# Patient Record
Sex: FEMALE | Race: WHITE | NOT HISPANIC OR LATINO | Employment: OTHER | URBAN - METROPOLITAN AREA
[De-identification: names, ages, dates, MRNs, and addresses within clinical notes are randomized per-mention and may not be internally consistent; named-entity substitution may affect disease eponyms.]

---

## 2017-02-15 ENCOUNTER — HOSPITAL ENCOUNTER (OUTPATIENT)
Dept: FAMILY MEDICINE CLINIC | Facility: CLINIC | Age: 72
Setting detail: SPECIMEN
Discharge: HOME | End: 2017-02-15
Attending: FAMILY MEDICINE | Admitting: FAMILY MEDICINE

## 2017-02-15 LAB
BILIRUB UR QL STRIP: NEGATIVE MG/DL
CASTS URNS QL MICRO: ABNORMAL /[LPF]
COLOR UR: YELLOW
CONV BACTERIA IN URINE MICRO: NEGATIVE
CONV CLARITY OF URINE: CLEAR
CONV HYALINE CASTS IN URINE MICRO: 0 /[LPF] (ref 0–5)
CONV PROTEIN IN URINE BY AUTOMATED TEST STRIP: NEGATIVE MG/DL
CONV SMALL ROUND CELLS: ABNORMAL /[HPF]
CONV UROBILINOGEN IN URINE BY AUTOMATED TEST STRIP: 0.2 MG/DL
CULTURE INDICATED?: ABNORMAL
GLUCOSE UR QL: NEGATIVE MG/DL
HGB UR QL STRIP: ABNORMAL
KETONES UR QL STRIP: NEGATIVE MG/DL
LEUKOCYTE ESTERASE UR QL STRIP: NEGATIVE
NITRITE UR QL STRIP: NEGATIVE
PH UR STRIP.AUTO: 6.5 [PH] (ref 4.5–8)
RBC #/AREA URNS HPF: 5 /[HPF] (ref 0–3)
SP GR UR: 1.01 (ref 1–1.03)
SPERM URNS QL MICRO: ABNORMAL /[HPF]
SQUAMOUS SPT QL MICRO: 0 /[HPF] (ref 0–5)
UNIDENT CRYS URNS QL MICRO: ABNORMAL /[HPF]
WBC #/AREA URNS HPF: 0 /[HPF] (ref 0–5)
YEAST SPEC QL WET PREP: ABNORMAL /[HPF]

## 2017-09-20 ENCOUNTER — HOSPITAL ENCOUNTER (OUTPATIENT)
Dept: FAMILY MEDICINE CLINIC | Facility: CLINIC | Age: 72
Setting detail: SPECIMEN
Discharge: HOME | End: 2017-09-20
Attending: FAMILY MEDICINE | Admitting: FAMILY MEDICINE

## 2017-12-06 ENCOUNTER — HOSPITAL ENCOUNTER (OUTPATIENT)
Dept: FAMILY MEDICINE CLINIC | Facility: CLINIC | Age: 72
Setting detail: SPECIMEN
Discharge: HOME | End: 2017-12-06
Attending: FAMILY MEDICINE | Admitting: FAMILY MEDICINE

## 2018-01-17 ENCOUNTER — HOSPITAL ENCOUNTER (OUTPATIENT)
Dept: CARDIOLOGY | Facility: HOSPITAL | Age: 73
Discharge: HOME | End: 2018-01-17
Attending: INTERNAL MEDICINE | Admitting: INTERNAL MEDICINE

## 2018-01-24 ENCOUNTER — HOSPITAL ENCOUNTER (OUTPATIENT)
Dept: CARDIOLOGY | Facility: HOSPITAL | Age: 73
Discharge: HOME | End: 2018-01-24
Attending: INTERNAL MEDICINE | Admitting: INTERNAL MEDICINE

## 2019-01-11 ENCOUNTER — HOSPITAL ENCOUNTER (OUTPATIENT)
Dept: CARDIOLOGY | Facility: HOSPITAL | Age: 74
Discharge: HOME | End: 2019-01-11
Attending: INTERNAL MEDICINE | Admitting: INTERNAL MEDICINE

## 2019-02-05 ENCOUNTER — HOSPITAL ENCOUNTER (OUTPATIENT)
Dept: FAMILY MEDICINE CLINIC | Facility: CLINIC | Age: 74
Setting detail: SPECIMEN
Discharge: HOME | End: 2019-02-05
Attending: FAMILY MEDICINE | Admitting: FAMILY MEDICINE

## 2019-02-05 LAB
ANION GAP SERPL CALC-SCNC: 16.5 MMOL/L (ref 10–20)
BUN SERPL-MCNC: 24 MG/DL (ref 8–20)
BUN/CREAT SERPL: 15 (ref 5.4–26.2)
CALCIUM SERPL-MCNC: 9 MG/DL (ref 8.9–10.3)
CHLORIDE SERPL-SCNC: 100 MMOL/L (ref 101–111)
CONV CO2: 23 MMOL/L (ref 22–32)
CREAT UR-MCNC: 1.6 MG/DL (ref 0.4–1)
GLUCOSE SERPL-MCNC: 108 MG/DL (ref 65–99)
POTASSIUM SERPL-SCNC: 4.5 MMOL/L (ref 3.6–5.1)
SODIUM SERPL-SCNC: 135 MMOL/L (ref 136–144)

## 2019-07-01 ENCOUNTER — OFFICE VISIT (OUTPATIENT)
Dept: FAMILY MEDICINE CLINIC | Facility: CLINIC | Age: 74
End: 2019-07-01

## 2019-07-01 VITALS
HEIGHT: 63 IN | TEMPERATURE: 97.8 F | BODY MASS INDEX: 28.53 KG/M2 | HEART RATE: 73 BPM | OXYGEN SATURATION: 97 % | RESPIRATION RATE: 14 BRPM | WEIGHT: 161 LBS | DIASTOLIC BLOOD PRESSURE: 87 MMHG | SYSTOLIC BLOOD PRESSURE: 145 MMHG

## 2019-07-01 DIAGNOSIS — I10 ESSENTIAL HYPERTENSION: ICD-10-CM

## 2019-07-01 DIAGNOSIS — F41.9 ANXIETY: Primary | ICD-10-CM

## 2019-07-01 PROBLEM — R32 URINARY INCONTINENCE: Status: ACTIVE | Noted: 2017-07-27

## 2019-07-01 PROBLEM — R89.9 ABNORMAL LABORATORY TEST: Status: ACTIVE | Noted: 2017-09-21

## 2019-07-01 PROBLEM — R89.9 ABNORMAL LABORATORY TEST RESULT: Status: ACTIVE | Noted: 2019-02-05

## 2019-07-01 PROBLEM — Z12.31 VISIT FOR SCREENING MAMMOGRAM: Status: ACTIVE | Noted: 2017-07-27

## 2019-07-01 PROBLEM — M79.643 HAND PAIN: Status: ACTIVE | Noted: 2017-12-06

## 2019-07-01 PROBLEM — M81.0 OSTEOPOROSIS: Status: ACTIVE | Noted: 2019-07-01

## 2019-07-01 PROBLEM — R63.5 WEIGHT GAIN: Status: ACTIVE | Noted: 2017-02-15

## 2019-07-01 PROBLEM — K76.82 ENCEPHALOPATHY, PORTAL SYSTEMIC: Status: ACTIVE | Noted: 2019-07-01

## 2019-07-01 PROBLEM — R10.13 EPIGASTRIC PAIN: Status: ACTIVE | Noted: 2017-09-20

## 2019-07-01 PROBLEM — R10.9 ABDOMINAL CRAMPS: Status: ACTIVE | Noted: 2017-09-20

## 2019-07-01 PROBLEM — R79.89 SERUM CREATININE RAISED: Status: ACTIVE | Noted: 2017-09-21

## 2019-07-01 PROBLEM — N76.0 VAGINITIS AND VULVOVAGINITIS: Status: ACTIVE | Noted: 2018-08-03

## 2019-07-01 PROBLEM — R41.3 OTHER AMNESIA: Status: ACTIVE | Noted: 2019-02-05

## 2019-07-01 PROBLEM — R00.2 PALPITATIONS: Status: ACTIVE | Noted: 2017-12-20

## 2019-07-01 PROBLEM — K21.9 GASTROESOPHAGEAL REFLUX DISEASE: Status: ACTIVE | Noted: 2017-09-20

## 2019-07-01 PROBLEM — E53.8 DEFICIENCY OF OTHER SPECIFIED B GROUP VITAMINS: Status: ACTIVE | Noted: 2019-07-01

## 2019-07-01 PROBLEM — E87.5 HYPERKALEMIA: Status: ACTIVE | Noted: 2019-02-05

## 2019-07-01 PROBLEM — I35.1 AORTIC VALVE REGURGITATION: Status: ACTIVE | Noted: 2018-01-24

## 2019-07-01 PROBLEM — N39.0 URINARY TRACT INFECTION: Status: ACTIVE | Noted: 2017-09-20

## 2019-07-01 PROBLEM — Z91.81 HX OF FALL: Status: ACTIVE | Noted: 2017-12-06

## 2019-07-01 PROBLEM — R06.09 DYSPNEA ON EXERTION: Status: ACTIVE | Noted: 2017-12-20

## 2019-07-01 PROBLEM — R11.0 NAUSEA: Status: ACTIVE | Noted: 2017-09-20

## 2019-07-01 PROBLEM — L98.9 SKIN LESION OF FACE: Status: ACTIVE | Noted: 2017-07-27

## 2019-07-01 PROBLEM — E78.5 HYPERLIPIDEMIA: Status: ACTIVE | Noted: 2019-07-01

## 2019-07-01 PROBLEM — R82.90 ABNORMAL URINE FINDING: Status: ACTIVE | Noted: 2017-02-15

## 2019-07-01 PROBLEM — R42 DIZZINESS: Status: ACTIVE | Noted: 2017-12-20

## 2019-07-01 PROBLEM — K58.9 IRRITABLE BOWEL SYNDROME: Status: ACTIVE | Noted: 2019-07-01

## 2019-07-01 PROBLEM — R51.9 HEADACHE: Status: ACTIVE | Noted: 2017-12-06

## 2019-07-01 PROBLEM — R53.83 FATIGUE: Status: ACTIVE | Noted: 2017-02-15

## 2019-07-01 PROBLEM — Z23 ENCOUNTER FOR IMMUNIZATION: Status: ACTIVE | Noted: 2018-08-03

## 2019-07-01 PROBLEM — R42 INTERMITTENT VERTIGO: Status: ACTIVE | Noted: 2019-02-05

## 2019-07-01 PROBLEM — M54.89 OTHER DORSALGIA: Status: ACTIVE | Noted: 2018-05-25

## 2019-07-01 PROCEDURE — 99213 OFFICE O/P EST LOW 20 MIN: CPT | Performed by: FAMILY MEDICINE

## 2019-07-01 RX ORDER — CLINDAMYCIN HYDROCHLORIDE 300 MG/1
1 CAPSULE ORAL EVERY 8 HOURS
COMMUNITY
Start: 2019-02-05 | End: 2019-08-27

## 2019-07-01 RX ORDER — METOPROLOL SUCCINATE 50 MG/1
50 TABLET, EXTENDED RELEASE ORAL DAILY
Qty: 90 TABLET | Refills: 0 | Status: SHIPPED | OUTPATIENT
Start: 2019-07-01 | End: 2019-08-16 | Stop reason: SDUPTHER

## 2019-07-01 RX ORDER — FENOFIBRATE 145 MG/1
1 TABLET, COATED ORAL EVERY EVENING
COMMUNITY
Start: 2019-06-03 | End: 2020-10-16

## 2019-07-01 RX ORDER — TACROLIMUS 1 MG/1
1 CAPSULE ORAL 2 TIMES DAILY
COMMUNITY
End: 2019-08-27

## 2019-07-01 RX ORDER — DICYCLOMINE HYDROCHLORIDE 10 MG/1
10 CAPSULE ORAL
Qty: 30 CAPSULE | Refills: 0 | Status: SHIPPED | OUTPATIENT
Start: 2019-07-01 | End: 2020-04-22

## 2019-07-01 RX ORDER — TRAMADOL HYDROCHLORIDE 50 MG/1
1 TABLET ORAL DAILY PRN
COMMUNITY
End: 2020-01-07

## 2019-07-01 RX ORDER — SPIRONOLACTONE 25 MG
1 TABLET ORAL DAILY
COMMUNITY

## 2019-07-01 RX ORDER — METOPROLOL SUCCINATE 50 MG/1
1 TABLET, EXTENDED RELEASE ORAL DAILY
COMMUNITY
Start: 2019-05-27 | End: 2019-07-01 | Stop reason: SDUPTHER

## 2019-07-01 RX ORDER — PANTOPRAZOLE SODIUM 40 MG/1
1 TABLET, DELAYED RELEASE ORAL DAILY
COMMUNITY
Start: 2017-01-31 | End: 2019-07-01 | Stop reason: SDUPTHER

## 2019-07-01 RX ORDER — VITAMIN E 268 MG
400 CAPSULE ORAL DAILY
COMMUNITY
End: 2022-05-06

## 2019-07-01 RX ORDER — ACETAMINOPHEN, ASPIRIN AND CAFFEINE 250; 250; 65 MG/1; MG/1; MG/1
1 TABLET, FILM COATED ORAL EVERY 6 HOURS PRN
COMMUNITY
End: 2022-12-07 | Stop reason: ALTCHOICE

## 2019-07-01 RX ORDER — BUSPIRONE HYDROCHLORIDE 15 MG/1
0.5 TABLET ORAL 2 TIMES DAILY
COMMUNITY
Start: 2016-03-14 | End: 2019-07-01 | Stop reason: SDUPTHER

## 2019-07-01 RX ORDER — ONDANSETRON 4 MG/1
1 TABLET, FILM COATED ORAL EVERY 8 HOURS
COMMUNITY
Start: 2017-09-20 | End: 2019-08-27 | Stop reason: SDUPTHER

## 2019-07-01 RX ORDER — LACTOBACILLUS RHAMNOSUS GG 10B CELL
2 CAPSULE ORAL DAILY
COMMUNITY
Start: 2018-05-25 | End: 2021-05-14

## 2019-07-01 RX ORDER — MECLIZINE HCL 12.5 MG/1
1 TABLET ORAL EVERY 6 HOURS PRN
COMMUNITY
Start: 2017-02-15 | End: 2021-03-16 | Stop reason: SDUPTHER

## 2019-07-01 RX ORDER — DIPHENHYDRAMINE HCL 25 MG
25 TABLET ORAL EVERY 6 HOURS PRN
COMMUNITY

## 2019-07-01 RX ORDER — BACLOFEN 10 MG/1
0.5 TABLET ORAL NIGHTLY PRN
COMMUNITY
Start: 2018-01-03 | End: 2019-07-01 | Stop reason: SDUPTHER

## 2019-07-01 RX ORDER — PHENYLEPHRINE HCL 10 MG/1
10 TABLET, FILM COATED ORAL EVERY 4 HOURS PRN
COMMUNITY
End: 2019-08-27

## 2019-07-01 RX ORDER — BACLOFEN 10 MG/1
5 TABLET ORAL 2 TIMES DAILY PRN
Qty: 30 TABLET | Refills: 1 | Status: SHIPPED | OUTPATIENT
Start: 2019-07-01 | End: 2019-12-16 | Stop reason: SDUPTHER

## 2019-07-01 RX ORDER — BUSPIRONE HYDROCHLORIDE 5 MG/1
2.5 TABLET ORAL 2 TIMES DAILY
Qty: 30 TABLET | Refills: 0 | Status: SHIPPED | OUTPATIENT
Start: 2019-07-01 | End: 2019-08-27 | Stop reason: SINTOL

## 2019-07-01 NOTE — PROGRESS NOTES
Called both of her #'s but no vm on either on  Subjective   Christal Romo is a 74 y.o. female.     Chief Complaint   Patient presents with   • Hypertension   • Anxiety         Current Outpatient Medications:   •  aspirin 81 MG tablet, Take 1 tablet by mouth Daily., Disp: , Rfl:   •  aspirin-acetaminophen-caffeine (EXCEDRIN MIGRAINE) 250-250-65 MG per tablet, Take 1 tablet by mouth Every 6 (Six) Hours As Needed for Headache., Disp: , Rfl:   •  baclofen (LIORESAL) 10 MG tablet, Take 0.5 tablets by mouth 2 (Two) Times a Day As Needed for Muscle Spasms., Disp: 30 tablet, Rfl: 1  •  busPIRone (BUSPAR) 5 MG tablet, Take 0.5 tablets by mouth 2 (Two) Times a Day., Disp: 30 tablet, Rfl: 0  •  clindamycin (CLEOCIN) 300 MG capsule, Take 1 capsule by mouth Every 8 (Eight) Hours. Prn dental procedure, Disp: , Rfl:   •  diphenhydrAMINE (BENADRYL) 25 MG tablet, Take 25 mg by mouth Every 6 (Six) Hours As Needed for Itching., Disp: , Rfl:   •  fenofibrate (TRICOR) 145 MG tablet, Take 1 tablet by mouth Every Evening., Disp: , Rfl:   •  Lutein 20 MG capsule, Take 1 tablet by mouth Daily., Disp: , Rfl:   •  meclizine (ANTIVERT) 12.5 MG tablet, Take 1 tablet by mouth Every 6 (Six) Hours As Needed., Disp: , Rfl:   •  metoprolol succinate XL (TOPROL-XL) 50 MG 24 hr tablet, Take 1 tablet by mouth Daily., Disp: 90 tablet, Rfl: 0  •  ondansetron (ZOFRAN) 4 MG tablet, Take 1 tablet by mouth Every 8 (Eight) Hours. prn, Disp: , Rfl:   •  phenylephrine (SUDAFED PE CONGESTION) 10 MG tablet, Take 10 mg by mouth Every 4 (Four) Hours As Needed for Congestion., Disp: , Rfl:   •  Probiotic Product (CULTURELLE PRO-WELL) capsule, Take 2 capsules by mouth Daily., Disp: , Rfl:   •  tacrolimus (PROGRAF) 1 MG capsule, Take 1 mg by mouth 2 (Two) Times a Day., Disp: , Rfl:   •  traMADol (ULTRAM) 50 MG tablet, Take 1 tablet by mouth Daily As Needed., Disp: , Rfl:   •  vitamin E 400 UNIT capsule, Take 400 Units by mouth 2 (Two) Times a Day., Disp: , Rfl:   •  dicyclomine (BENTYL) 10 MG capsule, Take 1  capsule by mouth 4 (Four) Times a Day Before Meals & at Bedtime As Needed (for abd bloating/ cramping)., Disp: 30 capsule, Rfl: 0    Past Medical History:   Diagnosis Date   • Anxiety    • B12 deficiency    • Depression    • Gastritis    • GERD (gastroesophageal reflux disease)    • H/O diagnostic tests     Dexa    • Hyperlipidemia    • Hypertension    • Insulin resistance    • Liver disease     s/p Transplant   • 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 - Dr Ritter [facility]; CBC/Prograf/CMP/LIPIDS/A1C   • Meniere's disease    • OAB (overactive bladder)    • Osteoporosis    • PUD (peptic ulcer disease)        Past Surgical History:   Procedure Laterality Date   • BREAST LUMPECTOMY Left     Benign   • COLONOSCOPY  2012    Polyps -  rech     • LIVER TRANSPLANTATION      [facility]   • MAMMO BILATERAL      Neg-    • OTHER SURGICAL HISTORY  2014    Liver Stent   • STEROID INJECTION KNEE      Knee Cortosone Injections   • TOTAL ABDOMINAL HYSTERECTOMY         Family History   Problem Relation Age of Onset   • Diabetes Sister         Type II       Social History     Socioeconomic History   • Marital status:      Spouse name: Not on file   • Number of children: Not on file   • Years of education: Not on file   • Highest education level: Not on file   Tobacco Use   • Smoking status: Former Smoker     Last attempt to quit:      Years since quittin.5   • Smokeless tobacco: Never Used   • Tobacco comment: Quit .    Substance and Sexual Activity   • Alcohol use: No     Frequency: Never   • Drug use: No   • Sexual activity: Defer       73 y/o C female here for f/u on mult conditions and med review    Pt stressed w/ the upcoming CABG Sx for her  and the Prograf med shortage for her liver transplant         The following portions of the patient's history were reviewed and updated as appropriate: allergies, current medications, past  family history, past medical history, past social history, past surgical history and problem list.    Review of Systems   Constitutional: Negative for activity change, fatigue and unexpected weight gain.   Respiratory: Negative for cough, chest tightness and shortness of breath.    Cardiovascular: Negative for chest pain, palpitations and leg swelling.   Gastrointestinal: Positive for abdominal distention (abd bloating) and diarrhea. Negative for abdominal pain.   Genitourinary: Negative for urinary incontinence, frequency and urgency.   Musculoskeletal: Negative for arthralgias and myalgias.   Neurological: Negative for dizziness, facial asymmetry, speech difficulty, weakness, light-headedness, headache, memory problem and confusion.   Psychiatric/Behavioral: The patient is nervous/anxious (only using buspar prn).        Vitals:    07/01/19 1357   BP: 145/87   Pulse: 73   Resp: 14   Temp: 97.8 °F (36.6 °C)   SpO2: 97%       Objective   Physical Exam   Constitutional: She is oriented to person, place, and time. She appears well-developed and well-nourished.   HENT:   Head: Normocephalic and atraumatic.   Neck: Normal range of motion. Neck supple.   Cardiovascular: Normal rate, regular rhythm, normal heart sounds and intact distal pulses.   No murmur heard.  Pulmonary/Chest: Effort normal and breath sounds normal.   Musculoskeletal: She exhibits no edema.   Neurological: She is alert and oriented to person, place, and time.   Skin: Skin is warm and dry. No rash noted.   Psychiatric: She has a normal mood and affect. Her behavior is normal. Judgment and thought content normal.   Nursing note and vitals reviewed.        Assessment/Plan   Christal was seen today for hypertension and anxiety.    Diagnoses and all orders for this visit:    Anxiety    Essential hypertension    Other orders  -     busPIRone (BUSPAR) 5 MG tablet; Take 0.5 tablets by mouth 2 (Two) Times a Day.  -     metoprolol succinate XL (TOPROL-XL) 50 MG 24  hr tablet; Take 1 tablet by mouth Daily.  -     dicyclomine (BENTYL) 10 MG capsule; Take 1 capsule by mouth 4 (Four) Times a Day Before Meals & at Bedtime As Needed (for abd bloating/ cramping).  -     baclofen (LIORESAL) 10 MG tablet; Take 0.5 tablets by mouth 2 (Two) Times a Day As Needed for Muscle Spasms.    goto bid buspar ---not prn  Trial of prn bentyl     Get recent labs from [facility] (transplant labs) for review

## 2019-08-16 RX ORDER — METOPROLOL SUCCINATE 50 MG/1
50 TABLET, EXTENDED RELEASE ORAL DAILY
Qty: 90 TABLET | Refills: 0 | Status: SHIPPED | OUTPATIENT
Start: 2019-08-16 | End: 2019-10-09 | Stop reason: SDUPTHER

## 2019-08-27 ENCOUNTER — OFFICE VISIT (OUTPATIENT)
Dept: FAMILY MEDICINE CLINIC | Facility: CLINIC | Age: 74
End: 2019-08-27

## 2019-08-27 VITALS
DIASTOLIC BLOOD PRESSURE: 80 MMHG | BODY MASS INDEX: 28.17 KG/M2 | HEIGHT: 63 IN | OXYGEN SATURATION: 99 % | RESPIRATION RATE: 18 BRPM | HEART RATE: 72 BPM | WEIGHT: 159 LBS | SYSTOLIC BLOOD PRESSURE: 175 MMHG | TEMPERATURE: 98.1 F

## 2019-08-27 DIAGNOSIS — I10 ESSENTIAL HYPERTENSION: ICD-10-CM

## 2019-08-27 DIAGNOSIS — L91.8 SKIN TAGS, MULTIPLE ACQUIRED: Primary | ICD-10-CM

## 2019-08-27 PROCEDURE — 99213 OFFICE O/P EST LOW 20 MIN: CPT | Performed by: FAMILY MEDICINE

## 2019-08-27 RX ORDER — BUSPIRONE HYDROCHLORIDE 5 MG/1
TABLET ORAL
Start: 2019-08-27 | End: 2020-01-07

## 2019-08-27 RX ORDER — ONDANSETRON 4 MG/1
4 TABLET, FILM COATED ORAL EVERY 8 HOURS PRN
Qty: 90 TABLET | Refills: 0 | Status: SHIPPED | OUTPATIENT
Start: 2019-08-27 | End: 2020-04-22

## 2019-08-27 RX ORDER — RANITIDINE 150 MG/1
150 CAPSULE ORAL 2 TIMES DAILY
Qty: 60 CAPSULE | Refills: 3 | Status: SHIPPED | OUTPATIENT
Start: 2019-08-27 | End: 2020-02-10

## 2019-09-26 ENCOUNTER — DOCUMENTATION (OUTPATIENT)
Dept: FAMILY MEDICINE CLINIC | Facility: CLINIC | Age: 74
End: 2019-09-26

## 2019-09-26 RX ORDER — PREDNISONE 10 MG/1
10 TABLET ORAL DAILY
COMMUNITY
End: 2020-03-03

## 2019-09-26 RX ORDER — MYCOPHENOLIC ACID 360 MG/1
360 TABLET, DELAYED RELEASE ORAL 2 TIMES DAILY
COMMUNITY
End: 2020-08-04

## 2019-10-09 ENCOUNTER — TELEPHONE (OUTPATIENT)
Dept: FAMILY MEDICINE CLINIC | Facility: CLINIC | Age: 74
End: 2019-10-09

## 2019-10-09 RX ORDER — METOPROLOL SUCCINATE 50 MG/1
50 TABLET, EXTENDED RELEASE ORAL DAILY
Qty: 90 TABLET | Refills: 0 | Status: SHIPPED | OUTPATIENT
Start: 2019-10-09 | End: 2019-10-19

## 2019-10-09 NOTE — TELEPHONE ENCOUNTER
PT NEEDS RX REFILLED.  IT'S HER BP MED/METOPROLOM SUCC 50 MILLIGRAM.[employer] ON 62 PAT IS NOT OUT YET BUT WILL BE IN A WEEK OR 2 BM

## 2019-10-14 ENCOUNTER — TELEPHONE (OUTPATIENT)
Dept: FAMILY MEDICINE CLINIC | Facility: CLINIC | Age: 74
End: 2019-10-14

## 2019-10-14 NOTE — TELEPHONE ENCOUNTER
Patient called stating that the Ranitidine that she was taking was one of the ones that is part of the recall and can cause cancer and is wanting to know what she should do.

## 2019-10-16 NOTE — TELEPHONE ENCOUNTER
Can try otc magnesium 250mg qday to see if helps but if doesn't, it is poss RLS and may need iron studies done before poss Rx

## 2019-10-16 NOTE — TELEPHONE ENCOUNTER
Pt informed. She has stopped taking it.     She is having a lot of muscle cramps in legs and says the Bacoflen does not seem to be helping. Any recs?     Had Oct labs done @ [facility] - in chart

## 2019-10-18 ENCOUNTER — TELEPHONE (OUTPATIENT)
Dept: FAMILY MEDICINE CLINIC | Facility: CLINIC | Age: 74
End: 2019-10-18

## 2019-10-18 NOTE — TELEPHONE ENCOUNTER
Patient called stating that when she was in last time  told her to take the metoprolol 1 and 1/2 daily because her BP may be elevated because she is dealing with the loss of her . She needs the medication updated in her chart and sent in to the [facility] in [place] as she is almost out of the med.

## 2019-10-19 RX ORDER — METOPROLOL SUCCINATE 50 MG/1
75 TABLET, EXTENDED RELEASE ORAL DAILY
Qty: 135 TABLET | Refills: 0 | Status: SHIPPED | OUTPATIENT
Start: 2019-10-19 | End: 2020-03-03 | Stop reason: SDUPTHER

## 2019-12-10 ENCOUNTER — TELEPHONE (OUTPATIENT)
Dept: FAMILY MEDICINE CLINIC | Facility: CLINIC | Age: 74
End: 2019-12-10

## 2019-12-10 DIAGNOSIS — Z12.31 VISIT FOR SCREENING MAMMOGRAM: Primary | ICD-10-CM

## 2019-12-10 NOTE — TELEPHONE ENCOUNTER
Patient is requesting an order for a screening mammogram be put in for [facility].

## 2019-12-17 RX ORDER — BACLOFEN 10 MG/1
TABLET ORAL
Qty: 30 TABLET | Refills: 0 | Status: SHIPPED | OUTPATIENT
Start: 2019-12-17 | End: 2020-03-03 | Stop reason: SDUPTHER

## 2020-01-07 ENCOUNTER — OFFICE VISIT (OUTPATIENT)
Dept: FAMILY MEDICINE CLINIC | Facility: CLINIC | Age: 75
End: 2020-01-07

## 2020-01-07 VITALS
HEIGHT: 63 IN | TEMPERATURE: 97.5 F | SYSTOLIC BLOOD PRESSURE: 178 MMHG | BODY MASS INDEX: 28.35 KG/M2 | OXYGEN SATURATION: 98 % | WEIGHT: 160 LBS | DIASTOLIC BLOOD PRESSURE: 84 MMHG | HEART RATE: 76 BPM

## 2020-01-07 DIAGNOSIS — M54.2 CERVICALGIA: ICD-10-CM

## 2020-01-07 DIAGNOSIS — I10 ELEVATED BLOOD PRESSURE READING IN OFFICE WITH DIAGNOSIS OF HYPERTENSION: Primary | ICD-10-CM

## 2020-01-07 DIAGNOSIS — R35.0 URINARY FREQUENCY: ICD-10-CM

## 2020-01-07 DIAGNOSIS — D69.6 THROMBOCYTOPENIA (HCC): ICD-10-CM

## 2020-01-07 DIAGNOSIS — Z94.4 STATUS POST LIVER TRANSPLANTATION (HCC): ICD-10-CM

## 2020-01-07 DIAGNOSIS — F41.9 ANXIETY: ICD-10-CM

## 2020-01-07 LAB
BILIRUB BLD-MCNC: NEGATIVE MG/DL
CLARITY, POC: CLEAR
COLOR UR: YELLOW
GLUCOSE UR STRIP-MCNC: NEGATIVE MG/DL
KETONES UR QL: NEGATIVE
LEUKOCYTE EST, POC: NEGATIVE
NITRITE UR-MCNC: NEGATIVE MG/ML
PH UR: 7.5 [PH] (ref 5–8)
PROT UR STRIP-MCNC: NEGATIVE MG/DL
RBC # UR STRIP: NEGATIVE /UL
SP GR UR: 1.01 (ref 1–1.03)
UROBILINOGEN UR QL: NORMAL

## 2020-01-07 PROCEDURE — 81003 URINALYSIS AUTO W/O SCOPE: CPT | Performed by: FAMILY MEDICINE

## 2020-01-07 PROCEDURE — 99214 OFFICE O/P EST MOD 30 MIN: CPT | Performed by: FAMILY MEDICINE

## 2020-01-07 RX ORDER — FAMOTIDINE 10 MG
10 TABLET ORAL DAILY
COMMUNITY
End: 2020-02-10

## 2020-01-07 RX ORDER — TACROLIMUS 1 MG/1
2 CAPSULE, GELATIN COATED ORAL 2 TIMES DAILY
Qty: 120 CAPSULE
Start: 2020-01-07 | End: 2020-03-03

## 2020-01-07 RX ORDER — OXYCODONE HYDROCHLORIDE 5 MG/1
TABLET ORAL
Qty: 14 TABLET | Refills: 0 | Status: SHIPPED | OUTPATIENT
Start: 2020-01-07 | End: 2020-09-17 | Stop reason: SINTOL

## 2020-01-07 RX ORDER — SOLIFENACIN SUCCINATE 5 MG/1
5 TABLET, FILM COATED ORAL NIGHTLY
Qty: 30 TABLET | Refills: 0 | Status: SHIPPED | OUTPATIENT
Start: 2020-01-07 | End: 2020-09-17

## 2020-01-07 RX ORDER — PHENYLEPHRINE HCL 10 MG/1
10 TABLET, FILM COATED ORAL EVERY 4 HOURS PRN
COMMUNITY
End: 2020-02-10

## 2020-01-07 RX ORDER — SERTRALINE HYDROCHLORIDE 25 MG/1
25 TABLET, FILM COATED ORAL NIGHTLY
Qty: 30 TABLET | Refills: 1 | Status: SHIPPED | OUTPATIENT
Start: 2020-01-07 | End: 2020-03-03

## 2020-02-05 ENCOUNTER — TELEPHONE (OUTPATIENT)
Dept: FAMILY MEDICINE CLINIC | Facility: CLINIC | Age: 75
End: 2020-02-05

## 2020-02-05 RX ORDER — ICOSAPENT ETHYL 1000 MG/1
2 CAPSULE ORAL 2 TIMES DAILY WITH MEALS
Qty: 120 CAPSULE | Refills: 1 | Status: SHIPPED | OUTPATIENT
Start: 2020-02-05 | End: 2020-02-14 | Stop reason: SDUPTHER

## 2020-02-09 PROBLEM — K76.82 ENCEPHALOPATHY, PORTAL SYSTEMIC: Status: RESOLVED | Noted: 2019-07-01 | Resolved: 2020-02-09

## 2020-02-09 PROBLEM — K27.9 PEPTIC ULCER DISEASE: Status: ACTIVE | Noted: 2020-02-09

## 2020-02-09 PROBLEM — I35.1 AORTIC VALVE REGURGITATION: Status: RESOLVED | Noted: 2018-01-24 | Resolved: 2020-02-09

## 2020-02-10 ENCOUNTER — OFFICE VISIT (OUTPATIENT)
Dept: FAMILY MEDICINE CLINIC | Facility: CLINIC | Age: 75
End: 2020-02-10

## 2020-02-10 VITALS
SYSTOLIC BLOOD PRESSURE: 161 MMHG | DIASTOLIC BLOOD PRESSURE: 68 MMHG | BODY MASS INDEX: 28.17 KG/M2 | HEIGHT: 63 IN | RESPIRATION RATE: 18 BRPM | HEART RATE: 73 BPM | TEMPERATURE: 98.2 F | OXYGEN SATURATION: 97 % | WEIGHT: 159 LBS

## 2020-02-10 DIAGNOSIS — I85.00 ESOPHAGEAL VARICES DETERMINED BY ENDOSCOPY (HCC): ICD-10-CM

## 2020-02-10 DIAGNOSIS — M81.0 AGE-RELATED OSTEOPOROSIS WITHOUT CURRENT PATHOLOGICAL FRACTURE: ICD-10-CM

## 2020-02-10 DIAGNOSIS — J06.9 ACUTE URI: Primary | ICD-10-CM

## 2020-02-10 DIAGNOSIS — Z94.4 STATUS POST LIVER TRANSPLANTATION (HCC): ICD-10-CM

## 2020-02-10 DIAGNOSIS — Z86.010 HISTORY OF COLON POLYPS: ICD-10-CM

## 2020-02-10 DIAGNOSIS — R42 VERTIGO: ICD-10-CM

## 2020-02-10 PROCEDURE — 99214 OFFICE O/P EST MOD 30 MIN: CPT | Performed by: FAMILY MEDICINE

## 2020-02-10 RX ORDER — IBANDRONATE SODIUM 150 MG/1
150 TABLET, FILM COATED ORAL
Qty: 3 TABLET | Refills: 4 | Status: SHIPPED | OUTPATIENT
Start: 2020-02-10 | End: 2022-04-13 | Stop reason: ALTCHOICE

## 2020-02-10 RX ORDER — FAMOTIDINE 20 MG/1
40 TABLET, FILM COATED ORAL
Start: 2020-02-10 | End: 2020-08-04

## 2020-02-10 NOTE — PROGRESS NOTES
Subjective   Christal Romo is a 75 y.o. female.     Chief Complaint   Patient presents with   • Follow-up     need GI referrals p/ transplant team   • Abnormal Lab     Creatinine levels         Current Outpatient Medications:   •  aspirin 81 MG tablet, Take 1 tablet by mouth Daily., Disp: , Rfl:   •  aspirin-acetaminophen-caffeine (EXCEDRIN MIGRAINE) 250-250-65 MG per tablet, Take 1 tablet by mouth Every 6 (Six) Hours As Needed for Headache., Disp: , Rfl:   •  baclofen (LIORESAL) 10 MG tablet, TAKE ONE-HALF TABLET BY MOUTH TWICE A DAY AS NEEDED FOR MUSCLE SPASM, Disp: 30 tablet, Rfl: 0  •  dicyclomine (BENTYL) 10 MG capsule, Take 1 capsule by mouth 4 (Four) Times a Day Before Meals & at Bedtime As Needed (for abd bloating/ cramping)., Disp: 30 capsule, Rfl: 0  •  diphenhydrAMINE (BENADRYL) 25 MG tablet, Take 25 mg by mouth Every 6 (Six) Hours As Needed for Itching., Disp: , Rfl:   •  fenofibrate (TRICOR) 145 MG tablet, Take 1 tablet by mouth Every Evening., Disp: , Rfl:   •  Lutein 20 MG capsule, Take 1 tablet by mouth Daily., Disp: , Rfl:   •  meclizine (ANTIVERT) 12.5 MG tablet, Take 1 tablet by mouth Every 6 (Six) Hours As Needed., Disp: , Rfl:   •  metoprolol succinate XL (TOPROL-XL) 50 MG 24 hr tablet, Take 1.5 tablets by mouth Daily., Disp: 135 tablet, Rfl: 0  •  mycophenolate (MYFORTIC) 180 MG EC tablet, Take 360 mg by mouth 2 (Two) Times a Day., Disp: , Rfl:   •  ondansetron (ZOFRAN) 4 MG tablet, Take 1 tablet by mouth Every 8 (Eight) Hours As Needed for Nausea or Vomiting. prn, Disp: 90 tablet, Rfl: 0  •  oxyCODONE (ROXICODONE) 5 MG immediate release tablet, 1/2 - 1 po bid prn severe pain, Disp: 14 tablet, Rfl: 0  •  predniSONE (DELTASONE) 10 MG tablet, Take 10 mg by mouth Daily., Disp: , Rfl:   •  Probiotic Product (CULTURELLE PRO-WELL) capsule, Take 2 capsules by mouth Daily., Disp: , Rfl:   •  PROGRAF 1 MG capsule, Take 2 capsules by mouth 2 (Two) Times a Day., Disp: 120 capsule, Rfl:   •  solifenacin  (VESICARE) 5 MG tablet, Take 1 tablet by mouth Every Night., Disp: 30 tablet, Rfl: 0  •  vitamin E 400 UNIT capsule, Take 400 Units by mouth 2 (Two) Times a Day., Disp: , Rfl:   •  famotidine (PEPCID) 20 MG tablet, Take 2 tablets by mouth Daily With Breakfast., Disp: , Rfl:   •  ibandronate (BONIVA) 150 MG tablet, Take 1 tablet by mouth Every 30 (Thirty) Days., Disp: 3 tablet, Rfl: 4  •  icosapent ethyl (VASCEPA) 1 g capsule capsule, Take 2 g by mouth 2 (Two) Times a Day With Meals., Disp: 120 capsule, Rfl: 1  •  sertraline (ZOLOFT) 25 MG tablet, Take 1 tablet by mouth Every Night., Disp: 30 tablet, Rfl: 1    Past Medical History:   Diagnosis Date   • Anxiety    • B12 deficiency    • Depression    • Gastritis    • GERD (gastroesophageal reflux disease)    • H/O diagnostic tests     Dexa 2012/2017   • Hyperlipidemia    • Hypertension    • Insulin resistance    • Liver disease     s/p Transplant   • 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 - Dr Stone Stevens; CBC/Prograf/CMP/LIPIDS/A1C   • Meniere's disease    • OAB (overactive bladder)    • Osteoporosis    • PUD (peptic ulcer disease)        Past Surgical History:   Procedure Laterality Date   • BREAST LUMPECTOMY Left     Benign   • COLONOSCOPY  2012    Polyps = 2012/ 2015 rech 2020    • LIVER TRANSPLANTATION      [facility]   • MAMMO BILATERAL      Neg- 2017/2018   • OTHER SURGICAL HISTORY  2014    Liver Stent   • STEROID INJECTION KNEE      Knee Cortosone Injections   • TOTAL ABDOMINAL HYSTERECTOMY  1979       Family History   Problem Relation Age of Onset   • Diabetes Sister         Type II   • Heart disease Sister    • Hypertension Sister    • No Known Problems Brother        Social History     Socioeconomic History   • Marital status:      Spouse name: Not on file   • Number of children: Not on file   • Years of education: Not on file   • Highest education level: Not on file   Tobacco Use   • Smoking status: Never  Smoker   • Smokeless tobacco: Never Used   • Tobacco comment: Quit 2002.    Substance and Sexual Activity   • Alcohol use: No     Frequency: Never     Comment: very rarely    • Drug use: No   • Sexual activity: Defer       76 y/o C female here to f/u on recent Transplant Clinic Labs.....    pt is worried about her CRI and feels she may have a sinus infection and having some vertigo ----  Pt wanting to go to [facility] for vestibular rehab  Pt also supposed to see GI and needing referral  Pt has had 2 shots in her neck by Dr Hadley @ [facility] but not seeming to help enough       The following portions of the patient's history were reviewed and updated as appropriate: allergies, current medications, past family history, past medical history, past social history, past surgical history and problem list.    Review of Systems   Constitutional: Negative for chills, fatigue and fever.   HENT: Positive for congestion, rhinorrhea and sinus pressure. Negative for ear discharge, ear pain, postnasal drip, sneezing, sore throat and swollen glands.    Eyes: Negative for pain, discharge, redness, itching and visual disturbance.   Respiratory: Negative for cough, shortness of breath, wheezing and stridor.    Skin: Negative for rash.   Allergic/Immunologic: Negative for environmental allergies.   Neurological: Positive for dizziness.   Psychiatric/Behavioral: Positive for dysphoric mood. Negative for sleep disturbance.       Vitals:    02/10/20 1422   BP: 161/68   Pulse: 73   Resp: 18   Temp: 98.2 °F (36.8 °C)   SpO2: 97%       Objective   Physical Exam   Constitutional: She is oriented to person, place, and time. She appears well-developed and well-nourished. No distress.   HENT:   Head: Normocephalic and atraumatic.   Right Ear: External ear normal.   Left Ear: External ear normal.   Nose: Nose normal.   Mouth/Throat: Oropharynx is clear and moist. No oropharyngeal exudate.   Eyes: Pupils are equal, round, and reactive to light. Conjunctivae  and EOM are normal. Right eye exhibits no discharge. Left eye exhibits no discharge. No scleral icterus.   Neck: Normal range of motion. Neck supple. No thyromegaly present.   Cardiovascular: Normal rate, regular rhythm, normal heart sounds and intact distal pulses.   No murmur heard.  Pulmonary/Chest: Effort normal and breath sounds normal. No respiratory distress. She has no wheezes. She has no rales.   Lymphadenopathy:     She has no cervical adenopathy.   Neurological: She is alert and oriented to person, place, and time. No cranial nerve deficit.   Skin: Skin is warm and dry. No rash noted. She is not diaphoretic. No erythema. No pallor.   Psychiatric: She has a normal mood and affect. Her behavior is normal. Judgment and thought content normal.   Nursing note and vitals reviewed.        Assessment/Plan   Christal was seen today for follow-up and abnormal lab.    Diagnoses and all orders for this visit:    Acute URI    Vertigo  -     Ambulatory Referral to Physical Therapy Vestibular    Esophageal varices determined by endoscopy (CMS/[facility])  -     Ambulatory Referral to Gastroenterology    Status post liver transplantation (CMS/[facility])  -     Ambulatory Referral to Gastroenterology    History of colon polyps  -     Ambulatory Referral to Gastroenterology    Age-related osteoporosis without current pathological fracture    Other orders  -     ibandronate (BONIVA) 150 MG tablet; Take 1 tablet by mouth Every 30 (Thirty) Days.  -     famotidine (PEPCID) 20 MG tablet; Take 2 tablets by mouth Daily With Breakfast.    otc sympt uri tx

## 2020-02-14 ENCOUNTER — TELEPHONE (OUTPATIENT)
Dept: FAMILY MEDICINE CLINIC | Facility: CLINIC | Age: 75
End: 2020-02-14

## 2020-02-14 RX ORDER — ICOSAPENT ETHYL 1000 MG/1
2 CAPSULE ORAL 2 TIMES DAILY WITH MEALS
Qty: 120 CAPSULE | Refills: 1 | Status: SHIPPED | OUTPATIENT
Start: 2020-02-14 | End: 2020-07-15

## 2020-02-14 NOTE — TELEPHONE ENCOUNTER
Patient states that the Vasepa cost too much at [facility] and wants to know if you can sent it to [facility] in [place] to see if its will be any cheaper there.

## 2020-02-14 NOTE — TELEPHONE ENCOUNTER
Pt states the Vascepa was $300 for 1 mon. She has fish oil 1 gram otc at home that she can take 2 tabs instead? She just can't afford that every mon. Please advise.

## 2020-02-17 NOTE — TELEPHONE ENCOUNTER
Attempted mult times to reach pt    Cell # - not accepting calls.   Home # - states # has changed

## 2020-02-19 ENCOUNTER — OFFICE VISIT (OUTPATIENT)
Dept: CARDIOLOGY | Facility: CLINIC | Age: 75
End: 2020-02-19

## 2020-02-19 VITALS
HEART RATE: 70 BPM | DIASTOLIC BLOOD PRESSURE: 86 MMHG | OXYGEN SATURATION: 98 % | HEIGHT: 63 IN | SYSTOLIC BLOOD PRESSURE: 164 MMHG | WEIGHT: 160.6 LBS | BODY MASS INDEX: 28.46 KG/M2

## 2020-02-19 DIAGNOSIS — I10 ESSENTIAL HYPERTENSION: ICD-10-CM

## 2020-02-19 DIAGNOSIS — E78.2 MIXED HYPERLIPIDEMIA: Primary | ICD-10-CM

## 2020-02-19 DIAGNOSIS — Z94.4 STATUS POST LIVER TRANSPLANTATION (HCC): ICD-10-CM

## 2020-02-19 DIAGNOSIS — K74.60 LIVER CIRRHOSIS SECONDARY TO NASH (HCC): ICD-10-CM

## 2020-02-19 DIAGNOSIS — K75.81 LIVER CIRRHOSIS SECONDARY TO NASH (HCC): ICD-10-CM

## 2020-02-19 DIAGNOSIS — R00.2 PALPITATIONS: ICD-10-CM

## 2020-02-19 DIAGNOSIS — R09.89 CAROTID BRUIT, UNSPECIFIED LATERALITY: ICD-10-CM

## 2020-02-19 PROCEDURE — 93000 ELECTROCARDIOGRAM COMPLETE: CPT | Performed by: INTERNAL MEDICINE

## 2020-02-19 PROCEDURE — 99214 OFFICE O/P EST MOD 30 MIN: CPT | Performed by: INTERNAL MEDICINE

## 2020-02-19 RX ORDER — BUSPIRONE HYDROCHLORIDE 15 MG/1
15 TABLET ORAL NIGHTLY
COMMUNITY
End: 2020-03-03 | Stop reason: SDUPTHER

## 2020-02-19 NOTE — PROGRESS NOTES
Cardiology Office Visit      Encounter Date:  02/19/2020    Patient ID:   Christal Romo is a 75 y.o. female.    Reason For Followup:  Aortic insufficiency  Hypertension    Brief Clinical History:  Dear Saar Espino, DO    I had the pleasure of seeing Christal Romo today. As you are well aware, this is a 75 y.o. female with no known history of ischemic heart disease.  She does have a history of nonalcoholic steatohepatitis and liver cirrhosis and is status post liver transplant.  She has additional history that includes hypertension, palpitations, and aortic insufficiency. She presents today for follow-up on the above conditions.    Interval History:  She denies any shortness of breath.  She denies any PND orthopnea.    She denies any PND orthopnea.  She denies any syncope or near syncope.  She reports feeling well from a cardiac perspective.    Unfortunately, as I am sure you are aware, her  passed away last summer and she is still struggling with this loss.  She is tearful and emotional in the office today.  She is having a significant amount of neck discomfort which I feel may be affecting her blood pressure as well.  She reports that she was originally going to use Vascepa however her insurance would not cover.  We will work on prior authorization or medication assistance.    She has sold her home and has moved into a senior community and is making adjustments.  She is closer to her children in this facility.    Assessment & Plan    Impressions:  Hypertension  Palpitations.  History of abnormal troponin secondary to mouse antigen interaction with troponin assay reagent.  Cirrhosis status post liver transplant 2016  Poor dentition.  Aortic insufficiency  Hyperlipidemia with a preponderance of hypertriglyceridemia    Recommendations:  Samples of Vascepa 1 mg twice daily were given  Continue to monitor blood pressure notify if persistently elevated  Will work on prior authorization/medication  "assistance  Follow-up in 6 months time sooner should there be difficulties.    Objective:    Vitals:  Vitals:    02/19/20 1315   BP: 164/86   BP Location: Left arm   Pulse: 70   SpO2: 98%   Weight: 72.8 kg (160 lb 9.6 oz)   Height: 160 cm (63\")       Physical Exam:    General: Alert, cooperative, no distress, appears stated age  Head:  Normocephalic, atraumatic, mucous membranes moist  Eyes:  Conjunctiva/corneas clear, EOM's intact     Neck:  Supple, bruit noted  Lungs: Clear to auscultation bilaterally, no wheezes rhonchi rales are noted  Chest wall: No tenderness  Heart::  Regular rate and rhythm, S1 and S2 normal, 1/6 holosystolic murmur.  Rub or gallop  Abdomen: Soft, non-tender, nondistended bowel sounds active  Extremities: No cyanosis, clubbing, or edema  Pulses: 2+ and symmetric all extremities  Skin:  No rashes or lesions  Neuro/psych: A&O x3. CN II through XII are grossly intact with appropriate affect      Allergies:  Allergies   Allergen Reactions   • Amlodipine Itching   • Atacand  [Candesartan Cilexetil] Other (See Comments)   • Atenolol Palpitations   • Azithromycin Nausea Only   • Escitalopram Oxalate Other (See Comments)   • Ibuprofen Unknown (See Comments)   • Levofloxacin Nausea Only   • Penicillin G Unknown (See Comments)   • Spironolactone Nausea And Vomiting   • Sucralfate Swelling   • Sulfamethoxazole-Trimethoprim Rash     BACTRIM CAUSED THROMBOCYTOPENIA PER PATIENT     • Triamterene-Hctz Myalgia   • Venlafaxine Other (See Comments)     HEADACHE   • Citrus Other (See Comments)       Medication Review:     Current Outpatient Medications:   •  aspirin 81 MG tablet, Take 1 tablet by mouth Daily., Disp: , Rfl:   •  aspirin-acetaminophen-caffeine (EXCEDRIN MIGRAINE) 250-250-65 MG per tablet, Take 1 tablet by mouth Every 6 (Six) Hours As Needed for Headache., Disp: , Rfl:   •  baclofen (LIORESAL) 10 MG tablet, TAKE ONE-HALF TABLET BY MOUTH TWICE A DAY AS NEEDED FOR MUSCLE SPASM, Disp: 30 tablet, " Rfl: 0  •  busPIRone (BUSPAR) 15 MG tablet, Take 15 mg by mouth Every Night., Disp: , Rfl:   •  dicyclomine (BENTYL) 10 MG capsule, Take 1 capsule by mouth 4 (Four) Times a Day Before Meals & at Bedtime As Needed (for abd bloating/ cramping)., Disp: 30 capsule, Rfl: 0  •  diphenhydrAMINE (BENADRYL) 25 MG tablet, Take 25 mg by mouth Every 6 (Six) Hours As Needed for Itching., Disp: , Rfl:   •  famotidine (PEPCID) 20 MG tablet, Take 2 tablets by mouth Daily With Breakfast., Disp: , Rfl:   •  fenofibrate (TRICOR) 145 MG tablet, Take 1 tablet by mouth Every Evening., Disp: , Rfl:   •  ibandronate (BONIVA) 150 MG tablet, Take 1 tablet by mouth Every 30 (Thirty) Days., Disp: 3 tablet, Rfl: 4  •  icosapent ethyl (VASCEPA) 1 g capsule capsule, Take 2 g by mouth 2 (Two) Times a Day With Meals., Disp: 120 capsule, Rfl: 1  •  Lutein 20 MG capsule, Take 1 tablet by mouth Daily., Disp: , Rfl:   •  meclizine (ANTIVERT) 12.5 MG tablet, Take 1 tablet by mouth Every 6 (Six) Hours As Needed., Disp: , Rfl:   •  metoprolol succinate XL (TOPROL-XL) 50 MG 24 hr tablet, Take 1.5 tablets by mouth Daily., Disp: 135 tablet, Rfl: 0  •  mycophenolate (MYFORTIC) 180 MG EC tablet, Take 360 mg by mouth 2 (Two) Times a Day., Disp: , Rfl:   •  ondansetron (ZOFRAN) 4 MG tablet, Take 1 tablet by mouth Every 8 (Eight) Hours As Needed for Nausea or Vomiting. prn, Disp: 90 tablet, Rfl: 0  •  oxyCODONE (ROXICODONE) 5 MG immediate release tablet, 1/2 - 1 po bid prn severe pain, Disp: 14 tablet, Rfl: 0  •  predniSONE (DELTASONE) 10 MG tablet, Take 10 mg by mouth Daily., Disp: , Rfl:   •  Probiotic Product (CULTURELLE PRO-WELL) capsule, Take 2 capsules by mouth Daily., Disp: , Rfl:   •  PROGRAF 1 MG capsule, Take 2 capsules by mouth 2 (Two) Times a Day., Disp: 120 capsule, Rfl:   •  sertraline (ZOLOFT) 25 MG tablet, Take 1 tablet by mouth Every Night., Disp: 30 tablet, Rfl: 1  •  solifenacin (VESICARE) 5 MG tablet, Take 1 tablet by mouth Every Night., Disp:  30 tablet, Rfl: 0  •  vitamin E 400 UNIT capsule, Take 400 Units by mouth Daily. 2 tabs daily ( morning ), Disp: , Rfl:     Family History:  Family History   Problem Relation Age of Onset   • Diabetes Sister         Type II   • Heart disease Sister    • Hypertension Sister    • No Known Problems Brother        Past Medical History:  Past Medical History:   Diagnosis Date   • Anxiety    • B12 deficiency    • Depression    • Gastritis    • GERD (gastroesophageal reflux disease)    • H/O diagnostic tests     Dexa 2012/2017   • Hyperlipidemia    • Hypertension    • Insulin resistance    • Liver disease     s/p Transplant   • 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 - Dr Stone Stevens; CBC/Prograf/CMP/LIPIDS/A1C   • Meniere's disease    • OAB (overactive bladder)    • Osteoporosis    • PUD (peptic ulcer disease)        Past surgical History:  Past Surgical History:   Procedure Laterality Date   • BREAST LUMPECTOMY Left     Benign   • COLONOSCOPY  2012    Polyps = 2012/ 2015, rech 2020   [facility]   • LIVER TRANSPLANTATION      [facility]   • MAMMO BILATERAL      Neg- 2017/2018   • OTHER SURGICAL HISTORY  2014    Liver Stent   • STEROID INJECTION KNEE      Knee Cortosone Injections   • TOTAL ABDOMINAL HYSTERECTOMY  1979       Social History:  Social History     Socioeconomic History   • Marital status:      Spouse name: Not on file   • Number of children: Not on file   • Years of education: Not on file   • Highest education level: Not on file   Tobacco Use   • Smoking status: Never Smoker   • Smokeless tobacco: Never Used   • Tobacco comment: Quit 2002.    Substance and Sexual Activity   • Alcohol use: No     Frequency: Never     Comment: very rarely    • Drug use: No   • Sexual activity: Defer       Review of Systems:  The following systems were reviewed as they relate to the cardiovascular system: Constitutional, Eyes, ENT, Cardiovascular, Respiratory, Gastrointestinal,  Integumentary, Neurological, Psychiatric, Hematologic, Endocrine, Musculoskeletal, and Genitourinary. The pertinent cardiovascular findings are reported above with all other cardiovascular points within those systems being negative.    Diagnostic Study Review:     Current Electrocardiogram:    ECG 12 Lead  Date/Time: 2/19/2020 6:30 PM  Performed by: Domneico Clifton DO  Authorized by: Domenico Clifton DO   Comparison: not compared with previous ECG   Previous ECG: no previous ECG available  Comments: Normal sinus rhythm with a ventricular rate of 70 bpm.  Low voltage in the precordial leads.  Consider old anterior septal MI.  Normal QT and QTc intervals.  Normal QRS axis.              NOTE: The following portions of the patient's history were reviewed and updated this visit as appropriate: allergies, current medications, past family history, past medical history, past social history, past surgical history and problem list.

## 2020-02-19 NOTE — TELEPHONE ENCOUNTER
Patient was notified and states that she got some samples from her cardiologist and his office is going to try to get assistance through the medication company.

## 2020-02-21 ENCOUNTER — TELEPHONE (OUTPATIENT)
Dept: CARDIOLOGY | Facility: CLINIC | Age: 75
End: 2020-02-21

## 2020-02-21 NOTE — TELEPHONE ENCOUNTER
Patient called 2- to report Dr. Clifton gave her samples of Vascepa at  [facility] on 2-19-20 .   Stated she took one that night and woke up 2-20-20 not feeling well. No fever , bp elevated alittle.   Patient questioned if from the medication or not. Thought to hold and try again on Saturday.   Informed patient there is virus going around currently and possibly could be that even though hasn't manifested other symptoms as of yet.   Instructed patient to wait 1-2 weeks till no symptoms and restart then. If again with symptoms to call and report.   Patient verbalized understanding.     normal... Well appearing, well nourished, awake, alert, oriented to person, place, time/situation and in no apparent distress.

## 2020-03-03 ENCOUNTER — OFFICE VISIT (OUTPATIENT)
Dept: FAMILY MEDICINE CLINIC | Facility: CLINIC | Age: 75
End: 2020-03-03

## 2020-03-03 ENCOUNTER — TELEPHONE (OUTPATIENT)
Dept: FAMILY MEDICINE CLINIC | Facility: CLINIC | Age: 75
End: 2020-03-03

## 2020-03-03 VITALS
HEIGHT: 63 IN | SYSTOLIC BLOOD PRESSURE: 161 MMHG | RESPIRATION RATE: 14 BRPM | TEMPERATURE: 98.1 F | BODY MASS INDEX: 28.35 KG/M2 | DIASTOLIC BLOOD PRESSURE: 83 MMHG | OXYGEN SATURATION: 98 % | HEART RATE: 75 BPM | WEIGHT: 160 LBS

## 2020-03-03 DIAGNOSIS — I10 ESSENTIAL HYPERTENSION: Primary | ICD-10-CM

## 2020-03-03 DIAGNOSIS — R11.0 NAUSEA: ICD-10-CM

## 2020-03-03 DIAGNOSIS — M81.0 AGE-RELATED OSTEOPOROSIS WITHOUT CURRENT PATHOLOGICAL FRACTURE: ICD-10-CM

## 2020-03-03 DIAGNOSIS — N32.81 OVERACTIVE BLADDER: ICD-10-CM

## 2020-03-03 DIAGNOSIS — Z94.4 STATUS POST LIVER TRANSPLANTATION (HCC): ICD-10-CM

## 2020-03-03 PROCEDURE — 99214 OFFICE O/P EST MOD 30 MIN: CPT | Performed by: FAMILY MEDICINE

## 2020-03-03 RX ORDER — BUSPIRONE HYDROCHLORIDE 15 MG/1
TABLET ORAL
Qty: 60 TABLET | Refills: 3 | Status: SHIPPED | OUTPATIENT
Start: 2020-03-03 | End: 2020-03-04

## 2020-03-03 RX ORDER — BACLOFEN 10 MG/1
10 TABLET ORAL NIGHTLY PRN
Qty: 30 TABLET | Refills: 3 | Status: SHIPPED | OUTPATIENT
Start: 2020-03-03 | End: 2020-10-19 | Stop reason: SDUPTHER

## 2020-03-03 RX ORDER — PREDNISONE 1 MG/1
TABLET ORAL
COMMUNITY
Start: 2020-02-27 | End: 2020-03-03 | Stop reason: SDUPTHER

## 2020-03-03 RX ORDER — PREDNISONE 1 MG/1
7.5 TABLET ORAL DAILY
Status: SHIPPED | COMMUNITY
Start: 2020-03-03 | End: 2020-04-20

## 2020-03-03 RX ORDER — CHLORHEXIDINE GLUCONATE 0.12 MG/ML
RINSE ORAL
COMMUNITY
Start: 2020-02-14 | End: 2020-04-20

## 2020-03-03 RX ORDER — METOPROLOL SUCCINATE 50 MG/1
75 TABLET, EXTENDED RELEASE ORAL DAILY
Qty: 135 TABLET | Refills: 0 | Status: SHIPPED | OUTPATIENT
Start: 2020-03-03 | End: 2020-05-27

## 2020-03-03 RX ORDER — AZELASTINE 1 MG/ML
2 SPRAY, METERED NASAL DAILY
COMMUNITY
Start: 2020-02-26 | End: 2021-10-18

## 2020-03-03 RX ORDER — TACROLIMUS 1 MG/1
CAPSULE, GELATIN COATED ORAL
Qty: 120 CAPSULE
Start: 2020-03-03 | End: 2020-03-03

## 2020-03-03 NOTE — PROGRESS NOTES
Subjective   Christal Romo is a 75 y.o. female.     Chief Complaint   Patient presents with   • Hypertension     Baclofen and Metoprolol to [facility] in [place].   • Hyperlipidemia     The Vasepa made her sick for 2 days and she went to the [facility] for it.    • Vomiting     Patient went to the [facility] after she ate a fish sandwhich at [facility].         Current Outpatient Medications:   •  aspirin 81 MG tablet, Take 1 tablet by mouth Daily., Disp: , Rfl:   •  aspirin-acetaminophen-caffeine (EXCEDRIN MIGRAINE) 250-250-65 MG per tablet, Take 1 tablet by mouth Every 6 (Six) Hours As Needed for Headache., Disp: , Rfl:   •  baclofen (LIORESAL) 10 MG tablet, Take 1 tablet by mouth At Night As Needed for Muscle Spasms., Disp: 30 tablet, Rfl: 3  •  dicyclomine (BENTYL) 10 MG capsule, Take 1 capsule by mouth 4 (Four) Times a Day Before Meals & at Bedtime As Needed (for abd bloating/ cramping)., Disp: 30 capsule, Rfl: 0  •  diphenhydrAMINE (BENADRYL) 25 MG tablet, Take 25 mg by mouth Every 6 (Six) Hours As Needed for Itching., Disp: , Rfl:   •  famotidine (PEPCID) 20 MG tablet, Take 2 tablets by mouth Daily With Breakfast., Disp: , Rfl:   •  fenofibrate (TRICOR) 145 MG tablet, Take 1 tablet by mouth Every Evening., Disp: , Rfl:   •  ibandronate (BONIVA) 150 MG tablet, Take 1 tablet by mouth Every 30 (Thirty) Days., Disp: 3 tablet, Rfl: 4  •  Lutein 20 MG capsule, Take 1 tablet by mouth Daily., Disp: , Rfl:   •  meclizine (ANTIVERT) 12.5 MG tablet, Take 1 tablet by mouth Every 6 (Six) Hours As Needed., Disp: , Rfl:   •  metoprolol succinate XL (TOPROL-XL) 50 MG 24 hr tablet, Take 1.5 tablets by mouth Daily., Disp: 135 tablet, Rfl: 0  •  mycophenolate (MYFORTIC) 180 MG EC tablet, Take 360 mg by mouth 2 (Two) Times a Day., Disp: , Rfl:   •  ondansetron (ZOFRAN) 4 MG tablet, Take 1 tablet by mouth Every 8 (Eight) Hours As Needed for Nausea or Vomiting. prn, Disp: 90 tablet, Rfl: 0  •  oxyCODONE (ROXICODONE) 5 MG  immediate release tablet, 1/2 - 1 po bid prn severe pain, Disp: 14 tablet, Rfl: 0  •  Probiotic Product (CULTURELLE PRO-WELL) capsule, Take 2 capsules by mouth Daily., Disp: , Rfl:   •  solifenacin (VESICARE) 5 MG tablet, Take 1 tablet by mouth Every Night., Disp: 30 tablet, Rfl: 0  •  vitamin E 400 UNIT capsule, Take 400 Units by mouth Daily. 2 tabs daily ( morning ), Disp: , Rfl:   •  azelastine (ASTELIN) 0.1 % nasal spray, 2 sprays into the nostril(s) as directed by provider Daily., Disp: , Rfl:   •  busPIRone (BUSPAR) 5 MG tablet, 5-10mg qam and 15mg qhs, Disp: 150 tablet, Rfl: 0  •  chlorhexidine (PERIDEX) 0.12 % solution, , Disp: , Rfl:   •  icosapent ethyl (VASCEPA) 1 g capsule capsule, Take 2 g by mouth 2 (Two) Times a Day With Meals., Disp: 120 capsule, Rfl: 1  •  predniSONE (DELTASONE) 5 MG tablet, Take 1.5 tablets by mouth Daily. X 2weeks then 5mg qday x 2weeks then 2.5 mg qday x 2wks then stop, Disp: , Rfl:     Past Medical History:   Diagnosis Date   • Allergic    • Anxiety    • Arthritis    • B12 deficiency    • Cataract    • Chronic diarrhea    • Depression    • Gastritis    • GERD (gastroesophageal reflux disease)    • H/O diagnostic tests     Dexa 2012/2017   • Headache    • Hyperlipidemia    • Hypertension    • Injury of back    • Injury of neck    • Insulin resistance    • Liver disease     s/p Transplant   • 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 - Dr Ritter [facility]; CBC/Prograf/CMP/LIPIDS/A1C   • Meniere's disease    • OAB (overactive bladder)    • Osteoporosis    • PUD (peptic ulcer disease)        Past Surgical History:   Procedure Laterality Date   • BREAST LUMPECTOMY Left     Benign   • COLONOSCOPY  2012    Polyps = 2012/ 2015, rech 2020   [facility]   • [place] TR[place]ATION      [facility]   • MAMMO BILATERAL      Neg- 2017/2018   • OTHER SURGICAL HISTORY  2014    Liver Stent   • STEROID INJECTION KNEE      Knee Cortosone Injections   • TOTAL ABDOMINAL  HYSTERECTOMY  1979       Family History   Problem Relation Age of Onset   • Diabetes Sister         Type II   • Heart disease Sister    • Hypertension Sister    • No Known Problems Brother        Social History     Socioeconomic History   • Marital status:      Spouse name: Not on file   • Number of children: Not on file   • Years of education: Not on file   • Highest education level: Not on file   Tobacco Use   • Smoking status: Never Smoker   • Smokeless tobacco: Never Used   • Tobacco comment: Quit 2002.    Substance and Sexual Activity   • Alcohol use: No     Frequency: Never     Comment: very rarely    • Drug use: No   • Sexual activity: Defer       74 y/o C female here for 6mos f/u appt on HTN/ CHOL and recent vomiting    Pt states the liver transplant clinic is weaning her prednisone    Cardio is trying to get her free Vesepa but she tried it a couple times and feels it made her sick    Pt states she went to  9 days ago and was told she didn't have the flu but had allergies----pt states she is still not feeling well ........states it seemed to start after eating a fish sandwich at [employer]       The following portions of the patient's history were reviewed and updated as appropriate: allergies, current medications, past family history, past medical history, past social history, past surgical history and problem list.    Review of Systems   Constitutional: Positive for activity change and fatigue. Negative for unexpected weight gain.   HENT: Positive for ear pain. Negative for congestion, sinus pressure and sore throat.    Respiratory: Negative for cough, chest tightness and shortness of breath.    Cardiovascular: Negative for chest pain, palpitations and leg swelling.   Gastrointestinal: Positive for nausea and vomiting. Negative for constipation and diarrhea.   Genitourinary: Positive for urgency and urinary incontinence. Negative for dysuria, frequency and hematuria.   Musculoskeletal: Negative for  arthralgias and myalgias.   Skin: Negative for rash and skin lesions.   Neurological: Negative for dizziness, facial asymmetry, speech difficulty, weakness, light-headedness, headache, memory problem and confusion.       Vitals:    03/03/20 1426   BP: 161/83   Pulse: 75   Resp: 14   Temp: 98.1 °F (36.7 °C)   SpO2: 98%       Objective   Physical Exam   Constitutional: She is oriented to person, place, and time. She appears well-developed and well-nourished. No distress.   HENT:   Head: Normocephalic and atraumatic.   Right Ear: External ear normal.   Left Ear: External ear normal.   Nose: Nose normal.   Mouth/Throat: Oropharynx is clear and moist. No oropharyngeal exudate.   Eyes: Pupils are equal, round, and reactive to light. Conjunctivae and EOM are normal. Right eye exhibits no discharge. Left eye exhibits no discharge. No scleral icterus.   Neck: Normal range of motion. Neck supple. No thyromegaly present.   Cardiovascular: Normal rate, regular rhythm, normal heart sounds and intact distal pulses.   No murmur heard.  Pulmonary/Chest: Effort normal and breath sounds normal. No respiratory distress. She has no wheezes. She has no rales.   Abdominal: Soft. Bowel sounds are normal. She exhibits no distension.   Musculoskeletal: She exhibits no edema.   Lymphadenopathy:     She has no cervical adenopathy.   Neurological: She is alert and oriented to person, place, and time. No cranial nerve deficit.   Skin: Skin is warm and dry. No rash noted. She is not diaphoretic. No erythema. No pallor.   Psychiatric: She has a normal mood and affect. Her behavior is normal. Judgment and thought content normal.   Nursing note and vitals reviewed.        Assessment/Plan   Christal was seen today for hypertension, hyperlipidemia and vomiting.    Diagnoses and all orders for this visit:    Essential hypertension    Nausea    Overactive bladder    Age-related osteoporosis without current pathological fracture    Status post liver  transplantation (CMS/HCC)    Other orders  -     baclofen (LIORESAL) 10 MG tablet; Take 1 tablet by mouth At Night As Needed for Muscle Spasms.  -     metoprolol succinate XL (TOPROL-XL) 50 MG 24 hr tablet; Take 1.5 tablets by mouth Daily.  -     Discontinue: PROGRAF 1 MG capsule; Take 1 capsule in Am and 1 capsule PM  -     Discontinue: busPIRone (BUSPAR) 15 MG tablet; 5-10mg qam and 15mg qhs

## 2020-03-03 NOTE — TELEPHONE ENCOUNTER
"[facility] pharm called needing verification on Buspar 15mg rx. Said it was sent in with instructions of \"Si-10mg qam and 15mg qhs\". Is the patient supposed to split the tabs herself? Please clarify daily dosage/ instructions.    Johana [phone]  "

## 2020-03-04 RX ORDER — BUSPIRONE HYDROCHLORIDE 5 MG/1
TABLET ORAL
Qty: 150 TABLET | Refills: 0 | Status: SHIPPED | OUTPATIENT
Start: 2020-03-04 | End: 2020-09-17

## 2020-03-04 NOTE — TELEPHONE ENCOUNTER
Yes, Pharm asked if you can send in 5mg tabs for her AM dose, would be best. She can keep the 15mg tabs for PM dose.

## 2020-03-04 NOTE — TELEPHONE ENCOUNTER
Yes she is to split the buspar tabs -----if they dont carry those, I can rewrite it using the 5mg tabs

## 2020-03-05 ENCOUNTER — TREATMENT (OUTPATIENT)
Dept: PHYSICAL THERAPY | Facility: CLINIC | Age: 75
End: 2020-03-05

## 2020-03-05 DIAGNOSIS — R42 DIZZINESS: ICD-10-CM

## 2020-03-05 DIAGNOSIS — R42 VERTIGO: Primary | ICD-10-CM

## 2020-03-05 PROCEDURE — 97112 NEUROMUSCULAR REEDUCATION: CPT | Performed by: PHYSICAL THERAPIST

## 2020-03-05 PROCEDURE — 97161 PT EVAL LOW COMPLEX 20 MIN: CPT | Performed by: PHYSICAL THERAPIST

## 2020-03-05 PROCEDURE — 95992 CANALITH REPOSITIONING PROC: CPT | Performed by: PHYSICAL THERAPIST

## 2020-03-05 NOTE — PROGRESS NOTES
"Physical Therapy Initial Evaluation and Plan of Care    Patient: Christal Romo   : 1945  Diagnosis/ICD-10 Code:  Vertigo [R42]  Referring practitioner: Sara Zhao DO  Date of Initial Visit: 3/5/2020  Today's Date: 3/5/2020  Patient seen for 1 sessions           Subjective Questionnaire: DHI: 53 points      Subjective Evaluation    History of Present Illness  Mechanism of injury: Pt reporting history of BPPV and has not had it treated in a couple years. Is dizzy today and had to take some medicine this morning and feels a \"little drunk\" from it. Reports the dizziness did go away when she had it treated back 2 years ago, but it still comes and goes now.     Normal dizziness level is 4/10, has to move slow and walk slow. Sometimes walks into things when the dizziness is there.     Pain  No pain reported    Treatments  Previous treatment: physical therapy  Patient Goals  Patient goals for therapy: improved balance  Patient goal: not be dizzy             Objective       Functional Assessment     Comments  VESTIBULAR:     VOR testing - horizontal and vertical positive for dizziness, no saccades noted     Sharmin Hallpike - positive (R) ear, negative (L) ear              Assessment & Plan     Assessment  Impairments: abnormal coordination, activity intolerance and safety issue  Assessment details: Pt is a 75 yr/o female presenting with ongoing dizziness, worsening over the past few years after a fall, however c/o similar issues throughout her whole life. Per DHI she reports 53 points (66% impairment). She shows positive [place] Hallpike on (R) ear, negative on (L). Performed Epley on (R) side with decreased dizziness following. Recommend skilled OPPT to address the above issues, pt in agreement.   Prognosis: good  Functional Limitations: walking, moving in bed and stooping  Goals  Plan Goals: STG: to be met within 6 visits   1. Pt to report 25% decrease in dizziness during daily activities   2. Pt to be (I) with " initial HEP for VOR ex     LTG: to be met by DC   1. Pt to report decreased impairment per DHI to less than 40 points   2. Pt to report minimal increase in dizziness with supine<>sit positions   3. Pt to show negative Sharmin Hallpike in (R) ear   4. Pt to report minimal issue with shopping/walking in the community     Plan  Therapy options: will be seen for skilled physical therapy services  Planned modality interventions: cryotherapy and thermotherapy (hydrocollator packs)  Planned therapy interventions: therapeutic activities, stretching, strengthening, soft tissue mobilization, postural training, neuromuscular re-education, manual therapy, flexibility, functional ROM exercises and home exercise program  Other planned therapy interventions: canilith repositioning   Frequency: 2x week  Duration in visits: 20  Treatment plan discussed with: patient        Timed:         Manual Therapy:         mins  28428;     Therapeutic Exercise:         mins  50091;     Neuromuscular Radha:    10    mins  21384;    Therapeutic Activity:          mins  69821;     Gait Training:           mins  19284;     Ultrasound:          mins  87421;    Ionto                                   mins   96322  Self Care                            mins   40433  Canalith Repos    15     mins 03427      Un-Timed:  Electrical Stimulation:         mins  90285 ( );  Traction          mins 34746  Low Eval     21     Mins  14026  Mod Eval          Mins  45623  High Eval                            Mins  45206  Re-Eval                               mins  91982        Timed Treatment:    25  mins   Total Treatment:     50   mins    PT SIGNATURE: Nancy Gutierrez PT   DATE TREATMENT INITIATED: 3/5/2020    Initial Certification  Certification Period: 6/3/2020  I certify that the therapy services are furnished while this patient is under my care.  The services outlined above are required by this patient, and will be reviewed every 90 days.     PHYSICIAN:  Sara Zhao, DO      DATE:     Please sign and return via fax to  .. Thank you, [facility].

## 2020-03-12 ENCOUNTER — TREATMENT (OUTPATIENT)
Dept: PHYSICAL THERAPY | Facility: CLINIC | Age: 75
End: 2020-03-12

## 2020-03-12 DIAGNOSIS — R42 VERTIGO: Primary | ICD-10-CM

## 2020-03-12 DIAGNOSIS — R42 DIZZINESS: ICD-10-CM

## 2020-03-12 PROCEDURE — 97535 SELF CARE MNGMENT TRAINING: CPT | Performed by: PHYSICAL THERAPIST

## 2020-03-12 PROCEDURE — 95992 CANALITH REPOSITIONING PROC: CPT | Performed by: PHYSICAL THERAPIST

## 2020-03-12 NOTE — PROGRESS NOTES
Physical Therapy Daily Progress Note    VISIT#: 2    Subjective   Christal Romo reports: Had a rough time after the eval last time but feels like it was a bit helpful. Is very stuffed up today and thinks that is making it worse.     Objective     See Exercise, Manual, and Modality Logs for complete treatment.   Positive (R) Sharmin Hallpike     Assessment/Plan  Decreased dizziness following Epley for (R) ear treatment. Pt educated on anatomy of inner ear again during session, as well as VOR relationship with vestibular issues. Reiteration on importance of HEP with VOR ex, pt with moderate motivation.     Progress per Plan of Care            Timed:         Manual Therapy:         mins  30207;     Therapeutic Exercise:     5    mins  74710;     Neuromuscular Radha:        mins  04232;    Therapeutic Activity:          mins  29099;     Gait Training:           mins  21604;     Ultrasound:          mins  90253;    Ionto                                   mins   48835  Self Care                       11     mins   84924  Canalith Repos               17    mins  50065    Un-Timed:  Electrical Stimulation:         mins  11028 ( );  Traction          mins 85876  Low Eval          Mins  47160  Mod Eval          Mins  58262  High Eval                            Mins  12374  Re-Eval                               mins  03134    Timed Treatment:   33   mins   Total Treatment:     40   mins    Nancy Gutierrez PT    Physical Therapist

## 2020-03-17 ENCOUNTER — TELEPHONE (OUTPATIENT)
Dept: FAMILY MEDICINE CLINIC | Facility: CLINIC | Age: 75
End: 2020-03-17

## 2020-03-17 NOTE — TELEPHONE ENCOUNTER
Pt advised allergy serum is here.  She is not feeling well and has a colonoscopy scheduled tmw.  She was advised to call for appt for shot when she is feeling better

## 2020-04-02 ENCOUNTER — DOCUMENTATION (OUTPATIENT)
Dept: FAMILY MEDICINE CLINIC | Facility: CLINIC | Age: 75
End: 2020-04-02

## 2020-04-02 DIAGNOSIS — K65.9 ABDOMINAL INFECTION (HCC): Primary | ICD-10-CM

## 2020-04-02 RX ORDER — METRONIDAZOLE 500 MG/1
500 TABLET ORAL 3 TIMES DAILY
Qty: 21 TABLET | Refills: 0 | Status: SHIPPED | OUTPATIENT
Start: 2020-04-02 | End: 2020-04-09

## 2020-04-02 RX ORDER — LEVOFLOXACIN 750 MG/1
750 TABLET ORAL EVERY OTHER DAY
Qty: 3 TABLET | Refills: 0 | Status: SHIPPED | OUTPATIENT
Start: 2020-04-02 | End: 2020-04-09

## 2020-04-02 NOTE — PROGRESS NOTES
"On call note.    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 Pain is described as a burning sensation. Associated symptoms include nausea. Denies any fever, dysuria, or pain with defecation. Last BM was today; reportedly normal BM and stool. Underwent a colonoscopy in 2012 that revealed no evidence of diverticulosis. Pain minimally controlled with PRN oxycodone; cries intermittently throughout phone call. Reports that she went to [employer] recently and ate from the buffet despite \"knowing better\".    Agreeable to a course of antibiotics for potential intraabdominal infection. Encouraged taking medication with yogurt. Advised to continue Zofran PRN for nausea. Advised to go to the ED if abdominal pain becomes insufferable tonight. Advised to call PCP in the morning to see if symptoms improved or worsened overnight. Prescription for Flagyl and renally dosed Levaquin sent to patient's pharmacy. Patient cannot recall severity of nausea associated with Levaquin allergy.  "

## 2020-04-03 ENCOUNTER — TELEPHONE (OUTPATIENT)
Dept: FAMILY MEDICINE CLINIC | Facility: CLINIC | Age: 75
End: 2020-04-03

## 2020-04-03 NOTE — TELEPHONE ENCOUNTER
Pt states she feels a pea sized tender (burning Pain) mass at Left lower abd above groin line and lateral to mons pubis; no erythema and no skin changes seen    Pt to try wet heat and INB can try ice pack---pt has pain meds and topical pain cream she may try but told to only take plain tylenol and NO Excedrin since took too much yest and had palpitations  Pt will hold off on any more abx since does not appear to be diverticulitis but poss cyst/ gland...If breaks into a rash, she will send a picture (disc'd poss start of zoster)  Pt will call later today w/ updates

## 2020-04-03 NOTE — TELEPHONE ENCOUNTER
Patient called stating that he called the after hours doctor last night and states that she has pain in the left lower groin area that is warm to the touch but not red, there is a pea size knot in the area. The after hours doctor sent in Flagyl and [facility]. Patient states that the pain isn't worse then yesterday but it is the same. Patient took a Tylenol 500mg today.

## 2020-04-06 NOTE — TELEPHONE ENCOUNTER
Pt LM stating that the on-call Dr put her on abx for this knot/cyst on her L thigh w burning sensation to area. States it's some better, but wanted to know how to proceed. Please advise.

## 2020-04-06 NOTE — TELEPHONE ENCOUNTER
I already called her yest about this and told her to STOP the diverticulitis abx since sounds more of a skin issue....See the note from yest

## 2020-04-17 ENCOUNTER — TELEPHONE (OUTPATIENT)
Dept: FAMILY MEDICINE CLINIC | Facility: CLINIC | Age: 75
End: 2020-04-17

## 2020-04-17 NOTE — TELEPHONE ENCOUNTER
"Pt LM asking if \"you thought it'd be safe for her to possibly have an US at [facility] either today or sanjuanita. Her side is still hurting from when she last talked to you.\" Please call pt or advise.   "

## 2020-04-20 ENCOUNTER — OFFICE VISIT (OUTPATIENT)
Dept: FAMILY MEDICINE CLINIC | Facility: CLINIC | Age: 75
End: 2020-04-20

## 2020-04-20 VITALS
HEIGHT: 63 IN | TEMPERATURE: 98 F | RESPIRATION RATE: 14 BRPM | DIASTOLIC BLOOD PRESSURE: 83 MMHG | SYSTOLIC BLOOD PRESSURE: 185 MMHG | OXYGEN SATURATION: 98 % | HEART RATE: 74 BPM | BODY MASS INDEX: 28.17 KG/M2 | WEIGHT: 159 LBS

## 2020-04-20 DIAGNOSIS — R10.32 LEFT LOWER QUADRANT ABDOMINAL PAIN: Primary | ICD-10-CM

## 2020-04-20 DIAGNOSIS — R39.15 URINARY URGENCY: ICD-10-CM

## 2020-04-20 DIAGNOSIS — I10 ELEVATED BLOOD PRESSURE READING IN OFFICE WITH DIAGNOSIS OF HYPERTENSION: ICD-10-CM

## 2020-04-20 DIAGNOSIS — R89.9 ABNORMAL LABORATORY TEST: ICD-10-CM

## 2020-04-20 DIAGNOSIS — R10.9 ABDOMINAL CRAMPS: ICD-10-CM

## 2020-04-20 DIAGNOSIS — Z94.4 STATUS POST LIVER TRANSPLANTATION (HCC): ICD-10-CM

## 2020-04-20 LAB
BILIRUB BLD-MCNC: NEGATIVE MG/DL
CLARITY, POC: CLEAR
COLOR UR: YELLOW
GLUCOSE UR STRIP-MCNC: NEGATIVE MG/DL
KETONES UR QL: NEGATIVE
LEUKOCYTE EST, POC: NEGATIVE
NITRITE UR-MCNC: NEGATIVE MG/ML
PH UR: 7.5 [PH] (ref 5–8)
PROT UR STRIP-MCNC: NEGATIVE MG/DL
RBC # UR STRIP: ABNORMAL /UL
SP GR UR: 1.01 (ref 1–1.03)
UROBILINOGEN UR QL: NORMAL

## 2020-04-20 PROCEDURE — 80053 COMPREHEN METABOLIC PANEL: CPT | Performed by: FAMILY MEDICINE

## 2020-04-20 PROCEDURE — 85025 COMPLETE CBC W/AUTO DIFF WBC: CPT | Performed by: FAMILY MEDICINE

## 2020-04-20 PROCEDURE — 36415 COLL VENOUS BLD VENIPUNCTURE: CPT | Performed by: FAMILY MEDICINE

## 2020-04-20 PROCEDURE — 83735 ASSAY OF MAGNESIUM: CPT | Performed by: FAMILY MEDICINE

## 2020-04-20 PROCEDURE — 99214 OFFICE O/P EST MOD 30 MIN: CPT | Performed by: FAMILY MEDICINE

## 2020-04-20 PROCEDURE — 81003 URINALYSIS AUTO W/O SCOPE: CPT | Performed by: FAMILY MEDICINE

## 2020-04-20 RX ORDER — TACROLIMUS 1 MG/1
CAPSULE ORAL
COMMUNITY
End: 2020-09-17

## 2020-04-20 RX ORDER — METRONIDAZOLE 500 MG/1
500 TABLET ORAL 3 TIMES DAILY
Qty: 21 TABLET | Refills: 0 | Status: SHIPPED | OUTPATIENT
Start: 2020-04-20 | End: 2020-04-22

## 2020-04-20 RX ORDER — PROMETHAZINE HYDROCHLORIDE 6.25 MG/5ML
6.25 SYRUP ORAL 4 TIMES DAILY PRN
Qty: 240 ML | Refills: 0 | Status: SHIPPED | OUTPATIENT
Start: 2020-04-20 | End: 2020-04-22 | Stop reason: SDUPTHER

## 2020-04-20 NOTE — PROGRESS NOTES
Subjective   Christal Romo is a 75 y.o. female.     Chief Complaint   Patient presents with   • Abdominal Pain     LLQ pain x 1 mo         Current Outpatient Medications:   •  aspirin 81 MG tablet, Take 1 tablet by mouth Daily., Disp: , Rfl:   •  aspirin-acetaminophen-caffeine (EXCEDRIN MIGRAINE) 250-250-65 MG per tablet, Take 1 tablet by mouth Every 6 (Six) Hours As Needed for Headache., Disp: , Rfl:   •  azelastine (ASTELIN) 0.1 % nasal spray, 2 sprays into the nostril(s) as directed by provider Daily., Disp: , Rfl:   •  baclofen (LIORESAL) 10 MG tablet, Take 1 tablet by mouth At Night As Needed for Muscle Spasms., Disp: 30 tablet, Rfl: 3  •  busPIRone (BUSPAR) 5 MG tablet, 5-10mg qam and 15mg qhs, Disp: 150 tablet, Rfl: 0  •  diphenhydrAMINE (BENADRYL) 25 MG tablet, Take 25 mg by mouth Every 6 (Six) Hours As Needed for Itching., Disp: , Rfl:   •  famotidine (PEPCID) 20 MG tablet, Take 2 tablets by mouth Daily With Breakfast., Disp: , Rfl:   •  fenofibrate (TRICOR) 145 MG tablet, Take 1 tablet by mouth Every Evening., Disp: , Rfl:   •  Lutein 20 MG capsule, Take 1 tablet by mouth Daily., Disp: , Rfl:   •  meclizine (ANTIVERT) 12.5 MG tablet, Take 1 tablet by mouth Every 6 (Six) Hours As Needed., Disp: , Rfl:   •  metoprolol succinate XL (TOPROL-XL) 50 MG 24 hr tablet, Take 1.5 tablets by mouth Daily., Disp: 135 tablet, Rfl: 0  •  mycophenolate (MYFORTIC) 360 MG tablet delayed-release EC tablet, Take 360 mg by mouth 2 (Two) Times a Day., Disp: , Rfl:   •  ondansetron (ZOFRAN) 4 MG tablet, Take 1 tablet by mouth Every 8 (Eight) Hours As Needed for Nausea or Vomiting. prn, Disp: 90 tablet, Rfl: 0  •  oxyCODONE (ROXICODONE) 5 MG immediate release tablet, 1/2 - 1 po bid prn severe pain, Disp: 14 tablet, Rfl: 0  •  Probiotic Product (CULTURELLE PRO-WELL) capsule, Take 2 capsules by mouth Daily., Disp: , Rfl:   •  solifenacin (VESICARE) 5 MG tablet, Take 1 tablet by mouth Every Night., Disp: 30 tablet, Rfl: 0  •   tacrolimus (PROGRAF) 1 MG capsule, Take 1 mg by mouth 2 (Two) Times a Day., Disp: , Rfl:   •  vitamin E 400 UNIT capsule, Take 400 Units by mouth Daily. 2 tabs daily ( morning ), Disp: , Rfl:   •  dicyclomine (BENTYL) 10 MG capsule, Take 1 capsule by mouth 4 (Four) Times a Day Before Meals & at Bedtime As Needed (for abd bloating/ cramping)., Disp: 30 capsule, Rfl: 0  •  ibandronate (BONIVA) 150 MG tablet, Take 1 tablet by mouth Every 30 (Thirty) Days., Disp: 3 tablet, Rfl: 4  •  icosapent ethyl (VASCEPA) 1 g capsule capsule, Take 2 g by mouth 2 (Two) Times a Day With Meals., Disp: 120 capsule, Rfl: 1  •  metroNIDAZOLE (Flagyl) 500 MG tablet, Take 1 tablet by mouth 3 (Three) Times a Day., Disp: 21 tablet, Rfl: 0  •  promethazine (PHENERGAN) 6.25 MG/5ML syrup, Take 5 mL by mouth 4 (Four) Times a Day As Needed for Nausea or Vomiting., Disp: 240 mL, Rfl: 0    Past Medical History:   Diagnosis Date   • Allergic    • Anxiety    • Arthritis    • B12 deficiency    • Cataract    • Chronic diarrhea    • Depression    • Gastritis    • GERD (gastroesophageal reflux disease)    • Headache    • Hyperlipidemia    • Hypertension    • Injury of back    • Injury of neck    • Insulin resistance    • Liver disease     s/p Transplant   • 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 - Dr Ritter [facility]; CBC/Prograf/CMP/LIPIDS/A1C   • Meniere's disease    • OAB (overactive bladder)    • Osteoporosis    • PUD (peptic ulcer disease)        Past Surgical History:   Procedure Laterality Date   • BREAST LUMPECTOMY Left     Benign   • COLONOSCOPY  2012    Polyps = 2012/ 2015, rech 2020   [facility]   • LIVER TRANSPLANTATION      [facility]   • MAMMO BILATERAL      Neg- 2017/2018   • OTHER SURGICAL HISTORY  2014    Liver Stent   • STEROID INJECTION KNEE      Knee Cortosone Injections   • TOTAL ABDOMINAL HYSTERECTOMY  1979       Family History   Problem Relation Age of Onset   • Diabetes Sister         Type II   • Heart  disease Sister    • Hypertension Sister    • No Known Problems Brother        Social History     Socioeconomic History   • Marital status:      Spouse name: Not on file   • Number of children: Not on file   • Years of education: Not on file   • Highest education level: Not on file   Tobacco Use   • Smoking status: Never Smoker   • Smokeless tobacco: Never Used   • Tobacco comment: Quit 2002.    Substance and Sexual Activity   • Alcohol use: No     Frequency: Never     Comment: very rarely    • Drug use: No   • Sexual activity: Defer       74 y/o C female w/ 3weeks of LLQ pain    Pt states she took some of the abx sent by on call  But only a day or two and it didn't seem to help; she admits she had a bad taste of her food/ water while taking them too; Pt is eating and usu cooked food vs raw; admits her BM's are regular w/o C/D; PT states she is due for her transplant labs but not willing to go to [facility] lab to get them------they said it was ok to wait         The following portions of the patient's history were reviewed and updated as appropriate: allergies, current medications, past family history, past medical history, past social history, past surgical history and problem list.    Review of Systems   Constitutional: Positive for activity change, appetite change and fatigue. Negative for fever.   Gastrointestinal: Positive for abdominal pain. Negative for abdominal distention, diarrhea, nausea, vomiting, GERD and indigestion.   Genitourinary: Positive for urgency. Negative for decreased urine volume, dysuria, hematuria and urinary incontinence.   Skin: Negative for rash and bruise.       Vitals:    04/20/20 1401   BP: (!) 185/83   Pulse: 74   Resp: 14   Temp: 98 °F (36.7 °C)   SpO2: 98%       Objective   Physical Exam   Constitutional: She is oriented to person, place, and time. She appears well-developed and well-nourished. She appears distressed.   HENT:   Head: Normocephalic and atraumatic.   Abdominal:  Soft. She exhibits no distension. Bowel sounds are decreased. There is tenderness in the left lower quadrant. There is no rigidity, no rebound and no guarding.   Musculoskeletal: She exhibits no edema.   Neurological: She is alert and oriented to person, place, and time. No cranial nerve deficit.   Skin: Skin is warm and dry. No rash noted. No erythema.   Psychiatric: She has a normal mood and affect. Her behavior is normal. Judgment and thought content normal.   Nursing note and vitals reviewed.        Assessment/Plan   Christal was seen today for abdominal pain.    Diagnoses and all orders for this visit:    Left lower quadrant abdominal pain    Abdominal cramps    Elevated blood pressure reading in office with diagnosis of hypertension    Urinary urgency  -     POCT urinalysis dipstick, automated    Status post liver transplantation (CMS/[facility])  -     Comprehensive Metabolic Panel  -     CBC & Differential  -     Magnesium; Future  -     Magnesium  -     CBC Auto Differential    Abnormal laboratory test    Other orders  -     promethazine (PHENERGAN) 6.25 MG/5ML syrup; Take 5 mL by mouth 4 (Four) Times a Day As Needed for Nausea or Vomiting.  -     metroNIDAZOLE (Flagyl) 500 MG tablet; Take 1 tablet by mouth 3 (Three) Times a Day.    Trial of Abx for diverticulitis/ low residue diet and labs   Colonoscopy due this yr  Pt to monitor o/s BP readings and call if conts >140's/90's

## 2020-04-21 ENCOUNTER — TELEPHONE (OUTPATIENT)
Dept: FAMILY MEDICINE CLINIC | Facility: CLINIC | Age: 75
End: 2020-04-21

## 2020-04-21 LAB
ALBUMIN SERPL-MCNC: 4.4 G/DL (ref 3.5–5.2)
ALBUMIN/GLOB SERPL: 2.1 G/DL
ALP SERPL-CCNC: 34 U/L (ref 39–117)
ALT SERPL W P-5'-P-CCNC: 58 U/L (ref 1–33)
ANION GAP SERPL CALCULATED.3IONS-SCNC: 11.7 MMOL/L (ref 5–15)
AST SERPL-CCNC: 45 U/L (ref 1–32)
BASOPHILS # BLD AUTO: 0.02 10*3/MM3 (ref 0–0.2)
BASOPHILS NFR BLD AUTO: 0.5 % (ref 0–1.5)
BILIRUB SERPL-MCNC: 0.5 MG/DL (ref 0.2–1.2)
BUN BLD-MCNC: 17 MG/DL (ref 8–23)
BUN/CREAT SERPL: 10.5 (ref 7–25)
CALCIUM SPEC-SCNC: 9.5 MG/DL (ref 8.6–10.5)
CHLORIDE SERPL-SCNC: 100 MMOL/L (ref 98–107)
CO2 SERPL-SCNC: 25.3 MMOL/L (ref 22–29)
CREAT BLD-MCNC: 1.62 MG/DL (ref 0.57–1)
DEPRECATED RDW RBC AUTO: 43.1 FL (ref 37–54)
EOSINOPHIL # BLD AUTO: 0.07 10*3/MM3 (ref 0–0.4)
EOSINOPHIL NFR BLD AUTO: 1.7 % (ref 0.3–6.2)
ERYTHROCYTE [DISTWIDTH] IN BLOOD BY AUTOMATED COUNT: 13.3 % (ref 12.3–15.4)
GFR SERPL CREATININE-BSD FRML MDRD: 31 ML/MIN/1.73
GLOBULIN UR ELPH-MCNC: 2.1 GM/DL
GLUCOSE BLD-MCNC: 100 MG/DL (ref 65–99)
HCT VFR BLD AUTO: 34.4 % (ref 34–46.6)
HGB BLD-MCNC: 11.8 G/DL (ref 12–15.9)
IMM GRANULOCYTES # BLD AUTO: 0.09 10*3/MM3 (ref 0–0.05)
IMM GRANULOCYTES NFR BLD AUTO: 2.1 % (ref 0–0.5)
LYMPHOCYTES # BLD AUTO: 1.1 10*3/MM3 (ref 0.7–3.1)
LYMPHOCYTES NFR BLD AUTO: 26 % (ref 19.6–45.3)
MAGNESIUM SERPL-MCNC: 1.8 MG/DL (ref 1.6–2.4)
MCH RBC QN AUTO: 30.6 PG (ref 26.6–33)
MCHC RBC AUTO-ENTMCNC: 34.3 G/DL (ref 31.5–35.7)
MCV RBC AUTO: 89.1 FL (ref 79–97)
MONOCYTES # BLD AUTO: 0.56 10*3/MM3 (ref 0.1–0.9)
MONOCYTES NFR BLD AUTO: 13.2 % (ref 5–12)
NEUTROPHILS # BLD AUTO: 2.39 10*3/MM3 (ref 1.7–7)
NEUTROPHILS NFR BLD AUTO: 56.5 % (ref 42.7–76)
NRBC BLD AUTO-RTO: 0 /100 WBC (ref 0–0.2)
PLATELET # BLD AUTO: 131 10*3/MM3 (ref 140–450)
PMV BLD AUTO: 12.9 FL (ref 6–12)
POTASSIUM BLD-SCNC: 4.9 MMOL/L (ref 3.5–5.2)
PROT SERPL-MCNC: 6.5 G/DL (ref 6–8.5)
RBC # BLD AUTO: 3.86 10*6/MM3 (ref 3.77–5.28)
SODIUM BLD-SCNC: 137 MMOL/L (ref 136–145)
WBC NRBC COR # BLD: 4.23 10*3/MM3 (ref 3.4–10.8)

## 2020-04-22 ENCOUNTER — OFFICE VISIT (OUTPATIENT)
Dept: FAMILY MEDICINE CLINIC | Facility: CLINIC | Age: 75
End: 2020-04-22

## 2020-04-22 ENCOUNTER — TELEPHONE (OUTPATIENT)
Dept: FAMILY MEDICINE CLINIC | Facility: CLINIC | Age: 75
End: 2020-04-22

## 2020-04-22 DIAGNOSIS — Z94.4 STATUS POST LIVER TRANSPLANTATION (HCC): ICD-10-CM

## 2020-04-22 DIAGNOSIS — T50.905A ADVERSE EFFECT OF DRUG, INITIAL ENCOUNTER: ICD-10-CM

## 2020-04-22 DIAGNOSIS — R10.32 LEFT LOWER QUADRANT ABDOMINAL PAIN: Primary | ICD-10-CM

## 2020-04-22 DIAGNOSIS — R79.89 ELEVATED LIVER FUNCTION TESTS: ICD-10-CM

## 2020-04-22 PROCEDURE — 99442 PR PHYS/QHP TELEPHONE EVALUATION 11-20 MIN: CPT | Performed by: FAMILY MEDICINE

## 2020-04-22 RX ORDER — PROMETHAZINE HYDROCHLORIDE 6.25 MG/5ML
6.25 SYRUP ORAL 4 TIMES DAILY PRN
Qty: 120 ML | Refills: 0 | Status: SHIPPED | OUTPATIENT
Start: 2020-04-22 | End: 2021-09-20

## 2020-04-22 NOTE — TELEPHONE ENCOUNTER
Pt will call her transplant clinic today  I told her not to take anymore abx since it does sound like it could be a rxn........ However, I also told her since she is allergic to PCN, I am not sure where to go from here as far as abx    She will get the phenergan and disc w/ transplant clinic what they think and can call [facility] Dr Escudero for their input as well---- she will call me back w/ any concerns and stay on low residue diet for now

## 2020-04-22 NOTE — TELEPHONE ENCOUNTER
Pt called with information she said you asked for at today's TELE visit.    Called [facility], but they are not seeing pts in office and it's been years since she's seen Dr Carroll. They told her, the PCP has to order US.    Pt states [facility] has put her on Doxycycline before.

## 2020-04-22 NOTE — TELEPHONE ENCOUNTER
She can get 120ml of it for $5.63 w/ [facility] at [facility]----if wanting this, I can resend w/ codes

## 2020-04-22 NOTE — TELEPHONE ENCOUNTER
Pt says ok to send Phenergan to [facility].  Also, states that she started itching and broke out with a whelp on her face. She think she's having a reaction to Flagyl. Took a benadryl and has stopped med for now. Please advise or send in alternative w the phenergan.

## 2020-04-22 NOTE — TELEPHONE ENCOUNTER
Spoke w/ pt and she states she really didn't get much out of contacting the transplant clinic or [facility]------they did review her recent labs and not concerned w/ the mildly elev. LFT's; she admits she did not talk to any of the Drs.     Pt states she is doing ok for today overall and eating mashed potatoes and gravy tonite; she wants to try taking a 1/2 tab of the flagyl in the am and see if she has a rxn to this again    We did disc the poss of getting rocephin shots qday x 7 days at clinic or thru ins at pharmacy if someone can give them to her......she will contact me Thurs w/ an update

## 2020-04-22 NOTE — TELEPHONE ENCOUNTER
Pt informed and would not listen to me. States that the reaction is so bad, she is scared to take anymore of the meds and doesn't want to drive to [facility] to  the phenergan bc of it. I offered that she could speak with the dr as a tele visit today, if she felt it was necessary, as she wouldn't accept your advice from me. She did and sched for 1030.

## 2020-04-22 NOTE — PROGRESS NOTES
Subjective   Christal Romo is a 75 y.o. female.     Chief Complaint   Patient presents with   • Urticaria         Current Outpatient Medications:   •  aspirin 81 MG tablet, Take 1 tablet by mouth Daily., Disp: , Rfl:   •  aspirin-acetaminophen-caffeine (EXCEDRIN MIGRAINE) 250-250-65 MG per tablet, Take 1 tablet by mouth Every 6 (Six) Hours As Needed for Headache., Disp: , Rfl:   •  azelastine (ASTELIN) 0.1 % nasal spray, 2 sprays into the nostril(s) as directed by provider Daily., Disp: , Rfl:   •  baclofen (LIORESAL) 10 MG tablet, Take 1 tablet by mouth At Night As Needed for Muscle Spasms., Disp: 30 tablet, Rfl: 3  •  busPIRone (BUSPAR) 5 MG tablet, 5-10mg qam and 15mg qhs, Disp: 150 tablet, Rfl: 0  •  diphenhydrAMINE (BENADRYL) 25 MG tablet, Take 25 mg by mouth Every 6 (Six) Hours As Needed for Itching., Disp: , Rfl:   •  famotidine (PEPCID) 20 MG tablet, Take 2 tablets by mouth Daily With Breakfast., Disp: , Rfl:   •  fenofibrate (TRICOR) 145 MG tablet, Take 1 tablet by mouth Every Evening., Disp: , Rfl:   •  ibandronate (BONIVA) 150 MG tablet, Take 1 tablet by mouth Every 30 (Thirty) Days., Disp: 3 tablet, Rfl: 4  •  icosapent ethyl (VASCEPA) 1 g capsule capsule, Take 2 g by mouth 2 (Two) Times a Day With Meals., Disp: 120 capsule, Rfl: 1  •  Lutein 20 MG capsule, Take 1 tablet by mouth Daily., Disp: , Rfl:   •  meclizine (ANTIVERT) 12.5 MG tablet, Take 1 tablet by mouth Every 6 (Six) Hours As Needed., Disp: , Rfl:   •  metoprolol succinate XL (TOPROL-XL) 50 MG 24 hr tablet, Take 1.5 tablets by mouth Daily., Disp: 135 tablet, Rfl: 0  •  mycophenolate (MYFORTIC) 360 MG tablet delayed-release EC tablet, Take 360 mg by mouth 2 (Two) Times a Day., Disp: , Rfl:   •  oxyCODONE (ROXICODONE) 5 MG immediate release tablet, 1/2 - 1 po bid prn severe pain, Disp: 14 tablet, Rfl: 0  •  Probiotic Product (CULTURELLE PRO-WELL) capsule, Take 2 capsules by mouth Daily., Disp: , Rfl:   •  promethazine (PHENERGAN) 6.25 MG/5ML  syrup, Take 5 mL by mouth 4 (Four) Times a Day As Needed for Nausea or Vomiting., Disp: 120 mL, Rfl: 0  •  solifenacin (VESICARE) 5 MG tablet, Take 1 tablet by mouth Every Night., Disp: 30 tablet, Rfl: 0  •  tacrolimus (PROGRAF) 1 MG capsule, Take 1 mg by mouth 2 (Two) Times a Day., Disp: , Rfl:   •  vitamin E 400 UNIT capsule, Take 400 Units by mouth Daily. 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 - Dr Stone Stevens; CBC/Prograf/CMP/LIPIDS/A1C   • Meniere's disease    • OAB (overactive bladder)    • Osteoporosis    • PUD (peptic ulcer disease)        Past Surgical History:   Procedure Laterality Date   • BREAST LUMPECTOMY Left     Benign   • COLONOSCOPY  2012    Polyps = 2012/ 2015, rech 2020   [facility]   • LIVER TRANSPLANTATION      [facility]   • MAMMO BILATERAL      Neg- 2017/2018   • OTHER SURGICAL HISTORY  2014    Liver Stent   • STEROID INJECTION KNEE      Knee Cortosone Injections   • TOTAL ABDOMINAL HYSTERECTOMY  1979       Family History   Problem Relation Age of Onset   • Diabetes Sister         Type II   • Heart disease Sister    • Hypertension Sister    • No Known Problems Brother        Social History     Socioeconomic History   • Marital status:      Spouse name: Not on file   • Number of children: Not on file   • Years of education: Not on file   • Highest education level: Not on file   Tobacco Use   • Smoking status: Never Smoker   • Smokeless tobacco: Never Used   • Tobacco comment: Quit 2002.    Substance and Sexual Activity   • Alcohol use: No      Frequency: Never     Comment: very rarely    • Drug use: No   • Sexual activity: Defer       74 y/o C female wanting televisit to disc med issues/ recent labs    Pt states she tried the flagyl and states she had nausea and thot she was getting hives -----she took a benadryl and concerned about taking any more    Pt having BMs w/o diarrhea and states she has seen [facility] in past        The following portions of the patient's history were reviewed and updated as appropriate: allergies, current medications, past family history, past medical history, past social history, past surgical history and problem list.    Review of Systems   Constitutional: Negative for activity change, appetite change, fever, unexpected weight gain and unexpected weight loss.   HENT: Negative for swollen glands and trouble swallowing.    Gastrointestinal: Positive for abdominal pain and nausea. Negative for blood in stool, constipation, diarrhea and vomiting.   Skin: Positive for color change and rash.   Neurological: Negative for weakness.   Psychiatric/Behavioral: Positive for stress.       There were no vitals filed for this visit.    Objective   Physical Exam   Constitutional: She is oriented to person, place, and time. She appears distressed.   Pulmonary/Chest: No respiratory distress.   Neurological: She is alert and oriented to person, place, and time.   Psychiatric: Her speech is normal and behavior is normal. Judgment and thought content normal. Her mood appears anxious. Cognition and memory are normal.         Assessment/Plan   Christal was seen today for urticaria.    Diagnoses and all orders for this visit:    Left lower quadrant abdominal pain    Adverse effect of drug, initial encounter    Elevated liver function tests    Status post liver transplantation (CMS/HCC)    Disc'd recent lab results w/ pt and she will call transplant clinic about the elev LFT's  Pt to call GI to see if they can see her in office  Disc'd poss of trying Rocephin  IM qday x 7days if intol of flagyl since allergic to PCN  Cont w/ low residue diet    This visit has been rescheduled as a phone visit to comply with patient safety concerns in accordance with CDC recommendations. Total time of discussion was 15 minutes.

## 2020-04-24 ENCOUNTER — TELEPHONE (OUTPATIENT)
Dept: FAMILY MEDICINE CLINIC | Facility: CLINIC | Age: 75
End: 2020-04-24

## 2020-04-24 NOTE — TELEPHONE ENCOUNTER
OK ------called pt and she is ok w/ the benadryl and feels maybe all of this is just causing her nerves to act up and she took her buspar and may take it again tonite    We decided NOT to take anymore Flagyl due to the pruitis and she will see how it goes over the weekend taking a PPI for her stomach +/- nausea meds and call Mon to start IM Rocephin shots q24hrs

## 2020-04-24 NOTE — TELEPHONE ENCOUNTER
Patient took the Flagyl and her side is feeling better but it is giving her nausea so she took the zofran and she felt itchy so she took a benedryl last night and that helped but thinks it may be making her nervous. Patient wants to know if there is anything else she can take instead of benedryl for the itching. Also she is going to try to take the promethazine today instead of the Zofran.

## 2020-04-27 ENCOUNTER — OFFICE (AMBULATORY)
Dept: URBAN - METROPOLITAN AREA CLINIC 64 | Facility: CLINIC | Age: 75
End: 2020-04-27

## 2020-04-27 ENCOUNTER — CLINICAL SUPPORT (OUTPATIENT)
Dept: FAMILY MEDICINE CLINIC | Facility: CLINIC | Age: 75
End: 2020-04-27

## 2020-04-27 VITALS — HEIGHT: 63 IN

## 2020-04-27 DIAGNOSIS — K57.92 DIVERTICULITIS: Primary | ICD-10-CM

## 2020-04-27 DIAGNOSIS — K21.9 GASTRO-ESOPHAGEAL REFLUX DISEASE WITHOUT ESOPHAGITIS: ICD-10-CM

## 2020-04-27 DIAGNOSIS — Z86.010 PERSONAL HISTORY OF COLONIC POLYPS: ICD-10-CM

## 2020-04-27 DIAGNOSIS — R10.32 LEFT LOWER QUADRANT PAIN: ICD-10-CM

## 2020-04-27 DIAGNOSIS — L29.8 OTHER PRURITUS: ICD-10-CM

## 2020-04-27 PROCEDURE — 99203 OFFICE O/P NEW LOW 30 MIN: CPT | Mod: 95 | Performed by: INTERNAL MEDICINE

## 2020-04-27 PROCEDURE — 96372 THER/PROPH/DIAG INJ SC/IM: CPT | Performed by: FAMILY MEDICINE

## 2020-04-28 ENCOUNTER — TELEPHONE (OUTPATIENT)
Dept: FAMILY MEDICINE CLINIC | Facility: CLINIC | Age: 75
End: 2020-04-28

## 2020-04-28 NOTE — TELEPHONE ENCOUNTER
Patient called and left a  stating that she came in for the Rocephin shot yesterday and last night she was freezing and shaking all over and at 2:30Am this morning she woke up with a really bad headache and she checked her BP and it was high.

## 2020-04-28 NOTE — TELEPHONE ENCOUNTER
Pt phoned.  States she has a terrible ha and severe dizziness.  Says her temp was 99.8 when she woke up.  She took med to get it down.  She states she is not comfortable driving in for 2nd inj at 130.  States she has taken her med for h/a and cannot take her med for dizziness for an hour.  Advised to rest and call back in a couple of hours to see how she is feeling and see if she is able to come in for inj.

## 2020-04-29 NOTE — TELEPHONE ENCOUNTER
"OK, pt informed and agrees. She is on her way to [facility] for CT abd/pelvis ordered by Dr Koch. Said he thought she might have \"internal shingles.\"  "

## 2020-04-30 ENCOUNTER — TELEPHONE (OUTPATIENT)
Dept: FAMILY MEDICINE CLINIC | Facility: CLINIC | Age: 75
End: 2020-04-30

## 2020-05-01 NOTE — TELEPHONE ENCOUNTER
Pt states she took a zoloft last nite and it helped with the itching/anxiety. Bumps on legs in random places, like pimples. Using Benadryl cream. They do look/feel better. She woke up w back pain/HA which she thinks is a side effect of zoloft. Should she take only 1/2 pill next time? Mentioned she is taking Buspar also for her anxiety.

## 2020-05-01 NOTE — TELEPHONE ENCOUNTER
If it is the zoloft 25mg dose----that is half the starting dose but if she takes this, it needs to be taken EVERY day and NOT prn----she can always cut this in half to start

## 2020-05-05 ENCOUNTER — TELEPHONE (OUTPATIENT)
Dept: FAMILY MEDICINE CLINIC | Facility: CLINIC | Age: 75
End: 2020-05-05

## 2020-05-05 RX ORDER — FLUCONAZOLE 150 MG/1
TABLET ORAL
Qty: 2 TABLET | Refills: 0 | Status: SHIPPED | OUTPATIENT
Start: 2020-05-05 | End: 2020-09-17

## 2020-05-05 NOTE — TELEPHONE ENCOUNTER
Patient called and left a VM stating that she has a yeast infection from all the antibiotics she has had and wants to know if you can send something in for her to the [facility] in [place].

## 2020-05-08 ENCOUNTER — TELEPHONE (OUTPATIENT)
Dept: FAMILY MEDICINE CLINIC | Facility: CLINIC | Age: 75
End: 2020-05-08

## 2020-05-08 NOTE — TELEPHONE ENCOUNTER
Pt LM asking for orders or note of rec to be tested for COVID to [facility] in [place].    Also, wanted to tell you that she has appt next wk w Dr Sheehan @ [facility] nephro s/p recent CT scan results

## 2020-05-12 NOTE — TELEPHONE ENCOUNTER
She said her relative thought she should be tested bc of her abd issue. I told her that her problem was not a typical COVID symptoms and she would prob not qualify for testing. But if Dr Zhao thought it appropriate to be tested, we would let her know.

## 2020-05-27 RX ORDER — METOPROLOL SUCCINATE 50 MG/1
100 TABLET, EXTENDED RELEASE ORAL DAILY
Qty: 60 TABLET | Refills: 0 | Status: SHIPPED | OUTPATIENT
Start: 2020-05-27 | End: 2020-06-30

## 2020-05-27 RX ORDER — METOPROLOL SUCCINATE 50 MG/1
100 TABLET, EXTENDED RELEASE ORAL DAILY
Qty: 135 TABLET | Refills: 0 | Status: CANCELLED | OUTPATIENT
Start: 2020-05-27

## 2020-05-27 NOTE — TELEPHONE ENCOUNTER
Patient called stating that the Amlodipine caused her itching so the transplant team told her to stop the Amlodipine and to do the Metoprolol 2 po qd and she would like that changed in her chart and refilled to the [facility] in [place].

## 2020-06-01 ENCOUNTER — DOCUMENTATION (OUTPATIENT)
Dept: PHYSICAL THERAPY | Facility: CLINIC | Age: 75
End: 2020-06-01

## 2020-06-01 NOTE — PROGRESS NOTES
Discharge Summary  Discharge Summary from Physical Therapy Report      Dates  PT visit: 3/5/2020- 3/12/2020  Number of Visits: 2    Goals: Partially Met    Discharge Plan: Continue with current home exercise program as instructed    Comments Pt present for 2 sessions for tx of BPPV. Pt did not schedule any additional sessions, which has been discussed prior to last appt. If needed more sessions, pt was to call clinic. Due to length of time following last session, pt appropriate for discharge. See last note for most updated information.     Date of Discharge 6/1/2020        Nancy Gutierrez, PT  Physical Therapist

## 2020-06-05 ENCOUNTER — OFFICE (AMBULATORY)
Dept: URBAN - METROPOLITAN AREA CLINIC 64 | Facility: CLINIC | Age: 75
End: 2020-06-05

## 2020-06-05 VITALS
HEART RATE: 71 BPM | HEIGHT: 63 IN | WEIGHT: 155 LBS | DIASTOLIC BLOOD PRESSURE: 82 MMHG | SYSTOLIC BLOOD PRESSURE: 192 MMHG

## 2020-06-05 DIAGNOSIS — I10 ESSENTIAL (PRIMARY) HYPERTENSION: ICD-10-CM

## 2020-06-05 DIAGNOSIS — R93.3 ABNORMAL FINDINGS ON DIAGNOSTIC IMAGING OF OTHER PARTS OF DI: ICD-10-CM

## 2020-06-05 DIAGNOSIS — R10.32 LEFT LOWER QUADRANT PAIN: ICD-10-CM

## 2020-06-05 DIAGNOSIS — R19.4 CHANGE IN BOWEL HABIT: ICD-10-CM

## 2020-06-05 DIAGNOSIS — Z94.4 LIVER TRANSPLANT STATUS: ICD-10-CM

## 2020-06-05 PROCEDURE — 99214 OFFICE O/P EST MOD 30 MIN: CPT | Performed by: INTERNAL MEDICINE

## 2020-06-23 ENCOUNTER — TELEPHONE (OUTPATIENT)
Dept: FAMILY MEDICINE CLINIC | Facility: CLINIC | Age: 75
End: 2020-06-23

## 2020-06-23 NOTE — TELEPHONE ENCOUNTER
Pt LM stating that Dr Shaikh did a CT and Cystoscope which did not show any infection or tumors. Pt wants to know if she needs to make appt w you to see about looking into female issues, since she is still having pain/problems and Uro has been r/o.

## 2020-06-30 RX ORDER — METOPROLOL SUCCINATE 50 MG/1
TABLET, EXTENDED RELEASE ORAL
Qty: 60 TABLET | Refills: 0 | Status: SHIPPED | OUTPATIENT
Start: 2020-06-30 | End: 2020-07-13

## 2020-07-07 ENCOUNTER — TELEPHONE (OUTPATIENT)
Dept: FAMILY MEDICINE CLINIC | Facility: CLINIC | Age: 75
End: 2020-07-07

## 2020-07-07 NOTE — TELEPHONE ENCOUNTER
No but she states she has terrible allergies and is doing sinus cleanses and she also has a disk problem in her neck.  She added she is still having pain in her left side and urologist found nothing on her scans.  She believes this is why her bp is so high because of pain

## 2020-07-07 NOTE — TELEPHONE ENCOUNTER
Pt phoned stating she has had a headache since last thur with vision problems.  /67.  She has no fever and says her kids came to look at her to make sure it wasn't a stroke.  I wanted to check with you first before working her in since its a symptom of covid

## 2020-07-13 ENCOUNTER — OFFICE VISIT (OUTPATIENT)
Dept: FAMILY MEDICINE CLINIC | Facility: CLINIC | Age: 75
End: 2020-07-13

## 2020-07-13 VITALS
OXYGEN SATURATION: 97 % | TEMPERATURE: 97.3 F | RESPIRATION RATE: 18 BRPM | HEART RATE: 71 BPM | DIASTOLIC BLOOD PRESSURE: 81 MMHG | HEIGHT: 63 IN | BODY MASS INDEX: 27.64 KG/M2 | WEIGHT: 156 LBS | SYSTOLIC BLOOD PRESSURE: 192 MMHG

## 2020-07-13 DIAGNOSIS — R19.7 DIARRHEA, UNSPECIFIED TYPE: ICD-10-CM

## 2020-07-13 DIAGNOSIS — I10 UNCONTROLLED HYPERTENSION: ICD-10-CM

## 2020-07-13 DIAGNOSIS — R10.9 ABDOMINAL PAIN IN FEMALE: Primary | ICD-10-CM

## 2020-07-13 PROCEDURE — 99214 OFFICE O/P EST MOD 30 MIN: CPT | Performed by: FAMILY MEDICINE

## 2020-07-13 RX ORDER — ONDANSETRON 4 MG/1
4 TABLET, FILM COATED ORAL EVERY 8 HOURS PRN
Qty: 90 TABLET | Refills: 0 | Status: SHIPPED | OUTPATIENT
Start: 2020-07-13 | End: 2021-10-18 | Stop reason: SDUPTHER

## 2020-07-13 RX ORDER — ONDANSETRON 4 MG/1
4 TABLET, FILM COATED ORAL EVERY 8 HOURS PRN
Qty: 90 TABLET | Refills: 0 | Status: SHIPPED | OUTPATIENT
Start: 2020-07-13 | End: 2020-07-13

## 2020-07-13 RX ORDER — DIPHENOXYLATE HYDROCHLORIDE AND ATROPINE SULFATE 2.5; .025 MG/1; MG/1
1 TABLET ORAL 2 TIMES DAILY PRN
Qty: 60 TABLET | Refills: 0 | Status: SHIPPED | OUTPATIENT
Start: 2020-07-13 | End: 2022-04-13

## 2020-07-13 RX ORDER — CLONIDINE HYDROCHLORIDE 0.1 MG/1
0.1 TABLET ORAL 2 TIMES DAILY
Qty: 60 TABLET | Refills: 0 | Status: SHIPPED | OUTPATIENT
Start: 2020-07-13 | End: 2020-09-17 | Stop reason: SINTOL

## 2020-07-13 RX ORDER — METOPROLOL SUCCINATE 50 MG/1
100 TABLET, EXTENDED RELEASE ORAL NIGHTLY
Qty: 180 TABLET | Refills: 1 | Status: SHIPPED | OUTPATIENT
Start: 2020-07-13 | End: 2021-01-11

## 2020-07-13 NOTE — PROGRESS NOTES
Subjective   Christal Romo is a 75 y.o. female.     Chief Complaint   Patient presents with   • Abdominal Pain   • Hypertension   • Diarrhea         Current Outpatient Medications:   •  aspirin 81 MG tablet, Take 1 tablet by mouth Daily., Disp: , Rfl:   •  aspirin-acetaminophen-caffeine (EXCEDRIN MIGRAINE) 250-250-65 MG per tablet, Take 1 tablet by mouth Every 6 (Six) Hours As Needed for Headache., Disp: , Rfl:   •  azelastine (ASTELIN) 0.1 % nasal spray, 2 sprays into the nostril(s) as directed by provider Daily., Disp: , Rfl:   •  baclofen (LIORESAL) 10 MG tablet, Take 1 tablet by mouth At Night As Needed for Muscle Spasms., Disp: 30 tablet, Rfl: 3  •  busPIRone (BUSPAR) 5 MG tablet, 5-10mg qam and 15mg qhs, Disp: 150 tablet, Rfl: 0  •  diphenhydrAMINE (BENADRYL) 25 MG tablet, Take 25 mg by mouth Every 6 (Six) Hours As Needed for Itching., Disp: , Rfl:   •  famotidine (PEPCID) 20 MG tablet, Take 2 tablets by mouth Daily With Breakfast., Disp: , Rfl:   •  fenofibrate (TRICOR) 145 MG tablet, Take 1 tablet by mouth Every Evening., Disp: , Rfl:   •  ibandronate (BONIVA) 150 MG tablet, Take 1 tablet by mouth Every 30 (Thirty) Days., Disp: 3 tablet, Rfl: 4  •  Lutein 20 MG capsule, Take 1 tablet by mouth Daily., Disp: , Rfl:   •  meclizine (ANTIVERT) 12.5 MG tablet, Take 1 tablet by mouth Every 6 (Six) Hours As Needed., Disp: , Rfl:   •  metoprolol succinate XL (TOPROL-XL) 50 MG 24 hr tablet, Take 2 tablets by mouth Every Night., Disp: 180 tablet, Rfl: 1  •  mycophenolate (MYFORTIC) 360 MG tablet delayed-release EC tablet, Take 360 mg by mouth 2 (Two) Times a Day., Disp: , Rfl:   •  oxyCODONE (ROXICODONE) 5 MG immediate release tablet, 1/2 - 1 po bid prn severe pain, Disp: 14 tablet, Rfl: 0  •  Probiotic Product (CULTURELLE PRO-WELL) capsule, Take 2 capsules by mouth Daily., Disp: , Rfl:   •  promethazine (PHENERGAN) 6.25 MG/5ML syrup, Take 5 mL by mouth 4 (Four) Times a Day As Needed for Nausea or Vomiting., Disp: 120  mL, Rfl: 0  •  solifenacin (VESICARE) 5 MG tablet, Take 1 tablet by mouth Every Night., Disp: 30 tablet, Rfl: 0  •  tacrolimus (PROGRAF) 1 MG capsule, Take 1 mg by mouth 2 (Two) Times a Day., Disp: , Rfl:   •  vitamin E 400 UNIT capsule, Take 400 Units by mouth Daily. 2 tabs daily ( morning ), Disp: , Rfl:   •  cloNIDine (CATAPRES) 0.1 MG tablet, Take 1 tablet by mouth 2 (Two) Times a Day., Disp: 60 tablet, Rfl: 0  •  diphenoxylate-atropine (Lomotil) 2.5-0.025 MG per tablet, Take 1 tablet by mouth 2 (Two) Times a Day As Needed for Diarrhea., Disp: 60 tablet, Rfl: 0  •  fluconazole (Diflucan) 150 MG tablet, 1 po today and may repeat again in 4 days if needed, Disp: 2 tablet, Rfl: 0  •  ondansetron (Zofran) 4 MG tablet, Take 1 tablet by mouth Every 8 (Eight) Hours As Needed for Nausea or Vomiting. 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 - Dr Ritter [facility]; CBC/Prograf/CMP/LIPIDS/A1C   • Meniere's disease    • OAB (overactive bladder)    • Osteoporosis    • PUD (peptic ulcer disease)        Past Surgical History:   Procedure Laterality Date   • BREAST LUMPECTOMY Left     Benign   • COLONOSCOPY  2012    Polyps = 2012/ 2015, rech 2020   [facility]   • ERCP W/ PLASTIC STENT PLACEMENT  2014    Liver Stent   • LIVER TRANSPLANTATION      [facility]   • MAMMO BILATERAL      NEG= 2017/ 2018   • TOTAL ABDOMINAL HYSTERECTOMY  1979       Family History   Problem Relation Age of Onset   • Diabetes Sister         Type II   • Heart disease Sister    • Hypertension Sister    • No Known Problems Brother         Social History     Socioeconomic History   • Marital status:      Spouse name: Not on file   • Number of children: Not on file   • Years of education: Not on file   • Highest education level: Not on file   Tobacco Use   • Smoking status: Never Smoker   • Smokeless tobacco: Never Used   • Tobacco comment: Quit 2002.    Substance and Sexual Activity   • Alcohol use: No     Frequency: Never     Comment: very rarely    • Drug use: No   • Sexual activity: Defer       76 y/o C female here for f/u on Abd Pain    Pt did see GI about the pain and after they did a CT abd, she was sent to Urology due to ?abn Left reptroperitoneal stranding due to kidney stone; Urology has since done a f/u Cystoscopy/ given her abx and did f/u CT abd and no stone found w/ scope and CT ess neg......the patient states she conts to have the Left sided abd pain and diarrhea    Pt states she has diarrhea after taking her prograf qday        The following portions of the patient's history were reviewed and updated as appropriate: allergies, current medications, past family history, past medical history, past social history, past surgical history and problem list.    Review of Systems   Gastrointestinal: Positive for abdominal distention, abdominal pain and diarrhea. Negative for constipation, nausea, vomiting, GERD and indigestion.   Genitourinary: Negative for dysuria, hematuria, pelvic pain and urgency.   Musculoskeletal: Negative for back pain.   Skin: Negative for rash.       Vitals:    07/13/20 1359   BP: (!) 192/81   Pulse: 71   Resp: 18   Temp: 97.3 °F (36.3 °C)   SpO2: 97%       Objective   Physical Exam   Constitutional: She is oriented to person, place, and time. She appears well-developed and well-nourished. She appears distressed.   HENT:   Head: Normocephalic and atraumatic.   Abdominal: Soft. Bowel sounds are normal. She exhibits no distension and no mass. There is no rebound and no guarding.   Musculoskeletal: She exhibits  no edema or tenderness.   Neurological: She is alert and oriented to person, place, and time. No cranial nerve deficit.   Skin: No rash noted. No erythema.   Psychiatric: She has a normal mood and affect. Her behavior is normal. Judgment and thought content normal.   Nursing note and vitals reviewed.        Assessment/Plan   Christal was seen today for abdominal pain, hypertension and diarrhea.    Diagnoses and all orders for this visit:    Abdominal pain in female    Diarrhea, unspecified type  -     diphenoxylate-atropine (Lomotil) 2.5-0.025 MG per tablet; Take 1 tablet by mouth 2 (Two) Times a Day As Needed for Diarrhea.    Uncontrolled hypertension    Other orders  -     metoprolol succinate XL (TOPROL-XL) 50 MG 24 hr tablet; Take 2 tablets by mouth Every Night.  -     Discontinue: ondansetron (Zofran) 4 MG tablet; Take 1 tablet by mouth Every 8 (Eight) Hours As Needed for Nausea or Vomiting. prn  -     ondansetron (Zofran) 4 MG tablet; Take 1 tablet by mouth Every 8 (Eight) Hours As Needed for Nausea or Vomiting. prn  -     cloNIDine (CATAPRES) 0.1 MG tablet; Take 1 tablet by mouth 2 (Two) Times a Day.    Trial of Lomotil prn Diarrhea    Add Catapress bid and monitor o/s BP readings  Pt due for colonoscopy this yr   Reviewed GSI/ Urology w/u for her LLQ pain -----pain seemed to start after pt taken off steroids by Transplant team

## 2020-07-15 ENCOUNTER — CLINICAL SUPPORT (OUTPATIENT)
Dept: FAMILY MEDICINE CLINIC | Facility: CLINIC | Age: 75
End: 2020-07-15

## 2020-07-15 VITALS — HEART RATE: 70 BPM | SYSTOLIC BLOOD PRESSURE: 153 MMHG | DIASTOLIC BLOOD PRESSURE: 68 MMHG

## 2020-07-23 ENCOUNTER — OFFICE (AMBULATORY)
Dept: URBAN - METROPOLITAN AREA PATHOLOGY 4 | Facility: PATHOLOGY | Age: 75
End: 2020-07-23
Payer: COMMERCIAL

## 2020-07-23 ENCOUNTER — ON CAMPUS - OUTPATIENT (AMBULATORY)
Dept: URBAN - METROPOLITAN AREA HOSPITAL 2 | Facility: HOSPITAL | Age: 75
End: 2020-07-23

## 2020-07-23 ENCOUNTER — OFFICE (AMBULATORY)
Dept: URBAN - METROPOLITAN AREA PATHOLOGY 4 | Facility: PATHOLOGY | Age: 75
End: 2020-07-23

## 2020-07-23 VITALS
HEART RATE: 83 BPM | SYSTOLIC BLOOD PRESSURE: 126 MMHG | DIASTOLIC BLOOD PRESSURE: 92 MMHG | OXYGEN SATURATION: 99 % | SYSTOLIC BLOOD PRESSURE: 153 MMHG | RESPIRATION RATE: 18 BRPM | SYSTOLIC BLOOD PRESSURE: 208 MMHG | SYSTOLIC BLOOD PRESSURE: 156 MMHG | OXYGEN SATURATION: 98 % | TEMPERATURE: 97.8 F | HEART RATE: 80 BPM | HEART RATE: 92 BPM | DIASTOLIC BLOOD PRESSURE: 103 MMHG | SYSTOLIC BLOOD PRESSURE: 133 MMHG | HEART RATE: 75 BPM | HEART RATE: 82 BPM | HEIGHT: 63 IN | OXYGEN SATURATION: 94 % | OXYGEN SATURATION: 100 % | DIASTOLIC BLOOD PRESSURE: 79 MMHG | DIASTOLIC BLOOD PRESSURE: 78 MMHG | DIASTOLIC BLOOD PRESSURE: 71 MMHG | SYSTOLIC BLOOD PRESSURE: 138 MMHG | DIASTOLIC BLOOD PRESSURE: 66 MMHG | WEIGHT: 150 LBS | SYSTOLIC BLOOD PRESSURE: 127 MMHG | HEART RATE: 85 BPM | HEART RATE: 68 BPM | SYSTOLIC BLOOD PRESSURE: 165 MMHG | RESPIRATION RATE: 15 BRPM | DIASTOLIC BLOOD PRESSURE: 68 MMHG | RESPIRATION RATE: 16 BRPM

## 2020-07-23 DIAGNOSIS — K64.0 FIRST DEGREE HEMORRHOIDS: ICD-10-CM

## 2020-07-23 DIAGNOSIS — R11.2 NAUSEA WITH VOMITING, UNSPECIFIED: ICD-10-CM

## 2020-07-23 DIAGNOSIS — K31.89 OTHER DISEASES OF STOMACH AND DUODENUM: ICD-10-CM

## 2020-07-23 DIAGNOSIS — D12.0 BENIGN NEOPLASM OF CECUM: ICD-10-CM

## 2020-07-23 DIAGNOSIS — R19.4 CHANGE IN BOWEL HABIT: ICD-10-CM

## 2020-07-23 DIAGNOSIS — K57.30 DIVERTICULOSIS OF LARGE INTESTINE WITHOUT PERFORATION OR ABS: ICD-10-CM

## 2020-07-23 DIAGNOSIS — Z86.010 PERSONAL HISTORY OF COLONIC POLYPS: ICD-10-CM

## 2020-07-23 DIAGNOSIS — D13.0 BENIGN NEOPLASM OF ESOPHAGUS: ICD-10-CM

## 2020-07-23 PROBLEM — K22.8 OTHER SPECIFIED DISEASES OF ESOPHAGUS: Status: ACTIVE | Noted: 2020-07-23

## 2020-07-23 PROBLEM — K63.5 POLYP OF COLON: Status: ACTIVE | Noted: 2020-07-23

## 2020-07-23 LAB
GI HISTOLOGY: A. SELECT: (no result)
GI HISTOLOGY: B. UNSPECIFIED: (no result)
GI HISTOLOGY: C. UNSPECIFIED: (no result)
GI HISTOLOGY: PDF REPORT: (no result)

## 2020-07-23 PROCEDURE — 45380 COLONOSCOPY AND BIOPSY: CPT | Performed by: INTERNAL MEDICINE

## 2020-07-23 PROCEDURE — 43239 EGD BIOPSY SINGLE/MULTIPLE: CPT | Performed by: INTERNAL MEDICINE

## 2020-07-23 PROCEDURE — 88305 TISSUE EXAM BY PATHOLOGIST: CPT | Performed by: INTERNAL MEDICINE

## 2020-08-04 ENCOUNTER — TELEPHONE (OUTPATIENT)
Dept: FAMILY MEDICINE CLINIC | Facility: CLINIC | Age: 75
End: 2020-08-04

## 2020-08-04 DIAGNOSIS — R50.9 FEVER, UNSPECIFIED FEVER CAUSE: Primary | ICD-10-CM

## 2020-08-04 RX ORDER — OMEPRAZOLE 20 MG/1
20 CAPSULE, DELAYED RELEASE ORAL DAILY
Start: 2020-08-04 | End: 2020-09-17

## 2020-08-04 NOTE — TELEPHONE ENCOUNTER
Patient called stating that she hasn't been able to go get the COVID test as she feels bad, patient states that she no longer has a fever but is dizzy and vomitted up green bile at 3:00pm.

## 2020-08-04 NOTE — TELEPHONE ENCOUNTER
Pt took some phenergan and able to take some soup.....  Pt w/ bad HA/ N/ V this am when she woke up.......the patient had an appt for COVID but it got canceled at the [facility]    Pt was ill over the weekend w/ low grade fever ...... But doing better now; Pt feeling congested and hasn't had allergy shots since covid ......

## 2020-08-09 PROCEDURE — U0003 INFECTIOUS AGENT DETECTION BY NUCLEIC ACID (DNA OR RNA); SEVERE ACUTE RESPIRATORY SYNDROME CORONAVIRUS 2 (SARS-COV-2) (CORONAVIRUS DISEASE [COVID-19]), AMPLIFIED PROBE TECHNIQUE, MAKING USE OF HIGH THROUGHPUT TECHNOLOGIES AS DESCRIBED BY CMS-2020-01-R: HCPCS | Performed by: FAMILY MEDICINE

## 2020-08-12 ENCOUNTER — TELEPHONE (OUTPATIENT)
Dept: FAMILY MEDICINE CLINIC | Facility: CLINIC | Age: 75
End: 2020-08-12

## 2020-08-12 NOTE — TELEPHONE ENCOUNTER
Patient called and left a  stating that she went and had the COVID test done and they said that it came back positive. Patient just wanted to let you know.

## 2020-08-27 ENCOUNTER — TELEPHONE (OUTPATIENT)
Dept: FAMILY MEDICINE CLINIC | Facility: CLINIC | Age: 75
End: 2020-08-27

## 2020-08-27 DIAGNOSIS — R06.02 SHORTNESS OF BREATH: ICD-10-CM

## 2020-08-27 DIAGNOSIS — U07.1 COVID-19 VIRUS DETECTED: Primary | ICD-10-CM

## 2020-08-27 NOTE — TELEPHONE ENCOUNTER
Pt states she had a +Covid on 8/8/20 and is still having some sob and pain when she breathes.  Wants to know if she can come in for a cxr.

## 2020-08-31 RX ORDER — ALBUTEROL SULFATE 90 UG/1
2 AEROSOL, METERED RESPIRATORY (INHALATION) EVERY 6 HOURS PRN
Qty: 18 G | Refills: 0 | Status: SHIPPED | OUTPATIENT
Start: 2020-08-31 | End: 2021-03-16 | Stop reason: SDUPTHER

## 2020-09-17 ENCOUNTER — OFFICE VISIT (OUTPATIENT)
Dept: FAMILY MEDICINE CLINIC | Facility: CLINIC | Age: 75
End: 2020-09-17

## 2020-09-17 VITALS
DIASTOLIC BLOOD PRESSURE: 77 MMHG | WEIGHT: 147 LBS | SYSTOLIC BLOOD PRESSURE: 188 MMHG | HEIGHT: 63 IN | HEART RATE: 68 BPM | OXYGEN SATURATION: 98 % | TEMPERATURE: 97.1 F | BODY MASS INDEX: 26.05 KG/M2 | RESPIRATION RATE: 16 BRPM

## 2020-09-17 DIAGNOSIS — R53.83 FATIGUE, UNSPECIFIED TYPE: ICD-10-CM

## 2020-09-17 DIAGNOSIS — K59.1 FUNCTIONAL DIARRHEA: ICD-10-CM

## 2020-09-17 DIAGNOSIS — F32.9 REACTIVE DEPRESSION: Primary | ICD-10-CM

## 2020-09-17 DIAGNOSIS — M54.2 CERVICALGIA: ICD-10-CM

## 2020-09-17 DIAGNOSIS — Z23 NEED FOR INFLUENZA VACCINATION: ICD-10-CM

## 2020-09-17 DIAGNOSIS — I10 ELEVATED BLOOD PRESSURE READING WITH DIAGNOSIS OF HYPERTENSION: ICD-10-CM

## 2020-09-17 PROCEDURE — 82607 VITAMIN B-12: CPT | Performed by: FAMILY MEDICINE

## 2020-09-17 PROCEDURE — G0008 ADMIN INFLUENZA VIRUS VAC: HCPCS | Performed by: FAMILY MEDICINE

## 2020-09-17 PROCEDURE — 36415 COLL VENOUS BLD VENIPUNCTURE: CPT | Performed by: FAMILY MEDICINE

## 2020-09-17 PROCEDURE — 90694 VACC AIIV4 NO PRSRV 0.5ML IM: CPT | Performed by: FAMILY MEDICINE

## 2020-09-17 PROCEDURE — 99214 OFFICE O/P EST MOD 30 MIN: CPT | Performed by: FAMILY MEDICINE

## 2020-09-17 RX ORDER — MYCOPHENOLIC ACID 360 MG/1
TABLET, DELAYED RELEASE ORAL
COMMUNITY
Start: 2020-08-11 | End: 2022-08-11

## 2020-09-17 RX ORDER — ROSUVASTATIN CALCIUM 10 MG/1
10 TABLET, COATED ORAL DAILY
Qty: 30 TABLET | Refills: 1 | Status: SHIPPED | OUTPATIENT
Start: 2020-09-17 | End: 2020-11-12

## 2020-09-17 RX ORDER — MONTELUKAST SODIUM 4 MG/1
TABLET, CHEWABLE ORAL
Qty: 60 TABLET | Refills: 0 | Status: SHIPPED | OUTPATIENT
Start: 2020-09-17 | End: 2020-11-12

## 2020-09-17 RX ORDER — TACROLIMUS 1 MG/1
2 CAPSULE ORAL 2 TIMES DAILY
Status: SHIPPED
Start: 2020-09-17 | End: 2022-12-16

## 2020-09-17 RX ORDER — DILTIAZEM HYDROCHLORIDE 60 MG/1
60 CAPSULE, EXTENDED RELEASE ORAL NIGHTLY
Qty: 30 CAPSULE | Refills: 1 | Status: SHIPPED | OUTPATIENT
Start: 2020-09-17 | End: 2020-09-24

## 2020-09-17 RX ORDER — PAROXETINE 10 MG/1
10 TABLET, FILM COATED ORAL NIGHTLY
Qty: 30 TABLET | Refills: 1 | Status: SHIPPED | OUTPATIENT
Start: 2020-09-17 | End: 2021-03-16

## 2020-09-17 RX ORDER — PHENOL 1.4 %
1 AEROSOL, SPRAY (ML) MUCOUS MEMBRANE DAILY
COMMUNITY

## 2020-09-17 RX ORDER — OMEPRAZOLE 20 MG/1
40 CAPSULE, DELAYED RELEASE ORAL DAILY
Status: SHIPPED
Start: 2020-09-17 | End: 2021-03-04

## 2020-09-17 RX ORDER — TRAMADOL HYDROCHLORIDE 50 MG/1
50 TABLET ORAL EVERY 6 HOURS PRN
Qty: 20 TABLET | Refills: 0 | Status: SHIPPED | OUTPATIENT
Start: 2020-09-17 | End: 2021-03-16 | Stop reason: SDUPTHER

## 2020-09-18 LAB — VIT B12 BLD-MCNC: 566 PG/ML (ref 211–946)

## 2020-09-23 ENCOUNTER — TELEPHONE (OUTPATIENT)
Dept: FAMILY MEDICINE CLINIC | Facility: CLINIC | Age: 75
End: 2020-09-23

## 2020-09-23 NOTE — TELEPHONE ENCOUNTER
I could try lower doses in verapamil but would have to take it TID----the long acting q24 hr and q12hr are all capsules

## 2020-09-23 NOTE — TELEPHONE ENCOUNTER
Patient called stating that she didn't realize the Diltiazem was going to be a capsule. Patient states that they wanted $68.00 for it and she sometimes has reactions to pills and doesn't want to have a reaction and not be able to take them anymore and she also would rather have a tablet in case she needs to end up cutting the dose back.

## 2020-09-24 RX ORDER — VERAPAMIL HYDROCHLORIDE 40 MG/1
TABLET ORAL
Qty: 45 TABLET | Refills: 1 | Status: SHIPPED | OUTPATIENT
Start: 2020-09-24 | End: 2021-03-16

## 2020-09-24 NOTE — TELEPHONE ENCOUNTER
Yes, pt would likeyou to send in the Verapamil tid - JayC    She wanted to verify that she takes this instead of Cardizem, and not stop taking all other meds,.

## 2020-10-04 PROBLEM — N39.0 URINARY TRACT INFECTION: Status: RESOLVED | Noted: 2017-09-20 | Resolved: 2020-10-04

## 2020-10-04 PROBLEM — R82.90 ABNORMAL URINE FINDING: Status: RESOLVED | Noted: 2017-02-15 | Resolved: 2020-10-04

## 2020-10-04 PROBLEM — M79.643 HAND PAIN: Status: RESOLVED | Noted: 2017-12-06 | Resolved: 2020-10-04

## 2020-10-04 PROBLEM — Z23 ENCOUNTER FOR IMMUNIZATION: Status: RESOLVED | Noted: 2018-08-03 | Resolved: 2020-10-04

## 2020-10-04 PROBLEM — R10.9 ABDOMINAL CRAMPS: Status: RESOLVED | Noted: 2017-09-20 | Resolved: 2020-10-04

## 2020-10-04 PROBLEM — R89.9 ABNORMAL LABORATORY TEST RESULT: Status: RESOLVED | Noted: 2019-02-05 | Resolved: 2020-10-04

## 2020-10-04 PROBLEM — Z12.31 VISIT FOR SCREENING MAMMOGRAM: Status: RESOLVED | Noted: 2017-07-27 | Resolved: 2020-10-04

## 2020-10-04 PROBLEM — R11.0 NAUSEA: Status: RESOLVED | Noted: 2017-09-20 | Resolved: 2020-10-04

## 2020-10-04 PROBLEM — Z91.81 HX OF FALL: Status: RESOLVED | Noted: 2017-12-06 | Resolved: 2020-10-04

## 2020-10-04 PROBLEM — R79.89 SERUM CREATININE RAISED: Status: RESOLVED | Noted: 2017-09-21 | Resolved: 2020-10-04

## 2020-10-04 PROBLEM — M54.89 OTHER DORSALGIA: Status: RESOLVED | Noted: 2018-05-25 | Resolved: 2020-10-04

## 2020-10-04 PROBLEM — R89.9 ABNORMAL LABORATORY TEST: Status: RESOLVED | Noted: 2017-09-21 | Resolved: 2020-10-04

## 2020-10-16 RX ORDER — FENOFIBRATE 160 MG/1
160 TABLET ORAL EVERY EVENING
Qty: 90 TABLET | Refills: 1 | Status: SHIPPED | OUTPATIENT
Start: 2020-10-16 | End: 2021-09-20

## 2020-10-19 ENCOUNTER — OFFICE VISIT (OUTPATIENT)
Dept: FAMILY MEDICINE CLINIC | Facility: CLINIC | Age: 75
End: 2020-10-19

## 2020-10-19 VITALS
OXYGEN SATURATION: 98 % | SYSTOLIC BLOOD PRESSURE: 180 MMHG | HEART RATE: 75 BPM | WEIGHT: 149 LBS | DIASTOLIC BLOOD PRESSURE: 80 MMHG | BODY MASS INDEX: 26.4 KG/M2 | HEIGHT: 63 IN | RESPIRATION RATE: 16 BRPM | TEMPERATURE: 97.5 F

## 2020-10-19 DIAGNOSIS — Z23 IMMUNIZATION DUE: ICD-10-CM

## 2020-10-19 DIAGNOSIS — Z91.81 HISTORY OF RECENT FALL: ICD-10-CM

## 2020-10-19 DIAGNOSIS — F32.9 REACTIVE DEPRESSION: ICD-10-CM

## 2020-10-19 DIAGNOSIS — I10 ELEVATED BLOOD PRESSURE READING WITH DIAGNOSIS OF HYPERTENSION: Primary | ICD-10-CM

## 2020-10-19 PROCEDURE — 99213 OFFICE O/P EST LOW 20 MIN: CPT | Performed by: FAMILY MEDICINE

## 2020-10-19 PROCEDURE — 90732 PPSV23 VACC 2 YRS+ SUBQ/IM: CPT | Performed by: FAMILY MEDICINE

## 2020-10-19 PROCEDURE — G0009 ADMIN PNEUMOCOCCAL VACCINE: HCPCS | Performed by: FAMILY MEDICINE

## 2020-10-19 RX ORDER — BACLOFEN 10 MG/1
10 TABLET ORAL NIGHTLY PRN
Qty: 30 TABLET | Refills: 3 | Status: SHIPPED | OUTPATIENT
Start: 2020-10-19 | End: 2021-03-16 | Stop reason: SDUPTHER

## 2020-11-11 NOTE — PROGRESS NOTES
Cardiology Office Visit      Encounter Date:  11/12/2020    Patient ID:   Christal Romo is a 75 y.o. female.    Reason For Followup:  Valvular heart disease  Hypertension    Brief Clinical History:  Dear Sara Espino, DO    I had the pleasure of seeing Christal Romo today. As you are well aware, this is a 75 y.o. female with no known history of ischemic heart disease.  She does have a history of nonalcoholic steatohepatitis and liver cirrhosis and is status post liver transplant.  She has additional history that includes hypertension, palpitations, and aortic insufficiency. She presents today for follow-up on the above conditions.    Interval History:  She denies any shortness of breath.  She denies any PND orthopnea.    She denies any PND orthopnea.  She denies any syncope or near syncope.  She reports feeling well from a cardiac perspective.    She continues to have difficulty with hypertension.  She reports she is using a significant amount of neck pain because of chronic disease and she lifted something she probably should not have a few days ago.  She has been afraid to go get injections because of Covid despite the fact that she had Covid earlier this year.    She continues to struggle with the loss of her .  She is tearful and emotional in the office today.  She is very anxious and does not like driving.  Her  used to drive her to her appointments.    As I am sure you are also aware she has an intolerance to multiple medications including multiple antihypertensives.  This makes treating her hypertension difficult.  She reports that she did not take her antihypertensive today because it makes her sleepy and she did more to fall asleep while driving.    Assessment & Plan    Impressions:  Hypertension     I believe a significant amount of her hypertension is due to anxiety, and pain.  Palpitations.  History of abnormal troponin secondary to mouse antigen interaction with troponin assay  reagent.  Cirrhosis status post liver transplant 2016  Poor dentition.  Aortic insufficiency  Hyperlipidemia with a preponderance of hypertriglyceridemia  Depression    Recommendations:  Continuation of her current cardiovascular regimen at the present time.  Cardiac status appropriate for neck injections to help with pain  2D echocardiogram to evaluate valve in light of recent Covid infection  Follow-up in 3 months.    Objective:    Vitals:  Vitals:    11/12/20 1407   BP: (!) 194/75   Pulse: 72   SpO2: 100%   Weight: 67.1 kg (148 lb)       Physical Exam:    General: Alert, cooperative, no distress, appears stated age  Head:  Normocephalic, atraumatic, mucous membranes moist  Eyes:  Conjunctiva/corneas clear, EOM's intact     Neck:  Supple, bruit noted  Lungs: Clear to auscultation bilaterally, no wheezes rhonchi rales are noted  Chest wall: No tenderness  Heart::  Regular rate and rhythm, S1 and S2 normal, 1/6 holosystolic murmur.  Rub or gallop  Abdomen: Soft, non-tender, nondistended bowel sounds active  Extremities: No cyanosis, clubbing, or edema  Pulses: 2+ and symmetric all extremities  Skin:  No rashes or lesions  Neuro/psych: A&O x3. CN II through XII are grossly intact with appropriate affect      Allergies:  Allergies   Allergen Reactions   • Amlodipine Itching   • Atacand  [Candesartan Cilexetil] Other (See Comments)   • Atenolol Palpitations   • Azithromycin Nausea Only   • Escitalopram Oxalate Other (See Comments)     ?   • Ibuprofen Unknown (See Comments)   • Levofloxacin Nausea Only   • Penicillin G Hives   • Spironolactone Nausea And Vomiting   • Sucralfate Swelling   • Sulfamethoxazole-Trimethoprim Rash and Other (See Comments)     THROMBOCYTOPENIA    • Triamterene-Hctz Myalgia   • Venlafaxine Other (See Comments)     HEADACHE   • Clonidine Derivatives GI Intolerance   • Whey Nausea And Vomiting   • Citrus Other (See Comments)   • Zoloft [Sertraline Hcl] Palpitations       Medication Review:      Current Outpatient Medications:   •  albuterol sulfate  (90 Base) MCG/ACT inhaler, Inhale 2 puffs Every 6 (Six) Hours As Needed for Wheezing or Shortness of Air., Disp: 18 g, Rfl: 0  •  aspirin 81 MG tablet, Take 1 tablet by mouth Daily., Disp: , Rfl:   •  aspirin-acetaminophen-caffeine (EXCEDRIN MIGRAINE) 250-250-65 MG per tablet, Take 1 tablet by mouth Every 6 (Six) Hours As Needed for Headache., Disp: , Rfl:   •  azelastine (ASTELIN) 0.1 % nasal spray, 2 sprays into the nostril(s) as directed by provider Daily., Disp: , Rfl:   •  baclofen (LIORESAL) 10 MG tablet, Take 1 tablet by mouth At Night As Needed for Muscle Spasms., Disp: 30 tablet, Rfl: 3  •  busPIRone (BUSPAR) 15 MG tablet, Take 15 mg by mouth 3 (Three) Times a Day., Disp: , Rfl:   •  diphenhydrAMINE (BENADRYL) 25 MG tablet, Take 25 mg by mouth Every 6 (Six) Hours As Needed for Itching., Disp: , Rfl:   •  diphenoxylate-atropine (Lomotil) 2.5-0.025 MG per tablet, Take 1 tablet by mouth 2 (Two) Times a Day As Needed for Diarrhea., Disp: 60 tablet, Rfl: 0  •  fenofibrate 160 MG tablet, Take 1 tablet by mouth Every Evening., Disp: 90 tablet, Rfl: 1  •  ibandronate (BONIVA) 150 MG tablet, Take 1 tablet by mouth Every 30 (Thirty) Days., Disp: 3 tablet, Rfl: 4  •  Lutein 20 MG capsule, Take 1 tablet by mouth Daily., Disp: , Rfl:   •  meclizine (ANTIVERT) 12.5 MG tablet, Take 1 tablet by mouth Every 6 (Six) Hours As Needed., Disp: , Rfl:   •  metoprolol succinate XL (TOPROL-XL) 50 MG 24 hr tablet, Take 2 tablets by mouth Every Night., Disp: 180 tablet, Rfl: 1  •  Multiple Vitamins-Minerals (Multivitamin Adults 50+) tablet, Take 1 tablet by mouth Daily., Disp: , Rfl:   •  mycophenolate (MYFORTIC) 360 MG tablet delayed-release EC tablet, TK 1 T PO Q 12 H, Disp: , Rfl:   •  omeprazole (PrilOSEC) 20 MG capsule, Take 2 capsules by mouth Daily., Disp: , Rfl:   •  ondansetron (Zofran) 4 MG tablet, Take 1 tablet by mouth Every 8 (Eight) Hours As Needed for  Nausea or Vomiting. prn, Disp: 90 tablet, Rfl: 0  •  PARoxetine (Paxil) 10 MG tablet, Take 1 tablet by mouth Every Night., Disp: 30 tablet, Rfl: 1  •  Probiotic Product (CULTURELLE PRO-WELL) capsule, Take 2 capsules by mouth Daily., Disp: , Rfl:   •  promethazine (PHENERGAN) 6.25 MG/5ML syrup, Take 5 mL by mouth 4 (Four) Times a Day As Needed for Nausea or Vomiting., Disp: 120 mL, Rfl: 0  •  tacrolimus (PROGRAF) 1 MG capsule, 2 capsules 2 (Two) Times a Day., Disp: , Rfl:   •  traMADol (ULTRAM) 50 MG tablet, Take 1 tablet by mouth Every 6 (Six) Hours As Needed for Moderate Pain  or Severe Pain ., Disp: 20 tablet, Rfl: 0  •  verapamil (CALAN) 40 MG tablet, 1/2 tab po TID, Disp: 45 tablet, Rfl: 1  •  vitamin E 400 UNIT capsule, Take 400 Units by mouth Daily. 2 tabs daily ( morning ), Disp: , Rfl:   •  colestipol (COLESTID) 1 g tablet, 1-2 po qid prn diarrhea, Disp: 60 tablet, Rfl: 0  •  rosuvastatin (CRESTOR) 10 MG tablet, Take 1 tablet by mouth Daily., Disp: 30 tablet, Rfl: 1    Family History:  Family History   Problem Relation Age of Onset   • Diabetes Sister         Type II   • Heart disease Sister    • Hypertension Sister    • No Known Problems Brother        Past Medical History:  Past Medical History:   Diagnosis Date   • Allergic    • Anxiety    • Arthritis    • B12 deficiency    • Cataract    • Chronic diarrhea    • Depression    • Gastritis    • GERD    • Headache    • Hyperlipidemia    • Hypertension    • Insulin resistance    • Liver disease     s/p Transplant   • 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 - Dr Ritter [facility]; CBC/Prograf/CMP/LIPIDS/A1C   • Meniere's disease    • OAB    • Osteoporosis    • PUD        Past surgical History:  Past Surgical History:   Procedure Laterality Date   • BREAST LUMPECTOMY Left     Benign   • COLONOSCOPY  2012    Polyps = 2012/ 2015/ 2020, rech 2025   [facility]   • ERCP W/ PLASTIC STENT PLACEMENT  2014    Liver Stent   • LIVER TRANSPLANTATION       [facility]   • MAMMO BILATERAL      NEG= 2017/ 2018   • TOTAL ABDOMINAL HYSTERECTOMY  1979       Social History:  Social History     Socioeconomic History   • Marital status:      Spouse name: Not on file   • Number of children: Not on file   • Years of education: Not on file   • Highest education level: Not on file   Tobacco Use   • Smoking status: Never Smoker   • Smokeless tobacco: Never Used   • Tobacco comment: Quit 2002.    Substance and Sexual Activity   • Alcohol use: No     Frequency: Never     Comment: very rarely    • Drug use: No   • Sexual activity: Defer       Review of Systems:  The following systems were reviewed as they relate to the cardiovascular system: Constitutional, Eyes, ENT, Cardiovascular, Respiratory, Gastrointestinal, Integumentary, Neurological, Psychiatric, Hematologic, Endocrine, Musculoskeletal, and Genitourinary. The pertinent cardiovascular findings are reported above with all other cardiovascular points within those systems being negative.    Diagnostic Study Review:     Current Electrocardiogram:    ECG 12 Lead    Date/Time: 11/12/2020 4:24 PM  Performed by: Domenico Clifton DO  Authorized by: Domenico Clifton DO   Comparison: not compared with previous ECG   Previous ECG: no previous ECG available  Comments: Normal sinus rhythm with a ventricular to 77 bpm.  Low voltage in the precordial leads.  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  Normal QT and QTc intervals.              NOTE: The following portions of the patient's history were reviewed and updated this visit as appropriate: allergies, current medications, past family history, past medical history, past social history, past surgical history and problem list.

## 2020-11-12 ENCOUNTER — OFFICE VISIT (OUTPATIENT)
Dept: CARDIOLOGY | Facility: CLINIC | Age: 75
End: 2020-11-12

## 2020-11-12 VITALS
SYSTOLIC BLOOD PRESSURE: 194 MMHG | HEART RATE: 72 BPM | WEIGHT: 148 LBS | DIASTOLIC BLOOD PRESSURE: 75 MMHG | BODY MASS INDEX: 26.22 KG/M2 | OXYGEN SATURATION: 100 %

## 2020-11-12 DIAGNOSIS — E78.2 MIXED HYPERLIPIDEMIA: ICD-10-CM

## 2020-11-12 DIAGNOSIS — I15.9 SECONDARY HYPERTENSION: ICD-10-CM

## 2020-11-12 DIAGNOSIS — I38 VALVULAR HEART DISEASE: ICD-10-CM

## 2020-11-12 DIAGNOSIS — I51.7 CARDIOMEGALY: Primary | ICD-10-CM

## 2020-11-12 PROCEDURE — 93000 ELECTROCARDIOGRAM COMPLETE: CPT | Performed by: INTERNAL MEDICINE

## 2020-11-12 PROCEDURE — 99214 OFFICE O/P EST MOD 30 MIN: CPT | Performed by: INTERNAL MEDICINE

## 2020-11-12 RX ORDER — BUSPIRONE HYDROCHLORIDE 15 MG/1
15 TABLET ORAL 3 TIMES DAILY
COMMUNITY
End: 2021-03-16 | Stop reason: SDUPTHER

## 2020-11-18 ENCOUNTER — APPOINTMENT (OUTPATIENT)
Dept: CARDIOLOGY | Facility: HOSPITAL | Age: 75
End: 2020-11-18

## 2020-11-24 ENCOUNTER — APPOINTMENT (OUTPATIENT)
Dept: CARDIOLOGY | Facility: HOSPITAL | Age: 75
End: 2020-11-24

## 2020-12-01 ENCOUNTER — APPOINTMENT (OUTPATIENT)
Dept: CARDIOLOGY | Facility: HOSPITAL | Age: 75
End: 2020-12-01

## 2020-12-07 ENCOUNTER — HOSPITAL ENCOUNTER (OUTPATIENT)
Dept: CARDIOLOGY | Facility: HOSPITAL | Age: 75
Discharge: HOME | End: 2020-12-07
Admitting: INTERNAL MEDICINE

## 2020-12-07 VITALS
WEIGHT: 148 LBS | BODY MASS INDEX: 26.22 KG/M2 | HEIGHT: 63 IN | HEART RATE: 72 BPM | SYSTOLIC BLOOD PRESSURE: 166 MMHG | DIASTOLIC BLOOD PRESSURE: 87 MMHG

## 2020-12-07 DIAGNOSIS — I51.7 CARDIOMEGALY: ICD-10-CM

## 2020-12-07 PROCEDURE — 93306 TTE W/DOPPLER COMPLETE: CPT

## 2020-12-07 PROCEDURE — 93306 TTE W/DOPPLER COMPLETE: CPT | Performed by: INTERNAL MEDICINE

## 2020-12-14 LAB
ASCENDING AORTA: 3.1 CM
BH CV ECHO MEAS - ACS: 2.3 CM
BH CV ECHO MEAS - AI DEC SLOPE: 336 CM/SEC^2
BH CV ECHO MEAS - AI DEC TIME: 1.2 SEC
BH CV ECHO MEAS - AI MAX PG: 68.6 MMHG
BH CV ECHO MEAS - AI MAX VEL: 414 CM/SEC
BH CV ECHO MEAS - AI P1/2T: 360.9 MSEC
BH CV ECHO MEAS - AO MAX PG (FULL): 0.16 MMHG
BH CV ECHO MEAS - AO MAX PG: 5.4 MMHG
BH CV ECHO MEAS - AO MEAN PG (FULL): 0.58 MMHG
BH CV ECHO MEAS - AO MEAN PG: 3.1 MMHG
BH CV ECHO MEAS - AO ROOT AREA (BSA CORRECTED): 2
BH CV ECHO MEAS - AO ROOT AREA: 9.2 CM^2
BH CV ECHO MEAS - AO ROOT DIAM: 3.4 CM
BH CV ECHO MEAS - AO V2 MAX: 116.2 CM/SEC
BH CV ECHO MEAS - AO V2 MEAN: 84.7 CM/SEC
BH CV ECHO MEAS - AO V2 VTI: 26.7 CM
BH CV ECHO MEAS - ASC AORTA: 3.1 CM
BH CV ECHO MEAS - AVA(I,A): 2.4 CM^2
BH CV ECHO MEAS - AVA(I,D): 2.4 CM^2
BH CV ECHO MEAS - AVA(V,A): 2.5 CM^2
BH CV ECHO MEAS - AVA(V,D): 2.5 CM^2
BH CV ECHO MEAS - BSA(HAYCOCK): 1.7 M^2
BH CV ECHO MEAS - BSA: 1.7 M^2
BH CV ECHO MEAS - BZI_BMI: 26.2 KILOGRAMS/M^2
BH CV ECHO MEAS - BZI_METRIC_HEIGHT: 160 CM
BH CV ECHO MEAS - BZI_METRIC_WEIGHT: 67.1 KG
BH CV ECHO MEAS - EDV(CUBED): 99.4 ML
BH CV ECHO MEAS - EDV(MOD-SP2): 74.7 ML
BH CV ECHO MEAS - EDV(MOD-SP4): 83.4 ML
BH CV ECHO MEAS - EDV(TEICH): 98.9 ML
BH CV ECHO MEAS - EF(CUBED): 79.4 %
BH CV ECHO MEAS - EF(MOD-BP): 59 %
BH CV ECHO MEAS - EF(MOD-SP2): 61 %
BH CV ECHO MEAS - EF(MOD-SP4): 56.9 %
BH CV ECHO MEAS - EF(TEICH): 71.8 %
BH CV ECHO MEAS - ESV(CUBED): 20.5 ML
BH CV ECHO MEAS - ESV(MOD-SP2): 29.2 ML
BH CV ECHO MEAS - ESV(MOD-SP4): 36 ML
BH CV ECHO MEAS - ESV(TEICH): 27.9 ML
BH CV ECHO MEAS - FS: 40.9 %
BH CV ECHO MEAS - IVS/LVPW: 1.5
BH CV ECHO MEAS - IVSD: 1.5 CM
BH CV ECHO MEAS - LA DIMENSION(2D): 3.3 CM
BH CV ECHO MEAS - LA DIMENSION: 3.6 CM
BH CV ECHO MEAS - LA/AO: 1
BH CV ECHO MEAS - LAT PEAK E' VEL: 5 CM/SEC
BH CV ECHO MEAS - LV DIASTOLIC VOL/BSA (35-75): 49 ML/M^2
BH CV ECHO MEAS - LV MASS(C)D: 215.8 GRAMS
BH CV ECHO MEAS - LV MASS(C)DI: 126.8 GRAMS/M^2
BH CV ECHO MEAS - LV MAX PG: 5.2 MMHG
BH CV ECHO MEAS - LV MEAN PG: 2.5 MMHG
BH CV ECHO MEAS - LV SYSTOLIC VOL/BSA (12-30): 21.1 ML/M^2
BH CV ECHO MEAS - LV V1 MAX: 114.4 CM/SEC
BH CV ECHO MEAS - LV V1 MEAN: 74.3 CM/SEC
BH CV ECHO MEAS - LV V1 VTI: 25.1 CM
BH CV ECHO MEAS - LVIDD: 4.6 CM
BH CV ECHO MEAS - LVIDS: 2.7 CM
BH CV ECHO MEAS - LVOT AREA: 2.5 CM^2
BH CV ECHO MEAS - LVOT DIAM: 1.8 CM
BH CV ECHO MEAS - LVPWD: 0.98 CM
BH CV ECHO MEAS - MED PEAK E' VEL: 6 CM/SEC
BH CV ECHO MEAS - MV A MAX VEL: 109.5 CM/SEC
BH CV ECHO MEAS - MV DEC SLOPE: 370.4 CM/SEC^2
BH CV ECHO MEAS - MV DEC TIME: 0.26 SEC
BH CV ECHO MEAS - MV E MAX VEL: 94.5 CM/SEC
BH CV ECHO MEAS - MV E/A: 0.86
BH CV ECHO MEAS - MV MAX PG: 5.9 MMHG
BH CV ECHO MEAS - MV MEAN PG: 2 MMHG
BH CV ECHO MEAS - MV P1/2T: 66 MSEC
BH CV ECHO MEAS - MV V2 MAX: 121.2 CM/SEC
BH CV ECHO MEAS - MV V2 MEAN: 65.7 CM/SEC
BH CV ECHO MEAS - MV V2 VTI: 31.8 CM
BH CV ECHO MEAS - MVA(P1/2T): 3.3 CM2
BH CV ECHO MEAS - MVA(VTI): 2 CM^2
BH CV ECHO MEAS - PA ACC SLOPE: 345 CM/SEC2
BH CV ECHO MEAS - PA ACC TIME: 0.15 SEC
BH CV ECHO MEAS - PA MAX PG (FULL): 0.81 MMHG
BH CV ECHO MEAS - PA MAX PG: 2.1 MMHG
BH CV ECHO MEAS - PA MEAN PG (FULL): 0.88 MMHG
BH CV ECHO MEAS - PA MEAN PG: 1.3 MMHG
BH CV ECHO MEAS - PA PR(ACCEL): 10 MMHG
BH CV ECHO MEAS - PA V2 MAX: 71.6 CM/SEC
BH CV ECHO MEAS - PA V2 MEAN: 56 CM/SEC
BH CV ECHO MEAS - PA V2 VTI: 16.7 CM
BH CV ECHO MEAS - PAPD(PI EDV): 13 MMHG
BH CV ECHO MEAS - PI END-D VEL: 150.8 CM/SEC
BH CV ECHO MEAS - PULM A REVS DUR: 0.1 SEC
BH CV ECHO MEAS - PULM A REVS VEL: 35.6 CM/SEC
BH CV ECHO MEAS - PULM DIAS VEL: 47.8 CM/SEC
BH CV ECHO MEAS - PULM S/D: 1.4
BH CV ECHO MEAS - PULM SYS VEL: 69.2 CM/SEC
BH CV ECHO MEAS - RAP SYSTOLE: 3 MMHG
BH CV ECHO MEAS - RV MAX PG: 1.2 MMHG
BH CV ECHO MEAS - RV MEAN PG: 0.47 MMHG
BH CV ECHO MEAS - RV V1 MAX: 55.7 CM/SEC
BH CV ECHO MEAS - RV V1 MEAN: 31.4 CM/SEC
BH CV ECHO MEAS - RV V1 VTI: 17.5 CM
BH CV ECHO MEAS - RVSP: 24.6 MMHG
BH CV ECHO MEAS - SI(AO): 144.6 ML/M^2
BH CV ECHO MEAS - SI(CUBED): 46.4 ML/M^2
BH CV ECHO MEAS - SI(LVOT): 37.6 ML/M^2
BH CV ECHO MEAS - SI(MOD-SP2): 26.8 ML/M^2
BH CV ECHO MEAS - SI(MOD-SP4): 27.9 ML/M^2
BH CV ECHO MEAS - SI(TEICH): 41.7 ML/M^2
BH CV ECHO MEAS - SV(AO): 246 ML
BH CV ECHO MEAS - SV(CUBED): 78.9 ML
BH CV ECHO MEAS - SV(LVOT): 63.9 ML
BH CV ECHO MEAS - SV(MOD-SP2): 45.6 ML
BH CV ECHO MEAS - SV(MOD-SP4): 47.4 ML
BH CV ECHO MEAS - SV(TEICH): 71 ML
BH CV ECHO MEAS - TAPSE (>1.6): 2.6 CM
BH CV ECHO MEAS - TR MAX PG: 22 MMHG
BH CV ECHO MEAS - TR MAX VEL: 232.2 CM/SEC
BH CV ECHO MEASUREMENTS AVERAGE E/E' RATIO: 17.18
BH CV VAS BP LEFT ARM: NORMAL MMHG
BH CV XLRA - RV BASE: 3.5 CM
BH CV XLRA - RV MID: 3.1 CM
BH CV XLRA - TDI S': 13 CM/SEC
LEFT ATRIUM VOLUME INDEX: 32 ML/M2
LEFT ATRIUM VOLUME: 55 CM3

## 2020-12-17 ENCOUNTER — TELEPHONE (OUTPATIENT)
Dept: FAMILY MEDICINE CLINIC | Facility: CLINIC | Age: 75
End: 2020-12-17

## 2020-12-17 NOTE — TELEPHONE ENCOUNTER
Pt states she is having feet and leg pain constantly.  Tylenol not helping.  Has tramadol but doesn't want to get hooked on it.  Wants to know if she needs to see a vascular dr for the pain.  Last seen 10/20

## 2020-12-18 DIAGNOSIS — D69.6 THROMBOCYTOPENIA (HCC): ICD-10-CM

## 2020-12-18 DIAGNOSIS — R09.89 OTHER SPECIFIED SYMPTOMS AND SIGNS INVOLVING THE CIRCULATORY AND RESPIRATORY SYSTEMS: ICD-10-CM

## 2020-12-18 DIAGNOSIS — M79.604 PAIN IN BOTH LOWER EXTREMITIES: Primary | ICD-10-CM

## 2020-12-18 DIAGNOSIS — K75.81 LIVER CIRRHOSIS SECONDARY TO NASH (HCC): ICD-10-CM

## 2020-12-18 DIAGNOSIS — E88.81 INSULIN RESISTANCE SYNDROME: ICD-10-CM

## 2020-12-18 DIAGNOSIS — K74.60 LIVER CIRRHOSIS SECONDARY TO NASH (HCC): ICD-10-CM

## 2020-12-18 DIAGNOSIS — M79.605 PAIN IN BOTH LOWER EXTREMITIES: Primary | ICD-10-CM

## 2020-12-18 DIAGNOSIS — I85.00 ESOPHAGEAL VARICES DETERMINED BY ENDOSCOPY (HCC): ICD-10-CM

## 2020-12-18 NOTE — TELEPHONE ENCOUNTER
Pt ok w testing. Please order for [facility].    C/o pain in BLE from knees to feet, and had a stinging sensation in R leg yest. Using aspercreme and heat to areas, not really working well, but trying to avoid pain meds.     Also, wanted to let you know she had echo done the other day w Dr Clifton, but she does not know the results yet.

## 2020-12-21 NOTE — TELEPHONE ENCOUNTER
[facility] called stating pt is sched for BELA on 12/31 and the test was ordered what looks like BELA w bilat carotids? Do you want to get the carotids w US in neck or just the standard BELA in the lower extremities?

## 2021-01-08 ENCOUNTER — TELEPHONE (OUTPATIENT)
Dept: FAMILY MEDICINE CLINIC | Facility: CLINIC | Age: 76
End: 2021-01-08

## 2021-01-08 RX ORDER — CILOSTAZOL 50 MG/1
TABLET ORAL
Qty: 60 TABLET | Refills: 0 | Status: SHIPPED | OUTPATIENT
Start: 2021-01-08 | End: 2021-03-16

## 2021-01-08 NOTE — TELEPHONE ENCOUNTER
We can try pletal for LE pain and see if it helps----low dose to start and increase to maintenance dosing ---let me know

## 2021-01-08 NOTE — TELEPHONE ENCOUNTER
Pt informed. Wants to know what to do for the leg pain now? Baclofen helps, but wears off quickly.

## 2021-01-11 RX ORDER — METOPROLOL SUCCINATE 50 MG/1
TABLET, EXTENDED RELEASE ORAL
Qty: 180 TABLET | Refills: 0 | Status: SHIPPED | OUTPATIENT
Start: 2021-01-11 | End: 2021-04-20

## 2021-01-11 NOTE — TELEPHONE ENCOUNTER
Last visit:  10/19/20  Next visit: 3/16/21  Last labs:11/23/20    Rx requested: Metoprolol XL   Pharmacy: [facility] in [place]

## 2021-01-21 ENCOUNTER — TELEPHONE (OUTPATIENT)
Dept: FAMILY MEDICINE CLINIC | Facility: CLINIC | Age: 76
End: 2021-01-21

## 2021-01-21 DIAGNOSIS — Z12.31 ENCOUNTER FOR SCREENING MAMMOGRAM FOR MALIGNANT NEOPLASM OF BREAST: Primary | ICD-10-CM

## 2021-01-21 NOTE — TELEPHONE ENCOUNTER
Caller: ERIS LOWE    Relationship: SELF    Best call back number: [phone]    What orders are you requesting (i.e. lab or imaging): MAMMOGRAM     In what timeframe would the patient need to come in: ASAP    Where will you receive your lab/imaging services: [facility]    Additional notes: PATIENT CALLED STATING SHE NEEDS ORDERS FOR HER YEARLY MAMMOGRAM.     PLEASE ADVISE: [phone]

## 2021-02-16 NOTE — PROGRESS NOTES
Cardiology Office Visit      Encounter Date:  02/17/2021    Patient ID:   Christal Romo is a 76 y.o. female.    Reason For Followup:  Valvular heart disease  Hypertension    Brief Clinical History:  Dear Sara Espino, DO    I had the pleasure of seeing Christal Romo today. As you are well aware, this is a 76 y.o. female with no known history of ischemic heart disease.  She does have a history of nonalcoholic steatohepatitis and liver cirrhosis and is status post liver transplant.  She has additional history that includes hypertension, palpitations, and aortic insufficiency. She presents today for follow-up on the above conditions.    Interval History:  She denies any shortness of breath.  She denies any PND orthopnea.    She denies any PND orthopnea.  She denies any syncope or near syncope.  She reports feeling well from a cardiac perspective.    She continues to have difficulty with hypertension.  She reports she is using a significant amount of neck and knee pain.   She has been afraid to go get injections because of Covid despite the fact that she had Covid earlier this year.    She is very anxious and does not like driving.  In addition, the snow piled up in her driveway made it difficult for her to leave and has increased her anxiety.  Her  used to drive her to her appointments before he passed away.    As I am sure you are also aware she has an intolerance to multiple medications including multiple antihypertensives.  This makes treating her hypertension difficult.  She is also reporting some leg cramps.  These may be partially related to the fenofibrate that she is taking.  She has been off of those for 3 days and is still having some cramps.  We reviewed her labs from January 2021 and her magnesium level is low.  This may also be contributing to her cramps.  She will take over-the-counter magnesium.    We reviewed her most recent echocardiogram performed in December 2020.  Normal LV systolic  "function was noted.  Mild MR, TR, AI and PI was noted.  This is unchanged from prior studies.    She reports she had a severe headache after taking cilostazol.  We reviewed her most recent vascular study that demonstrated no conclusive evidence of significant disease.  I have advised her that this should be safe to discontinue.    She reports that she is had some difficulty getting a carotid study done.  I gave her the information on the stroke aneurysm and vascular screening exam for $49 through [facility].    Assessment & Plan    Impressions:  Hypertension     I believe a significant amount of her hypertension is due to anxiety, and pain.  Palpitations.  History of abnormal troponin secondary to mouse antigen interaction with troponin assay reagent.  Cirrhosis status post liver transplant 2016  Poor dentition.  Valvular heart disease     Mild MR, TR, AI, PI Echocardiogram December 2020  Hyperlipidemia with a preponderance of hypertriglyceridemia  Depression    Recommendations:  Continuation of her current cardiovascular regimen at the present time.  Cardiac status appropriate for neck injections to help with pain  2D echocardiogram to evaluate valve in light of recent Covid infection  Follow-up in 6 months    Objective:    Vitals:  Vitals:    02/17/21 1346   BP: (!) 193/77   BP Location: Left arm   Patient Position: Sitting   Cuff Size: Large Adult   Pulse: 75   Resp: 18   SpO2: 98%   Weight: 68 kg (150 lb)   Height: 160 cm (63\")       Physical Exam:    General: Alert, cooperative, no distress, appears stated age  Head:  Normocephalic, atraumatic, mucous membranes moist  Eyes:  Conjunctiva/corneas clear, EOM's intact     Neck:  Supple, bruit noted  Lungs: Clear to auscultation bilaterally, no wheezes rhonchi rales are noted  Chest wall: No tenderness  Heart::  Regular rate and rhythm, S1 and S2 normal, 1/6 holosystolic murmur.  Rub or gallop  Abdomen: Soft, non-tender, nondistended bowel sounds " active  Extremities: No cyanosis, clubbing, or edema  Pulses: 2+ and symmetric all extremities  Skin:  No rashes or lesions  Neuro/psych: A&O x3. CN II through XII are grossly intact with appropriate affect      Allergies:  Allergies   Allergen Reactions   • Amlodipine Itching   • Atacand  [Candesartan Cilexetil] Other (See Comments)   • Atenolol Palpitations   • Azithromycin Nausea Only   • Escitalopram Oxalate Other (See Comments)     ?   • Ibuprofen Unknown (See Comments)   • Levofloxacin Nausea Only   • Penicillin G Hives   • Spironolactone Nausea And Vomiting   • Sucralfate Swelling   • Sulfamethoxazole-Trimethoprim Rash and Other (See Comments)     THROMBOCYTOPENIA    • Triamterene-Hctz Myalgia   • Venlafaxine Other (See Comments)     HEADACHE   • Clonidine Derivatives GI Intolerance   • Whey Nausea And Vomiting   • Citrus Other (See Comments)   • Zoloft [Sertraline Hcl] Palpitations       Medication Review:     Current Outpatient Medications:   •  albuterol sulfate  (90 Base) MCG/ACT inhaler, Inhale 2 puffs Every 6 (Six) Hours As Needed for Wheezing or Shortness of Air., Disp: 18 g, Rfl: 0  •  aspirin 81 MG tablet, Take 1 tablet by mouth Daily., Disp: , Rfl:   •  aspirin-acetaminophen-caffeine (EXCEDRIN MIGRAINE) 250-250-65 MG per tablet, Take 1 tablet by mouth Every 6 (Six) Hours As Needed for Headache., Disp: , Rfl:   •  azelastine (ASTELIN) 0.1 % nasal spray, 2 sprays into the nostril(s) as directed by provider Daily., Disp: , Rfl:   •  baclofen (LIORESAL) 10 MG tablet, Take 1 tablet by mouth At Night As Needed for Muscle Spasms., Disp: 30 tablet, Rfl: 3  •  busPIRone (BUSPAR) 15 MG tablet, Take 15 mg by mouth 3 (Three) Times a Day., Disp: , Rfl:   •  chlorhexidine (PERIDEX) 0.12 % solution, , Disp: , Rfl:   •  diphenhydrAMINE (BENADRYL) 25 MG tablet, Take 25 mg by mouth Every 6 (Six) Hours As Needed for Itching., Disp: , Rfl:   •  diphenoxylate-atropine (Lomotil) 2.5-0.025 MG per tablet, Take 1  tablet by mouth 2 (Two) Times a Day As Needed for Diarrhea., Disp: 60 tablet, Rfl: 0  •  fenofibrate 160 MG tablet, Take 1 tablet by mouth Every Evening., Disp: 90 tablet, Rfl: 1  •  ibandronate (BONIVA) 150 MG tablet, Take 1 tablet by mouth Every 30 (Thirty) Days., Disp: 3 tablet, Rfl: 4  •  Lutein 20 MG capsule, Take 1 tablet by mouth Daily., Disp: , Rfl:   •  meclizine (ANTIVERT) 12.5 MG tablet, Take 1 tablet by mouth Every 6 (Six) Hours As Needed., Disp: , Rfl:   •  metoprolol succinate XL (TOPROL-XL) 50 MG 24 hr tablet, TAKE TWO TABLETS BY MOUTH EVERY NIGHT AT BEDTIME, Disp: 180 tablet, Rfl: 0  •  Multiple Vitamins-Minerals (Multivitamin Adults 50+) tablet, Take 1 tablet by mouth Daily., Disp: , Rfl:   •  mycophenolate (MYFORTIC) 360 MG tablet delayed-release EC tablet, TK 1 T PO Q 12 H, Disp: , Rfl:   •  omeprazole (PrilOSEC) 20 MG capsule, Take 2 capsules by mouth Daily., Disp: , Rfl:   •  ondansetron (Zofran) 4 MG tablet, Take 1 tablet by mouth Every 8 (Eight) Hours As Needed for Nausea or Vomiting. prn, Disp: 90 tablet, Rfl: 0  •  PARoxetine (Paxil) 10 MG tablet, Take 1 tablet by mouth Every Night., Disp: 30 tablet, Rfl: 1  •  Probiotic Product (CULTURELLE PRO-WELL) capsule, Take 2 capsules by mouth Daily., Disp: , Rfl:   •  promethazine (PHENERGAN) 6.25 MG/5ML syrup, Take 5 mL by mouth 4 (Four) Times a Day As Needed for Nausea or Vomiting., Disp: 120 mL, Rfl: 0  •  tacrolimus (PROGRAF) 1 MG capsule, 2 capsules 2 (Two) Times a Day., Disp: , Rfl:   •  traMADol (ULTRAM) 50 MG tablet, Take 1 tablet by mouth Every 6 (Six) Hours As Needed for Moderate Pain  or Severe Pain ., Disp: 20 tablet, Rfl: 0  •  verapamil (CALAN) 40 MG tablet, 1/2 tab po TID, Disp: 45 tablet, Rfl: 1  •  vitamin E 400 UNIT capsule, Take 400 Units by mouth Daily. 2 tabs daily ( morning ), Disp: , Rfl:   •  cilostazol (PLETAL) 50 MG tablet, Start @ 1 po qhs x 2weeks and if tolerated then go to 1 po bid, Disp: 60 tablet, Rfl: 0    Family  History:  Family History   Problem Relation Age of Onset   • Diabetes Sister         Type II   • Heart disease Sister    • Hypertension Sister    • No Known Problems Brother        Past Medical History:  Past Medical History:   Diagnosis Date   • Allergic    • Anxiety    • Arthritis    • B12 deficiency    • Cataract    • Chronic diarrhea    • Depression    • Gastritis    • GERD    • Headache    • Hyperlipidemia    • Hypertension    • Insulin resistance    • Liver disease     s/p Transplant   • 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 - Dr Stone Stevens; CBC/Prograf/CMP/LIPIDS/A1C   • Meniere's disease    • OAB    • Osteoporosis    • PUD        Past surgical History:  Past Surgical History:   Procedure Laterality Date   • BREAST LUMPECTOMY Left     Benign   • COLONOSCOPY  2012    Polyps = 2012/ 2015/ 2020, rech 2025   GSI   • ERCP W/ PLASTIC STENT PLACEMENT  2014    Liver Stent   • LIVER TRANSPLANTATION      [facility]   • MAMMO BILATERAL      NEG= 2017/ 2018   • TOTAL ABDOMINAL HYSTERECTOMY  1979       Social History:  Social History     Socioeconomic History   • Marital status:      Spouse name: Not on file   • Number of children: Not on file   • Years of education: Not on file   • Highest education level: Not on file   Tobacco Use   • Smoking status: Never Smoker   • Smokeless tobacco: Never Used   • Tobacco comment: Quit 2002.    Substance and Sexual Activity   • Alcohol use: No     Frequency: Never     Comment: very rarely    • Drug use: No   • Sexual activity: Defer       Review of Systems:  The following systems were reviewed as they relate to the cardiovascular system: Constitutional, Eyes, ENT, Cardiovascular, Respiratory, Gastrointestinal, Integumentary, Neurological, Psychiatric, Hematologic, Endocrine, Musculoskeletal, and Genitourinary. The pertinent cardiovascular findings are reported above with all other cardiovascular points within those systems being  negative.    Diagnostic Study Review:     Current Electrocardiogram:  Procedures no new EKG.  EKG dated 11/12/2020 demonstrates sinus rhythm with a ventricular rate of 77 bpm.      NOTE: The following portions of the patient's note were reviewed, confirmed and/or updated this visit as appropriate: History of present illness/Interval history, physical examination, assessment & plan, allergies, current medications, past family history, past medical history, past social history, past surgical history and problem list.

## 2021-02-17 ENCOUNTER — OFFICE VISIT (OUTPATIENT)
Dept: CARDIOLOGY | Facility: CLINIC | Age: 76
End: 2021-02-17

## 2021-02-17 VITALS
HEIGHT: 63 IN | HEART RATE: 75 BPM | BODY MASS INDEX: 26.58 KG/M2 | OXYGEN SATURATION: 98 % | DIASTOLIC BLOOD PRESSURE: 77 MMHG | RESPIRATION RATE: 18 BRPM | SYSTOLIC BLOOD PRESSURE: 193 MMHG | WEIGHT: 150 LBS

## 2021-02-17 DIAGNOSIS — I15.9 SECONDARY HYPERTENSION: Primary | ICD-10-CM

## 2021-02-17 DIAGNOSIS — Z94.4 STATUS POST LIVER TRANSPLANTATION (HCC): ICD-10-CM

## 2021-02-17 DIAGNOSIS — E78.2 MIXED HYPERLIPIDEMIA: ICD-10-CM

## 2021-02-17 DIAGNOSIS — I38 VALVULAR HEART DISEASE: ICD-10-CM

## 2021-02-17 DIAGNOSIS — R00.2 PALPITATIONS: ICD-10-CM

## 2021-02-17 PROCEDURE — 99214 OFFICE O/P EST MOD 30 MIN: CPT | Performed by: INTERNAL MEDICINE

## 2021-02-17 RX ORDER — CHLORHEXIDINE GLUCONATE 0.12 MG/ML
RINSE ORAL
COMMUNITY
Start: 2021-01-15 | End: 2021-10-18

## 2021-02-17 NOTE — PATIENT INSTRUCTIONS
Consider stroke, vascular and aneurysm study  Stop cilostazol  Try over the counter magnesium  Follow-up in 6 months

## 2021-02-23 ENCOUNTER — TELEPHONE (OUTPATIENT)
Dept: FAMILY MEDICINE CLINIC | Facility: CLINIC | Age: 76
End: 2021-02-23

## 2021-02-23 RX ORDER — OMEPRAZOLE 20 MG/1
40 CAPSULE, DELAYED RELEASE ORAL DAILY
Status: CANCELLED
Start: 2021-02-23

## 2021-02-23 NOTE — TELEPHONE ENCOUNTER
Caller: Christal Romo     Relationship: patient     Best call back number: [phone]    Medication needed:   Requested Prescriptions     Pending Prescriptions Disp Refills   • omeprazole (PrilOSEC) 20 MG capsule        Sig: Take 2 capsules by mouth Daily.       When do you need the refill by: asap    What details did the patient provide when requesting the medication: patient has been taking 2 capsules twice a day.Patient called pharmacy for refill and told prescription was . Patient bought over the counter Prilosec. Patient is asking if she should continue buying over the counter or does she need a new prescription?    Does the patient have less than a 3 day supply:  [] Yes  [x] No    What is the patient's preferred pharmacy: [facility] [address] [place] [address] [address] [facility] [address] [facility] [address] - [phone] [facility] [phone]

## 2021-02-23 NOTE — TELEPHONE ENCOUNTER
Pt informed. Ok to see if goes through ins - please send. Will discuss at 3/16 appt if not working.

## 2021-02-23 NOTE — TELEPHONE ENCOUNTER
It is usually maxed at 40mg of omeprazole ONCE A DAY----I can order this and see if goes thru ins; if not working at once a day, may need to try something else

## 2021-03-04 RX ORDER — OMEPRAZOLE 20 MG/1
40 CAPSULE, DELAYED RELEASE ORAL DAILY
Status: CANCELLED
Start: 2021-03-04

## 2021-03-04 RX ORDER — OMEPRAZOLE 40 MG/1
40 CAPSULE, DELAYED RELEASE ORAL DAILY
Qty: 30 CAPSULE | Refills: 0 | Status: SHIPPED | OUTPATIENT
Start: 2021-03-04 | End: 2021-03-16 | Stop reason: SDUPTHER

## 2021-03-04 NOTE — TELEPHONE ENCOUNTER
Caller: Christal Romo    Relationship: Self    Best call back number:[phone]   Medication needed:   Requested Prescriptions     Pending Prescriptions Disp Refills   • omeprazole (PrilOSEC) 20 MG capsule        Sig: Take 2 capsules by mouth Daily.       When do you need the refill by:ASAP    What details did the patient provide when requesting the medication: INCREASE DOSAGE TO 40MG    Does the patient have less than a 3 day supply:  [x] Yes  [] No    What is the patient's preferred pharmacy: [facility] [address] [place] [address] [address] [place] [address] [address] [facility] [address] - [phone] [facility] [phone]

## 2021-03-16 ENCOUNTER — OFFICE VISIT (OUTPATIENT)
Dept: FAMILY MEDICINE CLINIC | Facility: CLINIC | Age: 76
End: 2021-03-16

## 2021-03-16 VITALS
HEIGHT: 63 IN | TEMPERATURE: 98.4 F | OXYGEN SATURATION: 98 % | RESPIRATION RATE: 14 BRPM | HEART RATE: 75 BPM | WEIGHT: 149 LBS | DIASTOLIC BLOOD PRESSURE: 88 MMHG | SYSTOLIC BLOOD PRESSURE: 159 MMHG | BODY MASS INDEX: 26.4 KG/M2

## 2021-03-16 DIAGNOSIS — K21.9 GASTROESOPHAGEAL REFLUX DISEASE WITHOUT ESOPHAGITIS: ICD-10-CM

## 2021-03-16 DIAGNOSIS — F32.9 REACTIVE DEPRESSION: ICD-10-CM

## 2021-03-16 DIAGNOSIS — Z94.4 STATUS POST LIVER TRANSPLANTATION (HCC): ICD-10-CM

## 2021-03-16 DIAGNOSIS — M54.2 CERVICALGIA: ICD-10-CM

## 2021-03-16 DIAGNOSIS — I10 ESSENTIAL HYPERTENSION: Primary | ICD-10-CM

## 2021-03-16 PROCEDURE — 99214 OFFICE O/P EST MOD 30 MIN: CPT | Performed by: FAMILY MEDICINE

## 2021-03-16 RX ORDER — MECLIZINE HCL 12.5 MG/1
12.5 TABLET ORAL EVERY 6 HOURS PRN
Qty: 30 TABLET | Refills: 1 | Status: SHIPPED | OUTPATIENT
Start: 2021-03-16 | End: 2021-09-20 | Stop reason: SDUPTHER

## 2021-03-16 RX ORDER — BUSPIRONE HYDROCHLORIDE 5 MG/1
5 TABLET ORAL 3 TIMES DAILY
Qty: 90 TABLET | Refills: 0 | Status: SHIPPED | OUTPATIENT
Start: 2021-03-16 | End: 2021-11-23

## 2021-03-16 RX ORDER — MONTELUKAST SODIUM 10 MG/1
10 TABLET ORAL NIGHTLY
Qty: 30 TABLET | Refills: 1 | Status: SHIPPED | OUTPATIENT
Start: 2021-03-16 | End: 2021-05-14 | Stop reason: SINTOL

## 2021-03-16 RX ORDER — CLINDAMYCIN HYDROCHLORIDE 300 MG/1
CAPSULE ORAL
Qty: 4 CAPSULE | Refills: 0 | Status: SHIPPED | OUTPATIENT
Start: 2021-03-16 | End: 2022-03-29

## 2021-03-16 RX ORDER — TRAMADOL HYDROCHLORIDE 50 MG/1
50 TABLET ORAL EVERY 6 HOURS PRN
Qty: 30 TABLET | Refills: 0 | Status: SHIPPED | OUTPATIENT
Start: 2021-03-16 | End: 2021-09-20 | Stop reason: SDUPTHER

## 2021-03-16 RX ORDER — BACLOFEN 10 MG/1
10 TABLET ORAL NIGHTLY PRN
Qty: 30 TABLET | Refills: 3 | Status: SHIPPED | OUTPATIENT
Start: 2021-03-16 | End: 2022-01-25

## 2021-03-16 RX ORDER — OMEPRAZOLE 40 MG/1
40 CAPSULE, DELAYED RELEASE ORAL DAILY
Qty: 90 CAPSULE | Refills: 0 | Status: SHIPPED | OUTPATIENT
Start: 2021-03-16 | End: 2021-07-19

## 2021-03-16 RX ORDER — ALBUTEROL SULFATE 90 UG/1
2 AEROSOL, METERED RESPIRATORY (INHALATION) EVERY 6 HOURS PRN
Qty: 18 G | Refills: 0 | Status: SHIPPED | OUTPATIENT
Start: 2021-03-16 | End: 2021-03-18

## 2021-03-18 RX ORDER — ALBUTEROL SULFATE 90 UG/1
2 AEROSOL, METERED RESPIRATORY (INHALATION) EVERY 6 HOURS PRN
Qty: 18 G | Refills: 0 | Status: SHIPPED | OUTPATIENT
Start: 2021-03-18 | End: 2022-04-13 | Stop reason: ALTCHOICE

## 2021-04-20 RX ORDER — METOPROLOL SUCCINATE 50 MG/1
TABLET, EXTENDED RELEASE ORAL
Qty: 180 TABLET | Refills: 0 | Status: SHIPPED | OUTPATIENT
Start: 2021-04-20 | End: 2021-07-19

## 2021-04-20 NOTE — TELEPHONE ENCOUNTER
Last visit: 3/16/21  Next visit: 9/20/21  Last labs: 3/23/21    Rx requested:Metoprolol   [facility]

## 2021-05-12 ENCOUNTER — CLINICAL SUPPORT (OUTPATIENT)
Dept: FAMILY MEDICINE CLINIC | Facility: CLINIC | Age: 76
End: 2021-05-12

## 2021-05-12 ENCOUNTER — TELEPHONE (OUTPATIENT)
Dept: FAMILY MEDICINE CLINIC | Facility: CLINIC | Age: 76
End: 2021-05-12

## 2021-05-12 DIAGNOSIS — R35.0 URINARY FREQUENCY: Primary | ICD-10-CM

## 2021-05-12 LAB
BILIRUB BLD-MCNC: NEGATIVE MG/DL
CLARITY, POC: CLEAR
COLOR UR: YELLOW
GLUCOSE UR STRIP-MCNC: NEGATIVE MG/DL
KETONES UR QL: NEGATIVE
LEUKOCYTE EST, POC: NEGATIVE
NITRITE UR-MCNC: NEGATIVE MG/ML
PH UR: 7 [PH] (ref 5–8)
PROT UR STRIP-MCNC: NEGATIVE MG/DL
RBC # UR STRIP: NEGATIVE /UL
SP GR UR: 1.01 (ref 1–1.03)
UROBILINOGEN UR QL: NORMAL

## 2021-05-12 PROCEDURE — 87086 URINE CULTURE/COLONY COUNT: CPT | Performed by: FAMILY MEDICINE

## 2021-05-12 PROCEDURE — 81003 URINALYSIS AUTO W/O SCOPE: CPT | Performed by: FAMILY MEDICINE

## 2021-05-12 NOTE — TELEPHONE ENCOUNTER
Patient called and stated she has been having some burning sensation in her pelvic area, back pain,  and is having frequent urination.  Patient is concerned that she has a UTI and is asking if she can come in and leave a UA?

## 2021-05-12 NOTE — TELEPHONE ENCOUNTER
Sara Zhao,    5/12/2021  3:12 PM EDT      Urine negative for infection-------she can  some AZO-Standard tabs for bladder pain and see if it resolves w/ 1 or 2 doses-----push lots of fluid     Will sent urine for culture also.  Pt informed

## 2021-05-13 LAB — BACTERIA SPEC AEROBE CULT: NO GROWTH

## 2021-05-13 NOTE — TELEPHONE ENCOUNTER
Patient stated she has taken 2 doses of the AZO and she doesn't feel any better.  Patient says she is still having pelvic pain, back pain, and frequency.  She said she has been pushing fluids and also been drinking some cranberry juice.  Patient asking if an ultrasound would be beneficial or what you would recommend from here.

## 2021-05-14 ENCOUNTER — OFFICE VISIT (OUTPATIENT)
Dept: FAMILY MEDICINE CLINIC | Facility: CLINIC | Age: 76
End: 2021-05-14

## 2021-05-14 VITALS
WEIGHT: 149 LBS | BODY MASS INDEX: 26.4 KG/M2 | OXYGEN SATURATION: 99 % | TEMPERATURE: 97.3 F | DIASTOLIC BLOOD PRESSURE: 76 MMHG | RESPIRATION RATE: 14 BRPM | HEIGHT: 63 IN | HEART RATE: 74 BPM | SYSTOLIC BLOOD PRESSURE: 204 MMHG

## 2021-05-14 DIAGNOSIS — R35.0 FREQUENT URINATION: Primary | ICD-10-CM

## 2021-05-14 PROCEDURE — 99213 OFFICE O/P EST LOW 20 MIN: CPT | Performed by: FAMILY MEDICINE

## 2021-05-14 RX ORDER — OXYBUTYNIN CHLORIDE 5 MG/1
TABLET, EXTENDED RELEASE ORAL
Qty: 30 TABLET | Refills: 0 | Status: SHIPPED | OUTPATIENT
Start: 2021-05-14 | End: 2021-09-20 | Stop reason: SDUPTHER

## 2021-06-28 ENCOUNTER — TRANSCRIBE ORDERS (OUTPATIENT)
Dept: CARDIOLOGY | Facility: CLINIC | Age: 76
End: 2021-06-28

## 2021-06-28 DIAGNOSIS — Z13.6 ENCOUNTER FOR SCREENING FOR VASCULAR DISEASE: Primary | ICD-10-CM

## 2021-07-05 ENCOUNTER — HOSPITAL ENCOUNTER (OUTPATIENT)
Dept: CARDIOLOGY | Facility: HOSPITAL | Age: 76
Discharge: HOME | End: 2021-07-05

## 2021-07-05 ENCOUNTER — TRANSCRIBE ORDERS (OUTPATIENT)
Dept: ADMINISTRATIVE | Facility: HOSPITAL | Age: 76
End: 2021-07-05

## 2021-07-05 ENCOUNTER — HOSPITAL ENCOUNTER (OUTPATIENT)
Dept: CT IMAGING | Facility: HOSPITAL | Age: 76
Discharge: HOME | End: 2021-07-05

## 2021-07-05 DIAGNOSIS — Z13.6 ENCOUNTER FOR SCREENING FOR VASCULAR DISEASE: ICD-10-CM

## 2021-07-05 DIAGNOSIS — Z13.6 ENCOUNTER FOR SCREENING FOR VASCULAR DISEASE: Primary | ICD-10-CM

## 2021-07-05 PROCEDURE — 75571 CT HRT W/O DYE W/CA TEST: CPT

## 2021-07-05 PROCEDURE — 93799 UNLISTED CV SVC/PROCEDURE: CPT

## 2021-07-08 LAB
BH CV XLRA MEAS - MID AO DIAM: 2.1 CM
BH CV XLRA MEAS - PAD LEFT ABI PT: 0.91
BH CV XLRA MEAS - PAD LEFT ARM: 183 MMHG
BH CV XLRA MEAS - PAD LEFT LEG PT: 173 MMHG
BH CV XLRA MEAS - PAD RIGHT ABI PT: 0.98
BH CV XLRA MEAS - PAD RIGHT ARM: 191 MMHG
BH CV XLRA MEAS - PAD RIGHT LEG PT: 187 MMHG
BH CV XLRA MEAS LEFT DIST CCA EDV: -16.9 CM/SEC
BH CV XLRA MEAS LEFT DIST CCA PSV: -81.1 CM/SEC
BH CV XLRA MEAS LEFT ICA/CCA RATIO: 0.8
BH CV XLRA MEAS LEFT MID CCA PSV: 81 CM/SEC
BH CV XLRA MEAS LEFT MID ICA PSV: 68.6 CM/SEC
BH CV XLRA MEAS LEFT PROX ICA EDV: -17.2 CM/SEC
BH CV XLRA MEAS LEFT PROX ICA PSV: -68.6 CM/SEC
BH CV XLRA MEAS RIGHT DIST CCA EDV: -11.8 CM/SEC
BH CV XLRA MEAS RIGHT DIST CCA PSV: -72.1 CM/SEC
BH CV XLRA MEAS RIGHT ICA/CCA RATIO: 0.8
BH CV XLRA MEAS RIGHT MID CCA PSV: 72 CM/SEC
BH CV XLRA MEAS RIGHT MID ICA PSV: 60.8 CM/SEC
BH CV XLRA MEAS RIGHT PROX ICA EDV: -16.5 CM/SEC
BH CV XLRA MEAS RIGHT PROX ICA PSV: -60.8 CM/SEC
MAXIMAL PREDICTED HEART RATE: 146 BPM
STRESS TARGET HR: 124 BPM

## 2021-07-19 RX ORDER — OMEPRAZOLE 40 MG/1
CAPSULE, DELAYED RELEASE ORAL
Qty: 90 CAPSULE | Refills: 0 | Status: SHIPPED | OUTPATIENT
Start: 2021-07-19 | End: 2021-10-18 | Stop reason: SDUPTHER

## 2021-07-19 RX ORDER — METOPROLOL SUCCINATE 50 MG/1
TABLET, EXTENDED RELEASE ORAL
Qty: 180 TABLET | Refills: 0 | Status: SHIPPED | OUTPATIENT
Start: 2021-07-19 | End: 2021-10-07

## 2021-08-17 ENCOUNTER — OFFICE VISIT (OUTPATIENT)
Dept: CARDIOLOGY | Facility: CLINIC | Age: 76
End: 2021-08-17

## 2021-08-17 VITALS
HEIGHT: 63 IN | OXYGEN SATURATION: 97 % | WEIGHT: 146 LBS | HEART RATE: 77 BPM | SYSTOLIC BLOOD PRESSURE: 168 MMHG | DIASTOLIC BLOOD PRESSURE: 75 MMHG | BODY MASS INDEX: 25.87 KG/M2

## 2021-08-17 DIAGNOSIS — I10 ESSENTIAL HYPERTENSION: ICD-10-CM

## 2021-08-17 DIAGNOSIS — E78.2 MIXED HYPERLIPIDEMIA: ICD-10-CM

## 2021-08-17 DIAGNOSIS — I38 VALVULAR HEART DISEASE: Primary | ICD-10-CM

## 2021-08-17 PROCEDURE — 93000 ELECTROCARDIOGRAM COMPLETE: CPT | Performed by: INTERNAL MEDICINE

## 2021-08-17 PROCEDURE — 99214 OFFICE O/P EST MOD 30 MIN: CPT | Performed by: INTERNAL MEDICINE

## 2021-08-17 NOTE — PROGRESS NOTES
Cardiology Office Visit      Encounter Date:  08/17/2021    Patient ID:   Christal Romo is a 76 y.o. female.    Reason For Followup:  Valvular heart disease  Hypertension    Brief Clinical History:  Dear Sara Espino, DO    I had the pleasure of seeing Christal Romo today. As you are well aware, this is a 76 y.o. female with no known history of ischemic heart disease.  She does have a history of nonalcoholic steatohepatitis and liver cirrhosis and is status post liver transplant.  She has additional history that includes hypertension, palpitations, and aortic insufficiency. She presents today for follow-up on the above conditions.    Interval History:  She denies any shortness of breath.  She denies any PND orthopnea.    She denies any PND orthopnea.  She denies any syncope or near syncope.  She reports feeling well from a cardiac perspective.    Her blood pressure remains elevated today.  As I am sure you are also aware she has an intolerance to multiple medications including multiple antihypertensives.  This makes treating her hypertension difficult.  She reports that she was started on metoprolol and this causes her drowsiness so she takes it in the evening.  It has helped some with her blood pressure but she is still suboptimal.  We reviewed her home blood pressure logs today.  Pressures are predominantly in the 140s to 170s.    Her most recent echocardiogram performed in December 2020.  Normal LV systolic function was noted.  Mild MR, TR, AI and PI was noted.  This is unchanged from prior studies.    She underwent a heart and vascular screening in July 2021.  Her coronary calcium score was 23.2.  Her ABIs were normal.  No evidence of carotid or abdominal aortic disease was noted.    Assessment & Plan    Impressions:  Hypertension     I believe a significant amount of her hypertension is due to anxiety, and pain.  Palpitations.  History of abnormal troponin secondary to mouse antigen interaction with  "troponin assay reagent.  Cirrhosis status post liver transplant 2016  Poor dentition.  Valvular heart disease     Mild MR, TR, AI, PI Echocardiogram December 2020  Hyperlipidemia with a preponderance of hypertriglyceridemia  Depression    Recommendations:  Continuation of her current cardiovascular regimen at the present time.  Follow-up in 6 months time sooner should there be difficulties.    Objective:    Vitals:  Vitals:    08/17/21 1453   BP: 168/75   BP Location: Left arm   Patient Position: Sitting   Cuff Size: Adult   Pulse: 77   SpO2: 97%   Weight: 66.2 kg (146 lb)   Height: 160 cm (63\")       Physical Exam:    General: Alert, cooperative, no distress, appears stated age  Head:  Normocephalic, atraumatic, mucous membranes moist  Eyes:  Conjunctiva/corneas clear, EOM's intact     Neck:  Supple, bruit noted  Lungs: Clear to auscultation bilaterally, no wheezes rhonchi rales are noted  Chest wall: No tenderness  Heart::  Regular rate and rhythm, S1 and S2 normal, 1/6 holosystolic murmur.  Rub or gallop  Abdomen: Soft, non-tender, nondistended bowel sounds active  Extremities: No cyanosis, clubbing, or edema  Pulses: 2+ and symmetric all extremities  Skin:  No rashes or lesions  Neuro/psych: A&O x3. CN II through XII are grossly intact with appropriate affect      Allergies:  Allergies   Allergen Reactions   • Amlodipine Itching   • Atacand  [Candesartan Cilexetil] Other (See Comments)   • Atenolol Palpitations   • Azithromycin Nausea Only   • Escitalopram Oxalate Other (See Comments)     ?   • Ibuprofen Unknown (See Comments)   • Levofloxacin Nausea Only   • Penicillin G Hives   • Spironolactone Nausea And Vomiting   • Sucralfate Swelling   • Sulfamethoxazole-Trimethoprim Rash and Other (See Comments)     THROMBOCYTOPENIA    • Triamterene-Hctz Myalgia   • Venlafaxine Other (See Comments)     HEADACHE   • Clonidine Derivatives GI Intolerance   • Polyoxyethylene 40 Sorbitol Septaoleate [Sorbitan] Unknown - High " Severity   • Whey Nausea And Vomiting   • Citrus Other (See Comments)   • Paroxetine Palpitations   • Zoloft [Sertraline Hcl] Palpitations       Medication Review:     Current Outpatient Medications:   •  albuterol sulfate  (90 Base) MCG/ACT inhaler, Inhale 2 puffs Every 6 (Six) Hours As Needed for Wheezing or Shortness of Air., Disp: 18 g, Rfl: 0  •  aspirin 81 MG tablet, Take 1 tablet by mouth Daily., Disp: , Rfl:   •  aspirin-acetaminophen-caffeine (EXCEDRIN MIGRAINE) 250-250-65 MG per tablet, Take 1 tablet by mouth Every 6 (Six) Hours As Needed for Headache., Disp: , Rfl:   •  azelastine (ASTELIN) 0.1 % nasal spray, 2 sprays into the nostril(s) as directed by provider Daily., Disp: , Rfl:   •  baclofen (LIORESAL) 10 MG tablet, Take 1 tablet by mouth At Night As Needed for Muscle Spasms., Disp: 30 tablet, Rfl: 3  •  busPIRone (BUSPAR) 5 MG tablet, Take 1 tablet by mouth 3 (Three) Times a Day., Disp: 90 tablet, Rfl: 0  •  chlorhexidine (PERIDEX) 0.12 % solution, , Disp: , Rfl:   •  clindamycin (Cleocin) 300 MG capsule, 2 tabs po 1 hour prior to dental procedure, Disp: 4 capsule, Rfl: 0  •  diphenhydrAMINE (BENADRYL) 25 MG tablet, Take 25 mg by mouth Every 6 (Six) Hours As Needed for Itching., Disp: , Rfl:   •  diphenoxylate-atropine (Lomotil) 2.5-0.025 MG per tablet, Take 1 tablet by mouth 2 (Two) Times a Day As Needed for Diarrhea., Disp: 60 tablet, Rfl: 0  •  fenofibrate 160 MG tablet, Take 1 tablet by mouth Every Evening., Disp: 90 tablet, Rfl: 1  •  ibandronate (BONIVA) 150 MG tablet, Take 1 tablet by mouth Every 30 (Thirty) Days., Disp: 3 tablet, Rfl: 4  •  Lutein 20 MG capsule, Take 1 tablet by mouth Daily., Disp: , Rfl:   •  meclizine (ANTIVERT) 12.5 MG tablet, Take 1 tablet by mouth Every 6 (Six) Hours As Needed for Dizziness., Disp: 30 tablet, Rfl: 1  •  metoprolol succinate XL (TOPROL-XL) 50 MG 24 hr tablet, TAKE TWO TABLETS BY MOUTH EVERY NIGHT AT BEDTIME, Disp: 180 tablet, Rfl: 0  •  Multiple  Vitamins-Minerals (Multivitamin Adults 50+) tablet, Take 1 tablet by mouth Daily., Disp: , Rfl:   •  mycophenolate (MYFORTIC) 360 MG tablet delayed-release EC tablet, TK 1 T PO Q 12 H, Disp: , Rfl:   •  omeprazole (priLOSEC) 40 MG capsule, TAKE ONE CAPSULE BY MOUTH DAILY, Disp: 90 capsule, Rfl: 0  •  ondansetron (Zofran) 4 MG tablet, Take 1 tablet by mouth Every 8 (Eight) Hours As Needed for Nausea or Vomiting. prn, Disp: 90 tablet, Rfl: 0  •  oxybutynin XL (Ditropan XL) 5 MG 24 hr tablet, 1-2 po qhs prn bladder spasms, Disp: 30 tablet, Rfl: 0  •  promethazine (PHENERGAN) 6.25 MG/5ML syrup, Take 5 mL by mouth 4 (Four) Times a Day As Needed for Nausea or Vomiting., Disp: 120 mL, Rfl: 0  •  tacrolimus (PROGRAF) 1 MG capsule, 2 capsules 2 (Two) Times a Day., Disp: , Rfl:   •  traMADol (ULTRAM) 50 MG tablet, Take 1 tablet by mouth Every 6 (Six) Hours As Needed for Moderate Pain  or Severe Pain ., Disp: 30 tablet, Rfl: 0  •  vitamin E 400 UNIT capsule, Take 400 Units by mouth Daily. 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 - Dr Ritter [facility]; CBC/Prograf/CMP/LIPIDS/A1C   • Meniere's disease    • OAB    • Osteoporosis    • PUD        Past surgical History:  Past Surgical History:   Procedure Laterality Date   • BREAST LUMPECTOMY Left     Benign   • COLONOSCOPY  2012    Polyps = 2012/ 2015/ 2020, rech 2025   GSI   • ERCP W/ PLASTIC STENT PLACEMENT  2014    Liver  Stent   • L[facility] TRANSPL[facility]ATION      [facility]   • MAMMO BILATERAL      NEG= 2017/ 2018   • TOTAL ABDOMINAL HYSTERECTOMY  1979       Social History:  Social History     Socioeconomic History   • Marital status:      Spouse name: Not on file   • Number of children: Not on file   • Years of education: Not on file   • Highest education level: Not on file   Tobacco Use   • Smoking status: Never Smoker   • Smokeless tobacco: Never Used   • Tobacco comment: Quit 2002.    Vaping Use   • Vaping Use: Never assessed   Substance and Sexual Activity   • Alcohol use: No     Comment: very rarely    • Drug use: No   • Sexual activity: Defer       Review of Systems:  The following systems were reviewed as they relate to the cardiovascular system: Constitutional, Eyes, ENT, Cardiovascular, Respiratory, Gastrointestinal, Integumentary, Neurological, Psychiatric, Hematologic, Endocrine, Musculoskeletal, and Genitourinary. The pertinent cardiovascular findings are reported above with all other cardiovascular points within those systems being negative.    Diagnostic Study Review:     Current Electrocardiogram:     ECG 12 Lead    Date/Time: 8/17/2021 5:12 PM  Performed by: Domenico Clifton DO  Authorized by: Domenico Clifton DO   Comparison: not compared with previous ECG   Previous ECG: no previous ECG available  Comments: Normal sinus rhythm with a ventricular rate of 77 bpm.  Low voltage in the precordial leads.  Normal QT and QTc intervals.               NOTE: The following portions of the patient's note were reviewed, confirmed and/or updated this visit as appropriate: History of present illness/Interval history, physical examination, assessment & plan, allergies, current medications, past family history, past medical history, past social history, past surgical history and problem list.

## 2021-08-23 ENCOUNTER — TELEPHONE (OUTPATIENT)
Dept: FAMILY MEDICINE CLINIC | Facility: CLINIC | Age: 76
End: 2021-08-23

## 2021-08-23 NOTE — TELEPHONE ENCOUNTER
Pt states saw Dr Clifton the other day and her BP was 168/75 hr 77. He asked her if she could increase her BP meds, bc he wants to get it down. She had told him it makes her too sleepy when she does. Pt states her BP today at 9 AM was 192/88 hr 54 and 184/84 hr 55. She took an extra Metoprolol 50mg today, and always take 2 pills at nite. However, she is c/o posterior headache x 1 wk now and she doesn't know if it's from getting her 1st covid vaccine 1 wk ago or if it's her BP causing it. Also, she is in a lot of pain from her back and neck and took 2 tylenol today @ 1PM. Wants to know if ok to take a tramadol and baclofen now to help w the pain? Also, what to do about her BP? She is very concerned and emotional, crying on phone, etc. She just doesn't know what to do. Please advise.

## 2021-08-23 NOTE — TELEPHONE ENCOUNTER
PT IS REQUESTING A CALL ABACK TO DISCUSS HER MELANIE BP AND TAKING TRAMADOL AS WELL AS GETTING THE FIRST COVID VACCINE.

## 2021-08-24 NOTE — TELEPHONE ENCOUNTER
Per : Patient needs to get a new BP machine and then call and make a nurse visit BP check appointment.     Patient was notified.

## 2021-08-24 NOTE — TELEPHONE ENCOUNTER
Instead of going up on the metoprolol, can try hydralazine 25mg bid and stay on low dose metoprolol.......  Yes she can ttake the tramadol and ms relaxer    I think this should have been a warm transfer

## 2021-08-30 ENCOUNTER — TELEPHONE (OUTPATIENT)
Dept: FAMILY MEDICINE CLINIC | Facility: CLINIC | Age: 76
End: 2021-08-30

## 2021-08-30 NOTE — TELEPHONE ENCOUNTER
THE PATIENT WANTED TO LET HER PCP KNOW THAT SHE WAS NEGATIVE FOR COVID-19. SHE ALSO STATES THAT SHE RECEIVED A NEW BLOOD PRESSURE MONITOR.

## 2021-08-30 NOTE — TELEPHONE ENCOUNTER
Patient states that she would like to come in for a nurse visit BP check with her cuff. She was routed up front to make the visit.

## 2021-09-07 ENCOUNTER — TELEPHONE (OUTPATIENT)
Dept: FAMILY MEDICINE CLINIC | Facility: CLINIC | Age: 76
End: 2021-09-07

## 2021-09-07 ENCOUNTER — CLINICAL SUPPORT (OUTPATIENT)
Dept: FAMILY MEDICINE CLINIC | Facility: CLINIC | Age: 76
End: 2021-09-07

## 2021-09-07 VITALS — HEART RATE: 76 BPM | SYSTOLIC BLOOD PRESSURE: 184 MMHG | DIASTOLIC BLOOD PRESSURE: 82 MMHG

## 2021-09-07 DIAGNOSIS — M25.572 ACUTE LEFT ANKLE PAIN: Primary | ICD-10-CM

## 2021-09-07 NOTE — PROGRESS NOTES
Pt old cuff = 185/78 hr 77  Pt new cuff = 177/84 hr 77  Manual cuff = 184/82 hr 76    Pt also dropped off BP diary to be scanned.  See other note with other pt's questions.

## 2021-09-07 NOTE — TELEPHONE ENCOUNTER
Pt came in for BP check today and was c/o L ankle pain/swelling/ wearing a brace. She doesn't know if maybe she turned it or why it's hurting so bad. She wants to know if you could order an xray for it. She will come at anytime to have done.     Also, states she got Pfizer #1 a couple wks ago and had muscle aches x 1-2 days, but is still having all over random body itching. She's due for #2 and wants to know if she should still get it, due to the itching.

## 2021-09-14 ENCOUNTER — TELEPHONE (OUTPATIENT)
Dept: FAMILY MEDICINE CLINIC | Facility: CLINIC | Age: 76
End: 2021-09-14

## 2021-09-14 NOTE — TELEPHONE ENCOUNTER
Caller: Christal Romo    Relationship: Self    Best call back number: [phone]    What was the call regarding: PATIENT WOULD LIKE TO KNOW IF DR PAZ CAN GIVE HER A CORTISONE SHOT IN HER KNEES WHEN SHE COMES TO HER APPT NEXT WEEK. PLEASE ADVISE.     Do you require a callback: YES

## 2021-09-20 ENCOUNTER — OFFICE VISIT (OUTPATIENT)
Dept: FAMILY MEDICINE CLINIC | Facility: CLINIC | Age: 76
End: 2021-09-20

## 2021-09-20 ENCOUNTER — TELEPHONE (OUTPATIENT)
Dept: FAMILY MEDICINE CLINIC | Facility: CLINIC | Age: 76
End: 2021-09-20

## 2021-09-20 VITALS
TEMPERATURE: 98.2 F | DIASTOLIC BLOOD PRESSURE: 69 MMHG | BODY MASS INDEX: 25.8 KG/M2 | HEIGHT: 63 IN | WEIGHT: 145.6 LBS | OXYGEN SATURATION: 99 % | RESPIRATION RATE: 18 BRPM | SYSTOLIC BLOOD PRESSURE: 189 MMHG | HEART RATE: 87 BPM

## 2021-09-20 DIAGNOSIS — M54.2 CERVICALGIA: ICD-10-CM

## 2021-09-20 DIAGNOSIS — Z00.00 MEDICARE ANNUAL WELLNESS VISIT, SUBSEQUENT: Primary | ICD-10-CM

## 2021-09-20 DIAGNOSIS — I10 ELEVATED BLOOD PRESSURE READING IN OFFICE WITH DIAGNOSIS OF HYPERTENSION: ICD-10-CM

## 2021-09-20 DIAGNOSIS — R73.9 HYPERGLYCEMIA: ICD-10-CM

## 2021-09-20 LAB — HBA1C MFR BLD: 5.2 %

## 2021-09-20 PROCEDURE — 83036 HEMOGLOBIN GLYCOSYLATED A1C: CPT | Performed by: FAMILY MEDICINE

## 2021-09-20 PROCEDURE — G0439 PPPS, SUBSEQ VISIT: HCPCS | Performed by: FAMILY MEDICINE

## 2021-09-20 RX ORDER — OMEGA-3-ACID ETHYL ESTERS 1 G/1
2 CAPSULE, LIQUID FILLED ORAL 2 TIMES DAILY
Qty: 120 CAPSULE | Refills: 3 | Status: SHIPPED | OUTPATIENT
Start: 2021-09-20 | End: 2022-04-13

## 2021-09-20 RX ORDER — TRAMADOL HYDROCHLORIDE 50 MG/1
50 TABLET ORAL EVERY 6 HOURS PRN
Qty: 30 TABLET | Refills: 0 | Status: SHIPPED | OUTPATIENT
Start: 2021-09-20 | End: 2022-05-06 | Stop reason: SDUPTHER

## 2021-09-20 RX ORDER — OXYBUTYNIN CHLORIDE 5 MG/1
TABLET, EXTENDED RELEASE ORAL
Qty: 30 TABLET | Refills: 0 | Status: SHIPPED | OUTPATIENT
Start: 2021-09-20 | End: 2021-10-18 | Stop reason: SINTOL

## 2021-09-20 RX ORDER — MECLIZINE HCL 12.5 MG/1
12.5 TABLET ORAL EVERY 6 HOURS PRN
Qty: 30 TABLET | Refills: 1 | Status: SHIPPED | OUTPATIENT
Start: 2021-09-20 | End: 2022-05-06 | Stop reason: SDUPTHER

## 2021-09-20 RX ORDER — CITALOPRAM 10 MG/1
10 TABLET ORAL NIGHTLY
Qty: 30 TABLET | Refills: 1 | Status: SHIPPED | OUTPATIENT
Start: 2021-09-20 | End: 2022-04-13

## 2021-09-20 RX ORDER — DOXAZOSIN MESYLATE 1 MG/1
1 TABLET ORAL NIGHTLY
Qty: 30 TABLET | Refills: 1 | Status: SHIPPED | OUTPATIENT
Start: 2021-09-20 | End: 2022-04-13

## 2021-09-20 NOTE — TELEPHONE ENCOUNTER
HUB to read    PA approved for  Omega-3-acid Ethyl Esters 1GM capsules (Lovaza)    PA approval was faxed to [facility] in [place].     CaseId:[id number];Status:Approved;Review Type:Prior Auth;Coverage Start Date:08/21/2021;Coverage End Date:09/20/2022;

## 2021-09-20 NOTE — TELEPHONE ENCOUNTER
HUB to read.  PA was sent for Omega-3-acid Ethyl Esters 1GM capsules (Lovaza)    Waiting on the outcome from insurance.

## 2021-10-07 RX ORDER — METOPROLOL SUCCINATE 50 MG/1
TABLET, EXTENDED RELEASE ORAL
Qty: 180 TABLET | Refills: 0 | Status: SHIPPED | OUTPATIENT
Start: 2021-10-07 | End: 2022-01-10

## 2021-10-07 NOTE — TELEPHONE ENCOUNTER
Rx Refill Note  Requested Prescriptions     Pending Prescriptions Disp Refills   • metoprolol succinate XL (TOPROL-XL) 50 MG 24 hr tablet [Pharmacy Med Name: METOPROLOL SUCC ER 50 MG TAB] 180 tablet 0     Sig: TAKE 2 TABLETS BY MOUTH EVERY NIGHT AT BEDTIME      Last office visit with prescribing clinician: 9/20/2021      Next office visit with prescribing clinician: 10/18/2021     POC Glycosylated Hemoglobin (Hb A1C) (09/20/2021 15:28)  SCANNED - LABS (09/14/2021)  SCANNED - LABS (07/27/2021)         Kiki Clinton CMA  10/07/21, 14:26 EDT

## 2021-10-18 ENCOUNTER — OFFICE VISIT (OUTPATIENT)
Dept: FAMILY MEDICINE CLINIC | Facility: CLINIC | Age: 76
End: 2021-10-18

## 2021-10-18 VITALS
TEMPERATURE: 97.1 F | HEIGHT: 63 IN | WEIGHT: 146 LBS | OXYGEN SATURATION: 98 % | RESPIRATION RATE: 14 BRPM | BODY MASS INDEX: 25.87 KG/M2 | DIASTOLIC BLOOD PRESSURE: 63 MMHG | SYSTOLIC BLOOD PRESSURE: 171 MMHG | HEART RATE: 69 BPM

## 2021-10-18 DIAGNOSIS — K21.9 GASTROESOPHAGEAL REFLUX DISEASE WITHOUT ESOPHAGITIS: ICD-10-CM

## 2021-10-18 DIAGNOSIS — I10 ELEVATED BLOOD PRESSURE READING IN OFFICE WITH DIAGNOSIS OF HYPERTENSION: Primary | ICD-10-CM

## 2021-10-18 DIAGNOSIS — N32.81 OVERACTIVE BLADDER: ICD-10-CM

## 2021-10-18 DIAGNOSIS — Z23 NEED FOR INFLUENZA VACCINATION: ICD-10-CM

## 2021-10-18 DIAGNOSIS — J30.2 SEASONAL ALLERGIES: ICD-10-CM

## 2021-10-18 PROCEDURE — G0008 ADMIN INFLUENZA VIRUS VAC: HCPCS | Performed by: FAMILY MEDICINE

## 2021-10-18 PROCEDURE — 90662 IIV NO PRSV INCREASED AG IM: CPT | Performed by: FAMILY MEDICINE

## 2021-10-18 PROCEDURE — 99214 OFFICE O/P EST MOD 30 MIN: CPT | Performed by: FAMILY MEDICINE

## 2021-10-18 RX ORDER — OMEPRAZOLE 40 MG/1
40 CAPSULE, DELAYED RELEASE ORAL DAILY
Qty: 90 CAPSULE | Refills: 0 | Status: SHIPPED | OUTPATIENT
Start: 2021-10-18 | End: 2022-01-14

## 2021-10-18 RX ORDER — FESOTERODINE FUMARATE 4 MG/1
4 TABLET, FILM COATED, EXTENDED RELEASE ORAL DAILY PRN
Qty: 30 TABLET | Refills: 0 | COMMUNITY
Start: 2021-10-18 | End: 2022-04-13 | Stop reason: ALTCHOICE

## 2021-10-18 RX ORDER — ONDANSETRON 4 MG/1
4 TABLET, FILM COATED ORAL EVERY 8 HOURS PRN
Qty: 90 TABLET | Refills: 0 | Status: SHIPPED | OUTPATIENT
Start: 2021-10-18 | End: 2022-05-06 | Stop reason: SDUPTHER

## 2021-11-23 RX ORDER — BUSPIRONE HYDROCHLORIDE 5 MG/1
TABLET ORAL
Qty: 90 TABLET | Refills: 0 | Status: SHIPPED | OUTPATIENT
Start: 2021-11-23 | End: 2022-05-06 | Stop reason: SDUPTHER

## 2021-11-23 NOTE — TELEPHONE ENCOUNTER
Rx Refill Note  Requested Prescriptions     Pending Prescriptions Disp Refills   • busPIRone (BUSPAR) 5 MG tablet [Pharmacy Med Name: busPIRone HCL 5 MG TABLET] 90 tablet 0     Sig: TAKE ONE TABLET BY MOUTH THREE TIMES A DAY      Last office visit with prescribing clinician: 10/18/2021      Next office visit with prescribing clinician: 4/18/2022     SCANNED - LABS (09/14/2021)         Linda Adam, RT  11/23/21, 11:01 EST

## 2022-01-10 RX ORDER — METOPROLOL SUCCINATE 50 MG/1
TABLET, EXTENDED RELEASE ORAL
Qty: 180 TABLET | Refills: 0 | Status: SHIPPED | OUTPATIENT
Start: 2022-01-10 | End: 2022-04-06

## 2022-01-14 RX ORDER — OMEPRAZOLE 40 MG/1
CAPSULE, DELAYED RELEASE ORAL
Qty: 90 CAPSULE | Refills: 0 | Status: SHIPPED | OUTPATIENT
Start: 2022-01-14 | End: 2022-05-06

## 2022-01-14 NOTE — TELEPHONE ENCOUNTER
Rx Refill Note  Requested Prescriptions     Pending Prescriptions Disp Refills   • omeprazole (priLOSEC) 40 MG capsule [Pharmacy Med Name: OMEPRAZOLE DR 40 MG CAPSULE] 90 capsule 0     Sig: TAKE ONE CAPSULE BY MOUTH DAILY      Last office visit with prescribing clinician: 10/18/2021      Next office visit with prescribing clinician: 4/18/2022     SCANNED - LABS (09/14/2021)         Linda Adam, RT  01/14/22, 14:19 EST

## 2022-01-25 RX ORDER — BACLOFEN 10 MG/1
TABLET ORAL
Qty: 30 TABLET | Refills: 3 | Status: SHIPPED | OUTPATIENT
Start: 2022-01-25 | End: 2022-09-01

## 2022-01-25 NOTE — TELEPHONE ENCOUNTER
Rx Refill Note  Requested Prescriptions     Pending Prescriptions Disp Refills   • baclofen (LIORESAL) 10 MG tablet [Pharmacy Med Name: BACLOFEN 10 MG TABLET] 30 tablet 3     Sig: TAKE ONE TABLET BY MOUTH EVERY NIGHT AT BEDTIME AS NEEDED FOR MUSCLE SPASMS      Last office visit with prescribing clinician: 10/18/2021      Next office visit with prescribing clinician: 4/18/2022     POC Glycosylated Hemoglobin (Hb A1C) (09/20/2021 15:28)         Linda Adam, RT  01/25/22, 15:41 EST

## 2022-02-02 ENCOUNTER — TRANSCRIBE ORDERS (OUTPATIENT)
Dept: PHYSICAL THERAPY | Facility: CLINIC | Age: 77
End: 2022-02-02

## 2022-02-02 DIAGNOSIS — R42 VERTIGO: Primary | ICD-10-CM

## 2022-02-08 ENCOUNTER — TREATMENT (OUTPATIENT)
Dept: PHYSICAL THERAPY | Facility: CLINIC | Age: 77
End: 2022-02-08

## 2022-02-08 DIAGNOSIS — R42 DIZZINESS: ICD-10-CM

## 2022-02-08 DIAGNOSIS — R42 VERTIGO: Primary | ICD-10-CM

## 2022-02-08 PROCEDURE — 97161 PT EVAL LOW COMPLEX 20 MIN: CPT | Performed by: PHYSICAL THERAPIST

## 2022-02-08 PROCEDURE — 97110 THERAPEUTIC EXERCISES: CPT | Performed by: PHYSICAL THERAPIST

## 2022-02-08 PROCEDURE — 95992 CANALITH REPOSITIONING PROC: CPT | Performed by: PHYSICAL THERAPIST

## 2022-02-08 NOTE — PROGRESS NOTES
Physical Therapy Initial Evaluation and Plan of Care    Patient: Christal Romo   : 1945  Diagnosis/ICD-10 Code:  Vertigo [R42]  Referring practitioner: EDA Weber  Date of Initial Visit: 2022  Today's Date: 2022  Patient seen for 1 sessions           Subjective Questionnaire: DHI: 58% impairment       Subjective Evaluation    History of Present Illness  Mechanism of injury: Pt presenting with vertigo symptoms that began about 10 days ago. Felt like she was drunk all week and couldn't even leave the house. Was eventually seen by urgent care who told her to keep up with her allergy medication. Is still having issues with tinnitus as well.   Pt was seen for this issue in 2020 as well, and had improved symptoms following the last session at that time.     Limitations: spins when she lays down, issues when turning head while walking, is sometimes afraid to lay down in the bed so she has been laying on her recliner.     Uses meclezine at night for the dizziness, but can't take it during the day d/t it making her sleepy.     Is also having some bladder issues and she is eager to see if this will help with her pain in her side.     Quality of life: good    Pain  No pain reported    Patient Goals  Patient goals for therapy: improved balance, increased motion and independence with ADLs/IADLs             Objective          Ambulation     Comments   VESTIBULAR:  Vertigo / hypofunction    VOMS:      Baseline symptoms-  2/10           Smooth pursuit - 4/10           Saccades vertical 5/10           Saccades horizontal 5/10           Convergence (near point) - unable to perform            VOR - horizontal 6/10           VOR - vertical 6/10    Vertebral aa test - negative (B)     SL halpike  -positive (R)               Assessment & Plan     Assessment  Impairments: abnormal coordination, abnormal gait, activity intolerance, impaired balance, impaired physical strength, lacks appropriate home  exercise program and safety issue  Functional Limitations: sleeping, moving in bed, stooping and unable to perform repetitive tasks  Assessment details: Pt is a 77 yr/o female presenting with dizziness and vestibular impairment.   Per DHI she reports 58% impairment. She shows vestibular hypofunction as well as vestibular motion sensitivity per objective testing. She also shows positive signs for vertigo and responded well to CRT maneuvers. Educated on results of evaluation, as well as initial HEP. Pt with good understanding. Recommend skilled OPPT to address above issues, pt in agreement.   Prognosis: good    Goals  Plan Goals: STG: to be met within 6 visits   1. Pt to be (I) with initial HEP  2. 50% decrease in spinning episodes   3. 50% improvement in confidence while walking in home and community     LTG: to be met by DC   1. Pt to be (I) with finalized HEP  2. Pt to report decreased impairment per DHI to less than 35%   3. No spinning episodes for over 2 week period of time   4. Return to normal ADLs and shopping tasks with no issues      Plan  Therapy options: will be seen for skilled therapy services  Planned modality interventions: cryotherapy, thermotherapy (hydrocollator packs) and dry needling  Planned therapy interventions: manual therapy, neuromuscular re-education, spinal/joint mobilization, strengthening, therapeutic activities, home exercise program, gait training, functional ROM exercises, fine motor coordination training and balance/weight-bearing training  Other planned therapy interventions: canalith repositioning techniques and maneuvers  Frequency: 1x week  Duration in weeks: 13  Treatment plan discussed with: patient        History # of Personal Factors and/or Comorbidities: LOW (0)  Examination of Body System(s): # of elements: LOW (1-2)  Clinical Presentation: STABLE   Clinical Decision Making: LOW     Timed:         Manual Therapy:         mins  47238;     Therapeutic Exercise:    10      mins  89707;     Neuromuscular Radha:        mins  18744;    Therapeutic Activity:          mins  69946;     Gait Training:           mins  77561;     Ultrasound:          mins  15471;    Ionto                                   mins   81086  Self Care                            mins   34687  Canalith Repos    16     mins 04605      Un-Timed:  Electrical Stimulation:         mins  67307 ( );  Traction          mins 10382  Dry Needle                 ______ mins DRYNDL  Low Eval     20     Mins  15486  Mod Eval          Mins  27482  High Eval                            Mins  54457  Re-Eval                               mins  79248        Timed Treatment:   26   mins   Total Treatment:     46   mins    PT SIGNATURE: Nancy Gutierrez, BEENA   DATE TREATMENT INITIATED: 2/8/2022    Initial Certification  Certification Period: 2/8/2022 through 5/9/2022  I certify that the therapy services are furnished while this patient is under my care.  The services outlined above are required by this patient, and will be reviewed every 90 days.     PHYSICIAN: Janak Rooney PA      DATE:     Please sign and return via fax to [phone]. Thank you, [facility].

## 2022-03-28 ENCOUNTER — TELEPHONE (OUTPATIENT)
Dept: FAMILY MEDICINE CLINIC | Facility: CLINIC | Age: 77
End: 2022-03-28

## 2022-03-28 NOTE — TELEPHONE ENCOUNTER
Caller: Christal Romo    Relationship: Self    Best call back number: [phone]    What orders are you requesting (i.e. lab or imaging): MAMMOGRAM    In what timeframe would the patient need to come in: ANY TIME AFTER LUNCH     Where will you receive your lab/imaging services: [facility]  [phone] EXT.1    Additional notes: PLEASE ADVISE PATIENT.

## 2022-03-28 NOTE — TELEPHONE ENCOUNTER
Rx Refill Note  Requested Prescriptions     Pending Prescriptions Disp Refills   • clindamycin (CLEOCIN) 300 MG capsule [Pharmacy Med Name: CLINDAMYCIN  MG CAPSULE] 4 capsule 0     Sig: TAKE TWO CAPSULES BY MOUTH ONE HOUR PRIOR TO DENTAL PROCEDURE      Last office visit with prescribing clinician: 10/18/2021      Next office visit with prescribing clinician: 3/28/2022            Linda Adam, RT  03/28/22, 14:27 EDT

## 2022-03-29 DIAGNOSIS — Z12.31 SCREENING MAMMOGRAM FOR BREAST CANCER: Primary | ICD-10-CM

## 2022-03-29 RX ORDER — CLINDAMYCIN HYDROCHLORIDE 300 MG/1
CAPSULE ORAL
Qty: 4 CAPSULE | Refills: 0 | Status: SHIPPED | OUTPATIENT
Start: 2022-03-29

## 2022-04-06 RX ORDER — METOPROLOL SUCCINATE 50 MG/1
TABLET, EXTENDED RELEASE ORAL
Qty: 180 TABLET | Refills: 0 | Status: SHIPPED | OUTPATIENT
Start: 2022-04-06 | End: 2022-04-13 | Stop reason: SDUPTHER

## 2022-04-12 ENCOUNTER — OFFICE (AMBULATORY)
Dept: URBAN - METROPOLITAN AREA CLINIC 64 | Facility: CLINIC | Age: 77
End: 2022-04-12

## 2022-04-12 DIAGNOSIS — R10.32 LEFT LOWER QUADRANT PAIN: ICD-10-CM

## 2022-04-12 PROBLEM — T78.40XA SENSITIVITY TO MEDICATION: Status: ACTIVE | Noted: 2022-04-12

## 2022-04-12 PROCEDURE — 99213 OFFICE O/P EST LOW 20 MIN: CPT | Performed by: NURSE PRACTITIONER

## 2022-04-12 NOTE — PROGRESS NOTES
Cardiology Office Visit      Encounter Date:  04/13/2022    Patient ID:   Christal Romo is a 77 y.o. female.    Reason For Followup:  Valvular heart disease  Hypertension    Brief Clinical History:  Dear Sara Espino, DO    I had the pleasure of seeing Christal Romo today. As you are well aware, this is a 77 y.o. female with no known history of ischemic heart disease.  She does have a history of nonalcoholic steatohepatitis and liver cirrhosis and is status post liver transplant.  She has additional history that includes hypertension, palpitations, and aortic insufficiency. She presents today for follow-up on the above conditions.    Interval History:  She denies any shortness of breath.  She denies any PND orthopnea.  She denies any PND orthopnea.  She denies any syncope or near syncope.  She reports feeling well from a cardiac perspective.    As I am sure you are aware she has an intolerance to multiple medications including multiple antihypertensives.  This makes treating her hypertension difficult.  She reports that she was started on metoprolol and this causes her drowsiness so she takes it in the evening.  It has helped some with her blood pressure but she is still suboptimal.      We reviewed her home blood pressure logs today.  Pressures are predominantly in the 140s to 170s.  She reports that she has been taking an extra half (25 mg) of her metoprolol on days where her blood pressure is really elevated.  We discussed options and will try increasing her to 125 mg in the evening with an extra 25 mg as needed in the morning for high blood pressure and see how well she tolerates this.    She is reporting some discomfort in her left lower quadrant that is undergoing a work-up at the present time.    Her most recent laboratory data is from [facility] in the fall 2021.  Unfortunately the results are not available for review.  She is also reporting some difficulty with ankle stability.  She is  wearing ankle braces today.    Her most recent echocardiogram performed in December 2020.  Normal LV systolic function was noted.  Mild MR, TR, AI and PI was noted.  This is unchanged from prior studies.    She underwent a heart and vascular screening in July 2021.  Her coronary calcium score was 23.2.  Her ABIs were normal.  No evidence of carotid or abdominal aortic disease was noted.    Assessment & Plan    Impressions:  Hypertension     I believe a significant amount of her hypertension is due to anxiety, and pain.  Palpitations.  History of abnormal troponin secondary to mouse antigen interaction with troponin assay reagent.  Cirrhosis status post liver transplant 2016  Poor dentition.  Valvular heart disease     Mild MR, TR, AI, PI Echocardiogram December 2020  Hyperlipidemia with a preponderance of hypertriglyceridemia  Depression  Multiple medication sensitivities and intolerances    Recommendations:  Continuation of her current cardiovascular regimen at the present time.     This includes antiplatelet therapy, and antihypertensives.  Follow-up in 6 months time sooner should there be difficulties.    There are no diagnoses linked to this encounter.      Objective:    Vitals:  There were no vitals filed for this visit.  There is no height or weight on file to calculate BMI.      Physical Exam:    General: Alert, cooperative, no distress, appears stated age  Head:  Normocephalic, atraumatic, mucous membranes moist  Eyes:  Conjunctiva/corneas clear, EOM's intact     Neck:  Supple,  no bruit    Lungs: Clear to auscultation bilaterally, no wheezes rhonchi rales are noted  Chest wall: No tenderness  Heart::  Regular rate and rhythm, S1 and S2 normal, 1/6 holosystolic murmur.  No rub or gallop  Abdomen: Soft, non-tender, nondistended bowel sounds active  Extremities: No cyanosis, clubbing, or edema.  Ankle braces  Pulses: Pedal pulses nonpalpable because of ankle braces  Skin:  No rashes or  lesions  Neuro/psych: A&O x3. CN II through XII are grossly intact with appropriate affect      Allergies:  Allergies   Allergen Reactions   • Amlodipine Itching   • Atacand  [Candesartan Cilexetil] Other (See Comments)   • Atenolol Palpitations   • Azithromycin Nausea Only   • Escitalopram Oxalate Other (See Comments)     ?   • Ibuprofen Unknown (See Comments)   • Levofloxacin Nausea Only   • Penicillin G Hives   • Spironolactone Nausea And Vomiting   • Sucralfate Swelling   • Sulfamethoxazole-Trimethoprim Rash and Other (See Comments)     THROMBOCYTOPENIA    • Triamterene-Hctz Myalgia   • Venlafaxine Other (See Comments)     HEADACHE   • Clonidine Derivatives GI Intolerance   • Lipitor [Atorvastatin] Myalgia   • Polyoxyethylene 40 Sorbitol Septaoleate [Sorbitan] Unknown - High Severity   • Whey Nausea And Vomiting   • Zyrtec [Cetirizine] Itching   • Citrus Other (See Comments)   • Paroxetine Palpitations   • Zoloft [Sertraline Hcl] Palpitations       Medication Review:     Current Outpatient Medications:   •  albuterol sulfate  (90 Base) MCG/ACT inhaler, Inhale 2 puffs Every 6 (Six) Hours As Needed for Wheezing or Shortness of Air., Disp: 18 g, Rfl: 0  •  aspirin 81 MG tablet, Take 1 tablet by mouth Daily., Disp: , Rfl:   •  aspirin-acetaminophen-caffeine (EXCEDRIN MIGRAINE) 250-250-65 MG per tablet, Take 1 tablet by mouth Every 6 (Six) Hours As Needed for Headache., Disp: , Rfl:   •  baclofen (LIORESAL) 10 MG tablet, TAKE ONE TABLET BY MOUTH EVERY NIGHT AT BEDTIME AS NEEDED FOR MUSCLE SPASMS, Disp: 30 tablet, Rfl: 3  •  busPIRone (BUSPAR) 5 MG tablet, TAKE ONE TABLET BY MOUTH THREE TIMES A DAY, Disp: 90 tablet, Rfl: 0  •  citalopram (CeleXA) 10 MG tablet, Take 1 tablet by mouth Every Night., Disp: 30 tablet, Rfl: 1  •  clindamycin (CLEOCIN) 300 MG capsule, TAKE TWO CAPSULES BY MOUTH ONE HOUR PRIOR TO DENTAL PROCEDURE, Disp: 4 capsule, Rfl: 0  •  diphenhydrAMINE (BENADRYL) 25 MG tablet, Take 25 mg by mouth  Every 6 (Six) Hours As Needed for Itching., Disp: , Rfl:   •  diphenoxylate-atropine (Lomotil) 2.5-0.025 MG per tablet, Take 1 tablet by mouth 2 (Two) Times a Day As Needed for Diarrhea., Disp: 60 tablet, Rfl: 0  •  doxazosin (Cardura) 1 MG tablet, Take 1 tablet by mouth Every Night., Disp: 30 tablet, Rfl: 1  •  fesoterodine fumarate (Toviaz) 4 MG tablet sustained-release 24 hour tablet, Take 1 tablet by mouth Daily As Needed (urine incont)., Disp: 30 tablet, Rfl: 0  •  ibandronate (BONIVA) 150 MG tablet, Take 1 tablet by mouth Every 30 (Thirty) Days., Disp: 3 tablet, Rfl: 4  •  Lutein 20 MG capsule, Take 1 tablet by mouth Daily., Disp: , Rfl:   •  meclizine (ANTIVERT) 12.5 MG tablet, Take 1 tablet by mouth Every 6 (Six) Hours As Needed for Dizziness., Disp: 30 tablet, Rfl: 1  •  metoprolol succinate XL (TOPROL-XL) 50 MG 24 hr tablet, TAKE TWO TABLETS BY MOUTH EVERY NIGHT AT BEDTIME, Disp: 180 tablet, Rfl: 0  •  Multiple Vitamins-Minerals (Multivitamin Adults 50+) tablet, Take 1 tablet by mouth Daily., Disp: , Rfl:   •  mycophenolate (MYFORTIC) 360 MG tablet delayed-release EC tablet, TK 1 T PO Q 12 H, Disp: , Rfl:   •  omega-3 acid ethyl esters (LOVAZA) 1 g capsule, Take 2 capsules by mouth 2 (Two) Times a Day., Disp: 120 capsule, Rfl: 3  •  omeprazole (priLOSEC) 40 MG capsule, TAKE ONE CAPSULE BY MOUTH DAILY, Disp: 90 capsule, Rfl: 0  •  ondansetron (Zofran) 4 MG tablet, Take 1 tablet by mouth Every 8 (Eight) Hours As Needed for Nausea or Vomiting. prn, Disp: 90 tablet, Rfl: 0  •  tacrolimus (PROGRAF) 1 MG capsule, 2 capsules 2 (Two) Times a Day. (Patient taking differently: 2 mg. 2tabs in the AM, 1 in the PM), Disp: , Rfl:   •  traMADol (ULTRAM) 50 MG tablet, Take 1 tablet by mouth Every 6 (Six) Hours As Needed for Moderate Pain  or Severe Pain ., Disp: 30 tablet, Rfl: 0  •  vitamin E 400 UNIT capsule, Take 400 Units by mouth Daily. 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 - Dr Stone Stevens; CBC/Prograf/CMP/LIPIDS/A1C   • MAMMO     NEG = 2018/ 2021   • Meniere's disease    • OAB    • Osteoporosis    • PUD    • 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    Substance and Sexual Activity   • Alcohol use: No     Comment: very rarely    • Drug use: No   • Sexual activity: Defer       Review of Systems:  The following systems were reviewed as they relate to the cardiovascular system: Constitutional, Eyes, ENT, Cardiovascular, Respiratory, Gastrointestinal, Integumentary, Neurological, Psychiatric, Hematologic, Endocrine, Musculoskeletal, and Genitourinary. The pertinent cardiovascular findings are reported above with all other cardiovascular points within those systems being negative.    Diagnostic Study Review:     Current Electrocardiogram:    ECG 12 Lead    Date/Time: 4/13/2022  5:22 PM  Performed by: Domenico Clifton DO  Authorized by: Domenico Clifton DO   Comparison: not compared with previous ECG   Previous ECG: no previous ECG available  Comments: Normal sinus rhythm with a ventricular rate of 74 bpm.  Left ventricular hypertrophy.  Consider old anterior MI.  Normal QT and QTc intervals.            Laboratory Data:  Lab Results   Component Value Date    GLUCOSE 100 (H) 04/20/2020    BUN 17 04/20/2020    CREATININE 1.62 (H) 04/20/2020    EGFRIFNONA 31 (L) 04/20/2020    BCR 10.5 04/20/2020    K 4.9 04/20/2020    CO2 25.3 04/20/2020    CALCIUM 9.5 04/20/2020    ALBUMIN 4.40 04/20/2020    AST 45 (H) 04/20/2020    ALT 58 (H) 04/20/2020     Lab Results   Component Value Date    GLUCOSE 100 (H) 04/20/2020    CALCIUM 9.5 04/20/2020     04/20/2020    K 4.9 04/20/2020    CO2 25.3 04/20/2020     04/20/2020    BUN 17 04/20/2020    CREATININE 1.62 (H) 04/20/2020    EGFRIFNONA 31 (L) 04/20/2020    BCR 10.5 04/20/2020    ANIONGAP 11.7 04/20/2020     Lab Results   Component Value Date    WBC 4.23 04/20/2020    HGB 11.8 (L) 04/20/2020    HCT 34.4 04/20/2020    MCV 89.1 04/20/2020     (L) 04/20/2020     No results found for: CHOL, CHLPL, TRIG, HDL, LDL, LDLDIRECT  Lab Results   Component Value Date    HGBA1C 5.2 09/20/2021     No results found for: INR, PROTIME    Most Recent Echo:  Results for orders placed during the hospital encounter of 12/07/20    Adult Transthoracic Echo Complete W/ Cont if Necessary Per Protocol    Interpretation Summary  · Estimated left ventricular EF was in agreement with the calculated left ventricular EF. Left ventricular ejection fraction appears to be 56 - 60%. Left ventricular systolic function is normal.  · Left ventricular wall thickness is consistent with hypertrophy. Sigmoid-shaped ventricular septum is present.  · Left ventricular diastolic function is consistent with (grade I) impaired relaxation.  · Estimated right  ventricular systolic pressure from tricuspid regurgitation is normal (<35 mmHg).       Most Recent Stress Test:       Most Recent Cardiac Catheterization:   No results found for this or any previous visit.       NOTE: The following portions of the patient's note were reviewed, confirmed and/or updated this visit as appropriate: History of present illness/Interval history, physical examination, assessment & plan, allergies, current medications, past family history, past medical history, past social history, past surgical history and problem list.

## 2022-04-13 ENCOUNTER — OFFICE VISIT (OUTPATIENT)
Dept: CARDIOLOGY | Facility: CLINIC | Age: 77
End: 2022-04-13

## 2022-04-13 VITALS
OXYGEN SATURATION: 97 % | BODY MASS INDEX: 26.05 KG/M2 | HEART RATE: 77 BPM | SYSTOLIC BLOOD PRESSURE: 146 MMHG | DIASTOLIC BLOOD PRESSURE: 82 MMHG | HEIGHT: 63 IN | WEIGHT: 147 LBS

## 2022-04-13 DIAGNOSIS — T78.40XD SENSITIVITY TO MEDICATION, SUBSEQUENT ENCOUNTER: ICD-10-CM

## 2022-04-13 DIAGNOSIS — Z94.4 STATUS POST LIVER TRANSPLANTATION: ICD-10-CM

## 2022-04-13 DIAGNOSIS — E78.2 MIXED HYPERLIPIDEMIA: Primary | ICD-10-CM

## 2022-04-13 DIAGNOSIS — I38 VALVULAR HEART DISEASE: ICD-10-CM

## 2022-04-13 DIAGNOSIS — I10 PRIMARY HYPERTENSION: ICD-10-CM

## 2022-04-13 PROCEDURE — 99214 OFFICE O/P EST MOD 30 MIN: CPT | Performed by: INTERNAL MEDICINE

## 2022-04-13 PROCEDURE — 93000 ELECTROCARDIOGRAM COMPLETE: CPT | Performed by: INTERNAL MEDICINE

## 2022-04-13 RX ORDER — METOPROLOL SUCCINATE 50 MG/1
150 TABLET, EXTENDED RELEASE ORAL NIGHTLY
Qty: 270 TABLET | Refills: 3 | Status: SHIPPED | OUTPATIENT
Start: 2022-04-13 | End: 2022-06-05

## 2022-04-13 RX ORDER — FAMOTIDINE 40 MG/1
40 TABLET, FILM COATED ORAL NIGHTLY PRN
COMMUNITY
Start: 2022-04-12 | End: 2022-09-27

## 2022-04-13 NOTE — PATIENT INSTRUCTIONS
Increase metoprolol to 125 mg daily     Use extra 25 mg as needed  Continue to monitor BP and log values  Follow-up 6 months

## 2022-04-14 ENCOUNTER — TELEPHONE (OUTPATIENT)
Dept: FAMILY MEDICINE CLINIC | Facility: CLINIC | Age: 77
End: 2022-04-14

## 2022-04-14 RX ORDER — FLUCONAZOLE 150 MG/1
150 TABLET ORAL ONCE
Qty: 2 TABLET | Refills: 0 | Status: SHIPPED | OUTPATIENT
Start: 2022-04-14 | End: 2022-04-14

## 2022-04-14 NOTE — TELEPHONE ENCOUNTER
Caller: Christal Romo    Relationship: Self    Best call back number:     What medication are you requesting:     What are your current symptoms: WARM DISCHARGE, VAGINAL ITCHING AND BURNING    How long have you been experiencing symptoms: 3 DAYS    Have you had these symptoms before:    [x] Yes  [] No    Have you been treated for these symptoms before:   [x] Yes  [] No    If a prescription is needed, what is your preferred pharmacy and phone number:  [facility]  [address]  [phone]    Additional notes: PATIENT IS HAVING THE SYMPTOMS LISTED ABOVE AND WANTS TO KNOW IF DR PAZ CAN CALL IN SOMETHING TO HELP HER.

## 2022-04-19 ENCOUNTER — TELEPHONE (OUTPATIENT)
Dept: FAMILY MEDICINE CLINIC | Facility: CLINIC | Age: 77
End: 2022-04-19

## 2022-04-19 NOTE — TELEPHONE ENCOUNTER
PATIENT STATES: THAT SHE TOOK A HOME TEST FOR COVID AND IT CAME BACK POSITIVE SHE WOULD LIKE A CALL BACK TO SEE WHAT SHE NEEDS TO DO PLEASE ADVISE      PATIENT CAN BE REACHED ON: [phone]    [facility] [address] [place], [place] [address] - [address]  - [phone] [facility] [phone]   [phone]

## 2022-04-20 ENCOUNTER — TELEPHONE (OUTPATIENT)
Dept: FAMILY MEDICINE CLINIC | Facility: CLINIC | Age: 77
End: 2022-04-20

## 2022-04-20 NOTE — TELEPHONE ENCOUNTER
PATIENT STATES: HER FLU AND COVID TEST CAME BACK NEGATIVE PLEASE ADVISE      PATIENT CAN BE REACHED ON: [phone]

## 2022-04-20 NOTE — TELEPHONE ENCOUNTER
Pt called transplant center and they suggested to go have a PCR covid test done first. Pt is scheduled at [facility] today at 1 for this. She will call us back for the med after she gets results. Also states having bodyaches and diarrhea (from Clindamycin abx - podiatry for ingrown toenail). So hold on med for now until she calls back.

## 2022-04-20 NOTE — TELEPHONE ENCOUNTER
PATIENT STATES: HER FLU AND COVID TEST CAME BACK NEGATIVE PLEASE ADVISE      PATIENT CAN BE REACHED ON: [phone]

## 2022-04-20 NOTE — TELEPHONE ENCOUNTER
If PCR COVID is neg, then she is NEG for COVID----------would still not take the abx for her foot due to the Diarrhea but call podiatrist

## 2022-05-06 ENCOUNTER — OFFICE VISIT (OUTPATIENT)
Dept: FAMILY MEDICINE CLINIC | Facility: CLINIC | Age: 77
End: 2022-05-06

## 2022-05-06 ENCOUNTER — TELEPHONE (OUTPATIENT)
Dept: FAMILY MEDICINE CLINIC | Facility: CLINIC | Age: 77
End: 2022-05-06

## 2022-05-06 VITALS
WEIGHT: 145 LBS | HEART RATE: 76 BPM | OXYGEN SATURATION: 99 % | HEIGHT: 63 IN | RESPIRATION RATE: 16 BRPM | TEMPERATURE: 97.8 F | BODY MASS INDEX: 25.69 KG/M2 | DIASTOLIC BLOOD PRESSURE: 69 MMHG | SYSTOLIC BLOOD PRESSURE: 189 MMHG

## 2022-05-06 DIAGNOSIS — K58.0 IRRITABLE BOWEL SYNDROME WITH DIARRHEA: ICD-10-CM

## 2022-05-06 DIAGNOSIS — M25.50 GENERALIZED JOINT PAIN: ICD-10-CM

## 2022-05-06 DIAGNOSIS — K21.9 GASTROESOPHAGEAL REFLUX DISEASE WITHOUT ESOPHAGITIS: ICD-10-CM

## 2022-05-06 DIAGNOSIS — R42 INTERMITTENT VERTIGO: ICD-10-CM

## 2022-05-06 DIAGNOSIS — F41.9 ANXIETY: ICD-10-CM

## 2022-05-06 DIAGNOSIS — I10 ELEVATED BLOOD PRESSURE READING IN OFFICE WITH DIAGNOSIS OF HYPERTENSION: Primary | ICD-10-CM

## 2022-05-06 DIAGNOSIS — M54.2 CERVICALGIA: ICD-10-CM

## 2022-05-06 PROCEDURE — 36415 COLL VENOUS BLD VENIPUNCTURE: CPT | Performed by: FAMILY MEDICINE

## 2022-05-06 PROCEDURE — 99214 OFFICE O/P EST MOD 30 MIN: CPT | Performed by: FAMILY MEDICINE

## 2022-05-06 PROCEDURE — 86038 ANTINUCLEAR ANTIBODIES: CPT | Performed by: FAMILY MEDICINE

## 2022-05-06 PROCEDURE — 85652 RBC SED RATE AUTOMATED: CPT | Performed by: FAMILY MEDICINE

## 2022-05-06 PROCEDURE — 86140 C-REACTIVE PROTEIN: CPT | Performed by: FAMILY MEDICINE

## 2022-05-06 PROCEDURE — 86431 RHEUMATOID FACTOR QUANT: CPT | Performed by: FAMILY MEDICINE

## 2022-05-06 RX ORDER — ONDANSETRON 4 MG/1
4 TABLET, FILM COATED ORAL EVERY 8 HOURS PRN
Qty: 90 TABLET | Refills: 0 | Status: SHIPPED | OUTPATIENT
Start: 2022-05-06 | End: 2022-12-16 | Stop reason: SDUPTHER

## 2022-05-06 RX ORDER — MONTELUKAST SODIUM 4 MG/1
1 TABLET, CHEWABLE ORAL DAILY PRN
Qty: 30 TABLET | Refills: 1 | Status: SHIPPED | OUTPATIENT
Start: 2022-05-06 | End: 2022-08-05

## 2022-05-06 RX ORDER — LANSOPRAZOLE 30 MG/1
30 CAPSULE, DELAYED RELEASE ORAL DAILY
Qty: 30 CAPSULE | Refills: 1 | Status: SHIPPED | OUTPATIENT
Start: 2022-05-06 | End: 2022-08-31 | Stop reason: ALTCHOICE

## 2022-05-06 RX ORDER — TRAMADOL HYDROCHLORIDE 50 MG/1
50 TABLET ORAL EVERY 6 HOURS PRN
Qty: 30 TABLET | Refills: 0 | Status: SHIPPED | OUTPATIENT
Start: 2022-05-06 | End: 2022-10-27

## 2022-05-06 RX ORDER — MONTELUKAST SODIUM 4 MG/1
TABLET, CHEWABLE ORAL
COMMUNITY
End: 2022-05-06 | Stop reason: SDUPTHER

## 2022-05-06 RX ORDER — METOPROLOL SUCCINATE 50 MG/1
TABLET, EXTENDED RELEASE ORAL
COMMUNITY
End: 2022-06-05

## 2022-05-06 RX ORDER — BUSPIRONE HYDROCHLORIDE 5 MG/1
5 TABLET ORAL 3 TIMES DAILY
Qty: 90 TABLET | Refills: 2 | Status: SHIPPED | OUTPATIENT
Start: 2022-05-06 | End: 2023-03-20

## 2022-05-06 RX ORDER — MECLIZINE HCL 12.5 MG/1
12.5 TABLET ORAL EVERY 6 HOURS PRN
Qty: 30 TABLET | Refills: 1 | Status: SHIPPED | OUTPATIENT
Start: 2022-05-06 | End: 2022-12-16 | Stop reason: SDUPTHER

## 2022-05-06 NOTE — TELEPHONE ENCOUNTER
ROQUE to raciel VERAS was approved for Lansoprazole 30MG dr capsules    CaseId:[id number];Status:Approved;Review Type:Prior Auth;Coverage Start Date:04/06/2022;Coverage End Date:05/06/2023;

## 2022-05-07 LAB
CHROMATIN AB SERPL-ACNC: <10 IU/ML (ref 0–14)
CRP SERPL-MCNC: <0.3 MG/DL (ref 0–0.5)
ERYTHROCYTE [SEDIMENTATION RATE] IN BLOOD: <1 MM/HR (ref 0–30)

## 2022-05-09 ENCOUNTER — TELEPHONE (OUTPATIENT)
Dept: FAMILY MEDICINE CLINIC | Facility: CLINIC | Age: 77
End: 2022-05-09

## 2022-05-09 LAB — ANA SER QL: NEGATIVE

## 2022-05-09 NOTE — TELEPHONE ENCOUNTER
----- Message from Sara Zhao DO sent at 5/7/2022  8:36 AM EDT -----  Rheumatoid lab =NEG  Inflammatory markers neg too

## 2022-05-10 NOTE — TELEPHONE ENCOUNTER
The compounding cream will come from [facility]---they will call first and let her know the cost and see if she is ok w/ this and then mail it to her if she is

## 2022-05-13 ENCOUNTER — TELEPHONE (OUTPATIENT)
Dept: FAMILY MEDICINE CLINIC | Facility: CLINIC | Age: 77
End: 2022-05-13

## 2022-05-13 NOTE — TELEPHONE ENCOUNTER
Caller: Christal Romo     Relationship: SELF     Best call back number: [phone]     What is your medical concern?     PATIENT HAS NOT HEARD ANYTHING YET REGARDING THE COMPOUND THAT YOU WHERE GOING TO HAVE MADE UP FOR HER.      COULD SOMEONE PLEASE LOOK INTO THIS AND GIVE HER A CALL.    THANKS.

## 2022-05-13 NOTE — TELEPHONE ENCOUNTER
Caller: Christal Romo    Relationship to patient: Self    Best call back number: [phone]    Patient is needing: PATIENT WAS CONTACTED BY PHARMACY. MEDICATION IS BEING SHIPPED TODAY

## 2022-06-23 ENCOUNTER — OFFICE (AMBULATORY)
Dept: URBAN - METROPOLITAN AREA CLINIC 64 | Facility: CLINIC | Age: 77
End: 2022-06-23

## 2022-06-23 VITALS
WEIGHT: 146 LBS | HEIGHT: 63 IN | SYSTOLIC BLOOD PRESSURE: 153 MMHG | DIASTOLIC BLOOD PRESSURE: 89 MMHG | HEART RATE: 78 BPM

## 2022-06-23 DIAGNOSIS — R10.32 LEFT LOWER QUADRANT PAIN: ICD-10-CM

## 2022-06-23 PROCEDURE — 99214 OFFICE O/P EST MOD 30 MIN: CPT | Performed by: INTERNAL MEDICINE

## 2022-06-23 RX ORDER — VITAMIN A 2400 MCG
500 CAPSULE ORAL
Qty: 60 | Refills: 0 | Status: COMPLETED
Start: 2022-06-23 | End: 2022-09-22

## 2022-07-05 ENCOUNTER — TELEPHONE (OUTPATIENT)
Dept: FAMILY MEDICINE CLINIC | Facility: CLINIC | Age: 77
End: 2022-07-05

## 2022-07-05 RX ORDER — METOPROLOL SUCCINATE 50 MG/1
TABLET, EXTENDED RELEASE ORAL
Qty: 180 TABLET | OUTPATIENT
Start: 2022-07-05

## 2022-07-05 NOTE — TELEPHONE ENCOUNTER
Caller: Christal Romo    Relationship: Self    Best call back number:[phone] (H)    Caller requesting test results: Christal Romo    What test was performed: MAMMOGRAM    When was the test performed: 06/14/2022    Where was the test performed: [facility] [place]    Additional notes: PATIENT IS ASKING IF DR PAZ HAS GOTTEN THE RESULTS OF THIS, PLEASE ADVISE ASAP

## 2022-07-05 NOTE — TELEPHONE ENCOUNTER
Caller: Christal Romo    Relationship: Self    Best call back number: [phone]    What was the call regarding: PATIENT STATES SHE RECEIVED THE RESULTS IN THE MAIL, AND NO LONGER NEEDS A CALL BACK    Do you require a callback: NO

## 2022-07-11 RX ORDER — METOPROLOL SUCCINATE 50 MG/1
125 TABLET, EXTENDED RELEASE ORAL NIGHTLY
Qty: 270 TABLET | Refills: 0 | Status: SHIPPED | OUTPATIENT
Start: 2022-07-11 | End: 2022-08-11

## 2022-07-11 RX ORDER — METOPROLOL SUCCINATE 50 MG/1
150 TABLET, EXTENDED RELEASE ORAL NIGHTLY
Qty: 270 TABLET | Refills: 0 | Status: SHIPPED | OUTPATIENT
Start: 2022-07-11 | End: 2022-07-11

## 2022-07-11 RX ORDER — METOPROLOL SUCCINATE 50 MG/1
TABLET, EXTENDED RELEASE ORAL
Qty: 180 TABLET | OUTPATIENT
Start: 2022-07-11

## 2022-07-20 ENCOUNTER — OFFICE (AMBULATORY)
Dept: URBAN - METROPOLITAN AREA LAB 2 | Facility: LAB | Age: 77
End: 2022-07-20
Payer: MEDICARE

## 2022-07-20 DIAGNOSIS — Z94.4 LIVER TRANSPLANT STATUS: ICD-10-CM

## 2022-07-20 DIAGNOSIS — R19.7 DIARRHEA, UNSPECIFIED: ICD-10-CM

## 2022-07-20 PROCEDURE — 87507 IADNA-DNA/RNA PROBE TQ 12-25: CPT | Performed by: INTERNAL MEDICINE

## 2022-07-21 ENCOUNTER — DOCUMENTATION (OUTPATIENT)
Dept: PHYSICAL THERAPY | Facility: CLINIC | Age: 77
End: 2022-07-21

## 2022-07-21 NOTE — PROGRESS NOTES
Discharge Summary  Discharge Summary from Physical Therapy Report      Dates  PT visit: 22  Number of Visits: 1     Goals: Partially Met    Discharge Plan: Continue with current home exercise program as instructed    Comments : Pt's status unknown as she did not return following initial eval. POC  now, therefore pt is appropriate ofr DC. Pt not present for discharge, therefore no functional measures taken. See last note for most updated information.      Date of Discharge 22        Nancy Gutierrez, PT  Physical Therapist

## 2022-08-05 ENCOUNTER — OFFICE VISIT (OUTPATIENT)
Dept: FAMILY MEDICINE CLINIC | Facility: CLINIC | Age: 77
End: 2022-08-05

## 2022-08-05 VITALS
DIASTOLIC BLOOD PRESSURE: 83 MMHG | SYSTOLIC BLOOD PRESSURE: 184 MMHG | OXYGEN SATURATION: 97 % | RESPIRATION RATE: 18 BRPM | WEIGHT: 144 LBS | HEIGHT: 63 IN | TEMPERATURE: 98.4 F | BODY MASS INDEX: 25.52 KG/M2 | HEART RATE: 74 BPM

## 2022-08-05 DIAGNOSIS — R06.02 SHORTNESS OF BREATH: Primary | ICD-10-CM

## 2022-08-05 DIAGNOSIS — K27.9 PEPTIC ULCER DISEASE: ICD-10-CM

## 2022-08-05 DIAGNOSIS — K58.0 IRRITABLE BOWEL SYNDROME WITH DIARRHEA: ICD-10-CM

## 2022-08-05 DIAGNOSIS — I10 ESSENTIAL HYPERTENSION: ICD-10-CM

## 2022-08-05 PROBLEM — J01.90 ACUTE SINUSITIS: Status: ACTIVE | Noted: 2022-08-05

## 2022-08-05 PROBLEM — E66.3 OVERWEIGHT WITH BODY MASS INDEX (BMI) 25.0-29.9: Status: ACTIVE | Noted: 2022-08-05

## 2022-08-05 PROCEDURE — 99214 OFFICE O/P EST MOD 30 MIN: CPT | Performed by: FAMILY MEDICINE

## 2022-08-05 RX ORDER — COLESTIPOL HYDROCHLORIDE 5 G/5G
5 GRANULE, FOR SUSPENSION ORAL DAILY PRN
Qty: 30 PACKET | Refills: 0 | Status: SHIPPED | OUTPATIENT
Start: 2022-08-05 | End: 2022-12-07 | Stop reason: ALTCHOICE

## 2022-08-05 RX ORDER — LEVOFLOXACIN 500 MG/1
TABLET, FILM COATED ORAL
COMMUNITY
Start: 2022-06-20 | End: 2022-08-05

## 2022-08-05 RX ORDER — ALBUTEROL SULFATE 90 UG/1
2 AEROSOL, METERED RESPIRATORY (INHALATION) EVERY 6 HOURS PRN
Qty: 18 G | Refills: 0 | Status: SHIPPED | OUTPATIENT
Start: 2022-08-05 | End: 2022-12-07 | Stop reason: ALTCHOICE

## 2022-08-05 NOTE — PROGRESS NOTES
Subjective   Christal Romo is a 77 y.o. female.     Chief Complaint   Patient presents with   • Hypertension   • Abdominal Pain         Current Outpatient Medications:   •  aspirin 81 MG tablet, Take 1 tablet by mouth Daily., Disp: , Rfl:   •  aspirin-acetaminophen-caffeine (EXCEDRIN MIGRAINE) 250-250-65 MG per tablet, Take 1 tablet by mouth Every 6 (Six) Hours As Needed for Headache., Disp: , Rfl:   •  baclofen (LIORESAL) 10 MG tablet, TAKE ONE TABLET BY MOUTH EVERY NIGHT AT BEDTIME AS NEEDED FOR MUSCLE SPASMS, Disp: 30 tablet, Rfl: 3  •  busPIRone (BUSPAR) 5 MG tablet, Take 1 tablet by mouth 3 (Three) Times a Day., Disp: 90 tablet, Rfl: 2  •  clindamycin (CLEOCIN) 300 MG capsule, TAKE TWO CAPSULES BY MOUTH ONE HOUR PRIOR TO DENTAL PROCEDURE, Disp: 4 capsule, Rfl: 0  •  diphenhydrAMINE (BENADRYL) 25 MG tablet, Take 25 mg by mouth Every 6 (Six) Hours As Needed for Itching., Disp: , Rfl:   •  famotidine (PEPCID) 40 MG tablet, Take 40 mg by mouth At Night As Needed., Disp: , Rfl:   •  Lutein 20 MG capsule, Take 1 tablet by mouth Daily., Disp: , Rfl:   •  meclizine (ANTIVERT) 12.5 MG tablet, Take 1 tablet by mouth Every 6 (Six) Hours As Needed for Dizziness., Disp: 30 tablet, Rfl: 1  •  metoprolol succinate XL (TOPROL-XL) 50 MG 24 hr tablet, Take 2.5 tablets by mouth Every Night. And may take and extra 25mg qday prn, Disp: 270 tablet, Rfl: 0  •  Multiple Vitamins-Minerals (Multivitamin Adults 50+) tablet, Take 1 tablet by mouth Daily., Disp: , Rfl:   •  mycophenolate (MYFORTIC) 360 MG tablet delayed-release EC tablet, TK 1 T PO Q 12 H, Disp: , Rfl:   •  ondansetron (Zofran) 4 MG tablet, Take 1 tablet by mouth Every 8 (Eight) Hours As Needed for Nausea or Vomiting. prn, Disp: 90 tablet, Rfl: 0  •  Salicylic Acid (COMPOUND W EX), Gabapentin/Lidocaine/Baclofen 10/4/2% Apply 1-2 GMS (1-2 pumps) to the affected area 3 to 4 times daily., Disp: , Rfl:   •  tacrolimus (PROGRAF) 1 MG capsule, 2 capsules 2 (Two) Times a Day.  (Patient taking differently: 2 mg. 2tabs in the AM, 1 in the PM), Disp: , Rfl:   •  traMADol (ULTRAM) 50 MG tablet, Take 1 tablet by mouth Every 6 (Six) Hours As Needed for Moderate Pain  or Severe Pain ., Disp: 30 tablet, Rfl: 0  •  albuterol sulfate  (90 Base) MCG/ACT inhaler, Inhale 2 puffs Every 6 (Six) Hours As Needed for Wheezing or Shortness of Air., Disp: 18 g, Rfl: 0  •  colestipol (COLESTID) 5 g packet, Take 1 packet by mouth Daily As Needed (loose stools)., Disp: 30 packet, Rfl: 0  •  Fluticasone-Umeclidin-Vilant (Trelegy Ellipta) 100-62.5-25 MCG/INH inhaler, Inhale 1 puff Daily., Disp: 14 each, Rfl: 0  •  lansoprazole (Prevacid) 30 MG capsule, Take 1 capsule by mouth Daily., Disp: 30 capsule, Rfl: 1  •  magnesium oxide (MAGOX) 400 (241.3 Mg) MG tablet tablet, Take 400 mg by mouth Daily., Disp: , Rfl:     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 - Dr Ritter [facility]; CBC/Prograf/CMP/LIPIDS/A1C   • MAMMO     NEG = 2018/ 2021   • Meniere's disease    • OAB    • Osteoporosis    • PUD    • SCC Rt UE        Past Surgical History:   Procedure Laterality Date   • BREAST LUMPECTOMY Left     Benign   • COLONOSCOPY  2012    Polyps = 2012/ 2015/ 2020, rech 2025   GSI   • ERCP W/ PLASTIC STENT PLACEMENT  2014    Liver Stent   • LIVER TRANSPLANTATION      [place] [facility]   • SKIN CANCER EXCISION      Rt UE   • TOTAL ABDOMINAL HYSTERECTOMY  1979       Family History   Problem Relation Age of Onset   • Diabetes Sister         Type II   • Heart disease Sister    • Hypertension Sister    • No Known Problems Brother    • Hemochromatosis Other        Social History     Socioeconomic History   • Marital status:     Tobacco Use   • Smoking status: Never Smoker   • Smokeless tobacco: Never Used   • Tobacco comment: Quit 2002.    Vaping Use   • Vaping Use: Never used   Substance and Sexual Activity   • Alcohol use: No     Comment: very rarely    • Drug use: No   • Sexual activity: Defer       78 y/o C female here for f/u on HTN/ LLQ pain and now states having intermittent SOB since COVID    Pt has seen GI since last appt in May for f/u on her LLQ abd pain; Pt states the antihist at nite to help am nausea not helpful; the pepcid also didn't help but he protonix did; Pt disc'd hemochromatosis w/ them and was given a test to do at home and bring into their office;  Due to the abx needed for her dental work, they want her to take a probiotic qday----- They also want her to try lyndon extract for her nausea but having trouble finding it  Pt states she has done everything to eval her LLQ pain and thinks it may just be scar tissue    Pt states she was given the colestipol tabs but they are too big for her and wanting to know if she can dissolve them first and drink it........pt still feels she has some trouble w/ some foods getting caught and may need to get an EGD w/ dilation    Pt states she just got more labs for her transplant clinic in June at [facility] and also did her mammo there    Pt states she is using the alb HFA but not sure it is helping -----pt feels she gets sudden onset of SOB and it is freaking her out; wanting to know if poss due to COVID        The following portions of the patient's history were reviewed and updated as appropriate: allergies, current medications, past family history, past medical history, past social history, past surgical history and problem list.    Review of Systems   Constitutional: Negative for activity change, appetite change, unexpected weight gain and unexpected weight loss.   Respiratory: Positive for shortness of breath. Negative for cough and wheezing.    Gastrointestinal: Positive for  diarrhea. Negative for constipation.   Psychiatric/Behavioral: The patient is nervous/anxious.        Vitals:    08/05/22 1452   BP: (!) 184/83   Pulse: 74   Resp: 18   Temp: 98.4 °F (36.9 °C)   SpO2: 97%       Objective   Physical Exam  Vitals and nursing note reviewed.   Constitutional:       General: She is not in acute distress.     Appearance: She is well-developed. She is not ill-appearing or toxic-appearing.   HENT:      Head: Normocephalic and atraumatic.   Cardiovascular:      Rate and Rhythm: Normal rate and regular rhythm.      Heart sounds: Normal heart sounds. No murmur heard.  Pulmonary:      Effort: Pulmonary effort is normal. No respiratory distress.      Breath sounds: Normal breath sounds. No stridor. No wheezing or rhonchi.   Skin:     General: Skin is warm and dry.      Findings: No rash.   Neurological:      Mental Status: She is alert and oriented to person, place, and time.      Cranial Nerves: No cranial nerve deficit.   Psychiatric:         Attention and Perception: Attention and perception normal.         Mood and Affect: Mood is anxious. Affect is tearful.         Speech: Speech normal.         Behavior: Behavior normal. Behavior is cooperative.         Thought Content: Thought content normal.         Cognition and Memory: Cognition and memory normal.         Judgment: Judgment normal.           Assessment & Plan   Diagnoses and all orders for this visit:    1. Shortness of breath (Primary)    2. Essential hypertension    3. Peptic ulcer disease    4. Irritable bowel syndrome with diarrhea    Other orders  -     colestipol (COLESTID) 5 g packet; Take 1 packet by mouth Daily As Needed (loose stools).  Dispense: 30 packet; Refill: 0  -     albuterol sulfate  (90 Base) MCG/ACT inhaler; Inhale 2 puffs Every 6 (Six) Hours As Needed for Wheezing or Shortness of Air.  Dispense: 18 g; Refill: 0  -     Fluticasone-Umeclidin-Vilant (Trelegy Ellipta) 100-62.5-25 MCG/INH inhaler; Inhale 1 puff  Daily.  Dispense: 14 each; Refill: 0    Pt can disc dissolving colestipol w/ pharmacist but doubtful-----disc'd trying the packets and using <1/2 pkt/ day   Disc'd lyndon extract options online----written for pt  Reviewed 2020 EGD/ Colonoscopy results w/ pt  Labs/ Mammo report from [facility]  Rx----Colestipol packet qday prn  Trial trelegy 100 x 14 days (sample) to see if helps w/ SOB  Rx---Alb HFA (pt's is exp'd 4/22)  Disc'd chin tuck and swallow w/ pt to limit poss choking

## 2022-08-11 ENCOUNTER — TELEPHONE (OUTPATIENT)
Dept: CARDIOLOGY | Facility: CLINIC | Age: 77
End: 2022-08-11

## 2022-08-11 ENCOUNTER — OFFICE VISIT (OUTPATIENT)
Dept: FAMILY MEDICINE CLINIC | Facility: CLINIC | Age: 77
End: 2022-08-11

## 2022-08-11 VITALS
OXYGEN SATURATION: 97 % | DIASTOLIC BLOOD PRESSURE: 72 MMHG | SYSTOLIC BLOOD PRESSURE: 184 MMHG | TEMPERATURE: 97.7 F | BODY MASS INDEX: 24.98 KG/M2 | HEIGHT: 63 IN | RESPIRATION RATE: 14 BRPM | WEIGHT: 141 LBS | HEART RATE: 70 BPM

## 2022-08-11 DIAGNOSIS — I10 ELEVATED BLOOD PRESSURE READING IN OFFICE WITH DIAGNOSIS OF HYPERTENSION: Primary | ICD-10-CM

## 2022-08-11 DIAGNOSIS — F41.9 ANXIETY: ICD-10-CM

## 2022-08-11 PROCEDURE — 99214 OFFICE O/P EST MOD 30 MIN: CPT | Performed by: FAMILY MEDICINE

## 2022-08-11 RX ORDER — MYCOPHENOLIC ACID 360 MG/1
360 TABLET, DELAYED RELEASE ORAL
Qty: 120 TABLET
Start: 2022-08-11

## 2022-08-11 RX ORDER — METOPROLOL SUCCINATE 50 MG/1
125 TABLET, EXTENDED RELEASE ORAL NIGHTLY
Qty: 270 TABLET | Refills: 0 | Status: SHIPPED
Start: 2022-08-11

## 2022-08-11 RX ORDER — LIDOCAINE HYDROCHLORIDE 20 MG/ML
SOLUTION OROPHARYNGEAL
Qty: 20 ML | Refills: 0 | Status: SHIPPED | OUTPATIENT
Start: 2022-08-11 | End: 2022-08-31

## 2022-08-11 RX ORDER — VALSARTAN 160 MG/1
160 TABLET ORAL DAILY
Qty: 30 TABLET | Refills: 2 | Status: SHIPPED | OUTPATIENT
Start: 2022-08-11 | End: 2022-08-31

## 2022-08-11 NOTE — TELEPHONE ENCOUNTER
Patient calling- reports her blood pressure is over 200 systolic this morning and yesterday when she was a [facility] for her Transplant follow up it was over 200. They told her to go to the ER yesterday but she refused and now wants to be worked in for an office visit. I advised the ER at [facility]- Dr Clifton is not in the office today. She said she will think about it but will not go to [place] as it is too far to drive- will go to [place] if she decides to go.

## 2022-08-12 ENCOUNTER — TELEPHONE (OUTPATIENT)
Dept: FAMILY MEDICINE CLINIC | Facility: CLINIC | Age: 77
End: 2022-08-12

## 2022-08-18 ENCOUNTER — TELEPHONE (OUTPATIENT)
Dept: FAMILY MEDICINE CLINIC | Facility: CLINIC | Age: 77
End: 2022-08-18

## 2022-08-18 NOTE — TELEPHONE ENCOUNTER
EDY Gallego - she doesn't think pt's issue w dizziness is caused by the BP med at all. Said it's been controlling her BP great, and the pt had a lot of anxiety bc the BP machine couldn't read her BP 1 time. However, her BP has dropped systolic from 220 to 152. She think's pt's dizziness/weakness is caused by her lack of nutrition, bc she's only eating mashed potatoes and broth since the dental procedure. Also, the pt has been on abx, which may also be making her feel worse with the small amounts of food. She did not know which abx the pt is on, but she just wanted to make sure Dr Zhao knew these details before she adjusted the meds.

## 2022-08-18 NOTE — TELEPHONE ENCOUNTER
Caller: RICKY LOWE    Relationship: Other Relative    Best call back number: [phone]    What is the best time to reach you: ANYTIME    Who are you requesting to speak with (clinical staff, provider,  specific staff member): CLINICAL    What was the call regarding: PLEASE CALL RICKY BEFORE HANDLING THE PHONE CALL FROM PATIENT. PATIENT HAD DENTAL WORK DONE AND IS ON ANTIBIOTICS FROM THAT, PLUS SHE HAS NOT BEEN EATING PROPERLY EITHER.     RICKY WOULD LIKE TO DISCUSS THIS WITH CLINICAL STAFF BEFORE THE RETURN CALL TO PATIENT.

## 2022-08-18 NOTE — TELEPHONE ENCOUNTER
Not sure what they want me to do----------stay on med if family can monitor and help w/ pt-----get her ensure or boost shakes to drink for nutrition

## 2022-08-18 NOTE — TELEPHONE ENCOUNTER
Caller: Christal Romo SAV    Relationship: Self    Best call back number: [phone]    What medications are you currently taking:   Current Outpatient Medications on File Prior to Visit   Medication Sig Dispense Refill   • albuterol sulfate  (90 Base) MCG/ACT inhaler Inhale 2 puffs Every 6 (Six) Hours As Needed for Wheezing or Shortness of Air. 18 g 0   • aspirin 81 MG tablet Take 1 tablet by mouth Daily.     • aspirin-acetaminophen-caffeine (EXCEDRIN MIGRAINE) 250-250-65 MG per tablet Take 1 tablet by mouth Every 6 (Six) Hours As Needed for Headache.     • baclofen (LIORESAL) 10 MG tablet TAKE ONE TABLET BY MOUTH EVERY NIGHT AT BEDTIME AS NEEDED FOR MUSCLE SPASMS 30 tablet 3   • busPIRone (BUSPAR) 5 MG tablet Take 1 tablet by mouth 3 (Three) Times a Day. 90 tablet 2   • clindamycin (CLEOCIN) 300 MG capsule TAKE TWO CAPSULES BY MOUTH ONE HOUR PRIOR TO DENTAL PROCEDURE 4 capsule 0   • colestipol (COLESTID) 5 g packet Take 1 packet by mouth Daily As Needed (loose stools). 30 packet 0   • diphenhydrAMINE (BENADRYL) 25 MG tablet Take 25 mg by mouth Every 6 (Six) Hours As Needed for Itching.     • famotidine (PEPCID) 40 MG tablet Take 40 mg by mouth At Night As Needed.     • Fluticasone-Umeclidin-Vilant (Trelegy Ellipta) 100-62.5-25 MCG/INH inhaler Inhale 1 puff Daily. 14 each 0   • lansoprazole (Prevacid) 30 MG capsule Take 1 capsule by mouth Daily. 30 capsule 1   • Lidocaine Viscous HCl (XYLOCAINE) 2 % solution Apply w/ QTip to painful area of mouth q3hrs prn 20 mL 0   • Lutein 20 MG capsule Take 1 tablet by mouth Daily.     • magnesium oxide (MAGOX) 400 (241.3 Mg) MG tablet tablet Take 400 mg by mouth Daily.     • meclizine (ANTIVERT) 12.5 MG tablet Take 1 tablet by mouth Every 6 (Six) Hours As Needed for Dizziness. 30 tablet 1   • metoprolol succinate XL (TOPROL-XL) 50 MG 24 hr tablet Take 2.5 tablets by mouth Every Night. And may take and extra 25mg qday prn 270 tablet 0   • Multiple Vitamins-Minerals  (Multivitamin Adults 50+) tablet Take 1 tablet by mouth Daily.     • mycophenolate (MYFORTIC) 360 MG tablet delayed-release EC tablet Take 1 tablet by mouth Every Morning. 120 tablet    • ondansetron (Zofran) 4 MG tablet Take 1 tablet by mouth Every 8 (Eight) Hours As Needed for Nausea or Vomiting. prn 90 tablet 0   • Salicylic Acid (COMPOUND W EX) Gabapentin/Lidocaine/Baclofen 10/4/2%  Apply 1-2 GMS (1-2 pumps) to the affected area 3 to 4 times daily.     • tacrolimus (PROGRAF) 1 MG capsule 2 capsules 2 (Two) Times a Day. (Patient taking differently: 2 mg. 2tabs in the AM, 1 in the PM)     • traMADol (ULTRAM) 50 MG tablet Take 1 tablet by mouth Every 6 (Six) Hours As Needed for Moderate Pain  or Severe Pain . 30 tablet 0   • valsartan (Diovan) 160 MG tablet Take 1 tablet by mouth Daily. 30 tablet 2     No current facility-administered medications on file prior to visit.          When did you start taking these medications: 08/15/2022    Which medication are you concerned about: VALSARTAN 160 MG TABLET    Who prescribed you this medication: DR. PAZ    What are your concerns: PATIENT STATES THE MEDICATION MAKES HER FEEL DIZZY, HEADACHE, REAL SHAKY AND MAKES HER FEELS LIKE SHE IS DRUNK.     PATIENT IS REQUESTING A CALL BACK AS SOON AS POSSIBLE.     How long have you had these concerns: 08/17/2022

## 2022-08-19 ENCOUNTER — TELEPHONE (OUTPATIENT)
Dept: CARDIOLOGY | Facility: CLINIC | Age: 77
End: 2022-08-19

## 2022-08-19 ENCOUNTER — TELEPHONE (OUTPATIENT)
Dept: FAMILY MEDICINE CLINIC | Facility: CLINIC | Age: 77
End: 2022-08-19

## 2022-08-19 NOTE — TELEPHONE ENCOUNTER
Caller: Christal Romo    Relationship: Self    Best call back number: [phone]    Do you require a callback: YES-PATIENT IS NEEDING TO KNOW IF SHE IS SUPPOSE TO BE FASTING FOR LABS ON 8/31 AND ALSO IF HER IRON WILL BE CHECKED OR NOT. PLEASE ADVISE.

## 2022-08-19 NOTE — TELEPHONE ENCOUNTER
Caller: Christal Romo    Relationship to patient: Self    Best call back number: [phone]    Chief complaint: BLOOD PRESSURE    Type of visit: FOLLOW UP    Requested date: ASAP     If rescheduling, when is the original appointment: 9.28.22    Additional notes: PT HAS TRANSPLANTED LIVER AND WENT TO DR FOR YEARLY CHECKUP AND WHEN THEY TOOK HER BP IT REGISTERED  AND PROVIDER WANTED TO DIRECTLY ADMIT HER TO HOSPITAL - DUE TO PERSONAL REASONS, PT REFUSED - PT MADE APPT WITH PCP AND THEY STARTED HER ON VALSARTAN NIGHTLY, HER BP READ -186 AT OFFICE -PT WANTING TO  MAKE SURE EVERYTHING IS OKAY AND WOULD LIKE TO BE SEEN SOONER AS SHE HAS 4 LEAKY VALVES

## 2022-08-19 NOTE — TELEPHONE ENCOUNTER
Is she wanting iron levels done?  I have only ordered f/u BMP since transplant clinic does labs so often

## 2022-08-22 DIAGNOSIS — D64.9 ANEMIA, UNSPECIFIED TYPE: ICD-10-CM

## 2022-08-22 DIAGNOSIS — D69.6 THROMBOCYTOPENIA: Primary | ICD-10-CM

## 2022-08-22 NOTE — TELEPHONE ENCOUNTER
Yes, please add iron to labs. States they do not check that.     Also, pt states she will not take the new BP med anymore due to side effects. States she is still getting HA's, nausea, dizziness, and then has to take a tramadol, which wipes her out. She is not on abx anymore. States she went back to her old Metoprolol 2 tabs at night. Please advise.

## 2022-08-23 NOTE — TELEPHONE ENCOUNTER
Spoke with the patient- BP numbers today are good but she is still feeling horrible, She is going to get a COVID test today to make sure that's not what is making her feel so bad.

## 2022-08-30 ENCOUNTER — CLINICAL SUPPORT (OUTPATIENT)
Dept: FAMILY MEDICINE CLINIC | Facility: CLINIC | Age: 77
End: 2022-08-30

## 2022-08-31 ENCOUNTER — OFFICE VISIT (OUTPATIENT)
Dept: CARDIOLOGY | Facility: CLINIC | Age: 77
End: 2022-08-31

## 2022-08-31 VITALS
WEIGHT: 141 LBS | HEIGHT: 55 IN | HEART RATE: 90 BPM | BODY MASS INDEX: 32.63 KG/M2 | DIASTOLIC BLOOD PRESSURE: 86 MMHG | OXYGEN SATURATION: 98 % | SYSTOLIC BLOOD PRESSURE: 217 MMHG

## 2022-08-31 DIAGNOSIS — I38 VALVULAR HEART DISEASE: ICD-10-CM

## 2022-08-31 DIAGNOSIS — I10 PRIMARY HYPERTENSION: ICD-10-CM

## 2022-08-31 DIAGNOSIS — E78.2 MIXED HYPERLIPIDEMIA: ICD-10-CM

## 2022-08-31 DIAGNOSIS — I10 ESSENTIAL HYPERTENSION: Primary | ICD-10-CM

## 2022-08-31 PROCEDURE — 99214 OFFICE O/P EST MOD 30 MIN: CPT | Performed by: INTERNAL MEDICINE

## 2022-08-31 PROCEDURE — 93000 ELECTROCARDIOGRAM COMPLETE: CPT | Performed by: INTERNAL MEDICINE

## 2022-08-31 RX ORDER — HYDROCODONE BITARTRATE AND ACETAMINOPHEN 5; 325 MG/1; MG/1
TABLET ORAL
COMMUNITY
Start: 2022-08-15 | End: 2022-08-31

## 2022-08-31 RX ORDER — VALSARTAN 40 MG/1
40 TABLET ORAL DAILY
Qty: 90 TABLET | Refills: 3 | Status: SHIPPED | OUTPATIENT
Start: 2022-08-31 | End: 2022-12-16

## 2022-08-31 RX ORDER — PSEUDOEPHEDRINE HYDROCHLORIDE 60 MG/1
60 TABLET, FILM COATED ORAL EVERY 4 HOURS PRN
COMMUNITY

## 2022-09-01 RX ORDER — BACLOFEN 10 MG/1
TABLET ORAL
Qty: 30 TABLET | Refills: 3 | Status: SHIPPED | OUTPATIENT
Start: 2022-09-01 | End: 2023-02-08

## 2022-09-01 NOTE — TELEPHONE ENCOUNTER
Rx Refill Note  Requested Prescriptions     Pending Prescriptions Disp Refills   • baclofen (LIORESAL) 10 MG tablet [Pharmacy Med Name: BACLOFEN 10 MG TABLET] 30 tablet 3     Sig: TAKE ONE TABLET BY MOUTH EVERY NIGHT AT BEDTIME AS NEEDED FOR MUSCLE SPASMS      Last office visit with prescribing clinician: 8/11/2022      Next office visit with prescribing clinician: 11/4/2022     SCANNED - LABS (06/14/2022)         Kiki Clinton CMA  09/01/22, 14:37 EDT

## 2022-09-02 ENCOUNTER — TELEPHONE (OUTPATIENT)
Dept: FAMILY MEDICINE CLINIC | Facility: CLINIC | Age: 77
End: 2022-09-02

## 2022-09-02 NOTE — TELEPHONE ENCOUNTER
Provider: VIRY PAZ     Caller: ERIS LOWE     Relationship to Patient: SELF     Phone Number:[phone]     Reason for Call: PATIENT IS CALLING IN TO UPDATE DR PAZ ON HER valsartan (DIOVAN) 40 MG tablet. PATIENT HAD APPOINTMENT WITH CARDIOLOGIST ON Wednesday 08.31.22 WHERE DOCTOR DECREASED THE DOSAGE FROM 160 MG TO 40 MG.     PATIENT WILL CALL IN TO MAKE APPOINTMENT FOR BLOOD PRESSURE FOLLOW UP WHERE SHE WILL ALSO BRING IN HER BLOOD PRESSURE MACHINE PER DR PAZ

## 2022-09-05 ENCOUNTER — APPOINTMENT (OUTPATIENT)
Dept: GENERAL RADIOLOGY | Facility: HOSPITAL | Age: 77
End: 2022-09-05

## 2022-09-05 ENCOUNTER — HOSPITAL ENCOUNTER (EMERGENCY)
Facility: HOSPITAL | Age: 77
Discharge: HOME | End: 2022-09-05
Attending: EMERGENCY MEDICINE | Admitting: EMERGENCY MEDICINE

## 2022-09-05 VITALS
BODY MASS INDEX: 25.69 KG/M2 | HEART RATE: 78 BPM | RESPIRATION RATE: 16 BRPM | OXYGEN SATURATION: 98 % | WEIGHT: 145 LBS | HEIGHT: 63 IN | DIASTOLIC BLOOD PRESSURE: 81 MMHG | SYSTOLIC BLOOD PRESSURE: 154 MMHG | TEMPERATURE: 98.3 F

## 2022-09-05 DIAGNOSIS — Z86.59 HISTORY OF ANXIETY: ICD-10-CM

## 2022-09-05 DIAGNOSIS — R91.8 PULMONARY NODULES: ICD-10-CM

## 2022-09-05 DIAGNOSIS — R79.89 ELEVATED SERUM CREATININE: ICD-10-CM

## 2022-09-05 DIAGNOSIS — R25.1 TREMULOUSNESS: ICD-10-CM

## 2022-09-05 DIAGNOSIS — R07.89 CHEST DISCOMFORT: Primary | ICD-10-CM

## 2022-09-05 PROBLEM — J01.90 ACUTE SINUSITIS: Status: RESOLVED | Noted: 2022-08-05 | Resolved: 2022-09-05

## 2022-09-05 LAB
ALBUMIN SERPL-MCNC: 4.62 G/DL (ref 3.5–5.2)
ALBUMIN/GLOB SERPL: 2.8 G/DL
ALP SERPL-CCNC: 66 U/L (ref 39–117)
ALT SERPL W P-5'-P-CCNC: 25 U/L (ref 1–33)
ANION GAP SERPL CALCULATED.3IONS-SCNC: 11.4 MMOL/L (ref 5–15)
AST SERPL-CCNC: 24 U/L (ref 1–32)
BASOPHILS # BLD AUTO: 0.02 10*3/MM3 (ref 0–0.2)
BASOPHILS NFR BLD AUTO: 0.4 % (ref 0–1.5)
BILIRUB SERPL-MCNC: 0.4 MG/DL (ref 0–1.2)
BUN SERPL-MCNC: 21 MG/DL (ref 8–23)
BUN/CREAT SERPL: 14.8 (ref 7–25)
CALCIUM SPEC-SCNC: 9.3 MG/DL (ref 8.6–10.5)
CHLORIDE SERPL-SCNC: 104 MMOL/L (ref 98–107)
CO2 SERPL-SCNC: 23.6 MMOL/L (ref 22–29)
CREAT SERPL-MCNC: 1.42 MG/DL (ref 0.57–1)
DEPRECATED RDW RBC AUTO: 41.1 FL (ref 37–54)
EGFRCR SERPLBLD CKD-EPI 2021: 38.7 ML/MIN/1.73
EOSINOPHIL # BLD AUTO: 0.06 10*3/MM3 (ref 0–0.4)
EOSINOPHIL NFR BLD AUTO: 1.3 % (ref 0.3–6.2)
ERYTHROCYTE [DISTWIDTH] IN BLOOD BY AUTOMATED COUNT: 13.2 % (ref 12.3–15.4)
GLOBULIN UR ELPH-MCNC: 1.7 GM/DL
GLUCOSE SERPL-MCNC: 111 MG/DL (ref 65–99)
HCT VFR BLD AUTO: 36 % (ref 34–46.6)
HGB BLD-MCNC: 12.3 G/DL (ref 12–15.9)
IMM GRANULOCYTES # BLD AUTO: 0.06 10*3/MM3 (ref 0–0.05)
IMM GRANULOCYTES NFR BLD AUTO: 1.3 % (ref 0–0.5)
LYMPHOCYTES # BLD AUTO: 1.22 10*3/MM3 (ref 0.7–3.1)
LYMPHOCYTES NFR BLD AUTO: 26.9 % (ref 19.6–45.3)
MCH RBC QN AUTO: 29 PG (ref 26.6–33)
MCHC RBC AUTO-ENTMCNC: 34.2 G/DL (ref 31.5–35.7)
MCV RBC AUTO: 84.9 FL (ref 79–97)
MONOCYTES # BLD AUTO: 0.52 10*3/MM3 (ref 0.1–0.9)
MONOCYTES NFR BLD AUTO: 11.5 % (ref 5–12)
NEUTROPHILS NFR BLD AUTO: 2.65 10*3/MM3 (ref 1.7–7)
NEUTROPHILS NFR BLD AUTO: 58.6 % (ref 42.7–76)
PLATELET # BLD AUTO: 119 10*3/MM3 (ref 140–450)
PMV BLD AUTO: 10.9 FL (ref 6–12)
POTASSIUM SERPL-SCNC: 4.4 MMOL/L (ref 3.5–5.2)
PROT SERPL-MCNC: 6.3 G/DL (ref 6–8.5)
RBC # BLD AUTO: 4.24 10*6/MM3 (ref 3.77–5.28)
SODIUM SERPL-SCNC: 139 MMOL/L (ref 136–145)
TROPONIN T SERPL-MCNC: <0.01 NG/ML (ref 0–0.03)
TROPONIN T SERPL-MCNC: <0.01 NG/ML (ref 0–0.03)
WBC NRBC COR # BLD: 4.53 10*3/MM3 (ref 3.4–10.8)

## 2022-09-05 PROCEDURE — 80053 COMPREHEN METABOLIC PANEL: CPT | Performed by: EMERGENCY MEDICINE

## 2022-09-05 PROCEDURE — 93010 ELECTROCARDIOGRAM REPORT: CPT | Performed by: EMERGENCY MEDICINE

## 2022-09-05 PROCEDURE — 99283 EMERGENCY DEPT VISIT LOW MDM: CPT

## 2022-09-05 PROCEDURE — 84484 ASSAY OF TROPONIN QUANT: CPT | Performed by: EMERGENCY MEDICINE

## 2022-09-05 PROCEDURE — 36415 COLL VENOUS BLD VENIPUNCTURE: CPT

## 2022-09-05 PROCEDURE — 85025 COMPLETE CBC W/AUTO DIFF WBC: CPT | Performed by: EMERGENCY MEDICINE

## 2022-09-05 PROCEDURE — 99284 EMERGENCY DEPT VISIT MOD MDM: CPT | Performed by: EMERGENCY MEDICINE

## 2022-09-05 PROCEDURE — 71046 X-RAY EXAM CHEST 2 VIEWS: CPT

## 2022-09-05 PROCEDURE — 93005 ELECTROCARDIOGRAM TRACING: CPT | Performed by: EMERGENCY MEDICINE

## 2022-09-05 RX ORDER — ACETAMINOPHEN 325 MG/1
650 TABLET ORAL ONCE
Status: COMPLETED | OUTPATIENT
Start: 2022-09-05 | End: 2022-09-05

## 2022-09-05 RX ORDER — ASPIRIN 325 MG
325 TABLET ORAL ONCE
Status: DISCONTINUED | OUTPATIENT
Start: 2022-09-05 | End: 2022-09-05

## 2022-09-05 RX ORDER — SODIUM CHLORIDE 0.9 % (FLUSH) 0.9 %
10 SYRINGE (ML) INJECTION AS NEEDED
Status: DISCONTINUED | OUTPATIENT
Start: 2022-09-05 | End: 2022-09-05 | Stop reason: HOSPADM

## 2022-09-05 RX ADMIN — ACETAMINOPHEN 650 MG: 325 TABLET, FILM COATED ORAL at 12:50

## 2022-09-05 NOTE — ED PROVIDER NOTES
Subjective   Patient has a history of anxiety, nervousness, with multiple drug allergies, complains of mild chest discomfort last night into this morning.  Daughter at bedside feels as if it may be anxiety.  She did take a Allegra last night and feels that this was the trigger.  She is feeling somewhat tremulous.  No shortness of breath, back pain.  No injury or fevers.  Nonradiating of symptoms          Review of Systems   All other systems reviewed and are negative.      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 - Dr Stone Stevens; CBC/Prograf/CMP/LIPIDS/A1C   • MAMMO     NEG = 2018/ 2021/ 2022   • Meniere's disease    • OAB    • Osteoporosis    • PUD        Allergies   Allergen Reactions   • Amlodipine Itching   • Atacand  [Candesartan Cilexetil] Other (See Comments)   • Atenolol Palpitations   • Azithromycin Nausea Only   • Ibuprofen Unknown (See Comments)   • Levofloxacin Nausea Only   • Penicillin G Hives   • Spironolactone Nausea And Vomiting   • Sucralfate Swelling   • Sulfamethoxazole-Trimethoprim Rash and Other (See Comments)     THROMBOCYTOPENIA    • Triamterene-Hctz Myalgia   • Venlafaxine Other (See Comments)     HEADACHE  Other reaction(s): Unknown   • Clonidine Derivatives GI Intolerance   • Fluconazole Nausea Only and Headache   • Lactulose Unknown - High Severity   • Lexapro [Escitalopram] Unknown - High Severity   • Lipitor [Atorvastatin] Myalgia   • Polyoxyethylene 40 Sorbitol Septaoleate [Sorbitan] Unknown - High Severity   • Whey Nausea And Vomiting   • Zyrtec [Cetirizine] Itching   • Citrus Other (See Comments)   • Paroxetine Palpitations   • Zoloft [Sertraline Hcl] Palpitations       Past  Surgical History:   Procedure Laterality Date   • BREAST LUMPECTOMY Left     Benign   • COLONOSCOPY  2012    Polyps = 2012/ 2015/ 2020, rech 2025   GSI   • ERCP W/ PLASTIC STENT PLACEMENT  2014    Liver Stent   • LIVER TRANSPLANTATION      [facility]   • SKIN CANCER EXCISION      Rt UE   • TOTAL ABDOMINAL HYSTERECTOMY  1979       Family History   Problem Relation Age of Onset   • Diabetes Sister         Type II   • Heart disease Sister    • Hypertension Sister    • No Known Problems Brother    • Hemochromatosis Other        Social History     Socioeconomic History   • Marital status:    Tobacco Use   • Smoking status: Former Smoker   • Smokeless tobacco: Never Used   • Tobacco comment: Quit 2002.    Vaping Use   • Vaping Use: Never used   Substance and Sexual Activity   • Alcohol use: No     Comment: very rarely    • Drug use: No   • Sexual activity: Defer           Objective   Physical Exam  Vitals and nursing note reviewed.   Constitutional:       Appearance: Normal appearance.   HENT:      Head: Normocephalic.      Right Ear: External ear normal.      Left Ear: External ear normal.      Nose: Nose normal.   Eyes:      Conjunctiva/sclera: Conjunctivae normal.   Cardiovascular:      Rate and Rhythm: Normal rate.      Heart sounds: Normal heart sounds.   Pulmonary:      Effort: Pulmonary effort is normal.      Breath sounds: Normal breath sounds.   Abdominal:      General: There is no distension.   Musculoskeletal:      Cervical back: Normal range of motion.   Skin:     Findings: No rash.   Neurological:      Mental Status: She is alert and oriented to person, place, and time.   Psychiatric:         Mood and Affect: Mood normal.         Procedures           ED Course                                           MDM  Number of Diagnoses or Management Options  Chest discomfort  Elevated serum creatinine  History of anxiety  Pulmonary nodules  Tremulousness  Diagnosis management comments: ER work-up looks reassuring.   Patient was anxious to get home and want to be discharged.  I did offer her oral Valium but she declined here.  She is feeling better.  Doubt angina.  More likely anxiety or side effect to the Allegra that she took       Amount and/or Complexity of Data Reviewed  Decide to obtain previous medical records or to obtain history from someone other than the patient: yes        Final diagnoses:   Chest discomfort   Elevated serum creatinine   Tremulousness   Pulmonary nodules   History of anxiety       ED Disposition  ED Disposition     ED Disposition   Discharge    Condition   Stable    Comment   --             Sara Zhao,   [address]    [facility] [address]  [phone]               Medication List      Changed    metoprolol succinate XL 50 MG 24 hr tablet  Commonly known as: TOPROL-XL  Take 2.5 tablets by mouth Every Night. And may take and extra 25mg qday prn  What changed:   · how much to take  · when to take this  · additional instructions     tacrolimus 1 MG capsule  Commonly known as: PROGRAF  2 capsules 2 (Two) Times a Day.  What changed:   · when to take this  · additional instructions             Tejas Bryan MD  09/05/22 5980

## 2022-09-06 ENCOUNTER — TELEPHONE (OUTPATIENT)
Dept: FAMILY MEDICINE CLINIC | Facility: CLINIC | Age: 77
End: 2022-09-06

## 2022-09-06 DIAGNOSIS — R35.0 URINE FREQUENCY: Primary | ICD-10-CM

## 2022-09-06 LAB — QT INTERVAL: 370 MS

## 2022-09-06 NOTE — TELEPHONE ENCOUNTER
Hub staff attempted to follow warm transfer process and was unsuccessful     Caller: Christal Romo    Relationship to patient: Self    Best call back number: [phone]    Patient is needing: PATIENT WAS SEEN AT URGENT CARE AND IS NEEDING A SOONER APPOINTMENT FOR FOLLOW UP AND IS ALSO NEEDING TO BRING IN HER BLOOD PRESSURE CUFF FOR CHECK UP. SHE WOULD ALSO LIKE A URINALYSIS DONE AT THIS TIME AS WELL.    PLEASE CALL TO DISCUSS AND ADVISE

## 2022-09-06 NOTE — TELEPHONE ENCOUNTER
"Sp w/pt.  Pt states Dr Zhao told her to have her Urine, labs and BP machine checked at her last visit.  Pt does have active lab orders in chart - but pt also states she had labs done at [facility] last week.  (called for results, will be faxed over).    Pt was also seen at ER yesterday and had labs there as well.  I scheduled pt for 9/7/22 for BP Check, and told her we would double check her lab orders after other results rec'd and reviewed by Dr Zhao to see what is still needed.  Pt wants UA regardless if that is ok because she is urinating \"a whole lot\".      "

## 2022-09-08 ENCOUNTER — CLINICAL SUPPORT (OUTPATIENT)
Dept: FAMILY MEDICINE CLINIC | Facility: CLINIC | Age: 77
End: 2022-09-08

## 2022-09-08 VITALS — HEART RATE: 78 BPM | DIASTOLIC BLOOD PRESSURE: 88 MMHG | SYSTOLIC BLOOD PRESSURE: 172 MMHG

## 2022-09-08 DIAGNOSIS — D69.6 THROMBOCYTOPENIA: ICD-10-CM

## 2022-09-08 DIAGNOSIS — D64.9 ANEMIA, UNSPECIFIED TYPE: ICD-10-CM

## 2022-09-08 DIAGNOSIS — R35.0 URINE FREQUENCY: ICD-10-CM

## 2022-09-08 DIAGNOSIS — I10 ELEVATED BLOOD PRESSURE READING IN OFFICE WITH DIAGNOSIS OF HYPERTENSION: ICD-10-CM

## 2022-09-08 LAB
BILIRUB BLD-MCNC: NEGATIVE MG/DL
CLARITY, POC: CLEAR
COLOR UR: YELLOW
EXPIRATION DATE: NORMAL
GLUCOSE UR STRIP-MCNC: NEGATIVE MG/DL
KETONES UR QL: NEGATIVE
LEUKOCYTE EST, POC: NEGATIVE
Lab: NORMAL
NITRITE UR-MCNC: NEGATIVE MG/ML
PH UR: 5.5 [PH] (ref 5–8)
PROT UR STRIP-MCNC: NEGATIVE MG/DL
RBC # UR STRIP: NEGATIVE /UL
SP GR UR: 1.02 (ref 1–1.03)
UROBILINOGEN UR QL: NORMAL

## 2022-09-08 PROCEDURE — 84466 ASSAY OF TRANSFERRIN: CPT | Performed by: FAMILY MEDICINE

## 2022-09-08 PROCEDURE — 80048 BASIC METABOLIC PNL TOTAL CA: CPT | Performed by: FAMILY MEDICINE

## 2022-09-08 PROCEDURE — 83540 ASSAY OF IRON: CPT | Performed by: FAMILY MEDICINE

## 2022-09-08 PROCEDURE — 82728 ASSAY OF FERRITIN: CPT | Performed by: FAMILY MEDICINE

## 2022-09-08 PROCEDURE — 81003 URINALYSIS AUTO W/O SCOPE: CPT | Performed by: FAMILY MEDICINE

## 2022-09-08 PROCEDURE — 36415 COLL VENOUS BLD VENIPUNCTURE: CPT | Performed by: FAMILY MEDICINE

## 2022-09-08 PROCEDURE — 85025 COMPLETE CBC W/AUTO DIFF WBC: CPT | Performed by: FAMILY MEDICINE

## 2022-09-08 NOTE — PROGRESS NOTES
178/77 hr 78 - Omron cuff  169/78 hr 78 - WG cuff  172/88 hr 78 - manual cuff p/ KB        Venipuncture performed in L ARM by RT Rashawn  with good hemostasis. Patient tolerated well. 09/08/22 Linda Adam RT

## 2022-09-09 LAB
ANION GAP SERPL CALCULATED.3IONS-SCNC: 11.7 MMOL/L (ref 5–15)
BASOPHILS # BLD AUTO: 0.03 10*3/MM3 (ref 0–0.2)
BASOPHILS NFR BLD AUTO: 0.7 % (ref 0–1.5)
BUN SERPL-MCNC: 20 MG/DL (ref 8–23)
BUN/CREAT SERPL: 17.2 (ref 7–25)
CALCIUM SPEC-SCNC: 9.5 MG/DL (ref 8.6–10.5)
CHLORIDE SERPL-SCNC: 101 MMOL/L (ref 98–107)
CO2 SERPL-SCNC: 25.3 MMOL/L (ref 22–29)
CREAT SERPL-MCNC: 1.16 MG/DL (ref 0.57–1)
DEPRECATED RDW RBC AUTO: 44.4 FL (ref 37–54)
EGFRCR SERPLBLD CKD-EPI 2021: 49.3 ML/MIN/1.73
EOSINOPHIL # BLD AUTO: 0.04 10*3/MM3 (ref 0–0.4)
EOSINOPHIL NFR BLD AUTO: 0.9 % (ref 0.3–6.2)
ERYTHROCYTE [DISTWIDTH] IN BLOOD BY AUTOMATED COUNT: 14 % (ref 12.3–15.4)
FERRITIN SERPL-MCNC: 290 NG/ML (ref 13–150)
GLUCOSE SERPL-MCNC: 121 MG/DL (ref 65–99)
HCT VFR BLD AUTO: 37 % (ref 34–46.6)
HGB BLD-MCNC: 12.2 G/DL (ref 12–15.9)
IMM GRANULOCYTES # BLD AUTO: 0.07 10*3/MM3 (ref 0–0.05)
IMM GRANULOCYTES NFR BLD AUTO: 1.5 % (ref 0–0.5)
IRON 24H UR-MRATE: 93 MCG/DL (ref 37–145)
IRON SATN MFR SERPL: 29 % (ref 20–50)
LYMPHOCYTES # BLD AUTO: 1.15 10*3/MM3 (ref 0.7–3.1)
LYMPHOCYTES NFR BLD AUTO: 25.3 % (ref 19.6–45.3)
MCH RBC QN AUTO: 28.9 PG (ref 26.6–33)
MCHC RBC AUTO-ENTMCNC: 33 G/DL (ref 31.5–35.7)
MCV RBC AUTO: 87.7 FL (ref 79–97)
MONOCYTES # BLD AUTO: 0.45 10*3/MM3 (ref 0.1–0.9)
MONOCYTES NFR BLD AUTO: 9.9 % (ref 5–12)
NEUTROPHILS NFR BLD AUTO: 2.8 10*3/MM3 (ref 1.7–7)
NEUTROPHILS NFR BLD AUTO: 61.7 % (ref 42.7–76)
NRBC BLD AUTO-RTO: 0 /100 WBC (ref 0–0.2)
PLATELET # BLD AUTO: 125 10*3/MM3 (ref 140–450)
PMV BLD AUTO: 12.3 FL (ref 6–12)
POTASSIUM SERPL-SCNC: 4.5 MMOL/L (ref 3.5–5.2)
RBC # BLD AUTO: 4.22 10*6/MM3 (ref 3.77–5.28)
SODIUM SERPL-SCNC: 138 MMOL/L (ref 136–145)
TIBC SERPL-MCNC: 317 MCG/DL (ref 298–536)
TRANSFERRIN SERPL-MCNC: 213 MG/DL (ref 200–360)
WBC NRBC COR # BLD: 4.54 10*3/MM3 (ref 3.4–10.8)

## 2022-09-19 ENCOUNTER — TELEPHONE (OUTPATIENT)
Dept: FAMILY MEDICINE CLINIC | Facility: CLINIC | Age: 77
End: 2022-09-19

## 2022-09-19 DIAGNOSIS — R91.8 MASS OF UPPER LOBE OF RIGHT LUNG: Primary | ICD-10-CM

## 2022-09-19 NOTE — TELEPHONE ENCOUNTER
----- Message from Sara Zhao DO sent at 9/19/2022  4:00 PM EDT -----  See if pt ok to get the CT CHEST done before her appt here..........  ----- Message -----  From: Nicole Macedo RN  Sent: 9/19/2022   1:42 PM EDT  To: Sara Zhao DO    Wanted you to be aware of this Chest XR from ER visit. She has an upcoming appointment with you 9/27. Thanks    Nicole Macedo  Lung Nurse Navigator   [facility]  [phone]  [email or web address]

## 2022-09-20 ENCOUNTER — TELEPHONE (OUTPATIENT)
Dept: FAMILY MEDICINE CLINIC | Facility: CLINIC | Age: 77
End: 2022-09-20

## 2022-09-20 DIAGNOSIS — R91.8 MASS OF UPPER LOBE OF RIGHT LUNG: Primary | ICD-10-CM

## 2022-09-20 NOTE — TELEPHONE ENCOUNTER
Caller: Christal Romo    Relationship: Self    Best call back number: [phone]*    What is the best time to reach you: ANYTIME    Who are you requesting to speak with (clinical staff, provider,  specific staff member): CLINICAL    What was the call regarding: PATIENT CALLING REQUESTING A CALL BACK REGARDING THE CT SCAN OF LUNGS. THE PATIENT STATES THAT IF THE CT SCAN HAS NOT BEEN SCHEDULED YET, SHE WOULD LIKE TO REQUEST THAT THE SCAN BE PERFORMED AT EITHER [facility] OR [facility].    Do you require a callback: YES

## 2022-09-20 NOTE — TELEPHONE ENCOUNTER
PATIENT CALLED AND STATED NOT TO MOVE THE APPOINTMENT, AS SHE NOW HAS CT SCAN SCHEDULED AT [facility].    PATIENT STATED IT IS SCHEDULED FOR MONDAY, 09-

## 2022-09-22 ENCOUNTER — OFFICE (AMBULATORY)
Dept: URBAN - METROPOLITAN AREA CLINIC 64 | Facility: CLINIC | Age: 77
End: 2022-09-22

## 2022-09-22 VITALS
HEART RATE: 82 BPM | WEIGHT: 142 LBS | SYSTOLIC BLOOD PRESSURE: 155 MMHG | DIASTOLIC BLOOD PRESSURE: 90 MMHG | HEIGHT: 63 IN

## 2022-09-22 DIAGNOSIS — R19.7 DIARRHEA, UNSPECIFIED: ICD-10-CM

## 2022-09-22 PROCEDURE — 99214 OFFICE O/P EST MOD 30 MIN: CPT | Performed by: INTERNAL MEDICINE

## 2022-09-22 RX ORDER — OMEPRAZOLE 40 MG/1
40 CAPSULE, DELAYED RELEASE ORAL
Qty: 30 | Refills: 11 | Status: ACTIVE
Start: 2022-09-22

## 2022-09-27 ENCOUNTER — OFFICE VISIT (OUTPATIENT)
Dept: FAMILY MEDICINE CLINIC | Facility: CLINIC | Age: 77
End: 2022-09-27

## 2022-09-27 VITALS
TEMPERATURE: 97.7 F | OXYGEN SATURATION: 97 % | BODY MASS INDEX: 25.16 KG/M2 | HEIGHT: 63 IN | HEART RATE: 81 BPM | WEIGHT: 142 LBS | DIASTOLIC BLOOD PRESSURE: 76 MMHG | SYSTOLIC BLOOD PRESSURE: 180 MMHG | RESPIRATION RATE: 16 BRPM

## 2022-09-27 DIAGNOSIS — E78.1 HYPERTRIGLYCERIDEMIA: ICD-10-CM

## 2022-09-27 DIAGNOSIS — R23.8 IRRITATION SYMPTOM OF SKIN: ICD-10-CM

## 2022-09-27 DIAGNOSIS — K27.9 PEPTIC ULCER DISEASE: ICD-10-CM

## 2022-09-27 DIAGNOSIS — I10 ELEVATED BLOOD PRESSURE READING WITH DIAGNOSIS OF HYPERTENSION: Primary | ICD-10-CM

## 2022-09-27 DIAGNOSIS — Z23 NEED FOR INFLUENZA VACCINATION: ICD-10-CM

## 2022-09-27 PROCEDURE — 90662 IIV NO PRSV INCREASED AG IM: CPT | Performed by: FAMILY MEDICINE

## 2022-09-27 PROCEDURE — 99214 OFFICE O/P EST MOD 30 MIN: CPT | Performed by: FAMILY MEDICINE

## 2022-09-27 PROCEDURE — G0008 ADMIN INFLUENZA VIRUS VAC: HCPCS | Performed by: FAMILY MEDICINE

## 2022-09-27 RX ORDER — FEXOFENADINE HCL 180 MG/1
180 TABLET ORAL DAILY
COMMUNITY

## 2022-09-27 RX ORDER — FAMOTIDINE 20 MG/1
20 TABLET, FILM COATED ORAL DAILY PRN
Qty: 90 TABLET | Refills: 0 | Status: SHIPPED | OUTPATIENT
Start: 2022-09-27 | End: 2022-12-07 | Stop reason: ALTCHOICE

## 2022-09-27 RX ORDER — FENOFIBRATE 48 MG/1
48 TABLET, COATED ORAL DAILY
Qty: 30 TABLET | Refills: 2 | Status: SHIPPED | OUTPATIENT
Start: 2022-09-27 | End: 2022-12-07 | Stop reason: ALTCHOICE

## 2022-09-27 RX ORDER — OMEPRAZOLE 20 MG/1
CAPSULE, DELAYED RELEASE ORAL
COMMUNITY
Start: 2022-09-22

## 2022-09-27 RX ORDER — ACETAMINOPHEN 500 MG
500 TABLET ORAL
COMMUNITY

## 2022-10-26 DIAGNOSIS — M54.2 CERVICALGIA: ICD-10-CM

## 2022-10-26 NOTE — TELEPHONE ENCOUNTER
INSPECT RAN    Rx Refill Note  Requested Prescriptions     Pending Prescriptions Disp Refills   • traMADol (ULTRAM) 50 MG tablet [Pharmacy Med Name: traMADol HCL 50MG TABLET] 30 tablet      Sig: TAKE ONE TABLET BY MOUTH EVERY 6 HOURS AS NEEDED FOR MODERATE OR SEVERE PAIN      Last office visit with prescribing clinician: 9/27/2022      Next office visit with prescribing clinician: 12/16/2022            Linda Adam, RT  10/26/22, 15:55 EDT

## 2022-10-27 RX ORDER — TRAMADOL HYDROCHLORIDE 50 MG/1
50 TABLET ORAL EVERY 6 HOURS PRN
Qty: 30 TABLET | Refills: 0 | Status: SHIPPED | OUTPATIENT
Start: 2022-10-27

## 2022-11-01 ENCOUNTER — TELEPHONE (OUTPATIENT)
Dept: FAMILY MEDICINE CLINIC | Facility: CLINIC | Age: 77
End: 2022-11-01

## 2022-11-01 RX ORDER — FLUCONAZOLE 150 MG/1
150 TABLET ORAL ONCE
Qty: 1 TABLET | Refills: 0 | Status: SHIPPED | OUTPATIENT
Start: 2022-11-01 | End: 2022-11-01

## 2022-11-01 NOTE — TELEPHONE ENCOUNTER
Caller: Christal Romo    Relationship: Self    Best call back number [phone]    What is the best time to reach you: ANYTIME    Who are you requesting to speak with (clinical staff, provider,  specific staff member): CLINICAL STAFF    Do you know the name of the person who called: SELF  What was the call regarding PATIENT CALLED AND STATED SHE WOULD LIKE FOR YOU TO CALL HER IN SOME MEDICINE FOR YEAST INFECTION. PLEASE SEND PRESCRIPTION TO [facility]. THANKS   Do you require a callback: YES

## 2022-11-07 NOTE — TELEPHONE ENCOUNTER
There were multiple tele encounters regarding same thing.  This was taken care of in another encounter.

## 2022-12-07 ENCOUNTER — OFFICE VISIT (OUTPATIENT)
Dept: CARDIOLOGY | Facility: CLINIC | Age: 77
End: 2022-12-07

## 2022-12-07 VITALS
DIASTOLIC BLOOD PRESSURE: 94 MMHG | BODY MASS INDEX: 25.34 KG/M2 | HEART RATE: 77 BPM | OXYGEN SATURATION: 98 % | HEIGHT: 63 IN | WEIGHT: 143 LBS | SYSTOLIC BLOOD PRESSURE: 193 MMHG

## 2022-12-07 DIAGNOSIS — I10 ESSENTIAL HYPERTENSION: Primary | ICD-10-CM

## 2022-12-07 DIAGNOSIS — I38 VALVULAR HEART DISEASE: ICD-10-CM

## 2022-12-07 DIAGNOSIS — E78.2 MIXED HYPERLIPIDEMIA: ICD-10-CM

## 2022-12-07 PROCEDURE — 99214 OFFICE O/P EST MOD 30 MIN: CPT | Performed by: INTERNAL MEDICINE

## 2022-12-07 PROCEDURE — 93000 ELECTROCARDIOGRAM COMPLETE: CPT | Performed by: INTERNAL MEDICINE

## 2022-12-07 NOTE — PATIENT INSTRUCTIONS
Make sure none of you decongestants have psuedophed or phenylephrine  Try taking half of vasartan daily for a week then try to increase to full dose daily  Follow-up 6 months

## 2022-12-07 NOTE — PROGRESS NOTES
Cardiology Office Visit      Encounter Date:  12/07/2022    Patient ID:   Christal Romo is a 77 y.o. female.    Reason For Followup:  Valvular heart disease  Hypertension    Brief Clinical History:  Dear Sara Espino, DO    I had the pleasure of seeing Christal Romo today. As you are well aware, this is a 77 y.o. female with no known history of ischemic heart disease.  She does have a history of nonalcoholic steatohepatitis and liver cirrhosis and is status post liver transplant.  She has additional history that includes hypertension, palpitations, and aortic insufficiency. She presents today for follow-up on the above conditions.    Interval History:  She denies any shortness of breath.  She denies any PND orthopnea.  She denies any PND orthopnea.  She denies any syncope or near syncope.  She reports feeling well from a cardiac perspective.    She is reporting significant discomfort in her neck and feet from arthritis.  She is getting injections to help with these but they do cause her blood pressure to be elevated.    As I am sure you are aware she has an intolerance to multiple medications including multiple antihypertensives.  This makes treating her hypertension difficult.  She reports that she was started on metoprolol and this causes her drowsiness so she takes it in the evening.  She was also placed on valsartan 160 mg daily.  She reports that she started taking this and became dizzy lightheaded and felt like she was going to pass out.  She completely stopped the medication.  She started back on a reduced dose at 40 mg daily and reports that she had a headache with this and stopped taking it.  She will try one half of a 40 mg tablet daily.    Although her home blood pressure logs do reflect better control they are still suboptimal with readings in the 150s and 160s predominantly.    Her most recent echocardiogram performed in December 2020.  Normal LV systolic function was noted.  Mild MR, TR, AI and  "PI was noted.  This is unchanged from prior studies.    She underwent a heart and vascular screening in July 2021.  Her coronary calcium score was 23.2.  Her ABIs were normal.  No evidence of carotid or abdominal aortic disease was noted.    Her most recent laboratory data is from October 2022.  Sodium 142 potassium 4.3 chloride 109 CO2 24 glucose 112 BUN 21 creatinine 1.2.  AST 24 ALT 28 alkaline phosphatase 57.  Total cholesterol 149, triglycerides 616, HDL 29.  White blood cell count 5.4, hemoglobin 12.0, hematocrit 35.8, platelets 128,000.    Assessment & Plan    Impressions:  Hypertension     I believe a significant amount of her hypertension is due to anxiety, and pain.  Palpitations.  History of abnormal troponin secondary to mouse antigen interaction with troponin assay reagent.  Cirrhosis status post liver transplant 2016  Poor dentition.  Valvular heart disease     Mild MR, TR, AI, PI Echocardiogram December 2020  Hyperlipidemia with a preponderance of hypertriglyceridemia  Depression  Multiple medication sensitivities and intolerances    Recommendations:  Continuation of her current cardiovascular regimen at the present time.     This includes antiplatelet therapy, and antihypertensives.  Valsartan 20 mg daily  Continue to log blood pressures and bring values in for next visit  Periodic echocardiogram  Follow-up in 6 months time.    Diagnoses and all orders for this visit:    1. Essential hypertension (Primary)  -     ECG 12 Lead    2. Mixed hyperlipidemia  -     ECG 12 Lead    3. Valvular heart disease  -     ECG 12 Lead          Objective:    Vitals:  Vitals:    12/07/22 1518   BP: (!) 193/94   Pulse: 77   SpO2: 98%   Weight: 64.9 kg (143 lb)   Height: 160 cm (63\")     Body mass index is 25.33 kg/m².      Physical Exam:    General: Alert, cooperative, no distress, appears stated age  Head:  Normocephalic, atraumatic, mucous membranes moist  Eyes:  Conjunctiva/corneas clear, EOM's intact   "   Neck:  Supple,  no bruit    Lungs: Clear to auscultation bilaterally, no wheezes rhonchi rales are noted  Chest wall: No tenderness  Heart::  Regular rate and rhythm, S1 and S2 normal, 1/6 holosystolic murmur.  No rub or gallop  Abdomen: Soft, non-tender, nondistended bowel sounds active  Extremities: No cyanosis, clubbing, or edema.  Ankle braces  Pulses: Pedal pulses nonpalpable because of ankle braces  Skin:  No rashes or lesions  Neuro/psych: A&O x3. CN II through XII are grossly intact with appropriate affect      Allergies:  Allergies   Allergen Reactions   • Amlodipine Itching   • Atacand  [Candesartan Cilexetil] Other (See Comments)   • Atenolol Palpitations   • Azithromycin Nausea Only   • Ibuprofen Unknown (See Comments)   • Levofloxacin Nausea Only   • Penicillin G Hives   • Spironolactone Nausea And Vomiting   • Sucralfate Swelling   • Sulfamethoxazole-Trimethoprim Rash and Other (See Comments)     THROMBOCYTOPENIA    • Triamterene-Hctz Myalgia   • Venlafaxine Other (See Comments)     HEADACHE  Other reaction(s): Unknown   • Clonidine Derivatives GI Intolerance   • Fluconazole Nausea Only and Headache   • Lactulose Unknown - High Severity   • Lexapro [Escitalopram] Unknown - High Severity   • Lipitor [Atorvastatin] Myalgia   • Polyoxyethylene 40 Sorbitol Septaoleate [Sorbitan] Unknown - High Severity   • Whey Nausea And Vomiting   • Zyrtec [Cetirizine] Itching   • Citrus Other (See Comments)   • Paroxetine Palpitations   • Zoloft [Sertraline Hcl] Palpitations       Medication Review:     Current Outpatient Medications:   •  acetaminophen (TYLENOL) 500 MG tablet, Take 500 mg by mouth. prn, Disp: , Rfl:   •  aspirin 81 MG tablet, Take 1 tablet by mouth Daily., Disp: , Rfl:   •  baclofen (LIORESAL) 10 MG tablet, TAKE ONE TABLET BY MOUTH EVERY NIGHT AT BEDTIME AS NEEDED FOR MUSCLE SPASMS, Disp: 30 tablet, Rfl: 3  •  busPIRone (BUSPAR) 5 MG tablet, Take 1 tablet by mouth 3 (Three) Times a Day., Disp: 90  tablet, Rfl: 2  •  clindamycin (CLEOCIN) 300 MG capsule, TAKE TWO CAPSULES BY MOUTH ONE HOUR PRIOR TO DENTAL PROCEDURE, Disp: 4 capsule, Rfl: 0  •  diphenhydrAMINE (BENADRYL) 25 MG tablet, Take 25 mg by mouth Every 6 (Six) Hours As Needed for Itching., Disp: , Rfl:   •  fexofenadine (ALLEGRA) 180 MG tablet, Take 180 mg by mouth Daily., Disp: , Rfl:   •  Fluticasone-Umeclidin-Vilant (Trelegy Ellipta) 100-62.5-25 MCG/INH inhaler, Inhale 1 puff Daily., Disp: 14 each, Rfl: 0  •  Lutein 20 MG capsule, Take 1 tablet by mouth Daily., Disp: , Rfl:   •  meclizine (ANTIVERT) 12.5 MG tablet, Take 1 tablet by mouth Every 6 (Six) Hours As Needed for Dizziness., Disp: 30 tablet, Rfl: 1  •  metoprolol succinate XL (TOPROL-XL) 50 MG 24 hr tablet, Take 2.5 tablets by mouth Every Night. And may take and extra 25mg qday prn (Patient taking differently: Take 50 mg by mouth 3 (Three) Times a Day.), Disp: 270 tablet, Rfl: 0  •  Multiple Vitamins-Minerals (Multivitamin Adults 50+) tablet, Take 1 tablet by mouth Daily., Disp: , Rfl:   •  mycophenolate (MYFORTIC) 360 MG tablet delayed-release EC tablet, Take 1 tablet by mouth Every Morning., Disp: 120 tablet, Rfl:   •  omeprazole (priLOSEC) 20 MG capsule, 1 po qd, Disp: , Rfl:   •  ondansetron (Zofran) 4 MG tablet, Take 1 tablet by mouth Every 8 (Eight) Hours As Needed for Nausea or Vomiting. prn, Disp: 90 tablet, Rfl: 0  •  pseudoephedrine (SUDAFED) 60 MG tablet, Take 60 mg by mouth Every 4 (Four) Hours As Needed for Congestion., Disp: , Rfl:   •  tacrolimus (PROGRAF) 1 MG capsule, 2 capsules 2 (Two) Times a Day. (Patient taking differently: 2 mg. 2tabs in the AM, 1 in the PM), Disp: , Rfl:   •  traMADol (ULTRAM) 50 MG tablet, Take 1 tablet by mouth Every 6 (Six) Hours As Needed for Moderate Pain or Severe Pain., Disp: 30 tablet, Rfl: 0  •  valsartan (DIOVAN) 40 MG tablet, Take 1 tablet by mouth Daily., Disp: 90 tablet, Rfl: 3    Family History:  Family History   Problem Relation Age of  Onset   • Diabetes Sister         Type II   • Heart disease Sister    • Hypertension Sister    • No Known Problems Brother    • Hemochromatosis Other        Past Medical History:  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 - Dr Stone Stevens; CBC/Prograf/CMP/LIPIDS/A1C   • MAMMO     NEG = 2021/    • Meniere's disease    • OAB    • Osteoporosis    • PUD        Past Surgical History:  Past Surgical History:   Procedure Laterality Date   • BREAST LUMPECTOMY Left     Benign   • COLONOSCOPY  2012    Polyps = / / , rech    GSI   • ERCP W/ PLASTIC STENT PLACEMENT      Liver Stent   • LIVER TRANSPLANTATION      [facility]   • SKIN CANCER EXCISION      Rt UE   • TOTAL ABDOMINAL HYSTERECTOMY         Social History:  Social History     Socioeconomic History   • Marital status:    Tobacco Use   • Smoking status: Former     Packs/day: 0.25     Years: 1.00     Pack years: 0.25     Types: Cigarettes     Start date:      Quit date:      Years since quittin.9   • Smokeless tobacco: Never   • Tobacco comments:     Quit .    Vaping Use   • Vaping Use: Never used   Substance and Sexual Activity   • Alcohol use: No     Comment: very rarely    • Drug use: No   • Sexual activity: Defer       Review of Systems:  The following systems were reviewed as they relate to the cardiovascular system: Constitutional, Eyes, ENT, Cardiovascular, Respiratory, Gastrointestinal, Integumentary, Neurological, Psychiatric, Hematologic, Endocrine, Musculoskeletal, and Genitourinary. The pertinent cardiovascular findings are reported above with all other cardiovascular points within those systems being  negative.    Diagnostic Study Review:     Current Electrocardiogram:    ECG 12 Lead    Date/Time: 12/7/2022 4:55 PM  Performed by: Domenico Clifton DO  Authorized by: Domenico Clifton DO   Comparison: not compared with previous ECG   Previous ECG: no previous ECG available  Comments: Normal sinus rhythm with a ventricular rate of 73 bpm.  Atrial premature complex.  Old anteroseptal MI.  Left ventricular hypertrophy.  Normal QT and QTc intervals.            Laboratory Data:  Lab Results   Component Value Date    GLUCOSE 121 (H) 09/08/2022    BUN 20 09/08/2022    CREATININE 1.16 (H) 09/08/2022    EGFRIFNONA 31 (L) 04/20/2020    BCR 17.2 09/08/2022    K 4.5 09/08/2022    CO2 25.3 09/08/2022    CALCIUM 9.5 09/08/2022    ALBUMIN 4.62 09/05/2022    AST 24 09/05/2022    ALT 25 09/05/2022     Lab Results   Component Value Date    GLUCOSE 121 (H) 09/08/2022    CALCIUM 9.5 09/08/2022     09/08/2022    K 4.5 09/08/2022    CO2 25.3 09/08/2022     09/08/2022    BUN 20 09/08/2022    CREATININE 1.16 (H) 09/08/2022    EGFRIFNONA 31 (L) 04/20/2020    BCR 17.2 09/08/2022    ANIONGAP 11.7 09/08/2022     Lab Results   Component Value Date    WBC 4.54 09/08/2022    HGB 12.2 09/08/2022    HCT 37.0 09/08/2022    MCV 87.7 09/08/2022     (L) 09/08/2022     No results found for: CHOL, CHLPL, TRIG, HDL, LDL, LDLDIRECT  Lab Results   Component Value Date    HGBA1C 5.2 09/20/2021     No results found for: INR, PROTIME    Most Recent Echo:  Results for orders placed during the hospital encounter of 12/07/20    Adult Transthoracic Echo Complete W/ Cont if Necessary Per Protocol    Interpretation Summary  · Estimated left ventricular EF was in agreement with the calculated left ventricular EF. Left ventricular ejection fraction appears to be 56 - 60%. Left ventricular systolic function is normal.  · Left ventricular wall thickness is consistent with hypertrophy. Sigmoid-shaped ventricular septum is  present.  · Left ventricular diastolic function is consistent with (grade I) impaired relaxation.  · Estimated right ventricular systolic pressure from tricuspid regurgitation is normal (<35 mmHg).       Most Recent Stress Test:       Most Recent Cardiac Catheterization:   No results found for this or any previous visit.       NOTE: The following portions of the patient's note were reviewed, confirmed and/or updated this visit as appropriate: History of present illness/Interval history, physical examination, assessment & plan, allergies, current medications, past family history, past medical history, past social history, past surgical history and problem list.

## 2022-12-16 ENCOUNTER — OFFICE VISIT (OUTPATIENT)
Dept: FAMILY MEDICINE CLINIC | Facility: CLINIC | Age: 77
End: 2022-12-16

## 2022-12-16 VITALS
HEART RATE: 76 BPM | DIASTOLIC BLOOD PRESSURE: 78 MMHG | OXYGEN SATURATION: 97 % | WEIGHT: 142 LBS | TEMPERATURE: 97.5 F | SYSTOLIC BLOOD PRESSURE: 184 MMHG | BODY MASS INDEX: 25.16 KG/M2 | RESPIRATION RATE: 18 BRPM | HEIGHT: 63 IN

## 2022-12-16 DIAGNOSIS — E78.2 MIXED HYPERLIPIDEMIA: ICD-10-CM

## 2022-12-16 DIAGNOSIS — F41.9 ANXIETY: ICD-10-CM

## 2022-12-16 DIAGNOSIS — Z94.4 STATUS POST LIVER TRANSPLANTATION: ICD-10-CM

## 2022-12-16 DIAGNOSIS — I10 ELEVATED BLOOD PRESSURE READING IN OFFICE WITH DIAGNOSIS OF HYPERTENSION: Primary | ICD-10-CM

## 2022-12-16 PROCEDURE — 99214 OFFICE O/P EST MOD 30 MIN: CPT | Performed by: FAMILY MEDICINE

## 2022-12-16 RX ORDER — MECLIZINE HCL 12.5 MG/1
12.5 TABLET ORAL EVERY 6 HOURS PRN
Qty: 30 TABLET | Refills: 1 | Status: SHIPPED | OUTPATIENT
Start: 2022-12-16

## 2022-12-16 RX ORDER — DILTIAZEM HYDROCHLORIDE 60 MG/1
60 CAPSULE, EXTENDED RELEASE ORAL 2 TIMES DAILY
Qty: 30 CAPSULE | Refills: 0 | Status: SHIPPED | OUTPATIENT
Start: 2022-12-16

## 2022-12-16 RX ORDER — FENOFIBRATE 48 MG/1
48 TABLET, COATED ORAL DAILY
Qty: 90 TABLET | Refills: 0 | Status: SHIPPED | OUTPATIENT
Start: 2022-12-16

## 2022-12-16 RX ORDER — TACROLIMUS 1 MG/1
2 CAPSULE ORAL 2 TIMES DAILY
Status: SHIPPED
Start: 2022-12-16 | End: 2022-12-16 | Stop reason: SDUPTHER

## 2022-12-16 RX ORDER — ONDANSETRON 4 MG/1
4 TABLET, FILM COATED ORAL EVERY 8 HOURS PRN
Qty: 30 TABLET | Refills: 1 | Status: SHIPPED | OUTPATIENT
Start: 2022-12-16

## 2022-12-16 RX ORDER — FENOFIBRATE 48 MG/1
48 TABLET, COATED ORAL DAILY
Start: 2022-12-16 | End: 2022-12-16 | Stop reason: SDUPTHER

## 2022-12-16 RX ORDER — TACROLIMUS 1 MG/1
CAPSULE ORAL
Start: 2022-12-16

## 2022-12-16 NOTE — PROGRESS NOTES
Subjective   Christal Romo is a 77 y.o. female.     Chief Complaint   Patient presents with   • Memory Loss   • Med Refill     Trelegy inhaler   • Hypertension         Current Outpatient Medications:   •  acetaminophen (TYLENOL) 500 MG tablet, Take 500 mg by mouth. prn, Disp: , Rfl:   •  aspirin 81 MG tablet, Take 1 tablet by mouth Daily., Disp: , Rfl:   •  baclofen (LIORESAL) 10 MG tablet, TAKE ONE TABLET BY MOUTH EVERY NIGHT AT BEDTIME AS NEEDED FOR MUSCLE SPASMS, Disp: 30 tablet, Rfl: 3  •  busPIRone (BUSPAR) 5 MG tablet, Take 1 tablet by mouth 3 (Three) Times a Day., Disp: 90 tablet, Rfl: 2  •  clindamycin (CLEOCIN) 300 MG capsule, TAKE TWO CAPSULES BY MOUTH ONE HOUR PRIOR TO DENTAL PROCEDURE, Disp: 4 capsule, Rfl: 0  •  diphenhydrAMINE (BENADRYL) 25 MG tablet, Take 25 mg by mouth Every 6 (Six) Hours As Needed for Itching., Disp: , Rfl:   •  fenofibrate (Tricor) 48 MG tablet, Take 1 tablet by mouth Daily., Disp: 90 tablet, Rfl: 0  •  fexofenadine (ALLEGRA) 180 MG tablet, Take 180 mg by mouth Daily., Disp: , Rfl:   •  Fluticasone-Umeclidin-Vilant (Trelegy Ellipta) 100-62.5-25 MCG/INH inhaler, Inhale 1 puff Daily., Disp: 14 each, Rfl: 0  •  Lutein 20 MG capsule, Take 1 tablet by mouth Daily., Disp: , Rfl:   •  meclizine (ANTIVERT) 12.5 MG tablet, Take 1 tablet by mouth Every 6 (Six) Hours As Needed for Dizziness., Disp: 30 tablet, Rfl: 1  •  metoprolol succinate XL (TOPROL-XL) 50 MG 24 hr tablet, Take 2.5 tablets by mouth Every Night. And may take and extra 25mg qday prn (Patient taking differently: Take 50 mg by mouth 3 (Three) Times a Day.), Disp: 270 tablet, Rfl: 0  •  Multiple Vitamins-Minerals (Multivitamin Adults 50+) tablet, Take 1 tablet by mouth Daily., Disp: , Rfl:   •  mycophenolate (MYFORTIC) 360 MG tablet delayed-release EC tablet, Take 1 tablet by mouth Every Morning., Disp: 120 tablet, Rfl:   •  omeprazole (priLOSEC) 20 MG capsule, 1 po qd, Disp: , Rfl:   •  ondansetron (Zofran) 4 MG tablet, Take 1  tablet by mouth Every 8 (Eight) Hours As Needed for Nausea or Vomiting. prn, Disp: 30 tablet, Rfl: 1  •  pseudoephedrine (SUDAFED) 60 MG tablet, Take 60 mg by mouth Every 4 (Four) Hours As Needed for Congestion., Disp: , Rfl:   •  tacrolimus (PROGRAF) 1 MG capsule, 2 po qam and 1 po qpm, Disp: , Rfl:   •  traMADol (ULTRAM) 50 MG tablet, Take 1 tablet by mouth Every 6 (Six) Hours As Needed for Moderate Pain or Severe Pain., Disp: 30 tablet, Rfl: 0  •  dilTIAZem SR (CARDIZEM SR) 60 MG 12 hr capsule, Take 1 capsule by mouth 2 (Two) Times a Day., Disp: 30 capsule, Rfl: 0    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 - Dr Ritter [facility]; CBC/Prograf/CMP/LIPIDS/A1C   • MAMMO     NEG = 2021/    • Meniere's disease    • OAB    • Osteoporosis    • PUD        Past Surgical History:   Procedure Laterality Date   • BREAST LUMPECTOMY Left     Benign   • COLONOSCOPY      Polyps = 2015/ , rech    GSI   • ERCP W/ PLASTIC STENT PLACEMENT      Liver Stent   • [place] TRANSPL[place]ATION      [facility]   • SKIN CANCER EXCISION      [facility]t [facility]   • TOTAL ABDOMINAL HYSTERECTOMY         Family History   Problem Relation Age of Onset   • Diabetes Sister         Type II   • Heart disease Sister    • Hypertension Sister    • No Known Problems Brother    • Hemochromatosis Other        Social History     Socioeconomic History   • Marital status:    Tobacco Use   • Smoking status: Former     Packs/day: 0.25     Years: 1.00     Pack years: 0.25     Types: Cigarettes     Start date:      Quit date:      Years since quittin.0   • Smokeless tobacco: Never   • Tobacco comments:     Quit .    Vaping Use   •  Vaping Use: Never used   Substance and Sexual Activity   • Alcohol use: No     Comment: very rarely    • Drug use: No   • Sexual activity: Defer       History of Present Illness  76 y/o C female here for f/u HTN/ COPD/ Anx/ CHOL    Pt states she just had f/u transplant labs done at [facility] -----they also decreased her dose of prograf; Pt states she has a lot of the side effects from prograf as well    Pt not sure if she is taking the Tricor for her lipids; Pt still having trouble taking her BP med------if she needs to drive and go out, she doesn't take the metoprolol since it makes her tired; she will take it while at home, though    Pt states she did see cardio for her BP and told to take 1/2 tab of the diovan but it still caused her to itch...... he is also frustrated w/ her mult allergies/ intolerances to meds       The following portions of the patient's history were reviewed and updated as appropriate: allergies, current medications, past family history, past medical history, past social history, past surgical history and problem list.    Review of Systems   Constitutional: Negative for activity change, appetite change, fatigue, unexpected weight gain and unexpected weight loss.   Respiratory: Negative for cough, chest tightness and shortness of breath.    Cardiovascular: Negative for chest pain, palpitations and leg swelling.   Gastrointestinal: Positive for nausea. Negative for vomiting.   Genitourinary: Negative for frequency, urgency and urinary incontinence.   Musculoskeletal: Negative for arthralgias and myalgias.   Neurological: Negative for dizziness, facial asymmetry, speech difficulty, weakness, light-headedness, headache, memory problem and confusion.   Psychiatric/Behavioral: The patient is nervous/anxious.        Vitals:    12/16/22 1519   BP: (!) 184/78   Pulse: 76   Resp: 18   Temp: 97.5 °F (36.4 °C)   SpO2: 97%       Objective   Physical Exam  Vitals and nursing note reviewed.   Constitutional:        General: She is not in acute distress.     Appearance: She is well-developed. She is not ill-appearing or toxic-appearing.   HENT:      Head: Normocephalic and atraumatic.   Cardiovascular:      Rate and Rhythm: Normal rate and regular rhythm.      Heart sounds: Normal heart sounds. No murmur heard.  Pulmonary:      Effort: Pulmonary effort is normal. No respiratory distress.      Breath sounds: Normal breath sounds. No stridor. No wheezing, rhonchi or rales.   Musculoskeletal:      Right lower leg: No edema.      Left lower leg: No edema.   Skin:     General: Skin is warm and dry.      Findings: No rash.   Neurological:      Mental Status: She is alert and oriented to person, place, and time.      Cranial Nerves: No cranial nerve deficit.   Psychiatric:         Mood and Affect: Mood normal.         Behavior: Behavior normal.         Thought Content: Thought content normal.         Judgment: Judgment normal.           Assessment & Plan   Diagnoses and all orders for this visit:    1. Elevated blood pressure reading in office with diagnosis of hypertension (Primary)    2. Status post liver transplantation (HCC)    3. Mixed hyperlipidemia    4. Anxiety    Other orders  -     Discontinue: fenofibrate (Tricor) 48 MG tablet; Take 1 tablet by mouth Daily.  -     meclizine (ANTIVERT) 12.5 MG tablet; Take 1 tablet by mouth Every 6 (Six) Hours As Needed for Dizziness.  Dispense: 30 tablet; Refill: 1  -     ondansetron (Zofran) 4 MG tablet; Take 1 tablet by mouth Every 8 (Eight) Hours As Needed for Nausea or Vomiting. prn  Dispense: 30 tablet; Refill: 1  -     dilTIAZem SR (CARDIZEM SR) 60 MG 12 hr capsule; Take 1 capsule by mouth 2 (Two) Times a Day.  Dispense: 30 capsule; Refill: 0  -     Discontinue: tacrolimus (PROGRAF) 1 MG capsule; 2 capsules 2 (Two) Times a Day. 2 po qam and 1 po qpm  -     tacrolimus (PROGRAF) 1 MG capsule; 2 po qam and 1 po qpm  -     fenofibrate (Tricor) 48 MG tablet; Take 1 tablet by mouth Daily.   Dispense: 90 tablet; Refill: 0      >1/2 time spent reviewing labs done by transplant clinic/ BP med tx options  Encouraged pt to try new BP med bid and stressed to pt that cardio wants her to take the metroprolol -----1 TID and not take all three tabs at once due to poss side effects  Encouraged to cont w/ the tricor qday

## 2023-01-17 ENCOUNTER — OFFICE (AMBULATORY)
Dept: URBAN - METROPOLITAN AREA CLINIC 64 | Facility: CLINIC | Age: 78
End: 2023-01-17

## 2023-01-17 VITALS
HEART RATE: 73 BPM | DIASTOLIC BLOOD PRESSURE: 94 MMHG | WEIGHT: 143 LBS | HEIGHT: 63 IN | SYSTOLIC BLOOD PRESSURE: 159 MMHG

## 2023-01-17 DIAGNOSIS — K21.9 GASTRO-ESOPHAGEAL REFLUX DISEASE WITHOUT ESOPHAGITIS: ICD-10-CM

## 2023-01-17 DIAGNOSIS — K58.0 IRRITABLE BOWEL SYNDROME WITH DIARRHEA: ICD-10-CM

## 2023-01-17 DIAGNOSIS — R10.32 LEFT LOWER QUADRANT PAIN: ICD-10-CM

## 2023-01-17 PROCEDURE — 99214 OFFICE O/P EST MOD 30 MIN: CPT | Performed by: INTERNAL MEDICINE

## 2023-01-17 RX ORDER — OMEPRAZOLE 40 MG/1
40 CAPSULE, DELAYED RELEASE ORAL
Qty: 30 | Refills: 11 | Status: ACTIVE
Start: 2022-09-22

## 2023-01-17 RX ORDER — DICYCLOMINE HYDROCHLORIDE 20 MG/1
60 TABLET ORAL
Qty: 90 | Refills: 3 | Status: ACTIVE
Start: 2023-01-17

## 2023-01-26 ENCOUNTER — TELEPHONE (OUTPATIENT)
Dept: FAMILY MEDICINE CLINIC | Facility: CLINIC | Age: 78
End: 2023-01-26

## 2023-01-26 NOTE — TELEPHONE ENCOUNTER
Caller: Christal Romo    Relationship: Self    Best call back number:[phone]    What medication are you requesting: N\A    What are your current symptoms: THRUSH IN MOUTH     How long have you been experiencing symptoms: A COUPLE OF DAYS     Have you had these symptoms before:    [x] Yes  [] No    Have you been treated for these symptoms before:   [x] Yes  [] No    If a prescription is needed, what is your preferred pharmacy and phone number: [id number] [place] [address] [address] [facility] [address] - [phone]  - [phone] FX     Additional notes:  PATIENT WOULD LIKE TO KNOW IF A MEDICATION COULD BE CALLED IN TO HELP WITH HER SYMPTOMS     PLEASE CALL AND ADVISE

## 2023-02-08 RX ORDER — BACLOFEN 10 MG/1
TABLET ORAL
Qty: 30 TABLET | Refills: 3 | Status: SHIPPED | OUTPATIENT
Start: 2023-02-08

## 2023-02-08 NOTE — TELEPHONE ENCOUNTER
Rx Refill Note  Requested Prescriptions     Pending Prescriptions Disp Refills   • baclofen (LIORESAL) 10 MG tablet [Pharmacy Med Name: BACLOFEN 10 MG TABLET] 30 tablet 3     Sig: TAKE ONE TABLET BY MOUTH EVERY NIGHT AT BEDTIME AS NEEDED FOR MUSCLE SPASMS      Last office visit with prescribing clinician: 12/16/2022   Last telemedicine visit with prescribing clinician: 3/16/2023   Next office visit with prescribing clinician: 3/16/2023     SCANNED - LABS (12/13/2022)  SCANNED - LABS (10/18/2022)                      Would you like a call back once the refill request has been completed: [] Yes [] No    If the office needs to give you a call back, can they leave a voicemail: [] Yes [] No    Kiki Woodward CMA  02/08/23, 10:38 EST

## 2023-03-13 ENCOUNTER — TELEPHONE (OUTPATIENT)
Dept: FAMILY MEDICINE CLINIC | Facility: CLINIC | Age: 78
End: 2023-03-13

## 2023-03-13 DIAGNOSIS — M79.673 PAIN OF FOOT, UNSPECIFIED LATERALITY: Primary | ICD-10-CM

## 2023-03-13 NOTE — TELEPHONE ENCOUNTER
Caller: Christal Romo    Relationship to patient: Self    Best call back number:     Patient is needing: PATIENT STATES THAT ON 3/9 SHE STARTED HAVING PAIN IN HER FEET AND SHE BELIEVES SHE IS HAVING PAIN WITH  GOUT.   AS SHE IS A LIVER TRANSPLANT PATIENT FROM [facility], THEY DO NOT WANT TO PRESCRIBE HER ANYTHING UNTIL SHE HAS HER URIC ACID LAB WORK DONE.  SHE HAS AN APPT ON 3/16, BUT WANTS TO COME IN AS SOON AS POSSIBLE TO GET HER LABS DONE SO SOMETHING CAN BE PRESCRIBED BY THE TRANSPLANT TEAM.    SHE STATES IF THE OFFICE DOES NOT HAVE TIME TO DRAW HER LABS, SHE WOULD LIKE TO GET THOSE DONE AT [facility].   SHE STATES SHE IS IN MODERATE PAIN IN HER LEFT FOOT.  PLEASE ADVISE PATIENT IF THESE LABS CAN BE ORDERED FOR HER.

## 2023-03-14 ENCOUNTER — CLINICAL SUPPORT (OUTPATIENT)
Dept: FAMILY MEDICINE CLINIC | Facility: CLINIC | Age: 78
End: 2023-03-14
Payer: MEDICARE

## 2023-03-14 DIAGNOSIS — M79.673 PAIN OF FOOT, UNSPECIFIED LATERALITY: ICD-10-CM

## 2023-03-14 PROCEDURE — 36415 COLL VENOUS BLD VENIPUNCTURE: CPT | Performed by: FAMILY MEDICINE

## 2023-03-14 PROCEDURE — 84550 ASSAY OF BLOOD/URIC ACID: CPT | Performed by: FAMILY MEDICINE

## 2023-03-14 NOTE — PROGRESS NOTES
Venipuncture performed in L ARM by RT Rashawn  with good hemostasis. Patient tolerated well. 03/14/23 Linda Adam, RT

## 2023-03-15 LAB — URATE SERPL-MCNC: 10.8 MG/DL (ref 2.4–5.7)

## 2023-03-16 ENCOUNTER — TELEPHONE (OUTPATIENT)
Dept: FAMILY MEDICINE CLINIC | Facility: CLINIC | Age: 78
End: 2023-03-16

## 2023-03-16 NOTE — TELEPHONE ENCOUNTER
Pt states go ahead and send it in. The transplant team takes a while to get back to her and she needs some relief badly, moses over the wkd. She will let us know if they call her back and say to stop it.

## 2023-03-16 NOTE — TELEPHONE ENCOUNTER
----- Message from Sara Zhao DO sent at 3/15/2023 10:04 PM EDT -----  Uric acid is high-------would she take allopurinol 100mg qday an rech levels in 3mos?

## 2023-03-16 NOTE — TELEPHONE ENCOUNTER
Bre Hobbs RegSched Rep 8 minutes ago (10:49 AM)     KO  Caller: Christal Romo     Relationship: Self     Best call back number [phone]     What medication are you requesting: PLEASE REFERENCE ENCOUNTER FROM 3/16.     ALLOPURINOL 100 MG     What are your current symptoms:URIC ACID HIGH     If a prescription is needed, what is your preferred pharmacy and phone number: [facility] [id number] - [place] [address] [address] [facility] [address] - [phone]  - [phone] FX      Additional notes:

## 2023-03-16 NOTE — TELEPHONE ENCOUNTER
Caller: Christal Romo    Relationship: Self    Best call back number [phone]    What medication are you requesting: PLEASE REFERENCE ENCOUNTER FROM 3/16.    ALLOPURINOL 100 MG    What are your current symptoms:URIC ACID HIGH    If a prescription is needed, what is your preferred pharmacy and phone number: [facility] [id number] - [place] [address] [address] - [phone]  - [phone]      Additional notes:

## 2023-03-17 RX ORDER — PREDNISONE 10 MG/1
TABLET ORAL
Qty: 30 TABLET | Refills: 0 | Status: SHIPPED | OUTPATIENT
Start: 2023-03-17

## 2023-03-20 RX ORDER — BUSPIRONE HYDROCHLORIDE 5 MG/1
TABLET ORAL
Qty: 90 TABLET | Refills: 1 | Status: SHIPPED | OUTPATIENT
Start: 2023-03-20

## 2023-03-20 NOTE — TELEPHONE ENCOUNTER
Rx Refill Note  Requested Prescriptions     Pending Prescriptions Disp Refills   • busPIRone (BUSPAR) 5 MG tablet [Pharmacy Med Name: busPIRone HCL 5 MG TABLET] 90 tablet 2     Sig: TAKE ONE TABLET BY MOUTH THREE TIMES A DAY      Last office visit with prescribing clinician: 12/16/2022   Last telemedicine visit with prescribing clinician: 4/10/2023   Next office visit with prescribing clinician: 4/10/2023                         Would you like a call back once the refill request has been completed: [] Yes [] No    If the office needs to give you a call back, can they leave a voicemail: [] Yes [] No    Linda Adam, RT  03/20/23, 11:20 EDT

## 2023-03-30 ENCOUNTER — TELEPHONE (OUTPATIENT)
Dept: FAMILY MEDICINE CLINIC | Facility: CLINIC | Age: 78
End: 2023-03-30

## 2023-03-30 NOTE — TELEPHONE ENCOUNTER
Caller: Christal Romo    Relationship: Self    Best call back number:[phone]    What is the best time to reach you: ANYTIME    Who are you requesting to speak with (clinical staff, provider,  specific staff member): CLINICAL    What was the call regarding: PATIENT STATES THAT SHE IS STILL HAVING A LOT OF PAIN IN HER RIGHT FOOT. PATIENT STATES THAT DR PAZ SUGGESTED ANOTHER MEDICATION SHE COULD TRY OTHER THAN PREDNISONE. PATIENT IS REQUESTING A CALLBACK TO KNOW WHAT TO DO NOW OR IF SHE CAN GET THE MEDICATION PRESCRIBED.

## 2023-04-10 ENCOUNTER — OFFICE VISIT (OUTPATIENT)
Dept: FAMILY MEDICINE CLINIC | Facility: CLINIC | Age: 78
End: 2023-04-10
Payer: MEDICARE

## 2023-04-10 VITALS
SYSTOLIC BLOOD PRESSURE: 169 MMHG | HEART RATE: 74 BPM | OXYGEN SATURATION: 98 % | WEIGHT: 147.4 LBS | TEMPERATURE: 97.9 F | BODY MASS INDEX: 26.12 KG/M2 | DIASTOLIC BLOOD PRESSURE: 79 MMHG | HEIGHT: 63 IN

## 2023-04-10 DIAGNOSIS — I10 ELEVATED BLOOD PRESSURE READING WITH DIAGNOSIS OF HYPERTENSION: Primary | ICD-10-CM

## 2023-04-10 DIAGNOSIS — E79.0 ELEVATED URIC ACID IN BLOOD: ICD-10-CM

## 2023-04-10 PROCEDURE — 3077F SYST BP >= 140 MM HG: CPT | Performed by: FAMILY MEDICINE

## 2023-04-10 PROCEDURE — 99213 OFFICE O/P EST LOW 20 MIN: CPT | Performed by: FAMILY MEDICINE

## 2023-04-10 PROCEDURE — 3078F DIAST BP <80 MM HG: CPT | Performed by: FAMILY MEDICINE

## 2023-04-10 RX ORDER — FLUCONAZOLE 150 MG/1
150 TABLET ORAL AS NEEDED
Qty: 3 TABLET | Refills: 0 | Status: SHIPPED | OUTPATIENT
Start: 2023-04-10

## 2023-04-10 RX ORDER — ALLOPURINOL 100 MG/1
100 TABLET ORAL DAILY
Qty: 30 TABLET | Refills: 1 | Status: SHIPPED | OUTPATIENT
Start: 2023-04-10

## 2023-04-10 RX ORDER — DICYCLOMINE HCL 20 MG
20 TABLET ORAL EVERY 6 HOURS PRN
Start: 2023-04-10

## 2023-04-10 RX ORDER — COLCHICINE 0.6 MG/1
0.6 TABLET ORAL DAILY
Qty: 30 TABLET | Refills: 1 | Status: SHIPPED | OUTPATIENT
Start: 2023-04-10

## 2023-04-10 NOTE — PROGRESS NOTES
Subjective   Christal Romo is a 78 y.o. female.     Chief Complaint   Patient presents with   • Memory Loss     3 month follow up   • Gout     Pt stated wanted to know if need to do another lab for gout   • Hypertension         Current Outpatient Medications:   •  acetaminophen (TYLENOL) 500 MG tablet, Take 1 tablet by mouth. prn, Disp: , Rfl:   •  aspirin 81 MG tablet, Take 1 tablet by mouth Daily., Disp: , Rfl:   •  baclofen (LIORESAL) 10 MG tablet, TAKE ONE TABLET BY MOUTH EVERY NIGHT AT BEDTIME AS NEEDED FOR MUSCLE SPASMS, Disp: 30 tablet, Rfl: 3  •  busPIRone (BUSPAR) 5 MG tablet, TAKE ONE TABLET BY MOUTH THREE TIMES A DAY, Disp: 90 tablet, Rfl: 1  •  clindamycin (CLEOCIN) 300 MG capsule, TAKE TWO CAPSULES BY MOUTH ONE HOUR PRIOR TO DENTAL PROCEDURE, Disp: 4 capsule, Rfl: 0  •  diphenhydrAMINE (BENADRYL) 25 MG tablet, Take 1 tablet by mouth Every 6 (Six) Hours As Needed for Itching., Disp: , Rfl:   •  fenofibrate (Tricor) 48 MG tablet, Take 1 tablet by mouth Daily., Disp: 90 tablet, Rfl: 0  •  fexofenadine (ALLEGRA) 180 MG tablet, Take 1 tablet by mouth Daily., Disp: , Rfl:   •  Fluticasone-Umeclidin-Vilant (Trelegy Ellipta) 100-62.5-25 MCG/INH inhaler, Inhale 1 puff Daily., Disp: 14 each, Rfl: 0  •  Lutein 20 MG capsule, Take 1 tablet by mouth Daily., Disp: , Rfl:   •  meclizine (ANTIVERT) 12.5 MG tablet, Take 1 tablet by mouth Every 6 (Six) Hours As Needed for Dizziness., Disp: 30 tablet, Rfl: 1  •  metoprolol succinate XL (TOPROL-XL) 50 MG 24 hr tablet, Take 2.5 tablets by mouth Every Night. And may take and extra 25mg qday prn (Patient taking differently: Take 1 tablet by mouth 3 (Three) Times a Day.), Disp: 270 tablet, Rfl: 0  •  Multiple Vitamins-Minerals (Multivitamin Adults 50+) tablet, Take 1 tablet by mouth Daily., Disp: , Rfl:   •  mycophenolate (MYFORTIC) 360 MG tablet delayed-release EC tablet, Take 1 tablet by mouth Every Morning., Disp: 120 tablet, Rfl:   •  nystatin (MYCOSTATIN) 100,000 unit/mL  suspension, Swish and swallow 5 mL 4 (Four) Times a Day., Disp: 200 mL, Rfl: 1  •  omeprazole (priLOSEC) 20 MG capsule, 1 po qd, Disp: , Rfl:   •  ondansetron (Zofran) 4 MG tablet, Take 1 tablet by mouth Every 8 (Eight) Hours As Needed for Nausea or Vomiting. prn, Disp: 30 tablet, Rfl: 1  •  tacrolimus (PROGRAF) 1 MG capsule, 2 po qam and 1 po qpm, Disp: , Rfl:   •  traMADol (ULTRAM) 50 MG tablet, Take 1 tablet by mouth Every 6 (Six) Hours As Needed for Moderate Pain or Severe Pain., Disp: 30 tablet, Rfl: 0  •  allopurinol (Zyloprim) 100 MG tablet, Take 1 tablet by mouth Daily., Disp: 30 tablet, Rfl: 1  •  colchicine 0.6 MG tablet, Take 1 tablet by mouth Daily., Disp: 30 tablet, Rfl: 1  •  dicyclomine (BENTYL) 20 MG tablet, Take 1 tablet by mouth Every 6 (Six) Hours As Needed (diarrhea)., Disp: , Rfl:   •  fluconazole (Diflucan) 150 MG tablet, Take 1 tablet by mouth As Needed (vaginitis x 1 dose per episode)., Disp: 3 tablet, Rfl: 0    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 - Dr Ritter [facility]; CBC/Prograf/CMP/LIPIDS/A1C   • MAMMO     NEG = 2018/ 2021/ 2022   • Meniere's disease    • OAB    • Osteoporosis    • PUD        Past Surgical History:   Procedure Laterality Date   • BREAST LUMPECTOMY Left     Benign   • COLONOSCOPY      Polyps = 2012/ 2015/ 2020, rech 2025   GSI   • ERCP W/ PLASTIC STENT PLACEMENT  2014    Liver Stent   • [place] TRANSPL[place]ATION      [facility]   • SKIN CANCER EXCISION      [facility]   • TOTAL ABDOMINAL HYSTERECTOMY  1979       Family History   Problem Relation Age of Onset   • Diabetes Sister         Type II   • Heart disease Sister    • Hypertension Sister    • No Known Problems Brother    •  "Hemochromatosis Other        Social History     Socioeconomic History   • Marital status:    Tobacco Use   • Smoking status: Former     Packs/day: 0.25     Years: 1.00     Pack years: 0.25     Types: Cigarettes     Start date:      Quit date:      Years since quittin.3   • Smokeless tobacco: Never   • Tobacco comments:     Quit .    Vaping Use   • Vaping Use: Never used   Substance and Sexual Activity   • Alcohol use: No     Comment: very rarely    • Drug use: No   • Sexual activity: Defer       History of Present Illness  79 y/o C female here for f/u on HTN/ Memory loss and gout    The patient's gout flare calmed down slightly since she stopped the steroids and it started to be painful again. She has not started taking allopurinol and is experiencing more pain on the right than the left. She thinks it is arthritis.  She takes her anxiety pill at night and baclofen. She had 2 injections and was told to go on a gout restricted diet.     She has a side effect to diltiazem medication and wants to stop it because she experiences chest pain. She has elevated blood pressure. She took a metoprolol today.     She takes Metamucil to improve the diarrhea. She is experiencing a headache from Zofran. Buspar improves her anxiety symptoms.    Tramadol does not improve her symptoms of burning pain in her feet, but Biofreeze works. .     She does use eye drops for her dry eyes and was told they will not do eye surgery because her eyes are too dry. She was also told she had a \"lazy eye\". She wears eyeglasses.    She took a memory test in  and is not on any memory medication.    The following portions of the patient's history were reviewed and updated as appropriate: allergies, current medications, past family history, past medical history, past social history, past surgical history and problem list.    Review of Systems   Constitutional: Positive for appetite change. Negative for activity change, " unexpected weight gain and unexpected weight loss.   Eyes: Positive for visual disturbance.   Respiratory: Negative for cough, shortness of breath and wheezing.    Cardiovascular: Negative for palpitations and leg swelling.   Gastrointestinal: Positive for diarrhea. Negative for constipation, nausea and vomiting.   Musculoskeletal: Positive for gout. Negative for joint swelling.   Neurological: Positive for memory problem.       Vitals:    04/10/23 1457   BP: 169/79   Pulse: 74   Temp: 97.9 °F (36.6 °C)   SpO2: 98%       Objective   Physical Exam  Vitals and nursing note reviewed.   Constitutional:       General: She is not in acute distress.     Appearance: She is well-developed. She is not ill-appearing or toxic-appearing.   HENT:      Head: Normocephalic and atraumatic.   Cardiovascular:      Rate and Rhythm: Normal rate and regular rhythm.      Heart sounds: Normal heart sounds. No murmur heard.  Pulmonary:      Effort: Pulmonary effort is normal. No respiratory distress.      Breath sounds: Normal breath sounds. No stridor. No wheezing, rhonchi or rales.   Skin:     General: Skin is warm and dry.      Findings: No rash.   Neurological:      Mental Status: She is alert and oriented to person, place, and time.      Cranial Nerves: No cranial nerve deficit.   Psychiatric:         Attention and Perception: Attention and perception normal.         Mood and Affect: Mood normal.         Speech: Speech normal.         Behavior: Behavior normal. Behavior is cooperative.         Thought Content: Thought content normal.         Cognition and Memory: Cognition normal.         Judgment: Judgment normal.           Assessment & Plan   Diagnoses and all orders for this visit:    1. Elevated blood pressure reading with diagnosis of hypertension (Primary)    2. Elevated uric acid in blood  -     Uric Acid; Future    Other orders  -     allopurinol (Zyloprim) 100 MG tablet; Take 1 tablet by mouth Daily.  Dispense: 30 tablet;  Refill: 1  -     colchicine 0.6 MG tablet; Take 1 tablet by mouth Daily.  Dispense: 30 tablet; Refill: 1  -     fluconazole (Diflucan) 150 MG tablet; Take 1 tablet by mouth As Needed (vaginitis x 1 dose per episode).  Dispense: 3 tablet; Refill: 0  -     dicyclomine (BENTYL) 20 MG tablet; Take 1 tablet by mouth Every 6 (Six) Hours As Needed (diarrhea).      - Patient will be prescribed allopurinol and colchicine.  - Blood work will be done at the end of the month.    - Medications reviewed and sent off.  - Will check patient's uric acid and memory test in 1 month.        Transcribed from ambient dictation for Sara Zhao DO by Carolynn Ventura.  04/10/23   19:13 EDT    Patient or patient representative verbalized consent to the visit recording.  I have personally performed the services described in this document as transcribed by the above individual, and it is both accurate and complete.

## 2023-04-13 ENCOUNTER — OFFICE (AMBULATORY)
Dept: URBAN - METROPOLITAN AREA CLINIC 64 | Facility: CLINIC | Age: 78
End: 2023-04-13

## 2023-04-13 VITALS
HEART RATE: 75 BPM | SYSTOLIC BLOOD PRESSURE: 183 MMHG | HEIGHT: 63 IN | DIASTOLIC BLOOD PRESSURE: 108 MMHG | WEIGHT: 146 LBS

## 2023-04-13 DIAGNOSIS — M10.9 GOUT, UNSPECIFIED: ICD-10-CM

## 2023-04-13 DIAGNOSIS — Z79.82 LONG TERM (CURRENT) USE OF ASPIRIN: ICD-10-CM

## 2023-04-13 DIAGNOSIS — R10.32 LEFT LOWER QUADRANT PAIN: ICD-10-CM

## 2023-04-13 DIAGNOSIS — Z87.891 PERSONAL HISTORY OF NICOTINE DEPENDENCE: ICD-10-CM

## 2023-04-13 DIAGNOSIS — R19.4 CHANGE IN BOWEL HABIT: ICD-10-CM

## 2023-04-13 DIAGNOSIS — Z94.4 LIVER TRANSPLANT STATUS: ICD-10-CM

## 2023-04-13 DIAGNOSIS — K75.81 NONALCOHOLIC STEATOHEPATITIS (NASH): ICD-10-CM

## 2023-04-13 PROCEDURE — 99213 OFFICE O/P EST LOW 20 MIN: CPT | Performed by: INTERNAL MEDICINE

## 2023-05-09 ENCOUNTER — TELEPHONE (OUTPATIENT)
Dept: FAMILY MEDICINE CLINIC | Facility: CLINIC | Age: 78
End: 2023-05-09
Payer: MEDICARE

## 2023-05-09 NOTE — TELEPHONE ENCOUNTER
Patient called complaining of urinary frequency.  Patient states she does not have any burning or pain but just has to go all the time.  Patient requesting to come in and leave a UA.

## 2023-05-10 ENCOUNTER — CLINICAL SUPPORT (OUTPATIENT)
Dept: FAMILY MEDICINE CLINIC | Facility: CLINIC | Age: 78
End: 2023-05-10
Payer: MEDICARE

## 2023-05-10 DIAGNOSIS — R30.0 DYSURIA: Primary | ICD-10-CM

## 2023-05-10 PROCEDURE — 81001 URINALYSIS AUTO W/SCOPE: CPT | Performed by: FAMILY MEDICINE

## 2023-05-11 LAB
BACTERIA UR QL AUTO: NORMAL /HPF
BILIRUB UR QL STRIP: NEGATIVE
CLARITY UR: CLEAR
COLOR UR: YELLOW
GLUCOSE UR STRIP-MCNC: NEGATIVE MG/DL
HGB UR QL STRIP.AUTO: NEGATIVE
HYALINE CASTS UR QL AUTO: NORMAL /LPF
KETONES UR QL STRIP: NEGATIVE
LEUKOCYTE ESTERASE UR QL STRIP.AUTO: ABNORMAL
NITRITE UR QL STRIP: NEGATIVE
PH UR STRIP.AUTO: 6 [PH] (ref 5–8)
PROT UR QL STRIP: NEGATIVE
RBC # UR STRIP: NORMAL /HPF
REF LAB TEST METHOD: NORMAL
SP GR UR STRIP: 1.02 (ref 1–1.03)
SQUAMOUS #/AREA URNS HPF: NORMAL /HPF
UROBILINOGEN UR QL STRIP: ABNORMAL
WBC # UR STRIP: NORMAL /HPF

## 2023-05-12 ENCOUNTER — TELEPHONE (OUTPATIENT)
Dept: FAMILY MEDICINE CLINIC | Facility: CLINIC | Age: 78
End: 2023-05-12
Payer: MEDICARE

## 2023-06-01 RX ORDER — METOPROLOL SUCCINATE 50 MG/1
TABLET, EXTENDED RELEASE ORAL
Qty: 270 TABLET | Refills: 0 | Status: SHIPPED | OUTPATIENT
Start: 2023-06-01

## 2023-06-05 ENCOUNTER — TELEPHONE (OUTPATIENT)
Dept: CARDIOLOGY | Facility: CLINIC | Age: 78
End: 2023-06-05

## 2023-06-05 ENCOUNTER — TELEPHONE (OUTPATIENT)
Dept: FAMILY MEDICINE CLINIC | Facility: CLINIC | Age: 78
End: 2023-06-05

## 2023-06-05 NOTE — TELEPHONE ENCOUNTER
Caller: Christal Romo    Relationship to patient: Self    Best call back number: [phone]    Patient is needing:     WOULD LIKE TO KNOW IF THERE ARE ANY SAMPLES OF THE   Fluticasone-Umeclidin-Vilant (Trelegy Ellipta) 100-62.5-25 MCG/INH inhaler THAT SHE COULD  WHEN SHE IN TO GET LABS ON 6.12.23    WOULD LIKE A CALL BACK CONFIRM OR DENY.

## 2023-06-05 NOTE — TELEPHONE ENCOUNTER
I called the patient- asked her to get a cardiac clearance request sent to our office for Dr Clifton to review.

## 2023-06-05 NOTE — TELEPHONE ENCOUNTER
Caller: Christal Romo    Relationship: Self    Best call back number: [phone]    Current Outpatient Medications on File Prior to Visit   Medication Sig Dispense Refill    acetaminophen (TYLENOL) 500 MG tablet Take 1 tablet by mouth. prn      allopurinol (Zyloprim) 100 MG tablet Take 1 tablet by mouth Daily. 30 tablet 1    aspirin 81 MG tablet Take 1 tablet by mouth Daily.      baclofen (LIORESAL) 10 MG tablet TAKE ONE TABLET BY MOUTH EVERY NIGHT AT BEDTIME AS NEEDED FOR MUSCLE SPASMS 30 tablet 3    busPIRone (BUSPAR) 5 MG tablet TAKE ONE TABLET BY MOUTH THREE TIMES A DAY 90 tablet 1    clindamycin (CLEOCIN) 300 MG capsule TAKE TWO CAPSULES BY MOUTH ONE HOUR PRIOR TO DENTAL PROCEDURE 4 capsule 0    colchicine 0.6 MG tablet Take 1 tablet by mouth Daily. 30 tablet 1    dicyclomine (BENTYL) 20 MG tablet Take 1 tablet by mouth Every 6 (Six) Hours As Needed (diarrhea).      diphenhydrAMINE (BENADRYL) 25 MG tablet Take 1 tablet by mouth Every 6 (Six) Hours As Needed for Itching.      fenofibrate (Tricor) 48 MG tablet Take 1 tablet by mouth Daily. 90 tablet 0    fexofenadine (ALLEGRA) 180 MG tablet Take 1 tablet by mouth Daily.      fluconazole (Diflucan) 150 MG tablet Take 1 tablet by mouth As Needed (vaginitis x 1 dose per episode). 3 tablet 0    Fluticasone-Umeclidin-Vilant (Trelegy Ellipta) 100-62.5-25 MCG/INH inhaler Inhale 1 puff Daily. 14 each 0    Lutein 20 MG capsule Take 1 tablet by mouth Daily.      meclizine (ANTIVERT) 12.5 MG tablet Take 1 tablet by mouth Every 6 (Six) Hours As Needed for Dizziness. 30 tablet 1    metoprolol succinate XL (TOPROL-XL) 50 MG 24 hr tablet TAKE THREE TABLETS BY MOUTH EVERY NIGHT AT BEDTIME 270 tablet 0    Multiple Vitamins-Minerals (Multivitamin Adults 50+) tablet Take 1 tablet by mouth Daily.      mycophenolate (MYFORTIC) 360 MG tablet delayed-release EC tablet Take 1 tablet by mouth Every Morning. 120 tablet     nystatin (MYCOSTATIN) 100,000 unit/mL suspension Swish and  swallow 5 mL 4 (Four) Times a Day. 200 mL 1    omeprazole (priLOSEC) 20 MG capsule 1 po qd      ondansetron (Zofran) 4 MG tablet Take 1 tablet by mouth Every 8 (Eight) Hours As Needed for Nausea or Vomiting. prn 30 tablet 1    tacrolimus (PROGRAF) 1 MG capsule 2 po qam and 1 po qpm      traMADol (ULTRAM) 50 MG tablet Take 1 tablet by mouth Every 6 (Six) Hours As Needed for Moderate Pain or Severe Pain. 30 tablet 0     No current facility-administered medications on file prior to visit.          When did you start taking these medications: ABOUT 7 1/2 YEARS AGO    Which medication are you concerned about: ASPIRIN 81 MG      What are your concerns: PATIENT IS SCHEDULED TO HAVE CATARACT SURGERY ON 6.13.23 AND WOULD LIKE TO KNOW IF SHE SHOULD STILL TAKE HER ASPIRIN OR DISCONTINUE FOR SURGERY PURPOSES, PLEASE ADVISE, THANK YOU.

## 2023-06-08 ENCOUNTER — OFFICE VISIT (OUTPATIENT)
Dept: CARDIOLOGY | Facility: CLINIC | Age: 78
End: 2023-06-08
Payer: MEDICARE

## 2023-06-08 VITALS
HEART RATE: 87 BPM | SYSTOLIC BLOOD PRESSURE: 152 MMHG | HEIGHT: 63 IN | DIASTOLIC BLOOD PRESSURE: 98 MMHG | WEIGHT: 148 LBS | BODY MASS INDEX: 26.22 KG/M2 | OXYGEN SATURATION: 98 %

## 2023-06-08 DIAGNOSIS — I38 VALVULAR HEART DISEASE: ICD-10-CM

## 2023-06-08 DIAGNOSIS — I10 ESSENTIAL HYPERTENSION: Primary | ICD-10-CM

## 2023-06-08 DIAGNOSIS — Z88.9 MULTIPLE DRUG ALLERGIES: ICD-10-CM

## 2023-06-08 DIAGNOSIS — E78.2 MIXED HYPERLIPIDEMIA: ICD-10-CM

## 2023-06-08 PROCEDURE — 3080F DIAST BP >= 90 MM HG: CPT | Performed by: INTERNAL MEDICINE

## 2023-06-08 PROCEDURE — 99214 OFFICE O/P EST MOD 30 MIN: CPT | Performed by: INTERNAL MEDICINE

## 2023-06-08 PROCEDURE — 93000 ELECTROCARDIOGRAM COMPLETE: CPT | Performed by: INTERNAL MEDICINE

## 2023-06-08 PROCEDURE — 1160F RVW MEDS BY RX/DR IN RCRD: CPT | Performed by: INTERNAL MEDICINE

## 2023-06-08 PROCEDURE — 1159F MED LIST DOCD IN RCRD: CPT | Performed by: INTERNAL MEDICINE

## 2023-06-08 PROCEDURE — 3077F SYST BP >= 140 MM HG: CPT | Performed by: INTERNAL MEDICINE

## 2023-06-08 RX ORDER — METOPROLOL SUCCINATE 100 MG/1
100 TABLET, EXTENDED RELEASE ORAL DAILY
Qty: 90 TABLET | Refills: 3 | Status: SHIPPED | OUTPATIENT
Start: 2023-06-08

## 2023-06-08 RX ORDER — METOPROLOL SUCCINATE 50 MG/1
50 TABLET, EXTENDED RELEASE ORAL DAILY
Qty: 90 TABLET | Refills: 3 | Status: SHIPPED | OUTPATIENT
Start: 2023-06-08

## 2023-06-08 NOTE — PROGRESS NOTES
Cardiology Office Visit      Encounter Date:  06/08/2023    Patient ID:   Christal Romo is a 78 y.o. female.    Reason For Followup:  Valvular heart disease  Hypertension    Brief Clinical History:  Dear Sara Espino, DO    I had the pleasure of seeing Christal Romo today. As you are well aware, this is a 78 y.o. female with no known history of ischemic heart disease.  She does have a history of nonalcoholic steatohepatitis and liver cirrhosis and is status post liver transplant.  She has additional history that includes hypertension, palpitations, and aortic insufficiency. She presents today for follow-up on the above conditions.    Interval History:  She denies any shortness of breath.  She denies any PND orthopnea.  She denies any PND orthopnea.  She denies any syncope or near syncope.  She reports feeling in her usual cardiac state of health.    Her blood pressure is elevated today.  As I am sure you will recall, she has an intolerance to multiple medications including multiple antihypertensives.  This makes treating her hypertension difficult.  She reports that she was started on metoprolol and this causes her drowsiness so she takes it in the evening.  She was also placed on valsartan 160 mg daily.  She reports that she started taking this and became dizzy lightheaded and felt like she was going to pass out.  She completely stopped the medication.  She started back on a reduced dose at 40 mg daily and reports that she had a headache with this and stopped taking it.  She now takes a 40 mg dose as she feels like she needs it.    Her most recent echocardiogram performed in December 2020.  Normal LV systolic function was noted.  Mild MR, TR, AI and PI was noted.  This is unchanged from prior studies.    She underwent a heart and vascular screening in July 2021.  Her coronary calcium score was 23.2.  Her ABIs were normal.  No evidence of carotid or abdominal aortic disease was noted.    Her most recent  "laboratory data is from April 2023.  White blood cell count is 6.9 hemoglobin 12.8 hematocrit 38.6 platelets 160,000.  Sodium 140 potassium 4.9 chloride 109 CO2 24 glucose 100 BUN 32 creatinine 1.4.  Total cholesterol 139 triglycerides 382 HDL 32 LDL 31.  AST 14 ALT 26 alkaline phosphatase 73.    Assessment & Plan    Impressions:  Hypertension     I believe a significant amount of her hypertension is due to anxiety, and pain.  Palpitations.  History of abnormal troponin secondary to mouse antigen interaction with troponin assay reagent.  Cirrhosis status post liver transplant 2016  Poor dentition.  Valvular heart disease     Mild MR, TR, AI, PI Echocardiogram December 2020  Hyperlipidemia with a preponderance of hypertriglyceridemia  Depression  Multiple medication sensitivities and intolerances    Recommendations:  Continuation of her current cardiovascular regimen at the present time.     This includes antiplatelet therapy, and antihypertensives.  Continue to log blood pressures and bring values in for next visit  Echocardiogram after next visit  Follow-up in 6 months time.    Diagnoses and all orders for this visit:    1. Essential hypertension (Primary)  -     ECG 12 Lead    2. Mixed hyperlipidemia  -     ECG 12 Lead    3. Valvular heart disease  -     ECG 12 Lead    4. Multiple drug allergies  -     ECG 12 Lead    Other orders  -     metoprolol succinate XL (Toprol XL) 100 MG 24 hr tablet; Take 1 tablet by mouth Daily.  Dispense: 90 tablet; Refill: 3  -     metoprolol succinate XL (TOPROL-XL) 50 MG 24 hr tablet; Take 1 tablet by mouth Daily.  Dispense: 90 tablet; Refill: 3          Objective:    Vitals:  Vitals:    06/08/23 1309   BP: 152/98   BP Location: Left arm   Patient Position: Sitting   Pulse: 87   SpO2: 98%   Weight: 67.1 kg (148 lb)   Height: 160 cm (63\")     Body mass index is 26.22 kg/m².      Physical Exam:    General: Alert, cooperative, no distress, appears stated age  Head:  Normocephalic, " atraumatic, mucous membranes moist  Eyes:  Conjunctiva/corneas clear, EOM's intact     Neck:  Supple,  no bruit    Lungs: Clear to auscultation bilaterally, no wheezes rhonchi rales are noted  Chest wall: No tenderness  Heart::  Regular rate and rhythm, S1 and S2 normal, 1/6 holosystolic murmur.  No rub or gallop  Abdomen: Soft, non-tender, nondistended bowel sounds active  Extremities: No cyanosis, clubbing, or edema.  Ankle braces  Pulses: Pedal pulses nonpalpable because of ankle braces  Skin:  No rashes or lesions  Neuro/psych: A&O x3. CN II through XII are grossly intact with appropriate affect      Allergies:  Allergies   Allergen Reactions    Amlodipine Itching    Atacand  [Candesartan Cilexetil] Other (See Comments)    Atenolol Palpitations    Azithromycin Nausea Only    Ibuprofen Unknown (See Comments)    Levofloxacin Nausea Only    Penicillin G Hives    Spironolactone Nausea And Vomiting    Sucralfate Swelling    Sulfamethoxazole-Trimethoprim Rash and Other (See Comments)     THROMBOCYTOPENIA     Triamterene-Hctz Myalgia    Venlafaxine Other (See Comments)     HEADACHE  Other reaction(s): Unknown    Cardizem Cd [Diltiazem Hcl Er Coated Beads] Other (See Comments)     Chest pressure    Clonidine Derivatives GI Intolerance    Fluconazole Nausea Only and Headache    Lactulose Unknown - High Severity    Lexapro [Escitalopram] Unknown - High Severity    Lipitor [Atorvastatin] Myalgia    Polyoxyethylene 40 Sorbitol Septaoleate [Sorbitan] Unknown - High Severity    Whey Nausea And Vomiting    Zyrtec [Cetirizine] Itching    Citrus Other (See Comments)    Paroxetine Palpitations    Zoloft [Sertraline Hcl] Palpitations       Medication Review:     Current Outpatient Medications:     acetaminophen (TYLENOL) 500 MG tablet, Take 1 tablet by mouth. prn, Disp: , Rfl:     allopurinol (Zyloprim) 100 MG tablet, Take 1 tablet by mouth Daily., Disp: 30 tablet, Rfl: 1    aspirin 81 MG tablet, Take 1 tablet by mouth Daily.,  Disp: , Rfl:     baclofen (LIORESAL) 10 MG tablet, TAKE ONE TABLET BY MOUTH EVERY NIGHT AT BEDTIME AS NEEDED FOR MUSCLE SPASMS, Disp: 30 tablet, Rfl: 3    busPIRone (BUSPAR) 5 MG tablet, TAKE ONE TABLET BY MOUTH THREE TIMES A DAY, Disp: 90 tablet, Rfl: 1    colchicine 0.6 MG tablet, Take 1 tablet by mouth Daily., Disp: 30 tablet, Rfl: 1    dicyclomine (BENTYL) 20 MG tablet, Take 1 tablet by mouth Every 6 (Six) Hours As Needed (diarrhea)., Disp: , Rfl:     diphenhydrAMINE (BENADRYL) 25 MG tablet, Take 1 tablet by mouth Every 6 (Six) Hours As Needed for Itching., Disp: , Rfl:     fenofibrate (Tricor) 48 MG tablet, Take 1 tablet by mouth Daily., Disp: 90 tablet, Rfl: 0    fexofenadine (ALLEGRA) 180 MG tablet, Take 1 tablet by mouth Daily., Disp: , Rfl:     fluconazole (Diflucan) 150 MG tablet, Take 1 tablet by mouth As Needed (vaginitis x 1 dose per episode)., Disp: 3 tablet, Rfl: 0    Fluticasone-Umeclidin-Vilant (Trelegy Ellipta) 100-62.5-25 MCG/ACT inhaler, Inhale 1 puff Daily., Disp: 60 each, Rfl: 0    Lutein 20 MG capsule, Take 1 tablet by mouth Daily., Disp: , Rfl:     meclizine (ANTIVERT) 12.5 MG tablet, Take 1 tablet by mouth Every 6 (Six) Hours As Needed for Dizziness., Disp: 30 tablet, Rfl: 1    Multiple Vitamins-Minerals (Multivitamin Adults 50+) tablet, Take 1 tablet by mouth Daily., Disp: , Rfl:     mycophenolate (MYFORTIC) 360 MG tablet delayed-release EC tablet, Take 1 tablet by mouth Every Morning., Disp: 120 tablet, Rfl:     nystatin (MYCOSTATIN) 100,000 unit/mL suspension, Swish and swallow 5 mL 4 (Four) Times a Day., Disp: 200 mL, Rfl: 1    omeprazole (priLOSEC) 20 MG capsule, 1 po qd, Disp: , Rfl:     ondansetron (Zofran) 4 MG tablet, Take 1 tablet by mouth Every 8 (Eight) Hours As Needed for Nausea or Vomiting. prn, Disp: 30 tablet, Rfl: 1    tacrolimus (PROGRAF) 1 MG capsule, 2 po qam and 1 po qpm, Disp: , Rfl:     TobraDex 0.3-0.1 % ophthalmic suspension, Apply 1 drop to eye(s) as directed by  provider Every 12 (Twelve) Hours., Disp: , Rfl:     traMADol (ULTRAM) 50 MG tablet, Take 1 tablet by mouth Every 6 (Six) Hours As Needed for Moderate Pain or Severe Pain., Disp: 30 tablet, Rfl: 0    clindamycin (CLEOCIN) 300 MG capsule, TAKE TWO CAPSULES BY MOUTH ONE HOUR PRIOR TO DENTAL PROCEDURE (Patient not taking: Reported on 2023), Disp: 4 capsule, Rfl: 0    metoprolol succinate XL (Toprol XL) 100 MG 24 hr tablet, Take 1 tablet by mouth Daily., Disp: 90 tablet, Rfl: 3    metoprolol succinate XL (TOPROL-XL) 50 MG 24 hr tablet, Take 1 tablet by mouth Daily., Disp: 90 tablet, Rfl: 3    Family History:  Family History   Problem Relation Age of Onset    Diabetes Sister         Type II    Heart disease Sister     Hypertension Sister     No Known Problems Brother     Hemochromatosis Other        Past Medical History:  Past Medical History:   Diagnosis Date    Allergic     Anxiety     Arthritis     B12 deficiency     Cataract     Chronic diarrhea     Depression     GERD     Headache     Hyperlipidemia     Hypertension     Insulin resistance     Liver disease     s/p Transplant    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 - Dr Ritter [facility]; CBC/Prograf/CMP/LIPIDS/A1C    MAMMO     NEG = 2021/     Meniere's disease     OAB     Osteoporosis     PUD        Past Surgical History:  Past Surgical History:   Procedure Laterality Date    BREAST LUMPECTOMY Left     Benign    COLONOSCOPY      Polyps = / / , rech    GSI    ERCP W/ PLASTIC STENT PLACEMENT      Liver Stent    LIVER TRANSPLANTATION      [place]    SKIN CANCER EXCISION      Rt UE    TOTAL ABDOMINAL HYSTERECTOMY         Social History:  Social History     Socioeconomic History    Marital status:    Tobacco Use    Smoking status: Former     Packs/day: 0.25     Years: 1.00     Pack years: 0.25     Types: Cigarettes     Start date:      Quit date:      Years since quittin.4    Smokeless  tobacco: Never    Tobacco comments:     Quit 2002.    Vaping Use    Vaping Use: Never used   Substance and Sexual Activity    Alcohol use: No     Comment: very rarely     Drug use: No    Sexual activity: Defer       Review of Systems:  The following systems were reviewed as they relate to the cardiovascular system: Constitutional, Eyes, ENT, Cardiovascular, Respiratory, Gastrointestinal, Integumentary, Neurological, Psychiatric, Hematologic, Endocrine, Musculoskeletal, and Genitourinary. The pertinent cardiovascular findings are reported above with all other cardiovascular points within those systems being negative.    Diagnostic Study Review:     Current Electrocardiogram:    ECG 12 Lead    Date/Time: 6/8/2023 5:56 PM  Performed by: Domenico Clifton DO  Authorized by: Domenico Clifton DO   Comparison: not compared with previous ECG   Previous ECG: no previous ECG available  Comments: Normal sinus rhythm with a ventricular rate of 71 bpm.  LVH.  Consider old anterior septal MI.  Normal QT and QTc intervals.        Laboratory Data:  Lab Results   Component Value Date    GLUCOSE 121 (H) 09/08/2022    BUN 20 09/08/2022    CREATININE 1.16 (H) 09/08/2022    EGFRIFNONA 31 (L) 04/20/2020    BCR 17.2 09/08/2022    K 4.5 09/08/2022    CO2 25.3 09/08/2022    CALCIUM 9.5 09/08/2022    ALBUMIN 4.62 09/05/2022    AST 24 09/05/2022    ALT 25 09/05/2022     Lab Results   Component Value Date    GLUCOSE 121 (H) 09/08/2022    CALCIUM 9.5 09/08/2022     09/08/2022    K 4.5 09/08/2022    CO2 25.3 09/08/2022     09/08/2022    BUN 20 09/08/2022    CREATININE 1.16 (H) 09/08/2022    EGFRIFNONA 31 (L) 04/20/2020    BCR 17.2 09/08/2022    ANIONGAP 11.7 09/08/2022     Lab Results   Component Value Date    WBC 4.54 09/08/2022    HGB 12.2 09/08/2022    HCT 37.0 09/08/2022    MCV 87.7 09/08/2022     (L) 09/08/2022     No results found for: CHOL, CHLPL, TRIG, HDL, LDL, LDLDIRECT  Lab Results   Component  Value Date    HGBA1C 5.2 09/20/2021     No results found for: INR, PROTIME    Most Recent Echo:  Results for orders placed during the hospital encounter of 12/07/20    Adult Transthoracic Echo Complete W/ Cont if Necessary Per Protocol    Interpretation Summary  · Estimated left ventricular EF was in agreement with the calculated left ventricular EF. Left ventricular ejection fraction appears to be 56 - 60%. Left ventricular systolic function is normal.  · Left ventricular wall thickness is consistent with hypertrophy. Sigmoid-shaped ventricular septum is present.  · Left ventricular diastolic function is consistent with (grade I) impaired relaxation.  · Estimated right ventricular systolic pressure from tricuspid regurgitation is normal (<35 mmHg).       Most Recent Stress Test:       Most Recent Cardiac Catheterization:   No results found for this or any previous visit.       NOTE: The following portions of the patient's note were reviewed, confirmed and/or updated this visit as appropriate: History of present illness/Interval history, physical examination, assessment & plan, allergies, current medications, past family history, past medical history, past social history, past surgical history and problem list.

## 2023-06-12 ENCOUNTER — CLINICAL SUPPORT (OUTPATIENT)
Dept: FAMILY MEDICINE CLINIC | Facility: CLINIC | Age: 78
End: 2023-06-12
Payer: MEDICARE

## 2023-06-12 DIAGNOSIS — E79.0 ELEVATED URIC ACID IN BLOOD: ICD-10-CM

## 2023-06-12 PROCEDURE — 36415 COLL VENOUS BLD VENIPUNCTURE: CPT | Performed by: FAMILY MEDICINE

## 2023-06-12 PROCEDURE — 84550 ASSAY OF BLOOD/URIC ACID: CPT | Performed by: FAMILY MEDICINE

## 2023-06-12 NOTE — PROGRESS NOTES
Venipuncture performed in L ARM by RT Rashawn  with good hemostasis. Patient tolerated well. 06/12/23 Linda Adam RT \

## 2023-06-13 ENCOUNTER — TELEPHONE (OUTPATIENT)
Dept: FAMILY MEDICINE CLINIC | Facility: CLINIC | Age: 78
End: 2023-06-13
Payer: MEDICARE

## 2023-06-13 ENCOUNTER — TELEPHONE (OUTPATIENT)
Dept: FAMILY MEDICINE CLINIC | Facility: CLINIC | Age: 78
End: 2023-06-13

## 2023-06-13 LAB — URATE SERPL-MCNC: 8.9 MG/DL (ref 2.4–5.7)

## 2023-06-13 RX ORDER — ALLOPURINOL 100 MG/1
200 TABLET ORAL DAILY
Qty: 60 TABLET | Refills: 1 | Status: SHIPPED | OUTPATIENT
Start: 2023-06-13

## 2023-06-13 NOTE — TELEPHONE ENCOUNTER
Leelee Lockwood RegSched Rep 1 minute ago (3:55 PM)     CV  HUB TO READ MESSAGE WAS RELAYED TO PATIENT REGARDING LAB RESULTS.

## 2023-06-13 NOTE — TELEPHONE ENCOUNTER
HUB to share    Uric acid too high---increase allopurinol to 200mg qday and rech in 1-2mos    LVM informing pt

## 2023-06-19 RX ORDER — BACLOFEN 10 MG/1
TABLET ORAL
Qty: 30 TABLET | Refills: 3 | Status: SHIPPED | OUTPATIENT
Start: 2023-06-19

## 2023-06-26 PROBLEM — R10.32 LEFT LOWER QUADRANT PAIN: Status: ACTIVE | Noted: 2023-06-26

## 2023-06-26 PROBLEM — K57.30 DVRTCLOS OF LG INT W/O PERFORATION OR ABSCESS W/O BLEEDING: Status: ACTIVE | Noted: 2020-07-23

## 2023-06-26 PROBLEM — L29.8 OTHER PRURITUS: Status: ACTIVE | Noted: 2023-06-26

## 2023-06-26 PROBLEM — H25.9 AGE-RELATED CATARACT OF BOTH EYES: Status: ACTIVE | Noted: 2023-04-17

## 2023-06-26 PROBLEM — K74.69 OTHER CIRRHOSIS OF LIVER: Status: ACTIVE | Noted: 2023-06-26

## 2023-06-26 PROBLEM — K82.9 GALL BLADDER DISEASE: Status: ACTIVE | Noted: 2023-06-26

## 2023-06-26 PROBLEM — E78.00 PURE HYPERCHOLESTEROLEMIA: Status: ACTIVE | Noted: 2023-06-26

## 2023-06-26 PROBLEM — R11.0 NAUSEA: Status: ACTIVE | Noted: 2023-06-26

## 2023-06-26 PROBLEM — Z94.4 HISTORY OF LIVER TRANSPLANT: Status: ACTIVE | Noted: 2023-06-26

## 2023-06-26 PROBLEM — K58.0 IRRITABLE BOWEL SYNDROME WITH DIARRHEA: Status: ACTIVE | Noted: 2023-06-26

## 2023-06-26 PROBLEM — J90 PLEURAL EFFUSION: Status: ACTIVE | Noted: 2023-06-26

## 2023-06-26 PROBLEM — I10 HTN (HYPERTENSION): Status: ACTIVE | Noted: 2023-06-26

## 2023-06-26 PROBLEM — M54.9 BACK PAIN: Status: ACTIVE | Noted: 2023-06-26

## 2023-06-26 PROBLEM — R13.10 DYSPHAGIA: Status: ACTIVE | Noted: 2023-06-26

## 2023-06-26 PROBLEM — J94.8 OTHER SPECIFIED PLEURAL CONDITIONS: Status: ACTIVE | Noted: 2023-06-26

## 2023-06-26 PROBLEM — K75.81 NONALCOHOLIC STEATOHEPATITIS (NASH): Status: ACTIVE | Noted: 2023-06-26

## 2023-06-26 PROBLEM — K21.9 GERD (GASTROESOPHAGEAL REFLUX DISEASE): Status: ACTIVE | Noted: 2023-06-26

## 2023-06-26 PROBLEM — N76.0 VULVOVAGINITIS: Status: ACTIVE | Noted: 2018-08-03

## 2023-06-26 PROBLEM — K22.89 OTHER SPECIFIED DISEASE OF ESOPHAGUS: Status: ACTIVE | Noted: 2020-07-23

## 2023-06-26 PROBLEM — K21.9 GASTRO-ESOPHAGEAL REFLUX DISEASE WITHOUT ESOPHAGITIS: Status: ACTIVE | Noted: 2023-06-26

## 2023-06-26 PROBLEM — K63.5 POLYP OF COLON: Status: ACTIVE | Noted: 2020-01-15

## 2023-06-26 PROBLEM — L29.89 OTHER PRURITUS: Status: ACTIVE | Noted: 2023-06-26

## 2023-06-26 PROBLEM — I10 ESSENTIAL HYPERTENSION: Status: ACTIVE | Noted: 2022-08-10

## 2023-06-26 PROBLEM — K31.89 OTHER DISEASES OF STOMACH AND DUODENUM: Status: ACTIVE | Noted: 2020-07-23

## 2023-06-26 PROBLEM — D64.9 ANEMIA OF UNKNOWN ETIOLOGY: Status: ACTIVE | Noted: 2023-06-26

## 2023-06-26 PROBLEM — R19.4 CHANGE IN BOWEL HABIT: Status: ACTIVE | Noted: 2023-06-26

## 2023-06-26 PROBLEM — K64.0 FIRST DEGREE HEMORRHOIDS: Status: ACTIVE | Noted: 2020-07-23

## 2023-06-26 PROBLEM — R42 VERTIGO: Status: ACTIVE | Noted: 2023-06-26

## 2023-06-26 PROBLEM — R05.9 COUGH: Status: ACTIVE | Noted: 2023-06-26

## 2023-06-26 PROBLEM — M10.9 GOUT: Status: ACTIVE | Noted: 2023-06-26

## 2023-06-26 PROBLEM — R11.2 NAUSEA AND VOMITING: Status: ACTIVE | Noted: 2023-06-26

## 2023-06-26 PROBLEM — I10 HIGH BLOOD PRESSURE: Status: ACTIVE | Noted: 2023-06-26

## 2023-08-18 ENCOUNTER — APPOINTMENT (OUTPATIENT)
Dept: GENERAL RADIOLOGY | Facility: HOSPITAL | Age: 78
DRG: 305 | End: 2023-08-18
Payer: MEDICARE

## 2023-08-18 ENCOUNTER — HOSPITAL ENCOUNTER (INPATIENT)
Facility: HOSPITAL | Age: 78
LOS: 2 days | Discharge: HOME | DRG: 305 | End: 2023-08-22
Attending: STUDENT IN AN ORGANIZED HEALTH CARE EDUCATION/TRAINING PROGRAM
Payer: MEDICARE

## 2023-08-18 ENCOUNTER — APPOINTMENT (OUTPATIENT)
Dept: CT IMAGING | Facility: HOSPITAL | Age: 78
DRG: 305 | End: 2023-08-18
Payer: MEDICARE

## 2023-08-18 DIAGNOSIS — R07.9 CHEST PAIN, UNSPECIFIED TYPE: Primary | ICD-10-CM

## 2023-08-18 DIAGNOSIS — I10 HYPERTENSION, UNSPECIFIED TYPE: ICD-10-CM

## 2023-08-18 LAB
ALBUMIN SERPL-MCNC: 4.5 G/DL (ref 3.5–5.2)
ALBUMIN/GLOB SERPL: 2.8 G/DL
ALP SERPL-CCNC: 73 U/L (ref 39–117)
ALT SERPL W P-5'-P-CCNC: 16 U/L (ref 1–33)
ANION GAP SERPL CALCULATED.3IONS-SCNC: 9.8 MMOL/L (ref 5–15)
AST SERPL-CCNC: 16 U/L (ref 1–32)
BASOPHILS # BLD AUTO: 0.03 10*3/MM3 (ref 0–0.2)
BASOPHILS NFR BLD AUTO: 0.5 % (ref 0–1.5)
BILIRUB SERPL-MCNC: 0.4 MG/DL (ref 0–1.2)
BUN SERPL-MCNC: 23 MG/DL (ref 8–23)
BUN/CREAT SERPL: 20.5 (ref 7–25)
CALCIUM SPEC-SCNC: 9.1 MG/DL (ref 8.6–10.5)
CHLORIDE SERPL-SCNC: 105 MMOL/L (ref 98–107)
CO2 SERPL-SCNC: 24.2 MMOL/L (ref 22–29)
CREAT SERPL-MCNC: 1.12 MG/DL (ref 0.57–1)
DEPRECATED RDW RBC AUTO: 43.1 FL (ref 37–54)
EGFRCR SERPLBLD CKD-EPI 2021: 51.1 ML/MIN/1.73
EOSINOPHIL # BLD AUTO: 0.07 10*3/MM3 (ref 0–0.4)
EOSINOPHIL NFR BLD AUTO: 1.2 % (ref 0.3–6.2)
ERYTHROCYTE [DISTWIDTH] IN BLOOD BY AUTOMATED COUNT: 14.1 % (ref 12.3–15.4)
GEN 5 2HR TROPONIN T REFLEX: 13 NG/L
GLOBULIN UR ELPH-MCNC: 1.6 GM/DL
GLUCOSE SERPL-MCNC: 167 MG/DL (ref 65–99)
HCT VFR BLD AUTO: 36.7 % (ref 34–46.6)
HGB BLD-MCNC: 12.3 G/DL (ref 12–15.9)
IMM GRANULOCYTES # BLD AUTO: 0.06 10*3/MM3 (ref 0–0.05)
IMM GRANULOCYTES NFR BLD AUTO: 1 % (ref 0–0.5)
LYMPHOCYTES # BLD AUTO: 1.27 10*3/MM3 (ref 0.7–3.1)
LYMPHOCYTES NFR BLD AUTO: 21.9 % (ref 19.6–45.3)
MCH RBC QN AUTO: 28.4 PG (ref 26.6–33)
MCHC RBC AUTO-ENTMCNC: 33.5 G/DL (ref 31.5–35.7)
MCV RBC AUTO: 84.8 FL (ref 79–97)
MONOCYTES # BLD AUTO: 0.41 10*3/MM3 (ref 0.1–0.9)
MONOCYTES NFR BLD AUTO: 7.1 % (ref 5–12)
NEUTROPHILS NFR BLD AUTO: 3.97 10*3/MM3 (ref 1.7–7)
NEUTROPHILS NFR BLD AUTO: 68.3 % (ref 42.7–76)
NT-PROBNP SERPL-MCNC: 798.4 PG/ML (ref 0–1800)
PLATELET # BLD AUTO: 122 10*3/MM3 (ref 140–450)
PMV BLD AUTO: 11.3 FL (ref 6–12)
POTASSIUM SERPL-SCNC: 4.5 MMOL/L (ref 3.5–5.2)
PROT SERPL-MCNC: 6.1 G/DL (ref 6–8.5)
QT INTERVAL: 388 MS
RBC # BLD AUTO: 4.33 10*6/MM3 (ref 3.77–5.28)
SODIUM SERPL-SCNC: 139 MMOL/L (ref 136–145)
TROPONIN T DELTA: -1 NG/L
TROPONIN T SERPL HS-MCNC: 14 NG/L
WBC NRBC COR # BLD: 5.81 10*3/MM3 (ref 3.4–10.8)

## 2023-08-18 PROCEDURE — 25010000002 HYDRALAZINE PER 20 MG: Performed by: STUDENT IN AN ORGANIZED HEALTH CARE EDUCATION/TRAINING PROGRAM

## 2023-08-18 PROCEDURE — 99285 EMERGENCY DEPT VISIT HI MDM: CPT

## 2023-08-18 PROCEDURE — 84484 ASSAY OF TROPONIN QUANT: CPT | Performed by: STUDENT IN AN ORGANIZED HEALTH CARE EDUCATION/TRAINING PROGRAM

## 2023-08-18 PROCEDURE — 93005 ELECTROCARDIOGRAM TRACING: CPT | Performed by: STUDENT IN AN ORGANIZED HEALTH CARE EDUCATION/TRAINING PROGRAM

## 2023-08-18 PROCEDURE — 36415 COLL VENOUS BLD VENIPUNCTURE: CPT

## 2023-08-18 PROCEDURE — 99285 EMERGENCY DEPT VISIT HI MDM: CPT | Performed by: STUDENT IN AN ORGANIZED HEALTH CARE EDUCATION/TRAINING PROGRAM

## 2023-08-18 PROCEDURE — 80053 COMPREHEN METABOLIC PANEL: CPT | Performed by: STUDENT IN AN ORGANIZED HEALTH CARE EDUCATION/TRAINING PROGRAM

## 2023-08-18 PROCEDURE — 83880 ASSAY OF NATRIURETIC PEPTIDE: CPT | Performed by: STUDENT IN AN ORGANIZED HEALTH CARE EDUCATION/TRAINING PROGRAM

## 2023-08-18 PROCEDURE — 71046 X-RAY EXAM CHEST 2 VIEWS: CPT

## 2023-08-18 PROCEDURE — 71275 CT ANGIOGRAPHY CHEST: CPT

## 2023-08-18 PROCEDURE — 25510000001 IOPAMIDOL PER 1 ML: Performed by: STUDENT IN AN ORGANIZED HEALTH CARE EDUCATION/TRAINING PROGRAM

## 2023-08-18 PROCEDURE — 93010 ELECTROCARDIOGRAM REPORT: CPT | Performed by: STUDENT IN AN ORGANIZED HEALTH CARE EDUCATION/TRAINING PROGRAM

## 2023-08-18 PROCEDURE — 25010000002 ONDANSETRON PER 1 MG: Performed by: STUDENT IN AN ORGANIZED HEALTH CARE EDUCATION/TRAINING PROGRAM

## 2023-08-18 PROCEDURE — 25010000002 LORAZEPAM PER 2 MG: Performed by: EMERGENCY MEDICINE

## 2023-08-18 PROCEDURE — 85025 COMPLETE CBC W/AUTO DIFF WBC: CPT | Performed by: STUDENT IN AN ORGANIZED HEALTH CARE EDUCATION/TRAINING PROGRAM

## 2023-08-18 RX ORDER — HYDRALAZINE HYDROCHLORIDE 20 MG/ML
10 INJECTION INTRAMUSCULAR; INTRAVENOUS ONCE
Status: COMPLETED | OUTPATIENT
Start: 2023-08-18 | End: 2023-08-18

## 2023-08-18 RX ORDER — LORAZEPAM 2 MG/ML
0.5 INJECTION INTRAMUSCULAR EVERY 4 HOURS PRN
Status: DISCONTINUED | OUTPATIENT
Start: 2023-08-18 | End: 2023-08-18

## 2023-08-18 RX ORDER — ACETAMINOPHEN 500 MG
1000 TABLET ORAL ONCE
Status: COMPLETED | OUTPATIENT
Start: 2023-08-18 | End: 2023-08-18

## 2023-08-18 RX ORDER — NITROGLYCERIN 0.4 MG/1
0.4 TABLET SUBLINGUAL
Status: DISCONTINUED | OUTPATIENT
Start: 2023-08-18 | End: 2023-08-22 | Stop reason: HOSPADM

## 2023-08-18 RX ORDER — ASPIRIN 81 MG/1
324 TABLET, CHEWABLE ORAL ONCE
Status: COMPLETED | OUTPATIENT
Start: 2023-08-18 | End: 2023-08-18

## 2023-08-18 RX ORDER — LORAZEPAM 2 MG/ML
0.5 INJECTION INTRAMUSCULAR ONCE
Status: COMPLETED | OUTPATIENT
Start: 2023-08-18 | End: 2023-08-18

## 2023-08-18 RX ORDER — ONDANSETRON 2 MG/ML
4 INJECTION INTRAMUSCULAR; INTRAVENOUS ONCE
Status: COMPLETED | OUTPATIENT
Start: 2023-08-18 | End: 2023-08-18

## 2023-08-18 RX ORDER — SODIUM CHLORIDE 0.9 % (FLUSH) 0.9 %
10 SYRINGE (ML) INJECTION AS NEEDED
Status: DISCONTINUED | OUTPATIENT
Start: 2023-08-18 | End: 2023-08-22 | Stop reason: HOSPADM

## 2023-08-18 RX ADMIN — ASPIRIN 324 MG: 81 TABLET, CHEWABLE ORAL at 15:14

## 2023-08-18 RX ADMIN — NITROGLYCERIN 0.4 MG: 0.4 TABLET SUBLINGUAL at 15:59

## 2023-08-18 RX ADMIN — ONDANSETRON 4 MG: 2 INJECTION INTRAMUSCULAR; INTRAVENOUS at 17:50

## 2023-08-18 RX ADMIN — NITROGLYCERIN 0.4 MG: 0.4 TABLET SUBLINGUAL at 15:13

## 2023-08-18 RX ADMIN — IOPAMIDOL 100 ML: 755 INJECTION, SOLUTION INTRAVENOUS at 16:22

## 2023-08-18 RX ADMIN — HYDRALAZINE HYDROCHLORIDE 10 MG: 20 INJECTION, SOLUTION INTRAMUSCULAR; INTRAVENOUS at 16:19

## 2023-08-18 RX ADMIN — METOPROLOL TARTRATE 50 MG: 25 TABLET, FILM COATED ORAL at 18:49

## 2023-08-18 RX ADMIN — ACETAMINOPHEN 500 MG: 500 TABLET, FILM COATED ORAL at 18:12

## 2023-08-18 RX ADMIN — LORAZEPAM 0.5 MG: 2 INJECTION INTRAMUSCULAR; INTRAVENOUS at 21:05

## 2023-08-18 NOTE — Clinical Note
Level of Care: Telemetry [5]   Diagnosis: Chest pain [890281]   Admitting Physician: BEL CEJA [[id number]]   Attending Physician: BEL CEJA [[id number]]   Bed Request Comments: observation, tele

## 2023-08-18 NOTE — ED NOTES
Patient decided she now wants to be admitted at [facility]. [facility] access center called by this RN and made aware to cancel admission request. Page placed to [facility] Hospitalist. Awaiting call back.

## 2023-08-18 NOTE — FSED PROVIDER NOTE
Subjective   History of Present Illness  Patient is a 78-year-old female presents emergency department due to concern for high blood pressure and chest pain.  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  Patient states for the last week her blood pressures have been elevated despite her taking her valsartan and metoprolol.  Patient reports her blood pressures been ranging anywhere from a systolic of 170-190.  She states she does have intermittent episodes of chest pain that she is localizing to the left side of her chest with radiation into her back.  She denies any associated shortness of breath, lightheadedness, dizziness, diaphoresis, vomiting.  Patient does follow with cardiology, Dr. Clifton.     Review of Systems   Constitutional:  Negative for activity change and fever.   HENT:  Negative for congestion, rhinorrhea and sore throat.    Respiratory:  Positive for chest tightness. Negative for cough and shortness of breath.    Cardiovascular:  Positive for chest pain.   Gastrointestinal:  Negative for abdominal pain, nausea and vomiting.   Genitourinary:  Negative for dysuria.   Musculoskeletal:  Negative for back pain and myalgias.   Skin:  Negative for rash.   Neurological:  Negative for dizziness, light-headedness and headaches.   Psychiatric/Behavioral:  Negative for confusion.      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 - Dr Ritter [facility]; CBC/Prograf/CMP/LIPIDS/A1C    MAMMO     NEG = 2018/ 2021/ 2022    Meniere's disease     OAB     Osteoporosis     PUD        Allergies   Allergen  Reactions    Amlodipine Itching    Atacand  [Candesartan Cilexetil] Other (See Comments)    Atenolol Palpitations    Azithromycin Nausea Only and GI Intolerance    Ibuprofen Unknown (See Comments)    Levofloxacin Nausea Only    Penicillin G Hives    Spironolactone Nausea And Vomiting    Sucralfate Swelling    Sulfamethoxazole-Trimethoprim Other (See Comments) and Rash     THROMBOCYTOPENIA    Triamterene-Hctz Myalgia    Venlafaxine Headache           Cardizem Cd [Diltiazem Hcl Er Coated Beads] Other (See Comments)     Chest pressure    Clonidine Derivatives GI Intolerance    Codeine Hallucinations    Escitalopram Unknown - High Severity and Headache    Fluconazole Nausea Only and Headache    Lactulose Unknown - High Severity and Itching    Lipitor [Atorvastatin] Myalgia    Metronidazole Unknown - High Severity    Polyoxyethylene 40 Sorbitol Septaoleate [Sorbitan] Unknown - High Severity    Whey Nausea And Vomiting    Zyrtec [Cetirizine] Itching    Citrus Other (See Comments)    Paroxetine Palpitations    Zoloft [Sertraline Hcl] Palpitations       Past Surgical History:   Procedure Laterality Date    BREAST LUMPECTOMY Left     Benign    CATARACT EXTRACTION WITH INTRAOCULAR LENS IMPLANT Left     COLONOSCOPY      Polyps = 2015/ , rech    GSI    ERCP W/ PLASTIC STENT PLACEMENT      Liver Stent    LIVER TRANSPLANTATION      [place]    SKIN CANCER EXCISION      Rt UE    TOTAL ABDOMINAL HYSTERECTOMY         Family History   Problem Relation Age of Onset    Diabetes Sister         Type II    Heart disease Sister     Hypertension Sister     No Known Problems Brother     Hemochromatosis Other        Social History     Socioeconomic History    Marital status:    Tobacco Use    Smoking status: Former     Packs/day: 0.25     Years: 1.00     Pack years: 0.25     Types: Cigarettes     Start date:      Quit date:      Years since quittin.6     Passive exposure: Past    Smokeless tobacco:  Never    Tobacco comments:     Quit 2002.    Vaping Use    Vaping Use: Never used   Substance and Sexual Activity    Alcohol use: No     Comment: very rarely     Drug use: No    Sexual activity: Defer           Objective   Physical Exam  Vitals and nursing note reviewed.   Constitutional:       General: She is not in acute distress.     Appearance: Normal appearance. She is not ill-appearing.   HENT:      Head: Normocephalic and atraumatic.      Right Ear: Tympanic membrane normal.      Left Ear: Tympanic membrane normal.      Nose: Nose normal. No congestion.      Mouth/Throat:      Mouth: Mucous membranes are moist.      Pharynx: No oropharyngeal exudate.   Eyes:      Conjunctiva/sclera: Conjunctivae normal.   Cardiovascular:      Rate and Rhythm: Normal rate and regular rhythm.      Heart sounds: No murmur heard.  Pulmonary:      Effort: Pulmonary effort is normal.      Breath sounds: No decreased breath sounds, wheezing, rhonchi or rales.   Abdominal:      General: Abdomen is flat.      Palpations: Abdomen is soft.      Tenderness: There is no abdominal tenderness. There is no guarding or rebound.      Comments: Old incision consistent with liver transplant   Musculoskeletal:         General: No swelling or tenderness. Normal range of motion.      Cervical back: Normal range of motion and neck supple.   Skin:     General: Skin is warm and dry.      Findings: No rash.   Neurological:      General: No focal deficit present.      Mental Status: She is alert and oriented to person, place, and time.       Procedures           ED Course  ED Course as of 08/18/23 1858   Fri Aug 18, 2023   1450 ECG 12 Lead Chest Pain  EKG is sinus rhythm rate 77, left axis deviation, poor R wave progression, EKG is not significantly changed when compared to 6/8/2023 [CO]   1544 HS Troponin T(!): 14 [CO]   1544 proBNP: 798.4 [CO]   1544 Creatinine(!): 1.12 [CO]   1544 WBC: 5.81 [CO]   1544 Hemoglobin: 12.3 [CO]   1545 XR Chest PA &  Lateral  IMPRESSION:  Impression:  No acute process [CO]   1714 HS Troponin T(!): 13 [CO]   1858 CT Angiogram Chest  IMPRESSION:     1. The aorta appears grossly unremarkable in appearance.     2. Lungs are clear    [CO]      ED Course User Index  [CO] Haylye Walker DO                      HEART Score: 7                      Medical Decision Making  Patient is a 78-year-old female presented to the emergency department for high blood pressure and chest pain.  On arrival patient is hypertensive but otherwise hemodynamically stable.  Patient placed on cardiac monitor, IV established.  Patient's EKG shows no ischemic changes, it is not significantly changed to her previous.  Patient was provided nitroglycerin due to chest pain associated with her hypertension which did minimally improve her blood pressure and she did get an additional dose of hydralazine.  Patient's initial troponin is 14, repeat is 13.  Creatinine is at baseline, BNP is not consistent with CHF exacerbation.  No leukocytosis, hemoglobin is stable, creatinine is within normal limits.  Chest x-ray shows no acute findings, CTA was obtained due to concern for radiation of pain into her back which shows no concern for aortic dissection.  Patient was provided a full dose aspirin.  Due to patient's elevated heart score, continued chest discomfort will admit for observation for further work-up and telemetry.    Problems Addressed:  Chest pain, unspecified type: complicated acute illness or injury  Hypertension, unspecified type: complicated acute illness or injury    Amount and/or Complexity of Data Reviewed  Labs: ordered. Decision-making details documented in ED Course.  Radiology: ordered. Decision-making details documented in ED Course.  ECG/medicine tests: ordered. Decision-making details documented in ED Course.    Risk  OTC drugs.  Prescription drug management.  Decision regarding hospitalization.        Final diagnoses:   Chest pain, unspecified type    Hypertension, unspecified type       ED Disposition  ED Disposition       ED Disposition   Decision to Admit    Condition   --    Comment   Level of Care: Telemetry [5]  Diagnosis: Chest pain [136957]  Admitting Physician: BEL CEJA [[id number]]  Attending Physician: BEL CEJA [[id number]]  Bed Request Comments: observation, tele                 No follow-up provider specified.       Medication List      No changes were made to your prescriptions during this visit.

## 2023-08-19 ENCOUNTER — APPOINTMENT (OUTPATIENT)
Dept: NUCLEAR MEDICINE | Facility: HOSPITAL | Age: 78
DRG: 305 | End: 2023-08-19
Payer: MEDICARE

## 2023-08-19 LAB
BH CV REST NUCLEAR ISOTOPE DOSE: 11 MCI
BH CV STRESS BP STAGE 1: NORMAL
BH CV STRESS COMMENTS STAGE 1: NORMAL
BH CV STRESS DOSE REGADENOSON STAGE 1: 0.4
BH CV STRESS DURATION MIN STAGE 1: 0
BH CV STRESS DURATION SEC STAGE 1: 10
BH CV STRESS HR STAGE 1: 91
BH CV STRESS NUCLEAR ISOTOPE DOSE: 31.6 MCI
BH CV STRESS PROTOCOL 1: NORMAL
BH CV STRESS RECOVERY BP: NORMAL MMHG
BH CV STRESS RECOVERY HR: 90 BPM
BH CV STRESS STAGE 1: 1
LV EF NUC BP: 70 %
MAGNESIUM SERPL-MCNC: 1.7 MG/DL (ref 1.6–2.4)
MAXIMAL PREDICTED HEART RATE: 144 BPM
PERCENT MAX PREDICTED HR: 84.72 %
PHOSPHATE SERPL-MCNC: 4.2 MG/DL (ref 2.5–4.5)
STRESS BASELINE BP: NORMAL MMHG
STRESS BASELINE HR: 75 BPM
STRESS PERCENT HR: 100 %
STRESS POST PEAK BP: NORMAL MMHG
STRESS POST PEAK HR: 122 BPM
STRESS TARGET HR: 122 BPM
TROPONIN T SERPL HS-MCNC: 14 NG/L
WHOLE BLOOD HOLD SPECIMEN: NORMAL

## 2023-08-19 PROCEDURE — G0378 HOSPITAL OBSERVATION PER HR: HCPCS

## 2023-08-19 PROCEDURE — 0 TECHNETIUM TETROFOSMIN KIT: Performed by: INTERNAL MEDICINE

## 2023-08-19 PROCEDURE — 94640 AIRWAY INHALATION TREATMENT: CPT

## 2023-08-19 PROCEDURE — 94664 DEMO&/EVAL PT USE INHALER: CPT

## 2023-08-19 PROCEDURE — 25010000002 REGADENOSON 0.4 MG/5ML SOLUTION: Performed by: INTERNAL MEDICINE

## 2023-08-19 PROCEDURE — 83735 ASSAY OF MAGNESIUM: CPT | Performed by: INTERNAL MEDICINE

## 2023-08-19 PROCEDURE — 84484 ASSAY OF TROPONIN QUANT: CPT | Performed by: HOSPITALIST

## 2023-08-19 PROCEDURE — 25010000002 HEPARIN (PORCINE) PER 1000 UNITS: Performed by: HOSPITALIST

## 2023-08-19 PROCEDURE — 78452 HT MUSCLE IMAGE SPECT MULT: CPT

## 2023-08-19 PROCEDURE — 63710000001 ONDANSETRON PER 8 MG: Performed by: HOSPITALIST

## 2023-08-19 PROCEDURE — 94799 UNLISTED PULMONARY SVC/PX: CPT

## 2023-08-19 PROCEDURE — A9502 TC99M TETROFOSMIN: HCPCS | Performed by: INTERNAL MEDICINE

## 2023-08-19 PROCEDURE — 63710000001 MYCOPHENOLATE PER 180 MG: Performed by: HOSPITALIST

## 2023-08-19 PROCEDURE — 78452 HT MUSCLE IMAGE SPECT MULT: CPT | Performed by: INTERNAL MEDICINE

## 2023-08-19 PROCEDURE — 63710000001 TACROLIMUS PER 1 MG: Performed by: HOSPITALIST

## 2023-08-19 PROCEDURE — 93017 CV STRESS TEST TRACING ONLY: CPT

## 2023-08-19 PROCEDURE — 99221 1ST HOSP IP/OBS SF/LOW 40: CPT | Performed by: INTERNAL MEDICINE

## 2023-08-19 PROCEDURE — 93018 CV STRESS TEST I&R ONLY: CPT | Performed by: INTERNAL MEDICINE

## 2023-08-19 PROCEDURE — 94761 N-INVAS EAR/PLS OXIMETRY MLT: CPT

## 2023-08-19 PROCEDURE — 84100 ASSAY OF PHOSPHORUS: CPT | Performed by: INTERNAL MEDICINE

## 2023-08-19 PROCEDURE — 93016 CV STRESS TEST SUPVJ ONLY: CPT | Performed by: NURSE PRACTITIONER

## 2023-08-19 RX ORDER — TACROLIMUS 1 MG/1
1 CAPSULE ORAL EVERY 24 HOURS
Status: DISCONTINUED | OUTPATIENT
Start: 2023-08-19 | End: 2023-08-22 | Stop reason: HOSPADM

## 2023-08-19 RX ORDER — LOPERAMIDE HYDROCHLORIDE 2 MG/1
2 CAPSULE ORAL 4 TIMES DAILY PRN
Status: DISCONTINUED | OUTPATIENT
Start: 2023-08-19 | End: 2023-08-22 | Stop reason: HOSPADM

## 2023-08-19 RX ORDER — MYCOPHENOLIC ACID 180 MG/1
360 TABLET, DELAYED RELEASE ORAL EVERY 24 HOURS
Status: DISCONTINUED | OUTPATIENT
Start: 2023-08-19 | End: 2023-08-22 | Stop reason: HOSPADM

## 2023-08-19 RX ORDER — BISACODYL 5 MG/1
5 TABLET, DELAYED RELEASE ORAL DAILY PRN
Status: DISCONTINUED | OUTPATIENT
Start: 2023-08-19 | End: 2023-08-22 | Stop reason: HOSPADM

## 2023-08-19 RX ORDER — HYDROCODONE BITARTRATE AND ACETAMINOPHEN 5; 325 MG/1; MG/1
1 TABLET ORAL EVERY 6 HOURS PRN
Status: DISCONTINUED | OUTPATIENT
Start: 2023-08-19 | End: 2023-08-22 | Stop reason: HOSPADM

## 2023-08-19 RX ORDER — BUSPIRONE HYDROCHLORIDE 5 MG/1
5 TABLET ORAL 3 TIMES DAILY
Status: DISCONTINUED | OUTPATIENT
Start: 2023-08-19 | End: 2023-08-22 | Stop reason: HOSPADM

## 2023-08-19 RX ORDER — ALLOPURINOL 100 MG/1
200 TABLET ORAL DAILY
Status: DISCONTINUED | OUTPATIENT
Start: 2023-08-19 | End: 2023-08-20

## 2023-08-19 RX ORDER — METOPROLOL SUCCINATE 50 MG/1
50 TABLET, EXTENDED RELEASE ORAL ONCE
Status: COMPLETED | OUTPATIENT
Start: 2023-08-19 | End: 2023-08-19

## 2023-08-19 RX ORDER — POLYETHYLENE GLYCOL 3350 17 G/17G
17 POWDER, FOR SOLUTION ORAL DAILY PRN
Status: DISCONTINUED | OUTPATIENT
Start: 2023-08-19 | End: 2023-08-22 | Stop reason: HOSPADM

## 2023-08-19 RX ORDER — TRAMADOL HYDROCHLORIDE 50 MG/1
50 TABLET ORAL EVERY 6 HOURS PRN
Status: DISCONTINUED | OUTPATIENT
Start: 2023-08-19 | End: 2023-08-22 | Stop reason: HOSPADM

## 2023-08-19 RX ORDER — HEPARIN SODIUM 5000 [USP'U]/ML
5000 INJECTION, SOLUTION INTRAVENOUS; SUBCUTANEOUS EVERY 12 HOURS SCHEDULED
Status: DISCONTINUED | OUTPATIENT
Start: 2023-08-19 | End: 2023-08-22 | Stop reason: HOSPADM

## 2023-08-19 RX ORDER — MECLIZINE HYDROCHLORIDE 25 MG/1
12.5 TABLET ORAL EVERY 6 HOURS PRN
Status: DISCONTINUED | OUTPATIENT
Start: 2023-08-19 | End: 2023-08-22 | Stop reason: HOSPADM

## 2023-08-19 RX ORDER — SODIUM CHLORIDE 0.9 % (FLUSH) 0.9 %
10 SYRINGE (ML) INJECTION AS NEEDED
Status: DISCONTINUED | OUTPATIENT
Start: 2023-08-19 | End: 2023-08-22 | Stop reason: HOSPADM

## 2023-08-19 RX ORDER — METOPROLOL SUCCINATE 50 MG/1
50 TABLET, EXTENDED RELEASE ORAL 2 TIMES DAILY
Status: DISCONTINUED | OUTPATIENT
Start: 2023-08-19 | End: 2023-08-22 | Stop reason: HOSPADM

## 2023-08-19 RX ORDER — BISACODYL 10 MG
10 SUPPOSITORY, RECTAL RECTAL DAILY PRN
Status: DISCONTINUED | OUTPATIENT
Start: 2023-08-19 | End: 2023-08-22 | Stop reason: HOSPADM

## 2023-08-19 RX ORDER — METOPROLOL SUCCINATE 50 MG/1
100 TABLET, EXTENDED RELEASE ORAL NIGHTLY
Status: DISCONTINUED | OUTPATIENT
Start: 2023-08-19 | End: 2023-08-22 | Stop reason: HOSPADM

## 2023-08-19 RX ORDER — PANTOPRAZOLE SODIUM 40 MG/1
40 TABLET, DELAYED RELEASE ORAL DAILY
Status: DISCONTINUED | OUTPATIENT
Start: 2023-08-19 | End: 2023-08-22 | Stop reason: HOSPADM

## 2023-08-19 RX ORDER — COLCHICINE 0.6 MG/1
0.6 TABLET ORAL DAILY
Status: DISCONTINUED | OUTPATIENT
Start: 2023-08-19 | End: 2023-08-22 | Stop reason: HOSPADM

## 2023-08-19 RX ORDER — LISINOPRIL 5 MG/1
10 TABLET ORAL
Status: DISCONTINUED | OUTPATIENT
Start: 2023-08-19 | End: 2023-08-21

## 2023-08-19 RX ORDER — MULTIPLE VITAMINS W/ MINERALS TAB 9MG-400MCG
1 TAB ORAL DAILY
Status: DISCONTINUED | OUTPATIENT
Start: 2023-08-19 | End: 2023-08-22 | Stop reason: HOSPADM

## 2023-08-19 RX ORDER — SODIUM CHLORIDE 9 MG/ML
40 INJECTION, SOLUTION INTRAVENOUS AS NEEDED
Status: DISCONTINUED | OUTPATIENT
Start: 2023-08-19 | End: 2023-08-22 | Stop reason: HOSPADM

## 2023-08-19 RX ORDER — ONDANSETRON 4 MG/1
4 TABLET, FILM COATED ORAL EVERY 8 HOURS PRN
Status: DISCONTINUED | OUTPATIENT
Start: 2023-08-19 | End: 2023-08-22 | Stop reason: HOSPADM

## 2023-08-19 RX ORDER — SODIUM CHLORIDE 0.9 % (FLUSH) 0.9 %
10 SYRINGE (ML) INJECTION EVERY 12 HOURS SCHEDULED
Status: DISCONTINUED | OUTPATIENT
Start: 2023-08-19 | End: 2023-08-22 | Stop reason: HOSPADM

## 2023-08-19 RX ORDER — TACROLIMUS 1 MG/1
2 CAPSULE ORAL EVERY 24 HOURS
Status: DISCONTINUED | OUTPATIENT
Start: 2023-08-19 | End: 2023-08-22 | Stop reason: HOSPADM

## 2023-08-19 RX ORDER — REGADENOSON 0.08 MG/ML
0.4 INJECTION, SOLUTION INTRAVENOUS
Status: COMPLETED | OUTPATIENT
Start: 2023-08-19 | End: 2023-08-19

## 2023-08-19 RX ORDER — BACLOFEN 10 MG/1
10 TABLET ORAL EVERY 12 HOURS PRN
Status: DISCONTINUED | OUTPATIENT
Start: 2023-08-19 | End: 2023-08-22 | Stop reason: HOSPADM

## 2023-08-19 RX ORDER — PROCHLORPERAZINE EDISYLATE 5 MG/ML
10 INJECTION INTRAMUSCULAR; INTRAVENOUS EVERY 6 HOURS PRN
Status: DISCONTINUED | OUTPATIENT
Start: 2023-08-19 | End: 2023-08-22 | Stop reason: HOSPADM

## 2023-08-19 RX ORDER — AMOXICILLIN 250 MG
2 CAPSULE ORAL 2 TIMES DAILY
Status: DISCONTINUED | OUTPATIENT
Start: 2023-08-19 | End: 2023-08-22 | Stop reason: HOSPADM

## 2023-08-19 RX ORDER — BUDESONIDE AND FORMOTEROL FUMARATE DIHYDRATE 160; 4.5 UG/1; UG/1
2 AEROSOL RESPIRATORY (INHALATION)
Status: DISCONTINUED | OUTPATIENT
Start: 2023-08-19 | End: 2023-08-22 | Stop reason: HOSPADM

## 2023-08-19 RX ADMIN — TACROLIMUS 2 MG: 1 CAPSULE ORAL at 12:04

## 2023-08-19 RX ADMIN — MULTIPLE VITAMINS W/ MINERALS TAB 1 TABLET: TAB at 08:51

## 2023-08-19 RX ADMIN — REGADENOSON 0.4 MG: 0.08 INJECTION, SOLUTION INTRAVENOUS at 10:55

## 2023-08-19 RX ADMIN — TETROFOSMIN 1 DOSE: 1.38 INJECTION, POWDER, LYOPHILIZED, FOR SOLUTION INTRAVENOUS at 10:55

## 2023-08-19 RX ADMIN — HEPARIN SODIUM 5000 UNITS: 5000 INJECTION INTRAVENOUS; SUBCUTANEOUS at 21:04

## 2023-08-19 RX ADMIN — LISINOPRIL 10 MG: 5 TABLET ORAL at 08:51

## 2023-08-19 RX ADMIN — HEPARIN SODIUM 5000 UNITS: 5000 INJECTION INTRAVENOUS; SUBCUTANEOUS at 08:51

## 2023-08-19 RX ADMIN — TRAMADOL HYDROCHLORIDE 50 MG: 50 TABLET, COATED ORAL at 13:15

## 2023-08-19 RX ADMIN — PANTOPRAZOLE SODIUM 40 MG: 40 TABLET, DELAYED RELEASE ORAL at 08:51

## 2023-08-19 RX ADMIN — METOPROLOL SUCCINATE 50 MG: 50 TABLET, EXTENDED RELEASE ORAL at 02:28

## 2023-08-19 RX ADMIN — ONDANSETRON HYDROCHLORIDE 4 MG: 4 TABLET, FILM COATED ORAL at 16:19

## 2023-08-19 RX ADMIN — TACROLIMUS 1 MG: 1 CAPSULE ORAL at 23:02

## 2023-08-19 RX ADMIN — BUSPIRONE HYDROCHLORIDE 5 MG: 5 TABLET ORAL at 21:04

## 2023-08-19 RX ADMIN — Medication 10 ML: at 21:04

## 2023-08-19 RX ADMIN — Medication 10 ML: at 08:52

## 2023-08-19 RX ADMIN — MYCOPHENOLIC ACID 360 MG: 180 TABLET, DELAYED RELEASE ORAL at 23:02

## 2023-08-19 RX ADMIN — TIOTROPIUM BROMIDE INHALATION SPRAY 2 PUFF: 3.12 SPRAY, METERED RESPIRATORY (INHALATION) at 06:47

## 2023-08-19 RX ADMIN — TRAMADOL HYDROCHLORIDE 50 MG: 50 TABLET, COATED ORAL at 02:28

## 2023-08-19 RX ADMIN — BUSPIRONE HYDROCHLORIDE 5 MG: 5 TABLET ORAL at 08:51

## 2023-08-19 RX ADMIN — LOPERAMIDE HYDROCHLORIDE 2 MG: 2 CAPSULE ORAL at 16:19

## 2023-08-19 RX ADMIN — ONDANSETRON HYDROCHLORIDE 4 MG: 4 TABLET, FILM COATED ORAL at 08:52

## 2023-08-19 RX ADMIN — TETROFOSMIN 1 DOSE: 1.38 INJECTION, POWDER, LYOPHILIZED, FOR SOLUTION INTRAVENOUS at 07:54

## 2023-08-19 RX ADMIN — METOPROLOL SUCCINATE 100 MG: 50 TABLET, EXTENDED RELEASE ORAL at 21:03

## 2023-08-19 RX ADMIN — BUSPIRONE HYDROCHLORIDE 5 MG: 5 TABLET ORAL at 16:19

## 2023-08-19 RX ADMIN — BACLOFEN 10 MG: 10 TABLET ORAL at 06:08

## 2023-08-19 RX ADMIN — BUDESONIDE AND FORMOTEROL FUMARATE DIHYDRATE 2 PUFF: 160; 4.5 AEROSOL RESPIRATORY (INHALATION) at 06:44

## 2023-08-19 RX ADMIN — BUDESONIDE AND FORMOTEROL FUMARATE DIHYDRATE 2 PUFF: 160; 4.5 AEROSOL RESPIRATORY (INHALATION) at 18:54

## 2023-08-19 RX ADMIN — METOPROLOL SUCCINATE 50 MG: 50 TABLET, EXTENDED RELEASE ORAL at 13:15

## 2023-08-19 RX ADMIN — BACLOFEN 10 MG: 10 TABLET ORAL at 21:10

## 2023-08-19 NOTE — PLAN OF CARE
Problem: Adult Inpatient Plan of Care  Goal: Plan of Care Review  Outcome: Ongoing, Progressing  Goal: Patient-Specific Goal (Individualized)  Outcome: Ongoing, Progressing  Goal: Absence of Hospital-Acquired Illness or Injury  Outcome: Ongoing, Progressing  Intervention: Identify and Manage Fall Risk  Recent Flowsheet Documentation  Taken 8/19/2023 0400 by Migdalia Gutierrez RN  Safety Promotion/Fall Prevention: safety round/check completed  Taken 8/19/2023 0200 by Migdalia Gutierrez RN  Safety Promotion/Fall Prevention: safety round/check completed  Goal: Optimal Comfort and Wellbeing  Outcome: Ongoing, Progressing  Intervention: Provide Person-Centered Care  Recent Flowsheet Documentation  Taken 8/19/2023 0030 by Migdalia Gutierrez RN  Trust Relationship/Rapport:   care explained   questions answered   questions encouraged   reassurance provided  Goal: Readiness for Transition of Care  Outcome: Ongoing, Progressing     Problem: Fall Injury Risk  Goal: Absence of Fall and Fall-Related Injury  Outcome: Ongoing, Progressing  Intervention: Identify and Manage Contributors  Recent Flowsheet Documentation  Taken 8/19/2023 0400 by Migdalia Gutierrez RN  Medication Review/Management: medications reviewed  Intervention: Promote Injury-Free Environment  Recent Flowsheet Documentation  Taken 8/19/2023 0400 by Migdalia Gutierrez RN  Safety Promotion/Fall Prevention: safety round/check completed  Taken 8/19/2023 0200 by Migdalia Gutierrez RN  Safety Promotion/Fall Prevention: safety round/check completed     Problem: Hypertension Comorbidity  Goal: Blood Pressure in Desired Range  Outcome: Ongoing, Progressing  Intervention: Maintain Blood Pressure Management  Recent Flowsheet Documentation  Taken 8/19/2023 0400 by Migdalia Gutierrez RN  Medication Review/Management: medications reviewed     Problem: Chest Pain  Goal: Resolution of Chest Pain Symptoms  Outcome: Ongoing, Progressing     Problem: Skin Injury Risk  Increased  Goal: Skin Health and Integrity  Outcome: Ongoing, Progressing   Goal Outcome Evaluation:

## 2023-08-19 NOTE — PROGRESS NOTES
Please see H&P from this morning for details.  Patient had a normal stress test but is having nausea vomiting diarrhea post test likely due to stress or agent.  Supportive care provided.  Hopefully home tomorrow.

## 2023-08-19 NOTE — ED NOTES
Patient stated that she took her prograf from her home medications that she brought with her. She does not leave home without her medications if she thinks she will be gone due to fear of missing her post-transplant medications.  Patient instructed to notify the staff at [facility] that she has medications from home with her when she arrives.

## 2023-08-19 NOTE — PLAN OF CARE
Problem: Adult Inpatient Plan of Care  Goal: Plan of Care Review  Outcome: Ongoing, Progressing  Flowsheets (Taken 8/19/2023 1600)  Progress: no change  Plan of Care Reviewed With: patient  Goal: Patient-Specific Goal (Individualized)  Outcome: Ongoing, Progressing  Goal: Absence of Hospital-Acquired Illness or Injury  Outcome: Ongoing, Progressing  Intervention: Identify and Manage Fall Risk  Recent Flowsheet Documentation  Taken 8/19/2023 1550 by Karine Church LPN  Safety Promotion/Fall Prevention:   assistive device/personal items within reach   clutter free environment maintained   fall prevention program maintained   nonskid shoes/slippers when out of bed   room organization consistent   safety round/check completed  Taken 8/19/2023 1438 by Karine Church LPN  Safety Promotion/Fall Prevention:   assistive device/personal items within reach   clutter free environment maintained   fall prevention program maintained   nonskid shoes/slippers when out of bed   safety round/check completed   room organization consistent  Taken 8/19/2023 1216 by Karine Church LPN  Safety Promotion/Fall Prevention:   assistive device/personal items within reach   clutter free environment maintained   fall prevention program maintained   nonskid shoes/slippers when out of bed   room organization consistent   safety round/check completed  Taken 8/19/2023 0936 by Karine Church LPN  Safety Promotion/Fall Prevention:   assistive device/personal items within reach   clutter free environment maintained   fall prevention program maintained   nonskid shoes/slippers when out of bed   safety round/check completed   room organization consistent  Taken 8/19/2023 0800 by Karine Church LPN  Safety Promotion/Fall Prevention:   assistive device/personal items within reach   clutter free environment maintained   fall prevention program maintained   nonskid shoes/slippers when out of bed   safety round/check completed   room organization  consistent  Intervention: Prevent Skin Injury  Recent Flowsheet Documentation  Taken 8/19/2023 1550 by Karine Church LPN  Body Position: position changed independently  Skin Protection:   adhesive use limited   tubing/devices free from skin contact  Taken 8/19/2023 1216 by Karine Church LPN  Body Position: position changed independently  Skin Protection:   adhesive use limited   tubing/devices free from skin contact  Taken 8/19/2023 0800 by Karine Church LPN  Body Position: position changed independently  Skin Protection:   adhesive use limited   tubing/devices free from skin contact  Intervention: Prevent and Manage VTE (Venous Thromboembolism) Risk  Recent Flowsheet Documentation  Taken 8/19/2023 1550 by Karine Church LPN  Activity Management: ambulated to bathroom  Range of Motion: active ROM (range of motion) encouraged  Taken 8/19/2023 1216 by Karine Church LPN  Activity Management: ambulated to bathroom  Range of Motion: active ROM (range of motion) encouraged  Taken 8/19/2023 0800 by Karine Church LPN  Range of Motion: active ROM (range of motion) encouraged  Intervention: Prevent Infection  Recent Flowsheet Documentation  Taken 8/19/2023 1550 by Karine Church LPN  Infection Prevention:   environmental surveillance performed   hand hygiene promoted   rest/sleep promoted   single patient room provided  Taken 8/19/2023 1438 by Karine Church LPN  Infection Prevention:   environmental surveillance performed   hand hygiene promoted   rest/sleep promoted   single patient room provided  Taken 8/19/2023 1216 by Karine Church LPN  Infection Prevention:   environmental surveillance performed   hand hygiene promoted   rest/sleep promoted   single patient room provided  Taken 8/19/2023 0936 by Karine Church LPN  Infection Prevention:   environmental surveillance performed   hand hygiene promoted   rest/sleep promoted   single patient room provided  Taken 8/19/2023 0800 by Karine Church LPN  Infection  Prevention:   environmental surveillance performed   hand hygiene promoted   rest/sleep promoted   single patient room provided  Goal: Optimal Comfort and Wellbeing  Outcome: Ongoing, Progressing  Intervention: Provide Person-Centered Care  Recent Flowsheet Documentation  Taken 8/19/2023 1550 by Karine Church LPN  Trust Relationship/Rapport:   care explained   thoughts/feelings acknowledged  Taken 8/19/2023 1216 by Karine Church LPN  Trust Relationship/Rapport:   care explained   thoughts/feelings acknowledged  Taken 8/19/2023 0800 by Karine Church LPN  Trust Relationship/Rapport:   care explained   thoughts/feelings acknowledged  Goal: Readiness for Transition of Care  Outcome: Ongoing, Progressing     Problem: Fall Injury Risk  Goal: Absence of Fall and Fall-Related Injury  Outcome: Ongoing, Progressing  Intervention: Identify and Manage Contributors  Recent Flowsheet Documentation  Taken 8/19/2023 1550 by Karine Church LPN  Medication Review/Management: medications reviewed  Taken 8/19/2023 1438 by Karine Church LPN  Medication Review/Management: medications reviewed  Taken 8/19/2023 1216 by Karine Church LPN  Medication Review/Management: medications reviewed  Taken 8/19/2023 0936 by Karine Church LPN  Medication Review/Management: medications reviewed  Taken 8/19/2023 0800 by Karine Church LPN  Medication Review/Management: medications reviewed  Intervention: Promote Injury-Free Environment  Recent Flowsheet Documentation  Taken 8/19/2023 1550 by Karine Church LPN  Safety Promotion/Fall Prevention:   assistive device/personal items within reach   clutter free environment maintained   fall prevention program maintained   nonskid shoes/slippers when out of bed   room organization consistent   safety round/check completed  Taken 8/19/2023 1438 by Karine Church LPN  Safety Promotion/Fall Prevention:   assistive device/personal items within reach   clutter free environment maintained   fall prevention  program maintained   nonskid shoes/slippers when out of bed   safety round/check completed   room organization consistent  Taken 8/19/2023 1216 by Karine Church LPN  Safety Promotion/Fall Prevention:   assistive device/personal items within reach   clutter free environment maintained   fall prevention program maintained   nonskid shoes/slippers when out of bed   room organization consistent   safety round/check completed  Taken 8/19/2023 0936 by Karine Church LPN  Safety Promotion/Fall Prevention:   assistive device/personal items within reach   clutter free environment maintained   fall prevention program maintained   nonskid shoes/slippers when out of bed   safety round/check completed   room organization consistent  Taken 8/19/2023 0800 by Karine Church LPN  Safety Promotion/Fall Prevention:   assistive device/personal items within reach   clutter free environment maintained   fall prevention program maintained   nonskid shoes/slippers when out of bed   safety round/check completed   room organization consistent     Problem: Hypertension Comorbidity  Goal: Blood Pressure in Desired Range  Outcome: Ongoing, Progressing  Intervention: Maintain Blood Pressure Management  Recent Flowsheet Documentation  Taken 8/19/2023 1550 by Karine Church LPN  Syncope Management: position changed slowly  Medication Review/Management: medications reviewed  Taken 8/19/2023 1438 by Karine Church LPN  Medication Review/Management: medications reviewed  Taken 8/19/2023 1216 by Karine Church LPN  Syncope Management: position changed slowly  Medication Review/Management: medications reviewed  Taken 8/19/2023 0936 by Karine Church LPN  Medication Review/Management: medications reviewed  Taken 8/19/2023 0800 by Karine Church LPN  Syncope Management: position changed slowly  Medication Review/Management: medications reviewed     Problem: Chest Pain  Goal: Resolution of Chest Pain Symptoms  Outcome: Ongoing, Progressing     Problem:  Skin Injury Risk Increased  Goal: Skin Health and Integrity  Outcome: Ongoing, Progressing  Intervention: Optimize Skin Protection  Recent Flowsheet Documentation  Taken 8/19/2023 1550 by Karine Church LPN  Pressure Reduction Techniques: frequent weight shift encouraged  Head of Bed (HOB) Positioning: HOB elevated  Pressure Reduction Devices: pressure-redistributing mattress utilized  Skin Protection:   adhesive use limited   tubing/devices free from skin contact  Taken 8/19/2023 1216 by Karine Church LPN  Pressure Reduction Techniques: frequent weight shift encouraged  Head of Bed (HOB) Positioning: HOB elevated  Pressure Reduction Devices: pressure-redistributing mattress utilized  Skin Protection:   adhesive use limited   tubing/devices free from skin contact  Taken 8/19/2023 0800 by Karine Church LPN  Pressure Reduction Techniques: frequent weight shift encouraged  Head of Bed (HOB) Positioning: HOB elevated  Pressure Reduction Devices: pressure-redistributing mattress utilized  Skin Protection:   adhesive use limited   tubing/devices free from skin contact   Goal Outcome Evaluation:  Plan of Care Reviewed With: patient        Progress: no change

## 2023-08-19 NOTE — CONSULTS
[facility] CARDIOLOGY CONSULT  [facility]        Subjective:     Encounter Date:08/18/2023      Patient ID: Christal Romo is a 78 y.o. female.        Cardiology assessment and plan    Chest pain  Nonspecific elevation of troponin  CT angiogram of the chest with no pulmonary embolism no acute chest findings  Hypertensive urgency and uncontrolled hypertension  Valvular heart disease  Normal LV systolic function  Hyperlipidemia  Cisneros cirrhosis status post liver transplantation in 2016      Tmax is 97.6 pulse is 85 respirations are 18 blood pressure is 174/80 sats are 97% on room air  Normal proBNP  Sodium is 139 potassium is 4.5 creatinine is 1.1 LFTs are normal hemoglobin is 12  Myocardial perfusion study with no significant reversible ischemia  Follow-up on the results of the echocardiogram with a prior history of valvular heart disease  Continue aggressive risk factor modification  If there is no significant pathology on echocardiogram okay to discharge home and follow-up in office  Need for close monitoring and follow-up reviewed and discussed the patient  Follow-up in office in 2 to 4 weeks        Chief Complaint: Chest Pain      HPI:  Christal Romo is a 78 y.o. female known to Dr. Clifton with a past cardiac history of aortic insufficiency but without prior ischemic heart disease.  Last 2D echo in 2020 showed an EF of 56 to 60% with grade 1 diastolic dysfunction, mild AI, mild MR.  Patient has a PMH of HTN, HLD, CISNEROS cirrhosis s/p liver transplant in 2016, gout, M‚niŠre's, PVD.  Patient has numerous drug allergies and intolerances.  Patient presented with complaints of chest pain and cardiology was consulted for further evaluation.    Patient is a difficult historian but seems to indicate chest discomfort at her left breast radiating to her back since yesterday.  She tells me that on Monday or Tuesday while scrubbing her tub she developed a sick feeling.  She is mostly  inactive due to bad knees.  She indicates that her blood pressure has been running high recently.      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 - Dr Ritter [facility]; CBC/Prograf/CMP/LIPIDS/A1C    MAMMO     NEG = 2022    Meniere's disease     OAB     Osteoporosis     PUD          Past Surgical History:   Procedure Laterality Date    BREAST LUMPECTOMY Left     Benign    CATARACT EXTRACTION WITH INTRAOCULAR LENS IMPLANT Left     COLONOSCOPY      Polyps = 2015/ , rech    GSI    ERCP W/ PLASTIC STENT PLACEMENT      Liver Stent    LIVER TRANSPLANTATION      [place]    SKIN CANCER EXCISION      Rt UE    TOTAL ABDOMINAL HYSTERECTOMY           Social History     Socioeconomic History    Marital status:    Tobacco Use    Smoking status: Former     Packs/day: 0.25     Years: 1.00     Pack years: 0.25     Types: Cigarettes     Start date:      Quit date:      Years since quittin.6     Passive exposure: Past    Smokeless tobacco: Never    Tobacco comments:     Quit .    Vaping Use    Vaping Use: Never used   Substance and Sexual Activity    Alcohol use: No     Comment: very rarely     Drug use: No    Sexual activity: Defer       Family History   Problem Relation Age of Onset    Diabetes Sister         Type II    Heart disease Sister     Hypertension Sister     No Known Problems Brother     Hemochromatosis Other          Allergies   Allergen Reactions    Amlodipine Itching    Atacand  [Candesartan Cilexetil] Other (See Comments)    Atenolol Palpitations    Azithromycin Nausea Only and GI Intolerance    Ibuprofen Unknown (See Comments)    Levofloxacin Nausea Only    Penicillin G  Hives    Spironolactone Nausea And Vomiting    Sucralfate Swelling    Sulfamethoxazole-Trimethoprim Other (See Comments) and Rash     THROMBOCYTOPENIA    Triamterene-Hctz Myalgia    Venlafaxine Headache           Cardizem Cd [Diltiazem Hcl Er Coated Beads] Other (See Comments)     Chest pressure    Clonidine Derivatives GI Intolerance    Codeine Hallucinations    Escitalopram Unknown - High Severity and Headache    Fluconazole Nausea Only and Headache    Lactulose Unknown - High Severity and Itching    Lipitor [Atorvastatin] Myalgia    Metronidazole Unknown - High Severity    Polyoxyethylene 40 Sorbitol Septaoleate [Sorbitan] Unknown - High Severity    Whey Nausea And Vomiting    Zyrtec [Cetirizine] Itching    Citrus Other (See Comments)    Paroxetine Palpitations    Zoloft [Sertraline Hcl] Palpitations       Current Medications:   Scheduled Meds:allopurinol, 200 mg, Oral, Daily  budesonide-formoterol, 2 puff, Inhalation, BID - RT   And  tiotropium bromide monohydrate, 2 puff, Inhalation, Daily - RT  busPIRone, 5 mg, Oral, TID  colchicine, 0.6 mg, Oral, Daily  heparin (porcine), 5,000 Units, Subcutaneous, Q12H  lisinopril, 10 mg, Oral, Q24H  metoprolol succinate XL, 100 mg, Oral, Nightly  metoprolol succinate XL, 50 mg, Oral, BID  multivitamin with minerals, 1 tablet, Oral, Daily  mycophenolate, 360 mg, Oral, Q24H  pantoprazole, 40 mg, Oral, Daily  senna-docusate sodium, 2 tablet, Oral, BID  sodium chloride, 10 mL, Intravenous, Q12H  tacrolimus, 1 mg, Oral, Q24H  tacrolimus, 2 mg, Oral, Q24H      Continuous Infusions:     Review of Systems   Constitutional: Negative for chills, decreased appetite and malaise/fatigue.   HENT:  Negative for congestion and nosebleeds.    Eyes:  Negative for blurred vision and double vision.   Cardiovascular:  Positive for chest pain. Negative for dyspnea on exertion, irregular heartbeat, leg swelling, near-syncope, orthopnea and palpitations.   Respiratory:  Negative for cough and  "shortness of breath.    Hematologic/Lymphatic: Negative for adenopathy. Does not bruise/bleed easily.   Skin:  Negative for color change and rash.   Musculoskeletal:  Negative for back pain and joint pain.   Gastrointestinal:  Negative for bloating, abdominal pain, heartburn and hematemesis.   Genitourinary:  Negative for flank pain and hematuria.   Neurological:  Negative for dizziness and focal weakness.   Psychiatric/Behavioral:  Negative for altered mental status. The patient does not have insomnia.    All other systems reviewed and are negative.       Objective:         /79 (BP Location: Left arm, Patient Position: Lying)   Pulse 71   Temp 98.1 øF (36.7 øC) (Oral)   Resp 17   Ht 160 cm (62.99\")   Wt 75 kg (165 lb 5.5 oz)   SpO2 97%   BMI 29.30 kg/mý       General: Elderly, in NAD.  Neuro: AAOx3. No gross deficits.  HEENT: Sclera clear, xanthelasmas bilaterally.  CV: S1S2 RRR. No murmurs or gallops.  Resp: Breathing is unlabored. Lungs CTA throughout.  GI: BS+. Abdomen soft and NTTP.  Ext: Pedal pulses are palpable. Extremities are nonedematous.  MS: moves all extremities, no weakness.  Skin: warm, dry.  Psych: calm and cooperative.            Lab Review:     Results from last 7 days   Lab Units 08/18/23  1505   SODIUM mmol/L 139   POTASSIUM mmol/L 4.5   CHLORIDE mmol/L 105   CO2 mmol/L 24.2   BUN mg/dL 23   CREATININE mg/dL 1.12*   GLUCOSE mg/dL 167*   CALCIUM mg/dL 9.1   AST (SGOT) U/L 16   ALT (SGPT) U/L 16     Results from last 7 days   Lab Units 08/19/23  0137 08/18/23  1649 08/18/23  1505   HSTROP T ng/L 14* 13* 14*     Results from last 7 days   Lab Units 08/18/23  1505   WBC 10*3/mm3 5.81   HEMOGLOBIN g/dL 12.3   HEMATOCRIT % 36.7   PLATELETS 10*3/mm3 122*         Results from last 7 days   Lab Units 08/19/23  0137   MAGNESIUM mg/dL 1.7           Invalid input(s): LDLCALC  Results from last 7 days   Lab Units 08/18/23  1505   PROBNP pg/mL 798.4           Recent Radiology:  Imaging Results " (Most Recent)       Procedure Component Value Units Date/Time    CT Angiogram Chest [[id number]] Collected: 08/18/23 1749     Updated: 08/18/23 1801    Narrative:      CT ANGIOGRAM CHEST    Date of Exam: 8/18/2023 4:10 PM EDT    Indication: Acute aortic syndrome (AAS) suspected.    Comparison: 9/26/2022    Technique: CTA of the chest was performed after the uneventful intravenous administration of iodinated contrast. Reconstructed coronal and sagittal images were also obtained. In addition, a 3-D volume rendered image was created for interpretation.   Automated exposure control and iterative reconstruction methods were used.         FINDINGS:    Aorta: The aorta is normal in caliber. Minimal atherosclerotic changes noted at the origin of the great vessels which appear grossly unremarkable in appearance. Minimal atherosclerotic changes of the descending thoracic aorta and visualized abdominal   aorta noted. Mild atherosclerotic changes are noted at the origin of the renal arteries bilaterally which appear to be grossly unremarkable. The visualized SMA and celiac axis are grossly unremarkable.    The main pulmonary artery is normal in caliber and the central portions of the pulmonary vascular system are unremarkable without evidence of filling defects.    Mediastinum: No evidence of mediastinal lymphadenopathy.  The heart and pericardium demonstrate no acute abnormality.    Lungs/pleura: Central airways are patent. Bandlike opacity in the left lower lobe compatible with scarring or atelectasis. There is evidence of old granulomatous disease. No suspicious new nodule or consolidation noted    Upper Abdomen: Spleen is mildly enlarged. Liver demonstrates mild decreased attenuation.    Soft tissues/Bones: No acute bone or soft tissue abnormality.      Impression:        1. The aorta appears grossly unremarkable in appearance.    2. Lungs are clear            Electronically Signed: Cruz Murillo MD    8/18/2023 5:58 PM  EDT    Workstation ID: [id number]    XR Chest PA & Lateral [[id number]] Collected: 08/18/23 1521     Updated: 08/18/23 1524    Narrative:      XR CHEST PA AND LATERAL    Date of Exam: 8/18/2023 3:05 PM EDT    Indication: chest pain    Comparison: 9/5/2022.    Findings:  Heart and pulmonary vessels are within normal limits. Lung fields are well inflated. There is old granulomatous disease. There are no acute lung infiltrates or effusions.      Impression:      Impression:  No acute process.      Electronically Signed: Mayra Smith MD    8/18/2023 3:22 PM EDT    Workstation ID: [id number]              ECHOCARDIOGRAM:    Results for orders placed during the hospital encounter of 12/07/20    Adult Transthoracic Echo Complete W/ Cont if Necessary Per Protocol    Interpretation Summary  ú Estimated left ventricular EF was in agreement with the calculated left ventricular EF. Left ventricular ejection fraction appears to be 56 - 60%. Left ventricular systolic function is normal.  ú Left ventricular wall thickness is consistent with hypertrophy. Sigmoid-shaped ventricular septum is present.  ú Left ventricular diastolic function is consistent with (grade I) impaired relaxation.  ú Estimated right ventricular systolic pressure from tricuspid regurgitation is normal (<35 mmHg).            Assessment:         Active Hospital Problems    Diagnosis  POA    **Chest pain [R07.9]  Yes     1) Chest Pain  -High-sensitivity troponin 14, 13, 14  -EKG shows no acute ST abnormalities  -Chest x-ray negative for acute findings  -CTA of the chest without acute findings    2) hypertensive urgency    3) VHD  - Last 2D echo in 2020 showed an EF of 56 to 60% with grade 1 diastolic dysfunction, mild AI, mild MR.     4) HLD    5) CISNEROS cirrhosis s/p liver transplant in 2016    6) gout    7) M‚niŠre's    8) PVD         Plan:   Optimize anti-hypertensive regimen.  Lexiscan nuclear stress test completed.  Nuclear images to follow.  Further  recommendations per attending cardiologist.         Electronically signed by NERISSA Lawson, 08/19/23, 11:47 AM EDT.

## 2023-08-19 NOTE — H&P
[facility] Medicine Services      Patient Name: Christal Romo  : 1945  MRN: [id number]  Primary Care Physician:  Sara Zhao DO  Date of admission: 2023      Subjective      Chief Complaint: Chest pain.    History of Present Illness: Christal Romo is a 78 y.o. female who presented to [facility] on 2023 complaining of chest pain off-and-on for last 1 day, somewhat radiating to the left shoulder.  Patient also complaining of neck pain at the same time and also some headache.  Patient says she usually gets those symptoms when her blood pressure is running high, patient was noted running blood pressure on the higher side.  Initial EKG cardia marker of unremarkable.  She follows with Dr. Nicolas cardiology service.  Following this asked with the patient with further care.      Review of Systems   All other systems reviewed and are negative.     Personal History     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 - Dr Ritter [facility]; CBC/Prograf/CMP/LIPIDS/A1C    MAMMO     NEG = 2021/     Meniere's disease     OAB     Osteoporosis     PUD        Past Surgical History:   Procedure Laterality Date    BREAST LUMPECTOMY Left     Benign    CATARACT EXTRACTION WITH INTRAOCULAR LENS IMPLANT Left     COLONOSCOPY      Polyps = / / , rech    GSI    ERCP W/ PLASTIC STENT PLACEMENT      Liver Stent    LIVER TRANSPLANTATION      [place]    SKIN CANCER EXCISION      Rt UE    TOTAL ABDOMINAL HYSTERECTOMY         Family History: family history includes Diabetes in her sister; Heart disease in her sister; Hemochromatosis in an other family member; Hypertension in  her sister; No Known Problems in her brother. Otherwise pertinent FHx was reviewed and not pertinent to current issue.    Social History:  reports that she quit smoking about 60 years ago. Her smoking use included cigarettes. She started smoking about 61 years ago. She has a 0.25 pack-year smoking history. She has been exposed to tobacco smoke. She has never used smokeless tobacco. She reports that she does not drink alcohol and does not use drugs.    Home Medications:  Prior to Admission Medications       Prescriptions Last Dose Informant Patient Reported? Taking?    metoprolol succinate XL (TOPROL-XL) 50 MG 24 hr tablet   No Yes    Take 1 tablet by mouth Daily.    mycophenolate (MYFORTIC) 360 MG tablet delayed-release EC tablet  Self No Yes    Take 1 tablet by mouth Every Morning.    tacrolimus (PROGRAF) 1 MG capsule  Self No Yes    2 po qam and 1 po qpm    acetaminophen (TYLENOL) 500 MG tablet  Self Yes No    Take 1 tablet by mouth. prn    allopurinol (Zyloprim) 100 MG tablet   No No    Take 2 tablets by mouth Daily.    aspirin 81 MG tablet  Self Yes No    Take 1 tablet by mouth Daily.    baclofen (LIORESAL) 10 MG tablet   No No    TAKE 1 TABLET BY MOUTH EVERY NIGHT AT BEDTIME AS NEEDED FOR MUSCLE SPASMS    busPIRone (BUSPAR) 5 MG tablet  Self No No    TAKE ONE TABLET BY MOUTH THREE TIMES A DAY    clindamycin (CLEOCIN) 300 MG capsule  Self No No    TAKE TWO CAPSULES BY MOUTH ONE HOUR PRIOR TO DENTAL PROCEDURE    colchicine 0.6 MG tablet   No No    Take 1 tablet by mouth Daily.    dicyclomine (BENTYL) 20 MG tablet  Self No No    Take 1 tablet by mouth Every 6 (Six) Hours As Needed (diarrhea).    diphenhydrAMINE (BENADRYL) 25 MG tablet  Self Yes No    Take 1 tablet by mouth Every 6 (Six) Hours As Needed for Itching.    fenofibrate (Tricor) 48 MG tablet  Self No No    Take 1 tablet by mouth Daily.    fexofenadine (ALLEGRA) 180 MG tablet  Self Yes No    Take 1 tablet by mouth Daily.    fluconazole (Diflucan) 150 MG  tablet  Self No No    Take 1 tablet by mouth As Needed (vaginitis x 1 dose per episode).    Fluticasone-Umeclidin-Vilant (Trelegy Ellipta) 100-62.5-25 MCG/ACT inhaler   No No    Inhale 1 puff Daily.    Lutein 20 MG capsule  Self Yes No    Take 1 tablet by mouth Daily.    meclizine (ANTIVERT) 12.5 MG tablet  Self No No    Take 1 tablet by mouth Every 6 (Six) Hours As Needed for Dizziness.    metoprolol succinate XL (Toprol XL) 100 MG 24 hr tablet   No No    Take 1 tablet by mouth Daily.    Multiple Vitamins-Minerals (Multivitamin Adults 50+) tablet  Self Yes No    Take 1 tablet by mouth Daily.    nystatin (MYCOSTATIN) 100,000 unit/mL suspension  Self No No    Swish and swallow 5 mL 4 (Four) Times a Day.    ondansetron (Zofran) 4 MG tablet  Self No No    Take 1 tablet by mouth Every 8 (Eight) Hours As Needed for Nausea or Vomiting. prn    pantoprazole (Protonix) 40 MG EC tablet   No No    Take 1 tablet by mouth Daily.    prednisoLONE acetate (PRED FORTE) 1 % ophthalmic suspension   Yes No    instill 1 drop by ophthalmic route left eye 2x's day    promethazine (PHENERGAN) 6.25 MG/5ML syrup   No No    5- 10ml po q6hrs prn N/V    TobraDex 0.3-0.1 % ophthalmic suspension  Self Yes No    Apply 1 drop to eye(s) as directed by provider Every 12 (Twelve) Hours.    traMADol (ULTRAM) 50 MG tablet   No No    Take 1 tablet by mouth Every 6 (Six) Hours As Needed for Moderate Pain or Severe Pain.              Allergies:  Allergies   Allergen Reactions    Amlodipine Itching    Atacand  [Candesartan Cilexetil] Other (See Comments)    Atenolol Palpitations    Azithromycin Nausea Only and GI Intolerance    Ibuprofen Unknown (See Comments)    Levofloxacin Nausea Only    Penicillin G Hives    Spironolactone Nausea And Vomiting    Sucralfate Swelling    Sulfamethoxazole-Trimethoprim Other (See Comments) and Rash     THROMBOCYTOPENIA    Triamterene-Hctz Myalgia    Venlafaxine Headache           Cardizem Cd [Diltiazem Hcl Er Coated Beads]  Other (See Comments)     Chest pressure    Clonidine Derivatives GI Intolerance    Codeine Hallucinations    Escitalopram Unknown - High Severity and Headache    Fluconazole Nausea Only and Headache    Lactulose Unknown - High Severity and Itching    Lipitor [Atorvastatin] Myalgia    Metronidazole Unknown - High Severity    Polyoxyethylene 40 Sorbitol Septaoleate [Sorbitan] Unknown - High Severity    Whey Nausea And Vomiting    Zyrtec [Cetirizine] Itching    Citrus Other (See Comments)    Paroxetine Palpitations    Zoloft [Sertraline Hcl] Palpitations       Objective      Vitals:   Temp:  [98.2 øF (36.8 øC)-98.5 øF (36.9 øC)] 98.3 øF (36.8 øC)  Heart Rate:  [66-78] 72  Resp:  [12-18] 16  BP: (135-205)/() 201/90    Physical Exam  Vitals and nursing note reviewed.   Constitutional:       General: She is not in acute distress.     Appearance: Normal appearance. She is well-developed. She is not ill-appearing, toxic-appearing or diaphoretic.   HENT:      Head: Normocephalic and atraumatic.      Right Ear: Ear canal and external ear normal.      Left Ear: Ear canal and external ear normal.      Nose: Nose normal. No congestion or rhinorrhea.      Mouth/Throat:      Mouth: Mucous membranes are moist.      Pharynx: No oropharyngeal exudate.   Eyes:      General: No scleral icterus.        Right eye: No discharge.         Left eye: No discharge.      Extraocular Movements: Extraocular movements intact.      Conjunctiva/sclera: Conjunctivae normal.      Pupils: Pupils are equal, round, and reactive to light.   Neck:      Thyroid: No thyromegaly.      Vascular: No carotid bruit or JVD.      Trachea: No tracheal deviation.   Cardiovascular:      Rate and Rhythm: Normal rate and regular rhythm.      Pulses: Normal pulses.      Heart sounds: Normal heart sounds. No murmur heard.    No friction rub. No gallop.   Pulmonary:      Effort: Pulmonary effort is normal. No respiratory distress.      Breath sounds: Normal breath  sounds. No stridor. No wheezing, rhonchi or rales.   Chest:      Chest wall: No tenderness.   Abdominal:      General: Bowel sounds are normal. There is no distension.      Palpations: Abdomen is soft. There is no mass.      Tenderness: There is no abdominal tenderness. There is no guarding or rebound.      Hernia: No hernia is present.   Musculoskeletal:         General: No swelling, tenderness, deformity or signs of injury. Normal range of motion.      Cervical back: Normal range of motion and neck supple. No rigidity. No muscular tenderness.      Right lower leg: No edema.      Left lower leg: No edema.   Lymphadenopathy:      Cervical: No cervical adenopathy.   Skin:     General: Skin is warm and dry.      Coloration: Skin is not jaundiced or pale.      Findings: No bruising, erythema or rash.   Neurological:      General: No focal deficit present.      Mental Status: She is alert and oriented to person, place, and time. Mental status is at baseline.      Cranial Nerves: No cranial nerve deficit.      Sensory: No sensory deficit.      Motor: No weakness or abnormal muscle tone.      Coordination: Coordination normal.   Psychiatric:         Mood and Affect: Mood normal.         Behavior: Behavior normal.         Thought Content: Thought content normal.         Judgment: Judgment normal.            Result Review    Result Review:  I have personally reviewed the results from the time of this admission to 8/19/2023 01:15 EDT and agree with these findings:  [x]  Laboratory  [x]  Microbiology  [x]  Radiology  []  EKG/Telemetry   []  Cardiology/Vascular   []  Pathology  []  Old records  []  Other:  Most notable findings include:       Assessment & Plan        Active Hospital Problems:  Active Hospital Problems    Diagnosis     **Chest pain      Plan:     Chest pain rule out acute coronary syndrome, telemetry, serial cardiac markers, echo stress test, cardiology consult.    Neck pain, will do MRI of the neck to rule out  any acute process, patient may need spine surgery evaluation.    Hypertensive urgency, continue metoprolol 100 mg daily, will add Norvasc 5 mg po daily.    History of liver transplant, patient noted on mycophenolate and tacrolimus, will continue.  We will check tacrolimus level.    DVT prophylaxis with heparin subcu.          DVT prophylaxis:  Medical DVT prophylaxis orders are present.    CODE STATUS:    Level Of Support Discussed With: Patient  Code Status (Patient has no pulse and is not breathing): CPR (Attempt to Resuscitate)  Medical Interventions (Patient has pulse or is breathing): Full Support    Admission Status:  I believe this patient meets observation status.    I discussed the patient's findings and my recommendations with patient.    This patient has been examined wearing appropriate Personal Protective Equipment and discussed with hospital infection control department. 08/19/23      Signature:

## 2023-08-20 ENCOUNTER — APPOINTMENT (OUTPATIENT)
Dept: CARDIOLOGY | Facility: HOSPITAL | Age: 78
DRG: 305 | End: 2023-08-20
Payer: MEDICARE

## 2023-08-20 LAB
ALBUMIN SERPL-MCNC: 4.1 G/DL (ref 3.5–5.2)
ALBUMIN/GLOB SERPL: 2.1 G/DL
ALP SERPL-CCNC: 73 U/L (ref 39–117)
ALT SERPL W P-5'-P-CCNC: 14 U/L (ref 1–33)
ANION GAP SERPL CALCULATED.3IONS-SCNC: 16 MMOL/L (ref 5–15)
AST SERPL-CCNC: 14 U/L (ref 1–32)
BASOPHILS # BLD AUTO: 0 10*3/MM3 (ref 0–0.2)
BASOPHILS NFR BLD AUTO: 0.5 % (ref 0–1.5)
BH CV ECHO MEAS - ACS: 2 CM
BH CV ECHO MEAS - AO MAX PG: 8 MMHG
BH CV ECHO MEAS - AO MEAN PG: 5 MMHG
BH CV ECHO MEAS - AO ROOT DIAM: 3.6 CM
BH CV ECHO MEAS - AO V2 MAX: 141 CM/SEC
BH CV ECHO MEAS - AO V2 VTI: 29.7 CM
BH CV ECHO MEAS - AVA(I,D): 2.1 CM2
BH CV ECHO MEAS - EDV(CUBED): 103.8 ML
BH CV ECHO MEAS - EDV(MOD-SP4): 64.1 ML
BH CV ECHO MEAS - EF(MOD-BP): 63 %
BH CV ECHO MEAS - EF(MOD-SP4): 63.2 %
BH CV ECHO MEAS - ESV(CUBED): 27 ML
BH CV ECHO MEAS - ESV(MOD-SP4): 23.6 ML
BH CV ECHO MEAS - FS: 36.2 %
BH CV ECHO MEAS - IVS/LVPW: 0.91 CM
BH CV ECHO MEAS - IVSD: 1 CM
BH CV ECHO MEAS - LA DIMENSION: 3.7 CM
BH CV ECHO MEAS - LAT PEAK E' VEL: 6.4 CM/SEC
BH CV ECHO MEAS - LV DIASTOLIC VOL/BSA (35-75): 36 CM2
BH CV ECHO MEAS - LV MASS(C)D: 175.8 GRAMS
BH CV ECHO MEAS - LV MAX PG: 5.5 MMHG
BH CV ECHO MEAS - LV MEAN PG: 3 MMHG
BH CV ECHO MEAS - LV SYSTOLIC VOL/BSA (12-30): 13.2 CM2
BH CV ECHO MEAS - LV V1 MAX: 117 CM/SEC
BH CV ECHO MEAS - LV V1 VTI: 24.5 CM
BH CV ECHO MEAS - LVIDD: 4.7 CM
BH CV ECHO MEAS - LVIDS: 3 CM
BH CV ECHO MEAS - LVOT AREA: 2.5 CM2
BH CV ECHO MEAS - LVOT DIAM: 1.8 CM
BH CV ECHO MEAS - LVPWD: 1.1 CM
BH CV ECHO MEAS - MED PEAK E' VEL: 5 CM/SEC
BH CV ECHO MEAS - MV A MAX VEL: 114 CM/SEC
BH CV ECHO MEAS - MV DEC SLOPE: 244 CM/SEC2
BH CV ECHO MEAS - MV DEC TIME: 0.39 MSEC
BH CV ECHO MEAS - MV E MAX VEL: 64.5 CM/SEC
BH CV ECHO MEAS - MV E/A: 0.57
BH CV ECHO MEAS - MV MAX PG: 4.8 MMHG
BH CV ECHO MEAS - MV MEAN PG: 2 MMHG
BH CV ECHO MEAS - MV P1/2T: 84.7 MSEC
BH CV ECHO MEAS - MV V2 VTI: 30.4 CM
BH CV ECHO MEAS - MVA(P1/2T): 2.6 CM2
BH CV ECHO MEAS - MVA(VTI): 2.05 CM2
BH CV ECHO MEAS - PA V2 MAX: 85.3 CM/SEC
BH CV ECHO MEAS - RV MAX PG: 2.25 MMHG
BH CV ECHO MEAS - RV V1 MAX: 75 CM/SEC
BH CV ECHO MEAS - RV V1 VTI: 14.8 CM
BH CV ECHO MEAS - SI(MOD-SP4): 22.7 ML/M2
BH CV ECHO MEAS - SV(LVOT): 62.3 ML
BH CV ECHO MEAS - SV(MOD-SP4): 40.5 ML
BH CV ECHO MEAS - TAPSE (>1.6): 2.5 CM
BH CV ECHO MEAS - TR MAX PG: 25.4 MMHG
BH CV ECHO MEAS - TR MAX VEL: 252 CM/SEC
BH CV ECHO MEASUREMENTS AVERAGE E/E' RATIO: 11.32
BH CV XLRA - TDI S': 20.8 CM/SEC
BILIRUB SERPL-MCNC: 0.4 MG/DL (ref 0–1.2)
BUN SERPL-MCNC: 19 MG/DL (ref 8–23)
BUN/CREAT SERPL: 17.9 (ref 7–25)
CALCIUM SPEC-SCNC: 9.2 MG/DL (ref 8.6–10.5)
CHLORIDE SERPL-SCNC: 102 MMOL/L (ref 98–107)
CO2 SERPL-SCNC: 22 MMOL/L (ref 22–29)
CREAT SERPL-MCNC: 1.06 MG/DL (ref 0.57–1)
DEPRECATED RDW RBC AUTO: 45.5 FL (ref 37–54)
EGFRCR SERPLBLD CKD-EPI 2021: 54.6 ML/MIN/1.73
EOSINOPHIL # BLD AUTO: 0.1 10*3/MM3 (ref 0–0.4)
EOSINOPHIL NFR BLD AUTO: 0.9 % (ref 0.3–6.2)
ERYTHROCYTE [DISTWIDTH] IN BLOOD BY AUTOMATED COUNT: 15.2 % (ref 12.3–15.4)
GLOBULIN UR ELPH-MCNC: 2 GM/DL
GLUCOSE SERPL-MCNC: 126 MG/DL (ref 65–99)
HCT VFR BLD AUTO: 37.7 % (ref 34–46.6)
HGB BLD-MCNC: 12.8 G/DL (ref 12–15.9)
LEFT ATRIUM VOLUME INDEX: 24.4 ML/M2
LYMPHOCYTES # BLD AUTO: 1.7 10*3/MM3 (ref 0.7–3.1)
LYMPHOCYTES NFR BLD AUTO: 24.5 % (ref 19.6–45.3)
MAGNESIUM SERPL-MCNC: 1.7 MG/DL (ref 1.6–2.4)
MCH RBC QN AUTO: 29.2 PG (ref 26.6–33)
MCHC RBC AUTO-ENTMCNC: 34 G/DL (ref 31.5–35.7)
MCV RBC AUTO: 85.9 FL (ref 79–97)
MONOCYTES # BLD AUTO: 0.7 10*3/MM3 (ref 0.1–0.9)
MONOCYTES NFR BLD AUTO: 10.1 % (ref 5–12)
NEUTROPHILS NFR BLD AUTO: 4.4 10*3/MM3 (ref 1.7–7)
NEUTROPHILS NFR BLD AUTO: 64 % (ref 42.7–76)
NRBC BLD AUTO-RTO: 0.6 /100 WBC (ref 0–0.2)
PHOSPHATE SERPL-MCNC: 4.5 MG/DL (ref 2.5–4.5)
PLATELET # BLD AUTO: 121 10*3/MM3 (ref 140–450)
PMV BLD AUTO: 9.7 FL (ref 6–12)
POTASSIUM SERPL-SCNC: 4.2 MMOL/L (ref 3.5–5.2)
PROT SERPL-MCNC: 6.1 G/DL (ref 6–8.5)
RBC # BLD AUTO: 4.38 10*6/MM3 (ref 3.77–5.28)
SINUS: 3.3 CM
SODIUM SERPL-SCNC: 140 MMOL/L (ref 136–145)
STJ: 2.6 CM
WBC NRBC COR # BLD: 6.9 10*3/MM3 (ref 3.4–10.8)

## 2023-08-20 PROCEDURE — 83735 ASSAY OF MAGNESIUM: CPT | Performed by: INTERNAL MEDICINE

## 2023-08-20 PROCEDURE — 63710000001 TACROLIMUS PER 1 MG: Performed by: HOSPITALIST

## 2023-08-20 PROCEDURE — 80053 COMPREHEN METABOLIC PANEL: CPT | Performed by: INTERNAL MEDICINE

## 2023-08-20 PROCEDURE — 93306 TTE W/DOPPLER COMPLETE: CPT | Performed by: INTERNAL MEDICINE

## 2023-08-20 PROCEDURE — 85025 COMPLETE CBC W/AUTO DIFF WBC: CPT | Performed by: INTERNAL MEDICINE

## 2023-08-20 PROCEDURE — 94799 UNLISTED PULMONARY SVC/PX: CPT

## 2023-08-20 PROCEDURE — 84100 ASSAY OF PHOSPHORUS: CPT | Performed by: INTERNAL MEDICINE

## 2023-08-20 PROCEDURE — 63710000001 MYCOPHENOLATE PER 180 MG: Performed by: HOSPITALIST

## 2023-08-20 PROCEDURE — 25010000002 PROCHLORPERAZINE 10 MG/2ML SOLUTION: Performed by: INTERNAL MEDICINE

## 2023-08-20 PROCEDURE — 99233 SBSQ HOSP IP/OBS HIGH 50: CPT | Performed by: INTERNAL MEDICINE

## 2023-08-20 PROCEDURE — 93306 TTE W/DOPPLER COMPLETE: CPT

## 2023-08-20 PROCEDURE — 97161 PT EVAL LOW COMPLEX 20 MIN: CPT

## 2023-08-20 PROCEDURE — 25010000002 HEPARIN (PORCINE) PER 1000 UNITS: Performed by: HOSPITALIST

## 2023-08-20 PROCEDURE — 63710000001 ONDANSETRON PER 8 MG: Performed by: HOSPITALIST

## 2023-08-20 RX ORDER — ACETAMINOPHEN 325 MG/1
650 TABLET ORAL EVERY 6 HOURS PRN
Status: DISCONTINUED | OUTPATIENT
Start: 2023-08-20 | End: 2023-08-22 | Stop reason: HOSPADM

## 2023-08-20 RX ORDER — ALLOPURINOL 100 MG/1
200 TABLET ORAL NIGHTLY
Status: DISCONTINUED | OUTPATIENT
Start: 2023-08-20 | End: 2023-08-22 | Stop reason: HOSPADM

## 2023-08-20 RX ADMIN — PROCHLORPERAZINE EDISYLATE 10 MG: 5 INJECTION INTRAMUSCULAR; INTRAVENOUS at 20:16

## 2023-08-20 RX ADMIN — MYCOPHENOLIC ACID 360 MG: 180 TABLET, DELAYED RELEASE ORAL at 22:50

## 2023-08-20 RX ADMIN — BUSPIRONE HYDROCHLORIDE 5 MG: 5 TABLET ORAL at 14:51

## 2023-08-20 RX ADMIN — MECLIZINE HYDROCHLORIDE 12.5 MG: 25 TABLET ORAL at 05:34

## 2023-08-20 RX ADMIN — TRAMADOL HYDROCHLORIDE 50 MG: 50 TABLET, COATED ORAL at 21:09

## 2023-08-20 RX ADMIN — TACROLIMUS 2 MG: 1 CAPSULE ORAL at 10:22

## 2023-08-20 RX ADMIN — ONDANSETRON HYDROCHLORIDE 4 MG: 4 TABLET, FILM COATED ORAL at 16:32

## 2023-08-20 RX ADMIN — Medication 10 ML: at 08:45

## 2023-08-20 RX ADMIN — ALLOPURINOL 200 MG: 100 TABLET ORAL at 20:16

## 2023-08-20 RX ADMIN — HEPARIN SODIUM 5000 UNITS: 5000 INJECTION INTRAVENOUS; SUBCUTANEOUS at 20:16

## 2023-08-20 RX ADMIN — BUSPIRONE HYDROCHLORIDE 5 MG: 5 TABLET ORAL at 20:16

## 2023-08-20 RX ADMIN — METOPROLOL SUCCINATE 100 MG: 50 TABLET, EXTENDED RELEASE ORAL at 20:16

## 2023-08-20 RX ADMIN — METOPROLOL SUCCINATE 50 MG: 50 TABLET, EXTENDED RELEASE ORAL at 05:21

## 2023-08-20 RX ADMIN — BUDESONIDE AND FORMOTEROL FUMARATE DIHYDRATE 2 PUFF: 160; 4.5 AEROSOL RESPIRATORY (INHALATION) at 19:26

## 2023-08-20 RX ADMIN — Medication 10 ML: at 20:16

## 2023-08-20 RX ADMIN — PANTOPRAZOLE SODIUM 40 MG: 40 TABLET, DELAYED RELEASE ORAL at 08:45

## 2023-08-20 RX ADMIN — MULTIPLE VITAMINS W/ MINERALS TAB 1 TABLET: TAB at 08:45

## 2023-08-20 RX ADMIN — PROCHLORPERAZINE EDISYLATE 10 MG: 5 INJECTION INTRAMUSCULAR; INTRAVENOUS at 05:33

## 2023-08-20 RX ADMIN — TACROLIMUS 1 MG: 1 CAPSULE ORAL at 22:50

## 2023-08-20 RX ADMIN — ACETAMINOPHEN 650 MG: 325 TABLET, FILM COATED ORAL at 11:33

## 2023-08-20 RX ADMIN — METOPROLOL SUCCINATE 50 MG: 50 TABLET, EXTENDED RELEASE ORAL at 14:51

## 2023-08-20 NOTE — PROGRESS NOTES
[facility]iology [facility]      Patient Care Team:  Sara Zhao DO as PCP - General (Family Medicine)  Domenico Medina DPM as Consulting Physician (Podiatry)  Domenico Clifton DO as Consulting Physician (Cardiology)  Mesha Ramon MD (Transplant)  Edmond Carroll MD as Consulting Physician (Gastroenterology)  Lui Schwab OD (Optometry)      Cardiology assessment and plan     Chest pain  Nonspecific elevation of troponin  CT angiogram of the chest with no pulmonary embolism no acute chest findings  Hypertensive urgency and uncontrolled hypertension  Valvular heart disease  Normal LV systolic function  Hyperlipidemia  Menard cirrhosis status post liver transplantation in 2016     Complaining of some nausea vomiting diarrhea  Denies any chest pain  Tmax is 98.6 pulse is 69 respirations are 19 blood pressure is 148/68 sats are 96% on room air  Normal proBNP  Sodium is 140 potassium is 4.2 creatinine is 1.0 LFTs are normal hemoglobin is 12.8  Myocardial perfusion study with no significant reversible ischemia  Follow-up on the results of the echocardiogram with a prior history of valvular heart disease  Continue aggressive risk factor modification  If there is no significant pathology on echocardiogram okay to discharge home and follow-up in office  Need for close monitoring and follow-up reviewed and discussed the patient  Follow-up in office in 2 to 4 weeks           Chief Complaint: Chest pain/nausea vomiting and diarrhea    Subjective no new complaints    Interval History: No significant change in the overall status    History taken from: patient    Review of Systems:  Review of Systems   Constitutional: Negative for chills, decreased appetite and malaise/fatigue.   HENT:  Negative for congestion and nosebleeds.    Eyes:  Negative for blurred vision and double vision.   Cardiovascular:  Negative for chest pain, dyspnea on exertion, irregular heartbeat, leg swelling, near-syncope  "and palpitations.   Respiratory:  Negative for cough and shortness of breath.    Hematologic/Lymphatic: Negative for adenopathy. Does not bruise/bleed easily.   Skin:  Negative for color change and rash.   Musculoskeletal:  Negative for back pain and joint pain.   Gastrointestinal:  Positive for diarrhea, nausea and vomiting. Negative for bloating, abdominal pain and hematemesis.   Genitourinary:  Negative for flank pain and hematuria.   Neurological:  Negative for dizziness and focal weakness.   Psychiatric/Behavioral:  Negative for altered mental status and memory loss.      Objective    Vital Signs  Visit Vitals  /68 (BP Location: Right arm, Patient Position: Lying)   Pulse 69   Temp 98.6 øF (37 øC) (Oral)   Resp 19   Ht 160 cm (62.99\")   Wt 75 kg (165 lb 5.5 oz)   SpO2 96%   BMI 29.30 kg/mý     Oxygen Therapy  SpO2: 96 %  Pulse Oximetry Type: Continuous  Device (Oxygen Therapy): room air  Flowsheet Rows      Flowsheet Row First Filed Value   Admission Height 160 cm (63\") Documented at 08/18/2023 1443   Admission Weight 75.5 kg (166 lb 8 oz) Documented at 08/18/2023 1443          Intake & Output (last 3 days)         08/17 0701  08/18 0700 08/18 0701  08/19 0700 08/19 0701  08/20 0700 08/20 0701  08/21 0700    P.O.  360 120 240    Total Intake(mL/kg)  360 (4.8) 120 (1.6) 240 (3.2)    Urine (mL/kg/hr)    100 (0.2)    Total Output    100    Net  +360 +120 +140            Urine Unmeasured Occurrence  6 x 3 x           Lines, Drains & Airways       Active LDAs       Name Placement date Placement time Site Days    Peripheral IV 09/05/22 1220 Left Antecubital 09/05/22  1220  Antecubital  349                    Physical Exam:  Constitutional:       Appearance: Well-developed.   Eyes:      Conjunctiva/sclera: Conjunctivae normal.      Pupils: Pupils are equal, round, and reactive to light.   HENT:      Head: Normocephalic and atraumatic.   Neck:      Thyroid: No thyromegaly.   Pulmonary:      Effort: Pulmonary " effort is normal.      Breath sounds: Normal breath sounds.   Cardiovascular:      Normal rate. Regular rhythm.   Pulses:     Intact distal pulses.   Edema:     Peripheral edema absent.   Abdominal:      General: Bowel sounds are normal.      Palpations: Abdomen is soft.   Musculoskeletal:      Cervical back: Normal range of motion and neck supple. Skin:     General: Skin is warm.   Neurological:      Mental Status: Alert and oriented to person, place, and time.       Results Review:     I reviewed the patient's new clinical results.    Lab Results (last 24 hours)       Procedure Component Value Units Date/Time    Magnesium [[id number]]  (Normal) Collected: 08/20/23 0020    Specimen: Blood Updated: 08/20/23 0140     Magnesium 1.7 mg/dL     Comprehensive Metabolic Panel [[id number]]  (Abnormal) Collected: 08/20/23 0020    Specimen: Blood Updated: 08/20/23 0140     Glucose 126 mg/dL      BUN 19 mg/dL      Creatinine 1.06 mg/dL      Sodium 140 mmol/L      Potassium 4.2 mmol/L      Chloride 102 mmol/L      CO2 22.0 mmol/L      Calcium 9.2 mg/dL      Total Protein 6.1 g/dL      Albumin 4.1 g/dL      ALT (SGPT) 14 U/L      AST (SGOT) 14 U/L      Alkaline Phosphatase 73 U/L      Total Bilirubin 0.4 mg/dL      Globulin 2.0 gm/dL      A/G Ratio 2.1 g/dL      BUN/Creatinine Ratio 17.9     Anion Gap 16.0 mmol/L      eGFR 54.6 mL/min/1.73     Narrative:      GFR Normal >60  Chronic Kidney Disease <60  Kidney Failure <15    The GFR formula is only valid for adults with stable renal function between ages 18 and 70.    Phosphorus [[id number]]  (Normal) Collected: 08/20/23 0020    Specimen: Blood Updated: 08/20/23 0140     Phosphorus 4.5 mg/dL     CBC & Differential [[id number]]  (Abnormal) Collected: 08/20/23 0020    Specimen: Blood Updated: 08/20/23 0115    Narrative:      The following orders were created for panel order CBC & Differential.  Procedure                               Abnormality         Status                      ---------                               -----------         ------                     CBC Auto Differential[[id number]]        Abnormal            Final result                 Please view results for these tests on the individual orders.    CBC Auto Differential [[id number]]  (Abnormal) Collected: 08/20/23 0020    Specimen: Blood Updated: 08/20/23 0115     WBC 6.90 10*3/mm3      RBC 4.38 10*6/mm3      Hemoglobin 12.8 g/dL      Hematocrit 37.7 %      MCV 85.9 fL      MCH 29.2 pg      MCHC 34.0 g/dL      RDW 15.2 %      RDW-SD 45.5 fl      MPV 9.7 fL      Platelets 121 10*3/mm3      Neutrophil % 64.0 %      Lymphocyte % 24.5 %      Monocyte % 10.1 %      Eosinophil % 0.9 %      Basophil % 0.5 %      Neutrophils, Absolute 4.40 10*3/mm3      Lymphocytes, Absolute 1.70 10*3/mm3      Monocytes, Absolute 0.70 10*3/mm3      Eosinophils, Absolute 0.10 10*3/mm3      Basophils, Absolute 0.00 10*3/mm3      nRBC 0.6 /100 WBC           Results for orders placed during the hospital encounter of 12/07/20    Adult Transthoracic Echo Complete W/ Cont if Necessary Per Protocol    Interpretation Summary  ú Estimated left ventricular EF was in agreement with the calculated left ventricular EF. Left ventricular ejection fraction appears to be 56 - 60%. Left ventricular systolic function is normal.  ú Left ventricular wall thickness is consistent with hypertrophy. Sigmoid-shaped ventricular septum is present.  ú Left ventricular diastolic function is consistent with (grade I) impaired relaxation.  ú Estimated right ventricular systolic pressure from tricuspid regurgitation is normal (<35 mmHg).        Medication Review:   I have reviewed the patient's current medication list  Scheduled Meds:allopurinol, 200 mg, Oral, Nightly  budesonide-formoterol, 2 puff, Inhalation, BID - RT   And  tiotropium bromide monohydrate, 2 puff, Inhalation, Daily - RT  busPIRone, 5 mg, Oral, TID  colchicine, 0.6 mg, Oral, Daily  heparin (porcine), 5,000 Units,  Subcutaneous, Q12H  lisinopril, 10 mg, Oral, Q24H  metoprolol succinate XL, 100 mg, Oral, Nightly  metoprolol succinate XL, 50 mg, Oral, BID  multivitamin with minerals, 1 tablet, Oral, Daily  mycophenolate, 360 mg, Oral, Q24H  pantoprazole, 40 mg, Oral, Daily  senna-docusate sodium, 2 tablet, Oral, BID  sodium chloride, 10 mL, Intravenous, Q12H  tacrolimus, 1 mg, Oral, Q24H  tacrolimus, 2 mg, Oral, Q24H      Continuous Infusions:   PRN Meds:.  acetaminophen    baclofen    senna-docusate sodium **AND** polyethylene glycol **AND** bisacodyl **AND** bisacodyl    Calcium Replacement - Follow Nurse / BPA Driven Protocol    HYDROcodone-acetaminophen    loperamide    Magnesium Standard Dose Replacement - Follow Nurse / BPA Driven Protocol    meclizine    nitroglycerin    ondansetron    Phosphorus Replacement - Follow Nurse / BPA Driven Protocol    Potassium Replacement - Follow Nurse / BPA Driven Protocol    prochlorperazine    [COMPLETED] Insert peripheral IV **AND** sodium chloride    sodium chloride    sodium chloride    traMADol    ECG/EMG Results (last 24 hours)       Procedure Component Value Units Date/Time    SCANNED - TELEMETRY   [[id number]] Resulted: 08/18/23     Updated: 08/19/23 1921    SCANNED - TELEMETRY   [[id number]] Resulted: 08/18/23     Updated: 08/19/23 2010            Imaging Results (Last 24 Hours)       ** No results found for the last 24 hours. **          No results found for this or any previous visit.     Results for orders placed during the hospital encounter of 12/07/20    Adult Transthoracic Echo Complete W/ Cont if Necessary Per Protocol    Interpretation Summary  ú Estimated left ventricular EF was in agreement with the calculated left ventricular EF. Left ventricular ejection fraction appears to be 56 - 60%. Left ventricular systolic function is normal.  ú Left ventricular wall thickness is consistent with hypertrophy. Sigmoid-shaped ventricular septum is present.  ú Left ventricular  diastolic function is consistent with (grade I) impaired relaxation.  ú Estimated right ventricular systolic pressure from tricuspid regurgitation is normal (<35 mmHg).     Lab Results   Component Value Date    GLUCOSE 126 (H) 08/20/2023    BUN 19 08/20/2023    CREATININE 1.06 (H) 08/20/2023    EGFR 54.6 (L) 08/20/2023    BCR 17.9 08/20/2023    K 4.2 08/20/2023    CO2 22.0 08/20/2023    CALCIUM 9.2 08/20/2023    ALBUMIN 4.1 08/20/2023    BILITOT 0.4 08/20/2023    AST 14 08/20/2023    ALT 14 08/20/2023      No results found for: CHOL, CHLPL, TRIG, HDL, LDL, LDLDIRECT   Lab Results   Component Value Date    TROPONINT 14 (H) 08/19/2023        Assessment & Plan       Chest pain        Gricelda Kamara MD  08/20/23  14:08 EDT

## 2023-08-20 NOTE — THERAPY EVALUATION
Patient Name: Christal Romo  : 1945    MRN: [id number]                              Today's Date: 2023       Admit Date: 2023    Visit Dx:     ICD-10-CM ICD-9-CM   1. Chest pain, unspecified type  R07.9 786.50   2. Hypertension, unspecified type  I10 401.9     Patient Active Problem List   Diagnosis    Cardiomegaly    Carotid bruit    Headache    Cervicalgia    Deficiency of other specified B group vitamins    Depression    Dizziness    Dyspnea on exertion    Encounter for screening for malignant neoplasm of breast    Epigastric pain    Fatigue    Former smoker    Gastroesophageal reflux disease    Hyperkalemia    Hyperlipidemia    Hypertension    Insulin resistance syndrome    Intermittent vertigo    Palpitations    Overactive bladder    Other amnesia    Osteoporosis    Meniere's disease    Irritable bowel syndrome    Seasonal allergies    Skin lesion of face    Status post liver transplantation    Thrombocytopenia    Urinary incontinence    Vaginitis and vulvovaginitis    Weight gain    Liver cirrhosis secondary to CISNEROS    Peptic ulcer disease    Pancytopenia    Valvular heart disease    Multiple drug allergies    Anxiety    Overweight with body mass index (BMI) 25.0-29.9    Essential hypertension    Abnormal findings on diagnostic imaging of other parts of digestive tract    Abnormal levels of other serum enzymes    Acute gastritis without bleeding    Age-related cataract of both eyes    Arthritis    Benign neoplasm of colon    Change in bowel habit    Diarrhea    Cough    Dvrtclos of lg int w/o perforation or abscess w/o bleeding    Esophageal varices with bleeding    Esophageal varices without bleeding    Gall bladder disease    Gout    History of colonic polyps    First degree hemorrhoids    Internal hemorrhoids without complication    Left lower quadrant pain    Nonalcoholic steatohepatitis (CISNEROS)    Other pruritus    Other specified pleural conditions    Pleural effusion    Polyp of colon     Pure hypercholesterolemia    Hypersplenism    Splenomegaly    Vertigo    Hepatic encephalopathy    Essential hypertension    High blood pressure    HTN (hypertension)    Ex-smoker    Dysphagia    GERD (gastroesophageal reflux disease)    Gastro-esophageal reflux disease without esophagitis    Nausea and vomiting    Nausea    Other diseases of stomach and duodenum    Other specified disease of esophagus    Irritable bowel syndrome with diarrhea    Cirrhosis of liver    Other cirrhosis of liver    M‚niŠre's disease    Back pain    Anemia of unknown etiology    History of liver transplant    History of liver transplant    Vulvovaginitis    Chest pain     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 - Dr Ritter [facility]; CBC/Prograf/CMP/LIPIDS/A1C    MAMMO     NEG = 2018/ 2021/ 2022    Meniere's disease     OAB     Osteoporosis     PUD      Past Surgical History:   Procedure Laterality Date    BREAST LUMPECTOMY Left     Benign    CATARACT EXTRACTION WITH INTRAOCULAR LENS IMPLANT Left     COLONOSCOPY      Polyps = 2012/ 2015/ 2020, rech 2025   GSI    ERCP W/ PLASTIC STENT PLACEMENT  2014    Liver Stent    LIVER TRANSPLANTATION      [place] [facility]    SKIN CANCER EXCISION      Rt [facility]    TOTAL ABDOMINAL HYSTERECTOMY  1979      General Information       Row Name 08/20/23 1824          Physical Therapy Time and Intention    Document Type evaluation  -EL     Mode of Treatment physical therapy  -       Row Name 08/20/23 1824          General Information    Prior Level of Function independent:;all household mobility;ADL's  -EL       Row Name 08/20/23 1824          Living Environment    People in Home alone  -       Row Name 08/20/23 1824           Home Main Entrance    Number of Stairs, Main Entrance none  -EL       Row Name 08/20/23 1824          Stairs Within Home, Primary    Number of Stairs, Within Home, Primary none  -EL       Row Name 08/20/23 1824          Cognition    Orientation Status (Cognition) oriented x 4  -EL       Row Name 08/20/23 1824          Safety Issues, Functional Mobility    Impairments Affecting Function (Mobility) shortness of breath  -EL               User Key  (r) = Recorded By, (t) = Taken By, (c) = Cosigned By      Initials Name Provider Type    Miguel Francois PT Physical Therapist                   Mobility       Row Name 08/20/23 1825          Bed Mobility    Bed Mobility bed mobility (all) activities  -EL     All Activities, [place] (Bed Mobility) independent  -EL       Row Name 08/20/23 1825          Bed-Chair Transfer    Bed-Chair [place] (Transfers) standby assist  -EL       Row Name 08/20/23 1825          Sit-Stand Transfer    Sit-Stand [place] (Transfers) standby assist  -       Row Name 08/20/23 1825          Gait/Stairs (Locomotion)    [place] Level (Gait) standby assist  -EL     Assistive Device (Gait) --  HHA as pt typically uses cane  -EL     Distance in Feet (Gait) 85  -EL     Comment, (Gait/Stairs) No significant balance deficits, minor SOA with mobiltiy  -EL               User Key  (r) = Recorded By, (t) = Taken By, (c) = Cosigned By      Initials Name Provider Type    Miguel Francois PT Physical Therapist                   Obj/Interventions       Row Name 08/20/23 1826          Range of Motion Comprehensive    General Range of Motion bilateral lower extremity ROM WFL  -EL       Row Name 08/20/23 1826          Strength Comprehensive (MMT)    General Manual Muscle Testing (MMT) Assessment no strength deficits identified  -       Row Name 08/20/23 1826          Balance    Balance Assessment sitting static balance;standing dynamic balance;standing static balance  -EL     Static Sitting  Balance independent  -EL     Static Standing Balance contact guard  -EL     Dynamic Standing Balance contact guard  -EL               User Key  (r) = Recorded By, (t) = Taken By, (c) = Cosigned By      Initials Name Provider Type    Miguel Francois, PT Physical Therapist                   Goals/Plan    No documentation.                  Clinical Impression       Row Name 08/20/23 1826          Pain    Pretreatment Pain Rating 0/10 - no pain  -EL     Posttreatment Pain Rating 0/10 - no pain  -EL       Row Name 08/20/23 1826          Plan of Care Review    Plan of Care Reviewed With patient  -EL     Outcome Evaluation Pt is a 79 YO F admitted with chest pain, stress test with no significant results, awaiting echo. Pt states she lives home alone, typically is independent with all ADLs, ambluation with SPC and no recnet falls. Pt states she has no steps to enter home and has family that calls to check in multiple times per week. Pt this date demonstrates near baseline mobility, requiring SBA for transfers and ambulation and appears safe to return home. Pt does not require continued PT at this time and PT to sign off. If status changes new orders required.  -EL       Row Name 08/20/23 1826          Therapy Assessment/Plan (PT)    Criteria for Skilled Interventions Met (PT) no problems identified which require skilled intervention  -EL     Therapy Frequency (PT) evaluation only  -EL       Row Name 08/20/23 1826          Vital Signs    O2 Delivery Pre Treatment room air  -EL     O2 Delivery Intra Treatment room air  -EL     O2 Delivery Post Treatment room air  -EL     Pre Patient Position Supine  -EL     Intra Patient Position Standing  -EL     Post Patient Position Sitting  -EL       Row Name 08/20/23 1826          Positioning and Restraints    Pre-Treatment Position in bed  -EL     Post Treatment Position bed  -EL     In Bed notified nsg;supine;call light within reach;encouraged to call for assist;exit alarm on  -EL                User Key  (r) = Recorded By, (t) = Taken By, (c) = Cosigned By      Initials Name Provider Type    Miguel Francois, PT Physical Therapist                   Outcome Measures       Row Name 08/20/23 1829 08/20/23 0844       How much help from another person do you currently need...    Turning from your back to your side while in flat bed without using bedrails? 4  -EL 4  -HJ    Moving from lying on back to sitting on the side of a flat bed without bedrails? 4  -EL 4  -HJ    Moving to and from a bed to a chair (including a wheelchair)? 4  -EL 4  -HJ    Standing up from a chair using your arms (e.g., wheelchair, bedside chair)? 4  -EL 4  -HJ    Climbing 3-5 steps with a railing? 3  -EL 3  -HJ    To walk in hospital room? 4  -EL 3  -HJ    AM-PAC 6 Clicks Score (PT) 23  -EL 22  -HJ    Highest level of mobility 7 --> Walked 25 feet or more  -EL 7 --> Walked 25 feet or more  -HJ      Row Name 08/20/23 1829          Functional Assessment    Outcome Measure Options AM-PAC 6 Clicks Basic Mobility (PT)  -EL               User Key  (r) = Recorded By, (t) = Taken By, (c) = Cosigned By      Initials Name Provider Type    Miguel Francois PT Physical Therapist    Page Story, RN Registered Nurse                                 Physical Therapy Education       Title: PT OT SLP Therapies (Done)       Topic: Physical Therapy (Done)       Point: Mobility training (Done)       Learning Progress Summary             Patient Acceptance, E,TB, VU by  at 8/20/2023 1830                         Point: Precautions (Done)       Learning Progress Summary             Patient Acceptance, E,TB, VU by  at 8/20/2023 1830                                         User Key       Initials Effective Dates Name Provider Type Discipline     06/23/20 -  Miguel Naylor, PT Physical Therapist PT                  PT Recommendation and Plan     Plan of Care Reviewed With: patient  Outcome Evaluation: Pt is a 77 YO F admitted with chest pain, stress  test with no significant results, awaiting echo. Pt states she lives home alone, typically is independent with all ADLs, ambluation with SPC and no recnet falls. Pt states she has no steps to enter home and has family that calls to check in multiple times per week. Pt this date demonstrates near baseline mobility, requiring SBA for transfers and ambulation and appears safe to return home. Pt does not require continued PT at this time and PT to sign off. If status changes new orders required.     Time Calculation:   PT Evaluation Complexity  History, PT Evaluation Complexity: no personal factors and/or comorbidities  Examination of Body Systems (PT Eval Complexity): 1-2 elements  Clinical Presentation (PT Evaluation Complexity): stable  Clinical Decision Making (PT Evaluation Complexity): low complexity  Overall Complexity (PT Evaluation Complexity): low complexity     PT Charges       Row Name 08/20/23 1830             Time Calculation    Start Time 1155  -EL      Stop Time 1205  -EL      Time Calculation (min) 10 min  -EL      PT Received On 08/20/23  -EL                User Key  (r) = Recorded By, (t) = Taken By, (c) = Cosigned By      Initials Name Provider Type    Miguel Francois PT Physical Therapist                  Therapy Charges for Today       Code Description Service Date Service Provider Modifiers Qty    [id number] HC PT EVAL LOW COMPLEXITY 2 8/20/2023 Miguel Naylor PT GP 1            PT G-Codes  Outcome Measure Options: AM-PAC 6 Clicks Basic Mobility (PT)  AM-PAC 6 Clicks Score (PT): 23  PT Discharge Summary  Anticipated Discharge Disposition (PT): home    Miguel Naylor PT  8/20/2023

## 2023-08-20 NOTE — PLAN OF CARE
Goal Outcome Evaluation:  Plan of Care Reviewed With: patient           Outcome Evaluation: Pt is a 79 YO F admitted with chest pain, stress test with no significant results, awaiting echo. Pt states she lives home alone, typically is independent with all ADLs, ambluation with SPC and no recnet falls. Pt states she has no steps to enter home and has family that calls to check in multiple times per week. Pt this date demonstrates near baseline mobility, requiring SBA for transfers and ambulation and appears safe to return home. Pt does not require continued PT at this time and PT to sign off. If status changes new orders required.      Anticipated Discharge Disposition (PT): home

## 2023-08-20 NOTE — PLAN OF CARE
Goal Outcome Evaluation:              Outcome Evaluation: Patient pleasant. Awaiting ECHO to be completed. No complaints at this time.

## 2023-08-20 NOTE — PROGRESS NOTES
[facility] Medicine Services Daily Progress Note    Patient Name: Crhistal Romo  : 1945  MRN: [id number]  Primary Care Physician:  Sara Zhao DO  Date of admission: 2023      Subjective      Chief Complaint: Chest pain      Patient Reports chest pain has improved but diarrhea is severe and persistent as well as nausea vomiting.  Stool panel ordered.    ROS negative except as above      Objective      Vitals:   Temp:  [97.8 øF (36.6 øC)-98.8 øF (37.1 øC)] 98.6 øF (37 øC)  Heart Rate:  [66-80] 79  Resp:  [16-20] 19  BP: (121-167)/(62-81) 148/68    Physical Exam  Vitals reviewed.   HENT:      Head: Normocephalic.      Nose: Nose normal.      Mouth/Throat:      Mouth: Mucous membranes are moist.   Cardiovascular:      Rate and Rhythm: Normal rate and regular rhythm.      Pulses: Normal pulses.      Heart sounds: Normal heart sounds.   Pulmonary:      Effort: Pulmonary effort is normal.      Breath sounds: Normal breath sounds.   Abdominal:      General: Abdomen is flat.      Palpations: Abdomen is soft.   Musculoskeletal:         General: Normal range of motion.      Cervical back: Normal range of motion.   Skin:     General: Skin is warm.   Neurological:      General: No focal deficit present.      Mental Status: She is alert and oriented to person, place, and time.   Psychiatric:         Mood and Affect: Mood normal.         Behavior: Behavior normal.           Result Review    Result Review:  I have personally reviewed the results from the time of this admission to 2023 16:32 EDT and agree with these findings:  [x]  Laboratory  []  Microbiology  []  Radiology  []  EKG/Telemetry   []  Cardiology/Vascular   []  Pathology  []  Old records  []  Other:  Most notable findings include: Creatinine 1.06          Assessment & Plan      Brief Patient Summary:  Christal Romo is a 78 y.o. female who presents with chest pain      allopurinol, 200 mg, Oral,  Nightly  budesonide-formoterol, 2 puff, Inhalation, BID - RT   And  tiotropium bromide monohydrate, 2 puff, Inhalation, Daily - RT  busPIRone, 5 mg, Oral, TID  colchicine, 0.6 mg, Oral, Daily  heparin (porcine), 5,000 Units, Subcutaneous, Q12H  lisinopril, 10 mg, Oral, Q24H  metoprolol succinate XL, 100 mg, Oral, Nightly  metoprolol succinate XL, 50 mg, Oral, BID  multivitamin with minerals, 1 tablet, Oral, Daily  mycophenolate, 360 mg, Oral, Q24H  pantoprazole, 40 mg, Oral, Daily  senna-docusate sodium, 2 tablet, Oral, BID  sodium chloride, 10 mL, Intravenous, Q12H  tacrolimus, 1 mg, Oral, Q24H  tacrolimus, 2 mg, Oral, Q24H             Active Hospital Problems:  Active Hospital Problems    Diagnosis     **Chest pain      Plan:   Acute chest pain  Stress test low risk study  Echocardiogram pending    Intractable diarrhea  GI panel pending  Imodium as needed    History of hypertension  Continue metoprolol and amlodipine    History of liver transplant  Continue mycophenolate and tacrolimus    DVT prophylaxis:  Medical DVT prophylaxis orders are present.    CODE STATUS:    Level Of Support Discussed With: Patient  Code Status (Patient has no pulse and is not breathing): CPR (Attempt to Resuscitate)  Medical Interventions (Patient has pulse or is breathing): Full Support      Disposition:  I expect patient to be discharged tomorrow if stable.    This patient has been examined wearing appropriate Personal Protective Equipment and discussed with  patient and nurse . 08/20/23      Electronically signed by Tutu Lara MD, 08/20/23, 16:32 EDT.  [facility] Hospitalist Team

## 2023-08-20 NOTE — PLAN OF CARE
Goal Outcome Evaluation:  Plan of Care Reviewed With: patient        Progress: no change  Outcome Evaluation: Pt rested well through the shift with no complaints. Stress test completed, 2D echo pending. Will continue to monitor

## 2023-08-21 LAB
ALBUMIN SERPL-MCNC: 4 G/DL (ref 3.5–5.2)
ALBUMIN/GLOB SERPL: 2.1 G/DL
ALP SERPL-CCNC: 73 U/L (ref 39–117)
ALT SERPL W P-5'-P-CCNC: 13 U/L (ref 1–33)
ANION GAP SERPL CALCULATED.3IONS-SCNC: 12 MMOL/L (ref 5–15)
AST SERPL-CCNC: 16 U/L (ref 1–32)
BASOPHILS # BLD AUTO: 0 10*3/MM3 (ref 0–0.2)
BASOPHILS NFR BLD AUTO: 0.3 % (ref 0–1.5)
BILIRUB SERPL-MCNC: 0.3 MG/DL (ref 0–1.2)
BUN SERPL-MCNC: 26 MG/DL (ref 8–23)
BUN/CREAT SERPL: 21 (ref 7–25)
CALCIUM SPEC-SCNC: 9.2 MG/DL (ref 8.6–10.5)
CHLORIDE SERPL-SCNC: 102 MMOL/L (ref 98–107)
CO2 SERPL-SCNC: 25 MMOL/L (ref 22–29)
CREAT SERPL-MCNC: 1.24 MG/DL (ref 0.57–1)
DEPRECATED RDW RBC AUTO: 45.5 FL (ref 37–54)
EGFRCR SERPLBLD CKD-EPI 2021: 45.2 ML/MIN/1.73
EOSINOPHIL # BLD AUTO: 0.1 10*3/MM3 (ref 0–0.4)
EOSINOPHIL NFR BLD AUTO: 1.2 % (ref 0.3–6.2)
ERYTHROCYTE [DISTWIDTH] IN BLOOD BY AUTOMATED COUNT: 15.3 % (ref 12.3–15.4)
GLOBULIN UR ELPH-MCNC: 1.9 GM/DL
GLUCOSE SERPL-MCNC: 106 MG/DL (ref 65–99)
HCT VFR BLD AUTO: 35.9 % (ref 34–46.6)
HGB BLD-MCNC: 12.1 G/DL (ref 12–15.9)
LYMPHOCYTES # BLD AUTO: 1.6 10*3/MM3 (ref 0.7–3.1)
LYMPHOCYTES NFR BLD AUTO: 24.9 % (ref 19.6–45.3)
MAGNESIUM SERPL-MCNC: 1.7 MG/DL (ref 1.6–2.4)
MCH RBC QN AUTO: 28.7 PG (ref 26.6–33)
MCHC RBC AUTO-ENTMCNC: 33.6 G/DL (ref 31.5–35.7)
MCV RBC AUTO: 85.4 FL (ref 79–97)
MONOCYTES # BLD AUTO: 0.7 10*3/MM3 (ref 0.1–0.9)
MONOCYTES NFR BLD AUTO: 10.9 % (ref 5–12)
NEUTROPHILS NFR BLD AUTO: 4 10*3/MM3 (ref 1.7–7)
NEUTROPHILS NFR BLD AUTO: 62.7 % (ref 42.7–76)
NRBC BLD AUTO-RTO: 0 /100 WBC (ref 0–0.2)
PHOSPHATE SERPL-MCNC: 4.3 MG/DL (ref 2.5–4.5)
PLATELET # BLD AUTO: 117 10*3/MM3 (ref 140–450)
PMV BLD AUTO: 9.5 FL (ref 6–12)
POTASSIUM SERPL-SCNC: 4.8 MMOL/L (ref 3.5–5.2)
PROT SERPL-MCNC: 5.9 G/DL (ref 6–8.5)
RBC # BLD AUTO: 4.21 10*6/MM3 (ref 3.77–5.28)
SODIUM SERPL-SCNC: 139 MMOL/L (ref 136–145)
WBC NRBC COR # BLD: 6.4 10*3/MM3 (ref 3.4–10.8)

## 2023-08-21 PROCEDURE — 94761 N-INVAS EAR/PLS OXIMETRY MLT: CPT

## 2023-08-21 PROCEDURE — 94799 UNLISTED PULMONARY SVC/PX: CPT

## 2023-08-21 PROCEDURE — 84100 ASSAY OF PHOSPHORUS: CPT | Performed by: INTERNAL MEDICINE

## 2023-08-21 PROCEDURE — 25010000002 HEPARIN (PORCINE) PER 1000 UNITS: Performed by: HOSPITALIST

## 2023-08-21 PROCEDURE — 80053 COMPREHEN METABOLIC PANEL: CPT | Performed by: INTERNAL MEDICINE

## 2023-08-21 PROCEDURE — 83735 ASSAY OF MAGNESIUM: CPT | Performed by: INTERNAL MEDICINE

## 2023-08-21 PROCEDURE — 25010000002 PROCHLORPERAZINE 10 MG/2ML SOLUTION: Performed by: INTERNAL MEDICINE

## 2023-08-21 PROCEDURE — 63710000001 TACROLIMUS PER 1 MG: Performed by: HOSPITALIST

## 2023-08-21 PROCEDURE — 85025 COMPLETE CBC W/AUTO DIFF WBC: CPT | Performed by: INTERNAL MEDICINE

## 2023-08-21 PROCEDURE — 99232 SBSQ HOSP IP/OBS MODERATE 35: CPT | Performed by: INTERNAL MEDICINE

## 2023-08-21 PROCEDURE — 25010000002 HYDRALAZINE PER 20 MG: Performed by: INTERNAL MEDICINE

## 2023-08-21 PROCEDURE — 94664 DEMO&/EVAL PT USE INHALER: CPT

## 2023-08-21 PROCEDURE — 63710000001 MYCOPHENOLATE PER 180 MG: Performed by: HOSPITALIST

## 2023-08-21 PROCEDURE — 63710000001 ONDANSETRON PER 8 MG: Performed by: HOSPITALIST

## 2023-08-21 RX ORDER — HYDRALAZINE HYDROCHLORIDE 20 MG/ML
10 INJECTION INTRAMUSCULAR; INTRAVENOUS ONCE
Status: COMPLETED | OUTPATIENT
Start: 2023-08-21 | End: 2023-08-21

## 2023-08-21 RX ORDER — NITROGLYCERIN 20 MG/100ML
5-200 INJECTION INTRAVENOUS
Status: DISCONTINUED | OUTPATIENT
Start: 2023-08-21 | End: 2023-08-21

## 2023-08-21 RX ORDER — ECHINACEA PURPUREA EXTRACT 125 MG
1 TABLET ORAL AS NEEDED
Status: DISCONTINUED | OUTPATIENT
Start: 2023-08-21 | End: 2023-08-22 | Stop reason: HOSPADM

## 2023-08-21 RX ORDER — LISINOPRIL 5 MG/1
10 TABLET ORAL ONCE
Status: DISCONTINUED | OUTPATIENT
Start: 2023-08-21 | End: 2023-08-21

## 2023-08-21 RX ADMIN — Medication 10 ML: at 20:08

## 2023-08-21 RX ADMIN — MYCOPHENOLIC ACID 360 MG: 180 TABLET, DELAYED RELEASE ORAL at 22:24

## 2023-08-21 RX ADMIN — PANTOPRAZOLE SODIUM 40 MG: 40 TABLET, DELAYED RELEASE ORAL at 08:41

## 2023-08-21 RX ADMIN — TIOTROPIUM BROMIDE INHALATION SPRAY 2 PUFF: 3.12 SPRAY, METERED RESPIRATORY (INHALATION) at 07:35

## 2023-08-21 RX ADMIN — ACETAMINOPHEN 650 MG: 325 TABLET, FILM COATED ORAL at 01:41

## 2023-08-21 RX ADMIN — PROCHLORPERAZINE EDISYLATE 10 MG: 5 INJECTION INTRAMUSCULAR; INTRAVENOUS at 20:15

## 2023-08-21 RX ADMIN — METOPROLOL SUCCINATE 100 MG: 50 TABLET, EXTENDED RELEASE ORAL at 20:08

## 2023-08-21 RX ADMIN — BACLOFEN 10 MG: 10 TABLET ORAL at 20:22

## 2023-08-21 RX ADMIN — ACETAMINOPHEN 650 MG: 325 TABLET, FILM COATED ORAL at 14:08

## 2023-08-21 RX ADMIN — HEPARIN SODIUM 5000 UNITS: 5000 INJECTION INTRAVENOUS; SUBCUTANEOUS at 08:41

## 2023-08-21 RX ADMIN — TRAMADOL HYDROCHLORIDE 50 MG: 50 TABLET, COATED ORAL at 19:05

## 2023-08-21 RX ADMIN — BUDESONIDE AND FORMOTEROL FUMARATE DIHYDRATE 2 PUFF: 160; 4.5 AEROSOL RESPIRATORY (INHALATION) at 07:39

## 2023-08-21 RX ADMIN — ALLOPURINOL 200 MG: 100 TABLET ORAL at 20:08

## 2023-08-21 RX ADMIN — METOPROLOL SUCCINATE 50 MG: 50 TABLET, EXTENDED RELEASE ORAL at 14:04

## 2023-08-21 RX ADMIN — ACETAMINOPHEN 650 MG: 325 TABLET, FILM COATED ORAL at 21:04

## 2023-08-21 RX ADMIN — METOPROLOL SUCCINATE 50 MG: 50 TABLET, EXTENDED RELEASE ORAL at 04:02

## 2023-08-21 RX ADMIN — BUSPIRONE HYDROCHLORIDE 5 MG: 5 TABLET ORAL at 16:28

## 2023-08-21 RX ADMIN — MULTIPLE VITAMINS W/ MINERALS TAB 1 TABLET: TAB at 08:41

## 2023-08-21 RX ADMIN — HYDRALAZINE HYDROCHLORIDE 10 MG: 20 INJECTION INTRAMUSCULAR; INTRAVENOUS at 16:28

## 2023-08-21 RX ADMIN — LOPERAMIDE HYDROCHLORIDE 2 MG: 2 CAPSULE ORAL at 12:37

## 2023-08-21 RX ADMIN — TRAMADOL HYDROCHLORIDE 50 MG: 50 TABLET, COATED ORAL at 08:41

## 2023-08-21 RX ADMIN — HYDROCODONE BITARTRATE AND ACETAMINOPHEN 1 TABLET: 5; 325 TABLET ORAL at 15:20

## 2023-08-21 RX ADMIN — BUSPIRONE HYDROCHLORIDE 5 MG: 5 TABLET ORAL at 20:08

## 2023-08-21 RX ADMIN — TACROLIMUS 1 MG: 1 CAPSULE ORAL at 22:24

## 2023-08-21 RX ADMIN — LISINOPRIL 10 MG: 5 TABLET ORAL at 14:09

## 2023-08-21 RX ADMIN — BUSPIRONE HYDROCHLORIDE 5 MG: 5 TABLET ORAL at 08:41

## 2023-08-21 RX ADMIN — ONDANSETRON HYDROCHLORIDE 4 MG: 4 TABLET, FILM COATED ORAL at 16:28

## 2023-08-21 RX ADMIN — BACLOFEN 10 MG: 10 TABLET ORAL at 04:02

## 2023-08-21 RX ADMIN — TACROLIMUS 2 MG: 1 CAPSULE ORAL at 11:19

## 2023-08-21 NOTE — PLAN OF CARE
Goal Outcome Evaluation:      PT complain of Headache and Nausea this shift. Medications given per MAR

## 2023-08-21 NOTE — PLAN OF CARE
Problem: Adult Inpatient Plan of Care  Goal: Plan of Care Review  Outcome: Ongoing, Progressing   Goal Outcome Evaluation:        Pt awake off & on during shift, c/o being unable to sleep. BP remains slightly high, treated per MAR. Pt up with SBA to bathroom, remains on RA. Pt educated on current plan of care, verbalized understanding.

## 2023-08-21 NOTE — PROGRESS NOTES
"Cardiology Progress  Note      Patient Care Team:  Sara Zhao DO as PCP - General (Family Medicine)  Domenico Medina DPM as Consulting Physician (Podiatry)  Domenico Clifton DO as Consulting Physician (Cardiology)  Mesha Ramon MD (Transplant)  Edmond Carroll MD as Consulting Physician (Gastroenterology)  Lui Schwab OD (Optometry)    PATIENT IDENTIFICATION  Name: Christal Romo  Age: 78 y.o.  Sex: female  :  1945  MRN: [id number]      Length of stay:    LOS: 1 day           REASON FOR FOLLOW-UP:  Chest pain      INTERVAL HISTORY  Patient seen and examined, chart and labs reviewed.  She is sitting on the side of the bed and states she is very anxious about her transplant medications and receiving them timely.  She stated that when she becomes anxious she experiences chest discomfort.  Patient had nuclear stress testing with no evidence of ischemia.  Blood pressure remains elevated.      SUBJECTIVE    No current chest pain  No shortness of breath  No abdominal pain, nausea or vomiting      REVIEW OF SYSTEMS:  Pertinent items are noted in HPI, all other systems reviewed and negative    OBJECTIVE   Creatinine 1.24 (1.06)    ASSESSMENT  Chest pain  Nonspecific troponin elevation  Valvular heart disease  CISNEROS cirrhosis status post liver transplant 2016  Dyslipidemia      RECOMMENDATIONS  Blood pressure remains elevated.  She has multiple medication intolerances/allergies.  We will give 1 dose hydralazine 10 mg  Nuclear stress testing with no evidence of ischemia  Continue risk factor modification and current CV plan of care  TTE with normal LV systolic function, grade 1 DD.          Vital Signs  Visit Vitals  BP (!) 188/78 (BP Location: Right arm, Patient Position: Lying)   Pulse 71   Temp 97.7 øF (36.5 øC) (Oral)   Resp 24   Ht 160 cm (63\")   Wt 62 kg (136 lb 11 oz)   SpO2 96%   BMI 24.21 kg/mý     Oxygen Therapy  SpO2: 96 %  Pulse Oximetry Type: " "Continuous  Device (Oxygen Therapy): room air  Flowsheet Rows      Flowsheet Row First Filed Value   Admission Height 160 cm (63\") Documented at 08/18/2023 1443   Admission Weight 75.5 kg (166 lb 8 oz) Documented at 08/18/2023 1443          Intake & Output (last 3 days)         08/18 0701  08/19 0700 08/19 0701  08/20 0700 08/20 0701  08/21 0700 08/21 0701  08/22 0700    P.O. 360 120 720 240    Total Intake(mL/kg) 360 (4.8) 120 (1.6) 720 (11.6) 240 (3.9)    Urine (mL/kg/hr)   100 (0.1)     Total Output   100     Net +360 +120 +620 +240            Urine Unmeasured Occurrence 6 x 3 x 3 x           Lines, Drains & Airways       Active LDAs       Name Placement date Placement time Site Days    Peripheral IV 09/05/22 1220 Left Antecubital 09/05/22  1220  Antecubital  350                           BP (!) 188/78 (BP Location: Right arm, Patient Position: Lying)   Pulse 71   Temp 97.7 øF (36.5 øC) (Oral)   Resp 24   Ht 160 cm (63\")   Wt 62 kg (136 lb 11 oz)   SpO2 96%   BMI 24.21 kg/mý   Intake/Output last 3 shifts:  I/O last 3 completed shifts:  In: 720 [P.O.:720]  Out: 100 [Urine:100]  Intake/Output this shift:  I/O this shift:  In: 240 [P.O.:240]  Out: -     PHYSICAL EXAM:    General: Alert, cooperative, no distress, appears stated age  Head:  Normocephalic, atraumatic, mucous membranes moist  Eyes:  Conjunctivae/corneas clear, EOM's intact     Neck:  Supple,  no adenopathy; no JVD or bruit  Lungs:  Clear to auscultation bilaterally, no wheezes, rhonchi or rales are noted  Chest wall: No tenderness  Heart::  Regular rate and rhythm, S1 and S2 normal, no murmur, rub or gallop  Abdomen: Soft, nontender, nondistended, bowel sounds active  Extremities: No cyanosis, clubbing, or edema   Pulses: 2+ and symmetric all extremities  Skin:  No rashes or lesions  Neuro/psych: A&O x3. CN II through XII are grossly intact with appropriate affect, anxious        Scheduled Meds:      allopurinol, 200 mg, Oral, " Nightly  budesonide-formoterol, 2 puff, Inhalation, BID - RT   And  tiotropium bromide monohydrate, 2 puff, Inhalation, Daily - RT  busPIRone, 5 mg, Oral, TID  colchicine, 0.6 mg, Oral, Daily  heparin (porcine), 5,000 Units, Subcutaneous, Q12H  lisinopril, 10 mg, Oral, Q24H  lisinopril, 10 mg, Oral, Once  metoprolol succinate XL, 100 mg, Oral, Nightly  metoprolol succinate XL, 50 mg, Oral, BID  multivitamin with minerals, 1 tablet, Oral, Daily  mycophenolate, 360 mg, Oral, Q24H  pantoprazole, 40 mg, Oral, Daily  senna-docusate sodium, 2 tablet, Oral, BID  sodium chloride, 10 mL, Intravenous, Q12H  tacrolimus, 1 mg, Oral, Q24H  tacrolimus, 2 mg, Oral, Q24H        Continuous Infusions:         PRN Meds:      acetaminophen    baclofen    senna-docusate sodium **AND** polyethylene glycol **AND** bisacodyl **AND** bisacodyl    Calcium Replacement - Follow Nurse / BPA Driven Protocol    HYDROcodone-acetaminophen    loperamide    Magnesium Standard Dose Replacement - Follow Nurse / BPA Driven Protocol    meclizine    nitroglycerin    ondansetron    Phosphorus Replacement - Follow Nurse / BPA Driven Protocol    Potassium Replacement - Follow Nurse / BPA Driven Protocol    prochlorperazine    [COMPLETED] Insert peripheral IV **AND** sodium chloride    sodium chloride    sodium chloride    traMADol        Results Review:     I reviewed the patient's new clinical results.    CBC    Results from last 7 days   Lab Units 08/21/23  0110 08/20/23  0020 08/18/23  1505   WBC 10*3/mm3 6.40 6.90 5.81   HEMOGLOBIN g/dL 12.1 12.8 12.3   PLATELETS 10*3/mm3 117* 121* 122*     Cr Clearance Estimated Creatinine Clearance: 37.8 mL/min (A) (by C-G formula based on SCr of 1.24 mg/dL (H)).  Coag     HbA1C   Lab Results   Component Value Date    HGBA1C 5.2 09/20/2021     Blood Glucose No results found for: POCGLU  Infection     CMP   Results from last 7 days   Lab Units 08/21/23  0110 08/20/23  0020 08/18/23  1505   SODIUM mmol/L 139 140 139    POTASSIUM mmol/L 4.8 4.2 4.5   CHLORIDE mmol/L 102 102 105   CO2 mmol/L 25.0 22.0 24.2   BUN mg/dL 26* 19 23   CREATININE mg/dL 1.24* 1.06* 1.12*   GLUCOSE mg/dL 106* 126* 167*   ALBUMIN g/dL 4.0 4.1 4.5   BILIRUBIN mg/dL 0.3 0.4 0.4   ALK PHOS U/L 73 73 73   AST (SGOT) U/L 16 14 16   ALT (SGPT) U/L 13 14 16     ABG      UA      LISET  No results found for: POCMETH, POCAMPHET, POCBARBITUR, POCBENZO, POCCOCAINE, POCOPIATES, POCOXYCODO, POCPHENCYC, POCPROPOXY, POCTHC, POCTRICYC  Lysis Labs   Results from last 7 days   Lab Units 08/21/23  0110 08/20/23  0020 08/18/23  1505   HEMOGLOBIN g/dL 12.1 12.8 12.3   PLATELETS 10*3/mm3 117* 121* 122*   CREATININE mg/dL 1.24* 1.06* 1.12*     Radiology(recent) No radiology results for the last day      Results from last 7 days   Lab Units 08/19/23  0137   HSTROP T ng/L 14*       Xrays, labs reviewed personally by physician.    ECG/EMG Results (most recent)       Procedure Component Value Units Date/Time    ECG 12 Lead Chest Pain [[id number]] Collected: 08/18/23 1438     Updated: 08/18/23 1556     QT Interval 388 ms     Narrative:      HEART RATE= 77  bpm  RR Interval= 776  ms  CT Interval= 165  ms  P Horizontal Axis=   deg  P Front Axis= 2  deg  QRSD Interval= 82  ms  QT Interval= 388  ms  QRS Axis= -37  deg  T Wave Axis= 18  deg  - ABNORMAL ECG -  Sinus rhythm  Left ventricular hypertrophy  Anterior infarct, old  When compared with ECG of 05-Sep-2022 12:05:39,  Significant axis, voltage or hypertrophy change  Electronically Signed By: Hayley Quintanilla (HIGINIO) 18-Aug-2023 15:56:20  Date and Time of Study: 2023-08-18 14:38:15    SCANNED - TELEMETRY   [[id number]] Resulted: 08/18/23     Updated: 08/19/23 1027    SCANNED - TELEMETRY   [[id number]] Resulted: 08/18/23     Updated: 08/19/23 1921    SCANNED - TELEMETRY   [[id number]] Resulted: 08/18/23     Updated: 08/19/23 2010    Adult Transthoracic Echo Complete W/ Cont if Necessary Per Protocol [[id number]] Resulted: 08/20/23 1800      Updated: 08/20/23 1804     LVIDd 4.7 cm      LVIDs 3.0 cm      IVSd 1.00 cm      LVPWd 1.10 cm      FS 36.2 %      IVS/LVPW 0.91 cm      ESV(cubed) 27.0 ml      LV Sys Vol (BSA corrected) 13.2 cm2      EDV(cubed) 103.8 ml      LV Ro Vol (BSA corrected) 36.0 cm2      LVOT area 2.5 cm2      LV mass(C)d 175.8 grams      LVOT diam 1.80 cm      EDV(MOD-sp4) 64.1 ml      ESV(MOD-sp4) 23.6 ml      SV(MOD-sp4) 40.5 ml      SI(MOD-sp4) 22.7 ml/m2      EF(MOD-sp4) 63.2 %      MV E max yoni 64.5 cm/sec      MV A max yoni 114.0 cm/sec      MV dec time 0.39 msec      MV E/A 0.57     LA ESV Index (BP) 24.4 ml/m2      Med Peak E' Yoni 5.0 cm/sec      Lat Peak E' Yoni 6.4 cm/sec      Avg E/e' ratio 11.32     SV(LVOT) 62.3 ml      TAPSE (>1.6) 2.5 cm      RV S' 20.8 cm/sec      LA dimension (2D)  3.7 cm      LV V1 max 117.0 cm/sec      LV V1 max PG 5.5 mmHg      LV V1 mean PG 3.0 mmHg      LV V1 VTI 24.5 cm      Ao pk yoni 141.0 cm/sec      Ao max PG 8.0 mmHg      Ao mean PG 5.0 mmHg      Ao V2 VTI 29.7 cm      TAMMY(I,D) 2.10 cm2      MV max PG 4.8 mmHg      MV mean PG 2.00 mmHg      MV V2 VTI 30.4 cm      MV P1/2t 84.7 msec      MVA(P1/2t) 2.6 cm2      MVA(VTI) 2.05 cm2      MV dec slope 244.0 cm/sec2      TR max yoni 252.0 cm/sec      TR max PG 25.4 mmHg      RV V1 max PG 2.25 mmHg      RV V1 max 75.0 cm/sec      RV V1 VTI 14.8 cm      PA V2 max 85.3 cm/sec      Ao root diam 3.6 cm      ACS 2.00 cm      Sinus 3.3 cm      STJ 2.6 cm      EF(MOD-bp) 63.0 %     Narrative:        Left ventricular systolic function is normal. Calculated left   ventricular EF = 63%    Left ventricular wall thickness is consistent with mild concentric   hypertrophy.    Left ventricular diastolic function is consistent with (grade I)   impaired relaxation.    The left atrial cavity is mildly dilated.    Estimated right ventricular systolic pressure from tricuspid   regurgitation is normal (<35 mmHg).      SCANNED - TELEMETRY   [[id number]] Resulted: 08/18/23      Updated: 08/20/23 2255    SCANNED - TELEMETRY   [[id number]] Resulted: 08/18/23     Updated: 08/20/23 2322    SCANNED - TELEMETRY   [[id number]] Resulted: 08/18/23     Updated: 08/20/23 2322    SCANNED - TELEMETRY   [[id number]] Resulted: 08/18/23     Updated: 08/20/23 2353    SCANNED - TELEMETRY   [[id number]] Resulted: 08/18/23     Updated: 08/21/23 0016    SCANNED - TELEMETRY   [[id number]] Resulted: 08/18/23     Updated: 08/21/23 0652    SCANNED - TELEMETRY   [[id number]] Resulted: 08/18/23     Updated: 08/21/23 0718    SCANNED - TELEMETRY   [[id number]] Resulted: 08/18/23     Updated: 08/21/23 0718    SCANNED - TELEMETRY   [[id number]] Resulted: 08/18/23     Updated: 08/21/23 0826    SCANNED - TELEMETRY   [[id number]] Resulted: 08/18/23     Updated: 08/21/23 0857    SCANNED - TELEMETRY   [[id number]] Resulted: 08/18/23     Updated: 08/21/23 1133    SCANNED - TELEMETRY   [[id number]] Resulted: 08/18/23     Updated: 08/21/23 1243    SCANNED - TELEMETRY   [[id number]] Resulted: 08/18/23     Updated: 08/21/23 1258    SCANNED - TELEMETRY   [[id number]] Resulted: 08/18/23     Updated: 08/21/23 1314    SCANNED - TELEMETRY   [[id number]] Resulted: 08/18/23     Updated: 08/21/23 1338    SCANNED - TELEMETRY   [[id number]] Resulted: 08/18/23     Updated: 08/21/23 1642              Medication Review:   I have reviewed the patient's current medication list  Scheduled Meds:allopurinol, 200 mg, Oral, Nightly  budesonide-formoterol, 2 puff, Inhalation, BID - RT   And  tiotropium bromide monohydrate, 2 puff, Inhalation, Daily - RT  busPIRone, 5 mg, Oral, TID  colchicine, 0.6 mg, Oral, Daily  heparin (porcine), 5,000 Units, Subcutaneous, Q12H  lisinopril, 10 mg, Oral, Q24H  lisinopril, 10 mg, Oral, Once  metoprolol succinate XL, 100 mg, Oral, Nightly  metoprolol succinate XL, 50 mg, Oral, BID  multivitamin with minerals, 1 tablet, Oral, Daily  mycophenolate, 360 mg, Oral, Q24H  pantoprazole, 40 mg, Oral, Daily  senna-docusate sodium, 2  tablet, Oral, BID  sodium chloride, 10 mL, Intravenous, Q12H  tacrolimus, 1 mg, Oral, Q24H  tacrolimus, 2 mg, Oral, Q24H      Continuous Infusions:   PRN Meds:.  acetaminophen    baclofen    senna-docusate sodium **AND** polyethylene glycol **AND** bisacodyl **AND** bisacodyl    Calcium Replacement - Follow Nurse / BPA Driven Protocol    HYDROcodone-acetaminophen    loperamide    Magnesium Standard Dose Replacement - Follow Nurse / BPA Driven Protocol    meclizine    nitroglycerin    ondansetron    Phosphorus Replacement - Follow Nurse / BPA Driven Protocol    Potassium Replacement - Follow Nurse / BPA Driven Protocol    prochlorperazine    [COMPLETED] Insert peripheral IV **AND** sodium chloride    sodium chloride    sodium chloride    traMADol    Imaging:  Imaging Results (Last 72 Hours)       Procedure Component Value Units Date/Time    CT Angiogram Chest [[id number]] Collected: 08/18/23 1749     Updated: 08/18/23 1801    Narrative:      CT ANGIOGRAM CHEST    Date of Exam: 8/18/2023 4:10 PM EDT    Indication: Acute aortic syndrome (AAS) suspected.    Comparison: 9/26/2022    Technique: CTA of the chest was performed after the uneventful intravenous administration of iodinated contrast. Reconstructed coronal and sagittal images were also obtained. In addition, a 3-D volume rendered image was created for interpretation.   Automated exposure control and iterative reconstruction methods were used.         FINDINGS:    Aorta: The aorta is normal in caliber. Minimal atherosclerotic changes noted at the origin of the great vessels which appear grossly unremarkable in appearance. Minimal atherosclerotic changes of the descending thoracic aorta and visualized abdominal   aorta noted. Mild atherosclerotic changes are noted at the origin of the renal arteries bilaterally which appear to be grossly unremarkable. The visualized SMA and celiac axis are grossly unremarkable.    The main pulmonary artery is normal in caliber and  "the central portions of the pulmonary vascular system are unremarkable without evidence of filling defects.    Mediastinum: No evidence of mediastinal lymphadenopathy.  The heart and pericardium demonstrate no acute abnormality.    Lungs/pleura: Central airways are patent. Bandlike opacity in the left lower lobe compatible with scarring or atelectasis. There is evidence of old granulomatous disease. No suspicious new nodule or consolidation noted    Upper Abdomen: Spleen is mildly enlarged. Liver demonstrates mild decreased attenuation.    Soft tissues/Bones: No acute bone or soft tissue abnormality.      Impression:        1. The aorta appears grossly unremarkable in appearance.    2. Lungs are clear            Electronically Signed: Cruz Murillo MD    8/18/2023 5:58 PM EDT    Workstation ID: [id number]              NERISSA Muir  08/21/23  17:51 EDT       EMR Dragon/Transcription:   \"Dictated utilizing Dragon dictation\".       Electronically signed by NERISSA Muir, 08/21/23, 5:47 PM EDT.    Cardiology attending:  Seen and examined.  Chart and labs reviewed.  History and exam findings are verified with above changes noted.  Assessment and plan notated by APC after being formulated by attending consultant.  Note that greater than 50% of the time spent in care of the patient was provided by attending consultant.    Patient has an extensive allergy list which includes many antihypertensive medications.  We tried lisinopril and she reports that she had a reaction to this as well.  We will try IV hydralazine to see if we can control her blood pressure this way.  If she tolerates this, we can consider placing her on an oral regimen.    Physical Exam:    General: Alert, cooperative, no distress, appears stated age  Head:  Normocephalic, atraumatic, mucous membranes moist  Eyes:  Conjunctiva/corneas clear, EOM's intact     Neck:  Supple,  no adenopathy;      Lungs:            Clear to auscultation " bilaterally, no wheezes rhonchi rales are noted  Chest wall: No tenderness  Heart::  Regular rate and rhythm, S1 and S2 normal, 1/6 holosystolic murmur.  No rub or gallop  Abdomen: Soft, non-tender, nondistended bowel sounds active  Extremities: No cyanosis, clubbing, or edema  Pulses: Diminished pedal pulses  Skin:  No rashes or lesions  Neuro/psych: A&O x3. CN II through XII are grossly intact with appropriate affect

## 2023-08-21 NOTE — CASE MANAGEMENT/SOCIAL WORK
Continued Stay Note  JANET Awan     Patient Name: Christal Romo  MRN: [id number]  Today's Date: 8/21/2023    Admit Date: 8/18/2023    Plan: DC Home   Discharge Plan       Row Name 08/21/23 1636       Plan    Plan DC Home    Plan Comments Attempted to meet with patient at bedside.  Will need CM assessment.  Barrier to discharge: hypertension             Expected Discharge Date and Time       Expected Discharge Date Expected Discharge Time    Aug 22, 2023         Phone communication or documentation only - no physical contact with patient or family.   Margy Graham RN     Office Phone [phone]  Office Cell [phone]

## 2023-08-21 NOTE — NURSING NOTE
Pt called nurse in room, complaining of head pounding and her neck hurting. Venu Junior. PT bp 219/84, Cardiologist paged. Awaiting call back

## 2023-08-21 NOTE — PROGRESS NOTES
[facility] Daily Progress Note    Patient Name: Christal Romo  : 1945  MRN: [id number]  Primary Care Physician:  Sara Zhao DO  Date of admission: 2023      Subjective      Chief Complaint: Chest pain      Patient Reports headache nausea vomiting developed hypertensive urgency with blood pressure 220 systolic.  Cardiology following.  Echo came back without major abnormalities grade 1 diastolic dysfunction.    ROS negative except as above      Objective      Vitals:   Temp:  [97.3 øF (36.3 øC)-98.4 øF (36.9 øC)] 97.7 øF (36.5 øC)  Heart Rate:  [66-82] 71  Resp:  [16-24] 24  BP: (141-219)/(67-89) 188/78    Physical Exam  Vitals reviewed.   Constitutional:       Comments: Appears uncomfortable laying in the bed   HENT:      Head: Normocephalic.      Nose: Nose normal.      Mouth/Throat:      Mouth: Mucous membranes are moist.   Cardiovascular:      Rate and Rhythm: Normal rate and regular rhythm.      Pulses: Normal pulses.      Heart sounds: Normal heart sounds.   Pulmonary:      Effort: Pulmonary effort is normal.      Breath sounds: Normal breath sounds.   Abdominal:      General: Abdomen is flat.      Palpations: Abdomen is soft.   Musculoskeletal:         General: Normal range of motion.      Cervical back: Normal range of motion.   Skin:     General: Skin is warm.   Neurological:      General: No focal deficit present.      Mental Status: She is alert and oriented to person, place, and time.   Psychiatric:         Mood and Affect: Mood normal.         Behavior: Behavior normal.           Result Review    Result Review:  I have personally reviewed the results from the time of this admission to 2023 17:06 EDT and agree with these findings:  [x]  Laboratory  []  Microbiology  []  Radiology  []  EKG/Telemetry   []  Cardiology/Vascular   []  Pathology  []  Old records  []  Other:  Most notable findings include: Creatinine 1.06          Assessment & Plan       Brief Patient Summary:  Christal Romo is a 78 y.o. female who presents with chest pain      allopurinol, 200 mg, Oral, Nightly  budesonide-formoterol, 2 puff, Inhalation, BID - RT   And  tiotropium bromide monohydrate, 2 puff, Inhalation, Daily - RT  busPIRone, 5 mg, Oral, TID  colchicine, 0.6 mg, Oral, Daily  heparin (porcine), 5,000 Units, Subcutaneous, Q12H  lisinopril, 10 mg, Oral, Q24H  lisinopril, 10 mg, Oral, Once  metoprolol succinate XL, 100 mg, Oral, Nightly  metoprolol succinate XL, 50 mg, Oral, BID  multivitamin with minerals, 1 tablet, Oral, Daily  mycophenolate, 360 mg, Oral, Q24H  pantoprazole, 40 mg, Oral, Daily  senna-docusate sodium, 2 tablet, Oral, BID  sodium chloride, 10 mL, Intravenous, Q12H  tacrolimus, 1 mg, Oral, Q24H  tacrolimus, 2 mg, Oral, Q24H             Active Hospital Problems:  Active Hospital Problems    Diagnosis     **Chest pain      Plan:   Acute chest pain  Stress test low risk study  Echocardiogram unremarkable    Hypertensive urgency developed on August 21  Lisinopril given IV hydralazine as needed    Intractable diarrhea  GI panel pending  Imodium as needed    History of hypertension  Continue metoprolol and amlodipine    History of liver transplant  Continue mycophenolate and tacrolimus    DVT prophylaxis:  Medical DVT prophylaxis orders are present.    CODE STATUS:    Level Of Support Discussed With: Patient  Code Status (Patient has no pulse and is not breathing): CPR (Attempt to Resuscitate)  Medical Interventions (Patient has pulse or is breathing): Full Support      Disposition:  I expect patient to be discharged tomorrow if stable.    This patient has been examined wearing appropriate Personal Protective Equipment and discussed with  patient and nurse . 08/21/23      Electronically signed by Tutu Lara MD, 08/21/23, 17:06 EDT.  [facility] Hospitalist Team

## 2023-08-22 ENCOUNTER — READMISSION MANAGEMENT (OUTPATIENT)
Dept: CALL CENTER | Facility: HOSPITAL | Age: 78
End: 2023-08-22
Payer: MEDICARE

## 2023-08-22 VITALS
HEART RATE: 77 BPM | WEIGHT: 137.13 LBS | SYSTOLIC BLOOD PRESSURE: 171 MMHG | DIASTOLIC BLOOD PRESSURE: 62 MMHG | TEMPERATURE: 97.8 F | BODY MASS INDEX: 24.3 KG/M2 | RESPIRATION RATE: 15 BRPM | HEIGHT: 63 IN | OXYGEN SATURATION: 96 %

## 2023-08-22 LAB
ALBUMIN SERPL-MCNC: 4.1 G/DL (ref 3.5–5.2)
ALBUMIN/GLOB SERPL: 2 G/DL
ALP SERPL-CCNC: 74 U/L (ref 39–117)
ALT SERPL W P-5'-P-CCNC: 16 U/L (ref 1–33)
ANION GAP SERPL CALCULATED.3IONS-SCNC: 12 MMOL/L (ref 5–15)
AST SERPL-CCNC: 14 U/L (ref 1–32)
BASOPHILS # BLD AUTO: 0 10*3/MM3 (ref 0–0.2)
BASOPHILS NFR BLD AUTO: 0.4 % (ref 0–1.5)
BILIRUB SERPL-MCNC: 0.4 MG/DL (ref 0–1.2)
BUN SERPL-MCNC: 25 MG/DL (ref 8–23)
BUN/CREAT SERPL: 22.5 (ref 7–25)
CALCIUM SPEC-SCNC: 9.5 MG/DL (ref 8.6–10.5)
CHLORIDE SERPL-SCNC: 103 MMOL/L (ref 98–107)
CO2 SERPL-SCNC: 23 MMOL/L (ref 22–29)
CREAT SERPL-MCNC: 1.11 MG/DL (ref 0.57–1)
DEPRECATED RDW RBC AUTO: 48.1 FL (ref 37–54)
EGFRCR SERPLBLD CKD-EPI 2021: 51.6 ML/MIN/1.73
EOSINOPHIL # BLD AUTO: 0.1 10*3/MM3 (ref 0–0.4)
EOSINOPHIL NFR BLD AUTO: 0.8 % (ref 0.3–6.2)
ERYTHROCYTE [DISTWIDTH] IN BLOOD BY AUTOMATED COUNT: 15.5 % (ref 12.3–15.4)
GLOBULIN UR ELPH-MCNC: 2.1 GM/DL
GLUCOSE SERPL-MCNC: 107 MG/DL (ref 65–99)
HCT VFR BLD AUTO: 36.6 % (ref 34–46.6)
HGB BLD-MCNC: 12.5 G/DL (ref 12–15.9)
LYMPHOCYTES # BLD AUTO: 1.9 10*3/MM3 (ref 0.7–3.1)
LYMPHOCYTES NFR BLD AUTO: 20.4 % (ref 19.6–45.3)
MAGNESIUM SERPL-MCNC: 1.7 MG/DL (ref 1.6–2.4)
MCH RBC QN AUTO: 28.9 PG (ref 26.6–33)
MCHC RBC AUTO-ENTMCNC: 34.1 G/DL (ref 31.5–35.7)
MCV RBC AUTO: 84.7 FL (ref 79–97)
MONOCYTES # BLD AUTO: 0.9 10*3/MM3 (ref 0.1–0.9)
MONOCYTES NFR BLD AUTO: 9.4 % (ref 5–12)
NEUTROPHILS NFR BLD AUTO: 6.3 10*3/MM3 (ref 1.7–7)
NEUTROPHILS NFR BLD AUTO: 69 % (ref 42.7–76)
NRBC BLD AUTO-RTO: 0 /100 WBC (ref 0–0.2)
PHOSPHATE SERPL-MCNC: 4.4 MG/DL (ref 2.5–4.5)
PLATELET # BLD AUTO: 124 10*3/MM3 (ref 140–450)
PMV BLD AUTO: 9.5 FL (ref 6–12)
POTASSIUM SERPL-SCNC: 4.7 MMOL/L (ref 3.5–5.2)
PROT SERPL-MCNC: 6.2 G/DL (ref 6–8.5)
RBC # BLD AUTO: 4.32 10*6/MM3 (ref 3.77–5.28)
SODIUM SERPL-SCNC: 138 MMOL/L (ref 136–145)
WBC NRBC COR # BLD: 9.1 10*3/MM3 (ref 3.4–10.8)

## 2023-08-22 PROCEDURE — 94799 UNLISTED PULMONARY SVC/PX: CPT

## 2023-08-22 PROCEDURE — 99232 SBSQ HOSP IP/OBS MODERATE 35: CPT | Performed by: INTERNAL MEDICINE

## 2023-08-22 PROCEDURE — 94664 DEMO&/EVAL PT USE INHALER: CPT

## 2023-08-22 PROCEDURE — 85025 COMPLETE CBC W/AUTO DIFF WBC: CPT | Performed by: INTERNAL MEDICINE

## 2023-08-22 PROCEDURE — 83735 ASSAY OF MAGNESIUM: CPT | Performed by: INTERNAL MEDICINE

## 2023-08-22 PROCEDURE — 25010000002 HEPARIN (PORCINE) PER 1000 UNITS: Performed by: HOSPITALIST

## 2023-08-22 PROCEDURE — 84100 ASSAY OF PHOSPHORUS: CPT | Performed by: INTERNAL MEDICINE

## 2023-08-22 PROCEDURE — 80053 COMPREHEN METABOLIC PANEL: CPT | Performed by: INTERNAL MEDICINE

## 2023-08-22 PROCEDURE — 63710000001 ONDANSETRON PER 8 MG: Performed by: HOSPITALIST

## 2023-08-22 PROCEDURE — 63710000001 TACROLIMUS PER 1 MG: Performed by: HOSPITALIST

## 2023-08-22 RX ORDER — HYDRALAZINE HYDROCHLORIDE 25 MG/1
25 TABLET, FILM COATED ORAL EVERY 8 HOURS SCHEDULED
Qty: 90 TABLET | Refills: 0 | Status: SHIPPED | OUTPATIENT
Start: 2023-08-22 | End: 2023-08-22 | Stop reason: SDUPTHER

## 2023-08-22 RX ORDER — HYDRALAZINE HYDROCHLORIDE 25 MG/1
25 TABLET, FILM COATED ORAL EVERY 8 HOURS SCHEDULED
Qty: 90 TABLET | Refills: 0 | Status: SHIPPED | OUTPATIENT
Start: 2023-08-22

## 2023-08-22 RX ORDER — HYDRALAZINE HYDROCHLORIDE 25 MG/1
25 TABLET, FILM COATED ORAL EVERY 8 HOURS SCHEDULED
Status: DISCONTINUED | OUTPATIENT
Start: 2023-08-22 | End: 2023-08-22 | Stop reason: HOSPADM

## 2023-08-22 RX ADMIN — TIOTROPIUM BROMIDE INHALATION SPRAY 2 PUFF: 3.12 SPRAY, METERED RESPIRATORY (INHALATION) at 07:01

## 2023-08-22 RX ADMIN — MULTIPLE VITAMINS W/ MINERALS TAB 1 TABLET: TAB at 08:30

## 2023-08-22 RX ADMIN — ACETAMINOPHEN 650 MG: 325 TABLET, FILM COATED ORAL at 07:18

## 2023-08-22 RX ADMIN — METOPROLOL SUCCINATE 50 MG: 50 TABLET, EXTENDED RELEASE ORAL at 06:05

## 2023-08-22 RX ADMIN — TACROLIMUS 2 MG: 1 CAPSULE ORAL at 10:50

## 2023-08-22 RX ADMIN — HYDROCODONE BITARTRATE AND ACETAMINOPHEN 1 TABLET: 5; 325 TABLET ORAL at 12:08

## 2023-08-22 RX ADMIN — BUSPIRONE HYDROCHLORIDE 5 MG: 5 TABLET ORAL at 08:30

## 2023-08-22 RX ADMIN — BUDESONIDE AND FORMOTEROL FUMARATE DIHYDRATE 2 PUFF: 160; 4.5 AEROSOL RESPIRATORY (INHALATION) at 07:01

## 2023-08-22 RX ADMIN — HEPARIN SODIUM 5000 UNITS: 5000 INJECTION INTRAVENOUS; SUBCUTANEOUS at 08:30

## 2023-08-22 RX ADMIN — ONDANSETRON HYDROCHLORIDE 4 MG: 4 TABLET, FILM COATED ORAL at 07:26

## 2023-08-22 RX ADMIN — TRAMADOL HYDROCHLORIDE 50 MG: 50 TABLET, COATED ORAL at 01:14

## 2023-08-22 RX ADMIN — PANTOPRAZOLE SODIUM 40 MG: 40 TABLET, DELAYED RELEASE ORAL at 08:30

## 2023-08-22 NOTE — PLAN OF CARE
Problem: Adult Inpatient Plan of Care  Goal: Plan of Care Review  Outcome: Ongoing, Progressing   Goal Outcome Evaluation:        Pt awake off & on through out shift. Pt c/o pain in bilateral feet & neck, meds given per MAR with minimal-no relief noted. Pt educated on current plan of care, verbalized understanding.

## 2023-08-22 NOTE — DISCHARGE SUMMARY
[facility] Medicine Services  DISCHARGE SUMMARY    Patient Name: Christal oRmo  : 1945  MRN: [id number]    Discharge condition: Improved  Date of Admission: 2023  Discharge Diagnosis: Chest pain, hypertensive urgency  Date of Discharge: 2023  Primary Care Physician: Sara Zhao DO      Presenting Problem:   Chest pain [R07.9]  Chest pain, unspecified type [R07.9]  Hypertension, unspecified type [I10]    Active and Resolved Hospital Problems:  Active Hospital Problems    Diagnosis POA    **Chest pain [R07.9] Yes      Resolved Hospital Problems   No resolved problems to display.         Hospital Course     Hospital Course:  Christal Romo is a 78 y.o. female is a nice lady who presented to the hospital with acute chest pain.  She also had uncontrolled hypertension which was controlled aggressively with blood pressure medications.  She did receive several dosages of hydralazine as needed IV.  The patient was evaluated by the cardiologist and a nuclear stress test was performed.  There was no acute abnormalities requiring left heart cath.  Patient was monitored and once stable she was cleared for discharge by cardiology in stable condition with stable vitals.        Reasons For Change In Medications and Indications for New Medications:      Day of Discharge     Vital Signs:  Temp:  [97.7 øF (36.5 øC)-97.9 øF (36.6 øC)] 97.8 øF (36.6 øC)  Heart Rate:  [66-82] 77  Resp:  [15-24] 15  BP: ()/(54-89) 171/62    Physical Exam:  Physical Exam  Vitals reviewed.   HENT:      Head: Normocephalic.      Nose: Nose normal.      Mouth/Throat:      Mouth: Mucous membranes are moist.   Cardiovascular:      Rate and Rhythm: Normal rate and regular rhythm.      Pulses: Normal pulses.      Heart sounds: Normal heart sounds.   Pulmonary:      Effort: Pulmonary effort is normal.      Breath sounds: Normal breath sounds.   Abdominal:      General: Abdomen is flat.      Palpations:  Abdomen is soft.   Musculoskeletal:         General: Normal range of motion.      Cervical back: Normal range of motion.   Skin:     General: Skin is warm.   Neurological:      General: No focal deficit present.      Mental Status: She is alert and oriented to person, place, and time.   Psychiatric:         Mood and Affect: Mood normal.         Behavior: Behavior normal.          Pertinent  and/or Most Recent Results     LAB RESULTS:      Lab 08/22/23  0254 08/21/23  0110 08/20/23  0020 08/18/23  1505   WBC 9.10 6.40 6.90 5.81   HEMOGLOBIN 12.5 12.1 12.8 12.3   HEMATOCRIT 36.6 35.9 37.7 36.7   PLATELETS 124* 117* 121* 122*   NEUTROS ABS 6.30 4.00 4.40 3.97   IMMATURE GRANS (ABS)  --   --   --  0.06*   LYMPHS ABS 1.90 1.60 1.70 1.27   MONOS ABS 0.90 0.70 0.70 0.41   EOS ABS 0.10 0.10 0.10 0.07   MCV 84.7 85.4 85.9 84.8         Lab 08/22/23  0254 08/21/23  0110 08/20/23  0020 08/19/23  0137 08/18/23  1505   SODIUM 138 139 140  --  139   POTASSIUM 4.7 4.8 4.2  --  4.5   CHLORIDE 103 102 102  --  105   CO2 23.0 25.0 22.0  --  24.2   ANION GAP 12.0 12.0 16.0*  --  9.8   BUN 25* 26* 19  --  23   CREATININE 1.11* 1.24* 1.06*  --  1.12*   EGFR 51.6* 45.2* 54.6*  --  51.1*   GLUCOSE 107* 106* 126*  --  167*   CALCIUM 9.5 9.2 9.2  --  9.1   MAGNESIUM 1.7 1.7 1.7 1.7  --    PHOSPHORUS 4.4 4.3 4.5 4.2  --          Lab 08/22/23  0254 08/21/23  0110 08/20/23  0020 08/18/23  1505   TOTAL PROTEIN 6.2 5.9* 6.1 6.1   ALBUMIN 4.1 4.0 4.1 4.5   GLOBULIN 2.1 1.9 2.0 1.6   ALT (SGPT) 16 13 14 16   AST (SGOT) 14 16 14 16   BILIRUBIN 0.4 0.3 0.4 0.4   ALK PHOS 74 73 73 73         Lab 08/19/23  0137 08/18/23  1649 08/18/23  1505   PROBNP  --   --  798.4   HSTROP T 14* 13* 14*                 Brief Urine Lab Results  (Last result in the past 365 days)        Color   Clarity   Blood   Leuk Est   Nitrite   Protein   CREAT   Urine HCG        05/10/23 1358 Yellow   Clear   Negative   Trace   Negative   Negative                 Microbiology  Results (last 10 days)       ** No results found for the last 240 hours. **            CT Angiogram Chest    Result Date: 8/18/2023  Impression: 1. The aorta appears grossly unremarkable in appearance. 2. Lungs are clear Electronically Signed: Cruz Murillo MD  8/18/2023 5:58 PM EDT  Workstation ID: [id number]    XR Chest PA & Lateral    Result Date: 8/18/2023  Impression: Impression: No acute process. Electronically Signed: Mayra Smith MD  8/18/2023 3:22 PM EDT  Workstation ID: [id number]     Results for orders placed during the hospital encounter of 07/05/21    Vascular screening (bundle) CAR    Interpretation Summary  ú Normal screening vascular ultrasound study      Results for orders placed during the hospital encounter of 07/05/21    Vascular screening (bundle) CAR    Interpretation Summary  ú Normal screening vascular ultrasound study      Results for orders placed during the hospital encounter of 08/18/23    Adult Transthoracic Echo Complete W/ Cont if Necessary Per Protocol    Interpretation Summary    Left ventricular systolic function is normal. Calculated left ventricular EF = 63%    Left ventricular wall thickness is consistent with mild concentric hypertrophy.    Left ventricular diastolic function is consistent with (grade I) impaired relaxation.    The left atrial cavity is mildly dilated.    Estimated right ventricular systolic pressure from tricuspid regurgitation is normal (<35 mmHg).      Labs Pending at Discharge:      Procedures Performed           Consults:   Consults       Date and Time Order Name Status Description    8/19/2023  1:15 AM Inpatient Cardiology Consult Completed               Discharge Details        Discharge Medications        New Medications        Instructions Start Date   hydrALAZINE 25 MG tablet  Commonly known as: APRESOLINE   25 mg, Oral, Every 8 Hours Scheduled             Continue These Medications        Instructions Start Date   acetaminophen 500 MG  tablet  Commonly known as: TYLENOL   500 mg, Oral, prn      allopurinol 100 MG tablet  Commonly known as: Zyloprim   200 mg, Oral, Daily      aspirin 81 MG tablet   1 tablet, Oral, Daily      baclofen 10 MG tablet  Commonly known as: LIORESAL   TAKE 1 TABLET BY MOUTH EVERY NIGHT AT BEDTIME AS NEEDED FOR MUSCLE SPASMS      busPIRone 5 MG tablet  Commonly known as: BUSPAR   TAKE ONE TABLET BY MOUTH THREE TIMES A DAY      colchicine 0.6 MG tablet   0.6 mg, Oral, Daily      dicyclomine 20 MG tablet  Commonly known as: BENTYL   20 mg, Oral, Every 6 Hours PRN      diphenhydrAMINE 25 MG tablet  Commonly known as: BENADRYL   25 mg, Oral, Every 6 Hours PRN      fenofibrate 48 MG tablet  Commonly known as: Tricor   48 mg, Oral, Daily      fexofenadine 180 MG tablet  Commonly known as: ALLEGRA   180 mg, Oral, Daily      fluconazole 150 MG tablet  Commonly known as: Diflucan   150 mg, Oral, As Needed      Fluticasone-Umeclidin-Vilant 100-62.5-25 MCG/ACT inhaler  Commonly known as: Trelegy Ellipta   1 puff, Inhalation, Daily - RT      Lutein 20 MG capsule   1 tablet, Oral, Daily      meclizine 12.5 MG tablet  Commonly known as: ANTIVERT   12.5 mg, Oral, Every 6 Hours PRN      metoprolol succinate  MG 24 hr tablet  Commonly known as: Toprol XL   100 mg, Oral, Daily      metoprolol succinate XL 50 MG 24 hr tablet  Commonly known as: TOPROL-XL   50 mg, Oral, Daily      Multivitamin Adults 50+ tablet tablet  Generic drug: multivitamin with minerals   1 tablet, Oral, Daily      mycophenolate 360 MG tablet delayed-release EC tablet  Commonly known as: MYFORTIC   360 mg, Oral, Every Early Morning      nystatin 100,000 unit/mL suspension  Commonly known as: MYCOSTATIN   500,000 Units, Swish & Swallow, 4 Times Daily      ondansetron 4 MG tablet  Commonly known as: Zofran   4 mg, Oral, Every 8 Hours PRN, prn      pantoprazole 40 MG EC tablet  Commonly known as: Protonix   40 mg, Oral, Daily      prednisoLONE acetate 1 % ophthalmic  suspension  Commonly known as: PRED FORTE   instill 1 drop by ophthalmic route left eye 2x's day      promethazine 6.25 MG/5ML syrup  Commonly known as: PHENERGAN   5- 10ml po q6hrs prn N/V      tacrolimus 1 MG capsule  Commonly known as: PROGRAF   2 po qam and 1 po qpm      TobraDex 0.3-0.1 % ophthalmic suspension  Generic drug: tobramycin-dexAMETHasone   1 drop, Ophthalmic, Every 12 Hours      traMADol 50 MG tablet  Commonly known as: ULTRAM   50 mg, Oral, Every 6 Hours PRN             Stop These Medications      clindamycin 300 MG capsule  Commonly known as: CLEOCIN              Allergies   Allergen Reactions    Amlodipine Itching    Atacand  [Candesartan Cilexetil] Other (See Comments)    Atenolol Palpitations    Azithromycin Nausea Only and GI Intolerance    Ibuprofen Unknown (See Comments)    Levofloxacin Nausea Only    Penicillin G Hives    Spironolactone Nausea And Vomiting    Sucralfate Swelling    Sulfamethoxazole-Trimethoprim Other (See Comments) and Rash     THROMBOCYTOPENIA    Triamterene-Hctz Myalgia    Venlafaxine Headache           Lisinopril Nausea Only, Anxiety and Headache    Cardizem Cd [Diltiazem Hcl Er Coated Beads] Other (See Comments)     Chest pressure    Clonidine Derivatives GI Intolerance    Codeine Hallucinations    Escitalopram Unknown - High Severity and Headache    Fluconazole Nausea Only and Headache    Lactulose Unknown - High Severity and Itching    Lipitor [Atorvastatin] Myalgia    Metronidazole Unknown - High Severity    Polyoxyethylene 40 Sorbitol Septaoleate [Sorbitan] Unknown - High Severity    Whey Nausea And Vomiting    Zyrtec [Cetirizine] Itching    Citrus Other (See Comments)    Paroxetine Palpitations    Zoloft [Sertraline Hcl] Palpitations         Discharge Disposition:   Home or Self Care    Diet:  Hospital:  Diet Order   Procedures    Diet: Cardiac Diets; Healthy Heart (2-3 Na+); Texture: Regular Texture (IDDSI 7); Fluid Consistency: Thin (IDDSI 0)    NPO Diet NPO  Type: Strict NPO         Discharge Activity:         CODE STATUS:  Code Status and Medical Interventions:   Ordered at: 08/19/23 0114     Level Of Support Discussed With:    Patient     Code Status (Patient has no pulse and is not breathing):    CPR (Attempt to Resuscitate)     Medical Interventions (Patient has pulse or is breathing):    Full Support         Future Appointments   Date Time Provider Department Center   9/26/2023  1:30 PM Sara Zhao DO [facility]   1/17/2024  1:00 PM Domenico Clifton DO [facility]           Time spent on Discharge including face to face service:  45 minutes    This patient has been examined wearing appropriate Personal Protective Equipment and discussed with  patient . 08/22/23      Signature: Tutu Lara MD

## 2023-08-22 NOTE — OUTREACH NOTE
Prep Survey      Flowsheet Row Responses   [facility] patient discharged from? [facility]   Is LACE score < 7 ? No   Eligibility [facility]   Date of Admission 08/18/23   Date of Discharge 08/22/23   Discharge Disposition Home or Self Care   Discharge diagnosis Chest pain, Hypertension   Does the patient have one of the following disease processes/diagnoses(primary or secondary)? Other   Does the patient have Home health ordered? No   Is there a DME ordered? No   Prep survey completed? Yes            Shonda ESPINOZA - Registered Nurse

## 2023-08-22 NOTE — PROGRESS NOTES
Cardiology Progress  Note      Patient Care Team:  Sara Zhao DO as PCP - General (Family Medicine)  Domenico Medina DPM as Consulting Physician (Podiatry)  Domenico Clifton DO as Consulting Physician (Cardiology)  Mesha Ramon MD (Transplant)  Edmond Carroll MD as Consulting Physician (Gastroenterology)  Lui Schwab OD (Optometry)    PATIENT IDENTIFICATION  Name: Christal Romo  Age: 78 y.o.  Sex: female  :  1945  MRN: [id number]      Length of stay:    LOS: 2 days           REASON FOR FOLLOW-UP:  Chest pain      INTERVAL HISTORY  Patient seen and examined, chart and labs reviewed.  She is sitting on the side of the bed and states she is very anxious about taking BuSpar and Zofran together which has made her quite sleepy.      Patient had nuclear stress testing with no evidence of ischemia.  Blood pressure remains elevated.      SUBJECTIVE    No current chest pain  No shortness of breath  No abdominal pain, nausea or vomiting  Very anxious      REVIEW OF SYSTEMS:  Pertinent items are noted in HPI, all other systems reviewed and negative    OBJECTIVE   Creatinine 1.24 (1.06)    ASSESSMENT  Chest pain  Nonspecific troponin elevation  Valvular heart disease  CISNEROS cirrhosis status post liver transplant 2016  Dyslipidemia      RECOMMENDATIONS  Blood pressure remains elevated.  She has multiple medication intolerances/allergies.  Start hydralazine 25 mg p.o. every 8 hours.  This was reviewed with the patient and she verbalized understanding.  Nuclear stress testing with no evidence of ischemia  Continue risk factor modification and current CV plan of care  TTE with normal LV systolic function, grade 1 DD.  Nothing further per cardiology.  Patient may discharge home and follow-up 2-3 weeks with primary cardiologist          Vital Signs  Visit Vitals  /62 (BP Location: Right arm, Patient Position: Lying)   Pulse 77   Temp 97.8 øF (36.6 øC)  "(Oral)   Resp 15   Ht 160 cm (63\")   Wt 62.2 kg (137 lb 2 oz)   SpO2 96%   BMI 24.29 kg/mý     Oxygen Therapy  SpO2: 96 %  Pulse Oximetry Type: Continuous  Device (Oxygen Therapy): room air  Flowsheet Rows      Flowsheet Row First Filed Value   Admission Height 160 cm (63\") Documented at 08/18/2023 1443   Admission Weight 75.5 kg (166 lb 8 oz) Documented at 08/18/2023 1443          Intake & Output (last 3 days)         08/19 0701 08/20 0700 08/20 0701 08/21 0700 08/21 0701 08/22 0700 08/22 0701  08/23 0700    P.O. 120 720 420 240    Total Intake(mL/kg) 120 (1.6) 720 (11.6) 420 (6.8) 240 (3.9)    Urine (mL/kg/hr)  100 (0.1)      Total Output  100      Net +120 +620 +420 +240            Urine Unmeasured Occurrence 3 x 3 x 2 x 2 x    Stool Unmeasured Occurrence   1 x 0 x          Lines, Drains & Airways       Active LDAs       Name Placement date Placement time Site Days    Peripheral IV 09/05/22 1220 Left Antecubital 09/05/22  1220  Antecubital  350                           /62 (BP Location: Right arm, Patient Position: Lying)   Pulse 77   Temp 97.8 øF (36.6 øC) (Oral)   Resp 15   Ht 160 cm (63\")   Wt 62.2 kg (137 lb 2 oz)   SpO2 96%   BMI 24.29 kg/mý   Intake/Output last 3 shifts:  I/O last 3 completed shifts:  In: 660 [P.O.:660]  Out: -   Intake/Output this shift:  I/O this shift:  In: 240 [P.O.:240]  Out: -     PHYSICAL EXAM:    General: Alert, cooperative, no distress, appears stated age  Head:  Normocephalic, atraumatic, mucous membranes moist  Eyes:  Conjunctivae/corneas clear, EOM's intact     Neck:  Supple,  no adenopathy; no JVD or bruit  Lungs:  Clear to auscultation bilaterally, no wheezes, rhonchi or rales are noted  Chest wall: No tenderness  Heart::  Regular rate and rhythm, S1 and S2 normal, no murmur, rub or gallop  Abdomen: Soft, nontender, nondistended, bowel sounds active  Extremities: No cyanosis, clubbing, or edema   Pulses: 2+ and symmetric all extremities  Skin:  No rashes or " lesions  Neuro/psych: A&O x3. CN II through XII are grossly intact with appropriate affect, anxious        Scheduled Meds:      allopurinol, 200 mg, Oral, Nightly  budesonide-formoterol, 2 puff, Inhalation, BID - RT   And  tiotropium bromide monohydrate, 2 puff, Inhalation, Daily - RT  busPIRone, 5 mg, Oral, TID  colchicine, 0.6 mg, Oral, Daily  heparin (porcine), 5,000 Units, Subcutaneous, Q12H  hydrALAZINE, 25 mg, Oral, Q8H  metoprolol succinate XL, 100 mg, Oral, Nightly  metoprolol succinate XL, 50 mg, Oral, BID  multivitamin with minerals, 1 tablet, Oral, Daily  mycophenolate, 360 mg, Oral, Q24H  pantoprazole, 40 mg, Oral, Daily  senna-docusate sodium, 2 tablet, Oral, BID  sodium chloride, 10 mL, Intravenous, Q12H  tacrolimus, 1 mg, Oral, Q24H  tacrolimus, 2 mg, Oral, Q24H        Continuous Infusions:         PRN Meds:      acetaminophen    baclofen    senna-docusate sodium **AND** polyethylene glycol **AND** bisacodyl **AND** bisacodyl    Calcium Replacement - Follow Nurse / BPA Driven Protocol    HYDROcodone-acetaminophen    loperamide    Magnesium Standard Dose Replacement - Follow Nurse / BPA Driven Protocol    meclizine    nitroglycerin    ondansetron    Phosphorus Replacement - Follow Nurse / BPA Driven Protocol    Potassium Replacement - Follow Nurse / BPA Driven Protocol    prochlorperazine    [COMPLETED] Insert peripheral IV **AND** sodium chloride    sodium chloride    sodium chloride    sodium chloride    traMADol        Results Review:     I reviewed the patient's new clinical results.    CBC    Results from last 7 days   Lab Units 08/22/23  0254 08/21/23  0110 08/20/23  0020 08/18/23  1505   WBC 10*3/mm3 9.10 6.40 6.90 5.81   HEMOGLOBIN g/dL 12.5 12.1 12.8 12.3   PLATELETS 10*3/mm3 124* 117* 121* 122*     Cr Clearance Estimated Creatinine Clearance: 42.3 mL/min (A) (by C-G formula based on SCr of 1.11 mg/dL (H)).  Coag     HbA1C   Lab Results   Component Value Date    HGBA1C 5.2 09/20/2021     Blood  Glucose No results found for: POCGLU  Infection     CMP   Results from last 7 days   Lab Units 08/22/23  0254 08/21/23  0110 08/20/23  0020 08/18/23  1505   SODIUM mmol/L 138 139 140 139   POTASSIUM mmol/L 4.7 4.8 4.2 4.5   CHLORIDE mmol/L 103 102 102 105   CO2 mmol/L 23.0 25.0 22.0 24.2   BUN mg/dL 25* 26* 19 23   CREATININE mg/dL 1.11* 1.24* 1.06* 1.12*   GLUCOSE mg/dL 107* 106* 126* 167*   ALBUMIN g/dL 4.1 4.0 4.1 4.5   BILIRUBIN mg/dL 0.4 0.3 0.4 0.4   ALK PHOS U/L 74 73 73 73   AST (SGOT) U/L 14 16 14 16   ALT (SGPT) U/L 16 13 14 16     ABG      UA      LISET  No results found for: POCMETH, POCAMPHET, POCBARBITUR, POCBENZO, POCCOCAINE, POCOPIATES, POCOXYCODO, POCPHENCYC, POCPROPOXY, POCTHC, POCTRICYC  Lysis Labs   Results from last 7 days   Lab Units 08/22/23  0254 08/21/23  0110 08/20/23  0020 08/18/23  1505   HEMOGLOBIN g/dL 12.5 12.1 12.8 12.3   PLATELETS 10*3/mm3 124* 117* 121* 122*   CREATININE mg/dL 1.11* 1.24* 1.06* 1.12*     Radiology(recent) No radiology results for the last day      Results from last 7 days   Lab Units 08/19/23  0137   HSTROP T ng/L 14*       Xrays, labs reviewed personally by physician.    ECG/EMG Results (most recent)       Procedure Component Value Units Date/Time    ECG 12 Lead Chest Pain [[id number]] Collected: 08/18/23 1438     Updated: 08/18/23 1556     QT Interval 388 ms     Narrative:      HEART RATE= 77  bpm  RR Interval= 776  ms  NV Interval= 165  ms  P Horizontal Axis=   deg  P Front Axis= 2  deg  QRSD Interval= 82  ms  QT Interval= 388  ms  QRS Axis= -37  deg  T Wave Axis= 18  deg  - ABNORMAL ECG -  Sinus rhythm  Left ventricular hypertrophy  Anterior infarct, old  When compared with ECG of 05-Sep-2022 12:05:39,  Significant axis, voltage or hypertrophy change  Electronically Signed By: Hayley Quintanilla (HIGINIO) 18-Aug-2023 15:56:20  Date and Time of Study: 2023-08-18 14:38:15    SCANNED - TELEMETRY   [[id number]] Resulted: 08/18/23     Updated: 08/19/23 1027    SCANNED -  TELEMETRY   [[id number]] Resulted: 08/18/23     Updated: 08/19/23 1921    SCANNED - TELEMETRY   [[id number]] Resulted: 08/18/23     Updated: 08/19/23 2010    Adult Transthoracic Echo Complete W/ Cont if Necessary Per Protocol [[id number]] Resulted: 08/20/23 1800     Updated: 08/20/23 1804     LVIDd 4.7 cm      LVIDs 3.0 cm      IVSd 1.00 cm      LVPWd 1.10 cm      FS 36.2 %      IVS/LVPW 0.91 cm      ESV(cubed) 27.0 ml      LV Sys Vol (BSA corrected) 13.2 cm2      EDV(cubed) 103.8 ml      LV Ro Vol (BSA corrected) 36.0 cm2      LVOT area 2.5 cm2      LV mass(C)d 175.8 grams      LVOT diam 1.80 cm      EDV(MOD-sp4) 64.1 ml      ESV(MOD-sp4) 23.6 ml      SV(MOD-sp4) 40.5 ml      SI(MOD-sp4) 22.7 ml/m2      EF(MOD-sp4) 63.2 %      MV E max yoni 64.5 cm/sec      MV A max yoni 114.0 cm/sec      MV dec time 0.39 msec      MV E/A 0.57     LA ESV Index (BP) 24.4 ml/m2      Med Peak E' Yoni 5.0 cm/sec      Lat Peak E' Yoni 6.4 cm/sec      Avg E/e' ratio 11.32     SV(LVOT) 62.3 ml      TAPSE (>1.6) 2.5 cm      RV S' 20.8 cm/sec      LA dimension (2D)  3.7 cm      LV V1 max 117.0 cm/sec      LV V1 max PG 5.5 mmHg      LV V1 mean PG 3.0 mmHg      LV V1 VTI 24.5 cm      Ao pk yoni 141.0 cm/sec      Ao max PG 8.0 mmHg      Ao mean PG 5.0 mmHg      Ao V2 VTI 29.7 cm      TAMMY(I,D) 2.10 cm2      MV max PG 4.8 mmHg      MV mean PG 2.00 mmHg      MV V2 VTI 30.4 cm      MV P1/2t 84.7 msec      MVA(P1/2t) 2.6 cm2      MVA(VTI) 2.05 cm2      MV dec slope 244.0 cm/sec2      TR max yoni 252.0 cm/sec      TR max PG 25.4 mmHg      RV V1 max PG 2.25 mmHg      RV V1 max 75.0 cm/sec      RV V1 VTI 14.8 cm      PA V2 max 85.3 cm/sec      Ao root diam 3.6 cm      ACS 2.00 cm      Sinus 3.3 cm      STJ 2.6 cm      EF(MOD-bp) 63.0 %     Narrative:        Left ventricular systolic function is normal. Calculated left   ventricular EF = 63%    Left ventricular wall thickness is consistent with mild concentric   hypertrophy.    Left ventricular diastolic  function is consistent with (grade I)   impaired relaxation.    The left atrial cavity is mildly dilated.    Estimated right ventricular systolic pressure from tricuspid   regurgitation is normal (<35 mmHg).      SCANNED - TELEMETRY   [[id number]] Resulted: 08/18/23     Updated: 08/20/23 2255    SCANNED - TELEMETRY   [[id number]] Resulted: 08/18/23     Updated: 08/20/23 2322    SCANNED - TELEMETRY   [[id number]] Resulted: 08/18/23     Updated: 08/20/23 2322    SCANNED - TELEMETRY   [[id number]] Resulted: 08/18/23     Updated: 08/20/23 2353    SCANNED - TELEMETRY   [[id number]] Resulted: 08/18/23     Updated: 08/21/23 0016    SCANNED - TELEMETRY   [[id number]] Resulted: 08/18/23     Updated: 08/21/23 0652    SCANNED - TELEMETRY   [[id number]] Resulted: 08/18/23     Updated: 08/21/23 0718    SCANNED - TELEMETRY   [[id number]] Resulted: 08/18/23     Updated: 08/21/23 0718    SCANNED - TELEMETRY   [[id number]] Resulted: 08/18/23     Updated: 08/21/23 0826    SCANNED - TELEMETRY   [[id number]] Resulted: 08/18/23     Updated: 08/21/23 0857    SCANNED - TELEMETRY   [[id number]] Resulted: 08/18/23     Updated: 08/21/23 1133    SCANNED - TELEMETRY   [[id number]] Resulted: 08/18/23     Updated: 08/21/23 1243    SCANNED - TELEMETRY   [[id number]] Resulted: 08/18/23     Updated: 08/21/23 1258    SCANNED - TELEMETRY   [[id number]] Resulted: 08/18/23     Updated: 08/21/23 1314    SCANNED - TELEMETRY   [[id number]] Resulted: 08/18/23     Updated: 08/21/23 1338    SCANNED - TELEMETRY   [[id number]] Resulted: 08/18/23     Updated: 08/21/23 1642    SCANNED - TELEMETRY   [[id number]] Resulted: 08/18/23     Updated: 08/22/23 0652    SCANNED - TELEMETRY   [[id number]] Resulted: 08/18/23     Updated: 08/22/23 0702    SCANNED - TELEMETRY   [[id number]] Resulted: 08/18/23     Updated: 08/22/23 0714    SCANNED - TELEMETRY   [[id number]] Resulted: 08/18/23     Updated: 08/22/23 0816    SCANNED - TELEMETRY   [[id number]] Resulted: 08/18/23     Updated: 08/22/23  "1141              Medication Review:   I have reviewed the patient's current medication list  Scheduled Meds:allopurinol, 200 mg, Oral, Nightly  budesonide-formoterol, 2 puff, Inhalation, BID - RT   And  tiotropium bromide monohydrate, 2 puff, Inhalation, Daily - RT  busPIRone, 5 mg, Oral, TID  colchicine, 0.6 mg, Oral, Daily  heparin (porcine), 5,000 Units, Subcutaneous, Q12H  hydrALAZINE, 25 mg, Oral, Q8H  metoprolol succinate XL, 100 mg, Oral, Nightly  metoprolol succinate XL, 50 mg, Oral, BID  multivitamin with minerals, 1 tablet, Oral, Daily  mycophenolate, 360 mg, Oral, Q24H  pantoprazole, 40 mg, Oral, Daily  senna-docusate sodium, 2 tablet, Oral, BID  sodium chloride, 10 mL, Intravenous, Q12H  tacrolimus, 1 mg, Oral, Q24H  tacrolimus, 2 mg, Oral, Q24H      Continuous Infusions:   PRN Meds:.  acetaminophen    baclofen    senna-docusate sodium **AND** polyethylene glycol **AND** bisacodyl **AND** bisacodyl    Calcium Replacement - Follow Nurse / BPA Driven Protocol    HYDROcodone-acetaminophen    loperamide    Magnesium Standard Dose Replacement - Follow Nurse / BPA Driven Protocol    meclizine    nitroglycerin    ondansetron    Phosphorus Replacement - Follow Nurse / BPA Driven Protocol    Potassium Replacement - Follow Nurse / BPA Driven Protocol    prochlorperazine    [COMPLETED] Insert peripheral IV **AND** sodium chloride    sodium chloride    sodium chloride    sodium chloride    traMADol    Imaging:  Imaging Results (Last 72 Hours)       ** No results found for the last 72 hours. **              NERISSA Muir  08/22/23  14:10 EDT       EMR Dragon/Transcription:   \"Dictated utilizing Dragon dictation\".       Electronically signed by NERISSA Muir, 08/22/23, 2:11 PM EDT.      "

## 2023-08-23 ENCOUNTER — TRANSITIONAL CARE MANAGEMENT TELEPHONE ENCOUNTER (OUTPATIENT)
Dept: CALL CENTER | Facility: HOSPITAL | Age: 78
End: 2023-08-23
Payer: MEDICARE

## 2023-08-23 NOTE — OUTREACH NOTE
Call Center TCM Note      Flowsheet Row Responses   [facility] patient discharged from? [facility]   Does the patient have one of the following disease processes/diagnoses(primary or secondary)? Other   TCM attempt successful? Yes  [SOn, Daughter , [place]]   Call start time 1005   Call end time 1008   Discharge diagnosis Chest pain, Hypertension   Meds reviewed with patient/caregiver? Yes   Is the patient having any side effects they believe may be caused by any medication additions or changes? No   Does the patient have all medications ordered at discharge? Yes   Is the patient taking all medications as directed (includes completed medication regime)? Yes   Comments No available HOSP DC FU appts that meet TCM guidelines.   Does the patient have an appointment with their PCP within 7-14 days of discharge? No appointments available   Nursing Interventions Routed TCM call to PCP office   Has home health visited the patient within 72 hours of discharge? N/A   Psychosocial issues? No   Did the patient receive a copy of their discharge instructions? Yes   Nursing interventions Reviewed instructions with patient   What is the patient's perception of their health status since discharge? Improving   Is the patient/caregiver able to teach back signs and symptoms related to disease process for when to call PCP? Yes   Is the patient/caregiver able to teach back signs and symptoms related to disease process for when to call 911? Yes   Is the patient/caregiver able to teach back the hierarchy of who to call/visit for symptoms/problems? PCP, Specialist, Home health nurse, Urgent Care, ED, 911 Yes   TCM call completed? Yes   Wrap up additional comments Pt denies any chest pain but does still feel weak. States her arm is still red and swollen from IV site and she is keeping ice off and on as directed from the hospital. Advised Pt it arm gets worse or does not improve to call PCP. Pt VU   Call end time 1008            Syl  BERE Banegas    8/23/2023, 10:08 EDT

## 2023-08-24 NOTE — PROGRESS NOTES
HUB TO READ.     LEFT VOICEMAIL FOR PATIENT TO CALL INTO THE OFFICE TO SCHEDULE HOSP F/U APPT. PLEASE TRANSFER TO OFFICE.

## 2023-08-31 ENCOUNTER — READMISSION MANAGEMENT (OUTPATIENT)
Dept: CALL CENTER | Facility: HOSPITAL | Age: 78
End: 2023-08-31
Payer: MEDICARE

## 2023-08-31 NOTE — OUTREACH NOTE
Medical Week 2 Survey      Flowsheet Row Responses   [facility] patient discharged from? [facility]   Does the patient have one of the following disease processes/diagnoses(primary or secondary)? Other   Week 2 attempt successful? Yes   Call start time 1504   Discharge diagnosis Chest pain, Hypertension   Call end time 1513   Meds reviewed with patient/caregiver? Yes   Is the patient taking all medications as directed (includes completed medication regime)? Yes   Does the patient have a primary care provider?  Yes   Does the patient have an appointment with their PCP within 7 days of discharge? Greater than 7 days   Comments regarding PCP 9/7/23   Nursing Interventions Verified appointment date/time/provider   Has the patient kept scheduled appointments due by today? N/A   Has home health visited the patient within 72 hours of discharge? N/A   Psychosocial issues? No   What is the patient's perception of their health status since discharge? Same  [pt reports she deals with something new everyday. She takes it day to day.]   Week 2 Call Completed? Yes   Wrap up additional comments Pt was unhappy with her care while in the hospital. She is dealing with thrush r/t not being offered water to rinse after breathing treatments from resp theraptist, construction noise on the unit she was on.   Call end time 1513            Courtney NORTON - Registered Nurse

## 2023-09-07 ENCOUNTER — OFFICE VISIT (OUTPATIENT)
Dept: FAMILY MEDICINE CLINIC | Facility: CLINIC | Age: 78
End: 2023-09-07
Payer: MEDICARE

## 2023-09-07 VITALS
WEIGHT: 144.4 LBS | SYSTOLIC BLOOD PRESSURE: 171 MMHG | TEMPERATURE: 97.8 F | DIASTOLIC BLOOD PRESSURE: 83 MMHG | HEART RATE: 71 BPM | OXYGEN SATURATION: 97 % | BODY MASS INDEX: 25.59 KG/M2 | HEIGHT: 63 IN

## 2023-09-07 DIAGNOSIS — R82.90 ABNORMAL URINALYSIS: ICD-10-CM

## 2023-09-07 DIAGNOSIS — D69.6 THROMBOCYTOPENIA: ICD-10-CM

## 2023-09-07 DIAGNOSIS — Z94.4 HISTORY OF LIVER TRANSPLANT: ICD-10-CM

## 2023-09-07 DIAGNOSIS — I10 ELEVATED BLOOD PRESSURE READING IN OFFICE WITH DIAGNOSIS OF HYPERTENSION: Primary | ICD-10-CM

## 2023-09-07 DIAGNOSIS — R30.0 DYSURIA: ICD-10-CM

## 2023-09-07 DIAGNOSIS — M54.2 CERVICALGIA: ICD-10-CM

## 2023-09-07 LAB
BILIRUB BLD-MCNC: NEGATIVE MG/DL
CLARITY, POC: CLEAR
COLOR UR: YELLOW
EXPIRATION DATE: NORMAL
GLUCOSE UR STRIP-MCNC: NEGATIVE MG/DL
KETONES UR QL: NEGATIVE
LEUKOCYTE EST, POC: NEGATIVE
Lab: NORMAL
NITRITE UR-MCNC: NEGATIVE MG/ML
PH UR: 5.5 [PH] (ref 5–8)
PROT UR STRIP-MCNC: NEGATIVE MG/DL
RBC # UR STRIP: NEGATIVE /UL
SP GR UR: 1.01 (ref 1–1.03)
UROBILINOGEN UR QL: NORMAL

## 2023-09-07 PROCEDURE — 87086 URINE CULTURE/COLONY COUNT: CPT | Performed by: FAMILY MEDICINE

## 2023-09-07 RX ORDER — METOPROLOL SUCCINATE 50 MG/1
50 TABLET, EXTENDED RELEASE ORAL
Qty: 90 TABLET | Refills: 3 | Status: SHIPPED
Start: 2023-09-07

## 2023-09-07 RX ORDER — BUSPIRONE HYDROCHLORIDE 5 MG/1
5 TABLET ORAL 3 TIMES DAILY
Qty: 270 TABLET | Refills: 0 | Status: SHIPPED | OUTPATIENT
Start: 2023-09-07

## 2023-09-07 RX ORDER — METOPROLOL SUCCINATE 100 MG/1
100 TABLET, EXTENDED RELEASE ORAL NIGHTLY
Qty: 90 TABLET | Refills: 3 | Status: SHIPPED
Start: 2023-09-07

## 2023-09-07 RX ORDER — ALLOPURINOL 100 MG/1
200 TABLET ORAL DAILY
Qty: 60 TABLET | Refills: 1 | Status: SHIPPED | OUTPATIENT
Start: 2023-09-07

## 2023-09-07 RX ORDER — TRAMADOL HYDROCHLORIDE 50 MG/1
50 TABLET ORAL EVERY 6 HOURS PRN
Qty: 30 TABLET | Refills: 0 | Status: SHIPPED | OUTPATIENT
Start: 2023-09-07

## 2023-09-07 NOTE — PROGRESS NOTES
Subjective   Christal Romo is a 78 y.o. female.     Chief Complaint   Patient presents with    Transitional Care Management     Hospital follow up    Chest Pain         Current Outpatient Medications:     acetaminophen (TYLENOL) 500 MG tablet, Take 1 tablet by mouth. prn, Disp: , Rfl:     allopurinol (Zyloprim) 100 MG tablet, Take 2 tablets by mouth Daily., Disp: 60 tablet, Rfl: 1    aspirin 81 MG tablet, Take 1 tablet by mouth Daily., Disp: , Rfl:     baclofen (LIORESAL) 10 MG tablet, TAKE 1 TABLET BY MOUTH EVERY NIGHT AT BEDTIME AS NEEDED FOR MUSCLE SPASMS, Disp: 30 tablet, Rfl: 3    busPIRone (BUSPAR) 5 MG tablet, Take 1 tablet by mouth 3 (Three) Times a Day., Disp: 270 tablet, Rfl: 0    colchicine 0.6 MG tablet, Take 1 tablet by mouth Daily., Disp: 30 tablet, Rfl: 1    dicyclomine (BENTYL) 20 MG tablet, Take 1 tablet by mouth Every 6 (Six) Hours As Needed (diarrhea)., Disp: , Rfl:     diphenhydrAMINE (BENADRYL) 25 MG tablet, Take 1 tablet by mouth Every 6 (Six) Hours As Needed for Itching., Disp: , Rfl:     fenofibrate (Tricor) 48 MG tablet, Take 1 tablet by mouth Daily., Disp: 90 tablet, Rfl: 0    fexofenadine (ALLEGRA) 180 MG tablet, Take 1 tablet by mouth Daily., Disp: , Rfl:     fluconazole (Diflucan) 150 MG tablet, Take 1 tablet by mouth As Needed (vaginitis x 1 dose per episode)., Disp: 3 tablet, Rfl: 0    Fluticasone-Umeclidin-Vilant (Trelegy Ellipta) 100-62.5-25 MCG/ACT inhaler, Inhale 1 puff Daily., Disp: 60 each, Rfl: 0    Lutein 20 MG capsule, Take 1 tablet by mouth Daily., Disp: , Rfl:     meclizine (ANTIVERT) 12.5 MG tablet, Take 1 tablet by mouth Every 6 (Six) Hours As Needed for Dizziness., Disp: 30 tablet, Rfl: 1    metoprolol succinate XL (Toprol XL) 100 MG 24 hr tablet, Take 1 tablet by mouth Every Night., Disp: 90 tablet, Rfl: 3    metoprolol succinate XL (TOPROL-XL) 50 MG 24 hr tablet, Take 1 tablet by mouth Every Morning., Disp: 90 tablet, Rfl: 3    Multiple Vitamins-Minerals (Multivitamin  Adults 50+) tablet, Take 1 tablet by mouth Daily., Disp: , Rfl:     mycophenolate (MYFORTIC) 360 MG tablet delayed-release EC tablet, Take 1 tablet by mouth Every Morning., Disp: 120 tablet, Rfl:     nystatin (MYCOSTATIN) 100,000 unit/mL suspension, Swish and swallow 5 mL 4 (Four) Times a Day., Disp: 200 mL, Rfl: 1    ondansetron (Zofran) 4 MG tablet, Take 1 tablet by mouth Every 8 (Eight) Hours As Needed for Nausea or Vomiting. prn, Disp: 30 tablet, Rfl: 1    pantoprazole (Protonix) 40 MG EC tablet, Take 1 tablet by mouth Daily., Disp: 30 tablet, Rfl: 2    promethazine (PHENERGAN) 6.25 MG/5ML syrup, 5- 10ml po q6hrs prn N/V, Disp: 118 mL, Rfl: 0    tacrolimus (PROGRAF) 1 MG capsule, 2 po qam and 1 po qpm, Disp: , Rfl:     TobraDex 0.3-0.1 % ophthalmic suspension, Apply 1 drop to eye(s) as directed by provider Every 12 (Twelve) Hours., Disp: , Rfl:     traMADol (ULTRAM) 50 MG tablet, Take 1 tablet by mouth Every 6 (Six) Hours As Needed for Moderate Pain or Severe Pain., Disp: 30 tablet, Rfl: 0    prednisoLONE acetate (PRED FORTE) 1 % ophthalmic suspension, instill 1 drop by ophthalmic route left eye 2x's day (Patient not taking: Reported on 9/7/2023), Disp: , Rfl:     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 - Dr Ritter [facility]; CBC/Prograf/CMP/LIPIDS/A1C    MAMMO     NEG = 2018/ 2021/ 2022    Meniere's disease     OAB     Osteoporosis     PUD        Past Surgical History:   Procedure Laterality Date    BREAST LUMPECTOMY Left     Benign    CATARACT EXTRACTION WITH INTRAOCULAR LENS IMPLANT Left     COLONOSCOPY      Polyps = 2012/ 2015/ 2020, rech 2025   GSI    ERCP W/ PLASTIC STENT PLACEMENT  2014    Liver Stent    LIVER  TRANSPLANTATION      [place] [facility]    SKIN CANCER EXCISION      Rt UE    TOTAL ABDOMINAL HYSTERECTOMY         Family History   Problem Relation Age of Onset    Diabetes Sister         Type II    Heart disease Sister     Hypertension Sister     No Known Problems Brother     Hemochromatosis Other        Social History     Socioeconomic History    Marital status:    Tobacco Use    Smoking status: Former     Packs/day: 0.25     Years: 1.00     Pack years: 0.25     Types: Cigarettes     Start date:      Quit date:      Years since quittin.7     Passive exposure: Past    Smokeless tobacco: Never    Tobacco comments:     Quit .    Vaping Use    Vaping Use: Never used   Substance and Sexual Activity    Alcohol use: No     Comment: very rarely     Drug use: No    Sexual activity: Defer       History of Present Illness     The patient presents for follow-up from the hospital for chest pain.     The patient reports she was treated poorly at [facility]. She admits that she urinated on herself because the staff would not answer her call light. She experienced pain in her arm after the intravenous line was inserted. She was given breathing treatments from the respiratory staff with no water to rinse her mouth and it then left her tongue white with a raspy voice. She was not given nystatin. She denies her tongue burning today.     Per D/C ------pt presented w/ acute CP and was found to have uncontrolled HTN; pt required IV hydralazine and cardio order nuclear stress test; Pt also c/o'd neck pain and MRI C spine ordered; BP came down and stress test was NEG, so pt d/c'd home    The patient is unable to eat seeds due to colitis. She reports that last Friday night on 2023, she vomited for 3 hours. She had labs performed on 08/15/2023 by Christopher. She admits to not having a bowel movement on 2023 or 2023. She had 3 normal bowel movements on 2023. She took Imodium  today on 09/07/2023. She admits that Bentyl makes her sleepy.     The patient experiencing a burning sensation when she urinates. She drinks 6 ounce cup of coffee a day. She denies drinking juice. She admits to drinking water daily.     The patient is scheduled to see Dr. Solorio next week for injections in her knee.       The patient declines the shingles vaccine currently. She reports that hydralazine makes her head hurt. She took 75 mg of metoprolol today on 09/07/2023. She takes 100 mg at bedtime and 50 mg in the morning. She reports her blood pressure is elevated when she wakes in the morning. She takes allopurinol for gout prevention at night. She is scheduled to have a mammogram in 10/2023.     The following portions of the patient's history were reviewed and updated as appropriate: allergies, current medications, past family history, past medical history, past social history, past surgical history and problem list.    Review of Systems   Constitutional:  Positive for activity change and appetite change.   Gastrointestinal:  Positive for nausea and vomiting.   Genitourinary:  Positive for dysuria. Negative for flank pain, frequency, hematuria and urinary incontinence.   Musculoskeletal:  Positive for arthralgias.   Psychiatric/Behavioral:  Positive for stress. The patient is nervous/anxious.      Vitals:    09/07/23 1600   BP: 171/83   Pulse: 71   Temp: 97.8 °F (36.6 °C)   SpO2: 97%       Objective   Physical Exam  Vitals and nursing note reviewed.   Constitutional:       General: She is not in acute distress.     Appearance: She is well-developed. She is not ill-appearing or toxic-appearing.   HENT:      Head: Normocephalic and atraumatic.   Cardiovascular:      Rate and Rhythm: Normal rate and regular rhythm.      Heart sounds: Normal heart sounds. No murmur heard.  Pulmonary:      Effort: Pulmonary effort is normal. No respiratory distress.      Breath sounds: Normal breath sounds. No stridor. No  wheezing, rhonchi or rales.   Skin:     General: Skin is warm and dry.      Findings: No rash.   Neurological:      Mental Status: She is alert and oriented to person, place, and time.      Cranial Nerves: No cranial nerve deficit.      Gait: Gait normal.   Psychiatric:         Attention and Perception: Attention and perception normal.         Mood and Affect: Mood normal. Mood is anxious.         Speech: Speech normal.         Behavior: Behavior normal. Behavior is agitated. Behavior is cooperative.         Thought Content: Thought content normal.         Cognition and Memory: Memory normal.         Judgment: Judgment normal.     Assessment & Plan   Diagnoses and all orders for this visit:    1. Elevated blood pressure reading in office with diagnosis of hypertension (Primary)    2. Dysuria  -     POC Urinalysis Dipstick, Automated    3. Cervicalgia  -     traMADol (ULTRAM) 50 MG tablet; Take 1 tablet by mouth Every 6 (Six) Hours As Needed for Moderate Pain or Severe Pain.  Dispense: 30 tablet; Refill: 0    4. Thrombocytopenia    5. History of liver transplant    6. Abnormal urinalysis  -     Urine Culture - Urine, Urine, Clean Catch    Other orders  -     metoprolol succinate XL (Toprol XL) 100 MG 24 hr tablet; Take 1 tablet by mouth Every Night.  Dispense: 90 tablet; Refill: 3  -     metoprolol succinate XL (TOPROL-XL) 50 MG 24 hr tablet; Take 1 tablet by mouth Every Morning.  Dispense: 90 tablet; Refill: 3  -     busPIRone (BUSPAR) 5 MG tablet; Take 1 tablet by mouth 3 (Three) Times a Day.  Dispense: 270 tablet; Refill: 0  -     allopurinol (Zyloprim) 100 MG tablet; Take 2 tablets by mouth Daily.  Dispense: 60 tablet; Refill: 1        Transcribed from ambient dictation for Sara Zhao DO by Vanessa Angulo.  09/07/23   17:18 EDT    Patient or patient representative verbalized consent to the visit recording.  I have personally performed the services described in this document as transcribed by the above  individual, and it is both accurate and complete.

## 2023-09-09 LAB — BACTERIA SPEC AEROBE CULT: NO GROWTH

## 2023-09-26 ENCOUNTER — OFFICE VISIT (OUTPATIENT)
Dept: FAMILY MEDICINE CLINIC | Facility: CLINIC | Age: 78
End: 2023-09-26
Payer: MEDICARE

## 2023-09-26 VITALS
WEIGHT: 143 LBS | TEMPERATURE: 98.6 F | HEIGHT: 63 IN | SYSTOLIC BLOOD PRESSURE: 193 MMHG | RESPIRATION RATE: 18 BRPM | BODY MASS INDEX: 25.34 KG/M2 | HEART RATE: 75 BPM | OXYGEN SATURATION: 98 % | DIASTOLIC BLOOD PRESSURE: 83 MMHG

## 2023-09-26 DIAGNOSIS — E79.0 ELEVATED URIC ACID IN BLOOD: ICD-10-CM

## 2023-09-26 DIAGNOSIS — R41.3 MEMORY DEFICIT: ICD-10-CM

## 2023-09-26 DIAGNOSIS — I10 ELEVATED BLOOD PRESSURE READING IN OFFICE WITH DIAGNOSIS OF HYPERTENSION: Primary | ICD-10-CM

## 2023-09-26 DIAGNOSIS — Z23 IMMUNIZATION DUE: ICD-10-CM

## 2023-09-26 PROBLEM — E78.00 PURE HYPERCHOLESTEROLEMIA: Status: RESOLVED | Noted: 2023-06-26 | Resolved: 2023-09-26

## 2023-09-26 PROCEDURE — 99214 OFFICE O/P EST MOD 30 MIN: CPT | Performed by: FAMILY MEDICINE

## 2023-09-26 PROCEDURE — 36415 COLL VENOUS BLD VENIPUNCTURE: CPT | Performed by: FAMILY MEDICINE

## 2023-09-26 PROCEDURE — 84550 ASSAY OF BLOOD/URIC ACID: CPT | Performed by: FAMILY MEDICINE

## 2023-09-26 PROCEDURE — G0008 ADMIN INFLUENZA VIRUS VAC: HCPCS | Performed by: FAMILY MEDICINE

## 2023-09-26 PROCEDURE — 3079F DIAST BP 80-89 MM HG: CPT | Performed by: FAMILY MEDICINE

## 2023-09-26 PROCEDURE — 3077F SYST BP >= 140 MM HG: CPT | Performed by: FAMILY MEDICINE

## 2023-09-26 PROCEDURE — 90662 IIV NO PRSV INCREASED AG IM: CPT | Performed by: FAMILY MEDICINE

## 2023-09-26 RX ORDER — DONEPEZIL HYDROCHLORIDE 5 MG/1
5 TABLET, FILM COATED ORAL NIGHTLY
Qty: 30 TABLET | Refills: 1 | Status: SHIPPED | OUTPATIENT
Start: 2023-09-26

## 2023-09-26 RX ORDER — CIPROFLOXACIN 500 MG/1
500 TABLET, FILM COATED ORAL 2 TIMES DAILY
COMMUNITY
Start: 2023-09-13 | End: 2023-09-26

## 2023-09-26 NOTE — PROGRESS NOTES
Subjective   Christal Romo is a 78 y.o. female.     Chief Complaint   Patient presents with    Hypertension    Memory Loss         Current Outpatient Medications:     acetaminophen (TYLENOL) 500 MG tablet, Take 1 tablet by mouth. prn, Disp: , Rfl:     allopurinol (Zyloprim) 100 MG tablet, Take 2 tablets by mouth Daily., Disp: 60 tablet, Rfl: 1    aspirin 81 MG tablet, Take 1 tablet by mouth Daily., Disp: , Rfl:     baclofen (LIORESAL) 10 MG tablet, TAKE 1 TABLET BY MOUTH EVERY NIGHT AT BEDTIME AS NEEDED FOR MUSCLE SPASMS, Disp: 30 tablet, Rfl: 3    busPIRone (BUSPAR) 5 MG tablet, Take 1 tablet by mouth 3 (Three) Times a Day., Disp: 270 tablet, Rfl: 0    dicyclomine (BENTYL) 20 MG tablet, Take 1 tablet by mouth Every 6 (Six) Hours As Needed (diarrhea)., Disp: , Rfl:     diphenhydrAMINE (BENADRYL) 25 MG tablet, Take 1 tablet by mouth Every 6 (Six) Hours As Needed for Itching., Disp: , Rfl:     fexofenadine (ALLEGRA) 180 MG tablet, Take 1 tablet by mouth Daily., Disp: , Rfl:     fluconazole (Diflucan) 150 MG tablet, Take 1 tablet by mouth As Needed (vaginitis x 1 dose per episode)., Disp: 3 tablet, Rfl: 0    Fluticasone-Umeclidin-Vilant (Trelegy Ellipta) 100-62.5-25 MCG/ACT inhaler, Inhale 1 puff Daily., Disp: 60 each, Rfl: 0    Lutein 20 MG capsule, Take 1 tablet by mouth Daily., Disp: , Rfl:     meclizine (ANTIVERT) 12.5 MG tablet, Take 1 tablet by mouth Every 6 (Six) Hours As Needed for Dizziness., Disp: 30 tablet, Rfl: 1    metoprolol succinate XL (Toprol XL) 100 MG 24 hr tablet, Take 1 tablet by mouth Every Night., Disp: 90 tablet, Rfl: 3    metoprolol succinate XL (TOPROL-XL) 50 MG 24 hr tablet, Take 1 tablet by mouth Every Morning., Disp: 90 tablet, Rfl: 3    Multiple Vitamins-Minerals (Multivitamin Adults 50+) tablet, Take 1 tablet by mouth Daily., Disp: , Rfl:     mycophenolate (MYFORTIC) 360 MG tablet delayed-release EC tablet, Take 1 tablet by mouth Every Morning., Disp: 120 tablet, Rfl:     nystatin  (MYCOSTATIN) 100,000 unit/mL suspension, Swish and swallow 5 mL 4 (Four) Times a Day., Disp: 200 mL, Rfl: 1    ondansetron (Zofran) 4 MG tablet, Take 1 tablet by mouth Every 8 (Eight) Hours As Needed for Nausea or Vomiting. prn, Disp: 30 tablet, Rfl: 1    pantoprazole (Protonix) 40 MG EC tablet, Take 1 tablet by mouth Daily., Disp: 30 tablet, Rfl: 2    promethazine (PHENERGAN) 6.25 MG/5ML syrup, 5- 10ml po q6hrs prn N/V, Disp: 118 mL, Rfl: 0    tacrolimus (PROGRAF) 1 MG capsule, 2 po qam and 1 po qpm, Disp: , Rfl:     traMADol (ULTRAM) 50 MG tablet, Take 1 tablet by mouth Every 6 (Six) Hours As Needed for Moderate Pain or Severe Pain., Disp: 30 tablet, Rfl: 0    donepezil (Aricept) 5 MG tablet, Take 1 tablet by mouth Every Night., Disp: 30 tablet, Rfl: 1    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 - Dr Ritter [facility]; CBC/Prograf/CMP/LIPIDS/A1C    MAMMO     NEG = 2018/ 2021/ 2022    Meniere's disease     OAB     Osteoporosis     PUD        Past Surgical History:   Procedure Laterality Date    BREAST LUMPECTOMY Left     Benign    CATARACT EXTRACTION WITH INTRAOCULAR LENS IMPLANT Left     COLONOSCOPY      Polyps = 2012/ 2015/ 2020, rech 2025   GSI    ERCP W/ PLASTIC STENT PLACEMENT  2014    Liver Stent    [place] TRANSPLANTATION      [place] [facility]    SKIN CANCER EXCISION      Rt UE    TOTAL ABDOMINAL HYSTERECTOMY  1979       Family History   Problem Relation Age of Onset    Diabetes Sister         Type II    Heart disease Sister     Hypertension Sister     No Known Problems Brother     Hemochromatosis Other        Social History     Socioeconomic History    Marital status:    Tobacco Use    Smoking status: Former      Packs/day: 0.25     Years: 1.00     Additional pack years: 0.00     Total pack years: 0.25     Types: Cigarettes     Start date:      Quit date:      Years since quittin.8     Passive exposure: Past    Smokeless tobacco: Never    Tobacco comments:     Quit .    Vaping Use    Vaping Use: Never used   Substance and Sexual Activity    Alcohol use: No     Comment: very rarely     Drug use: No    Sexual activity: Defer       History of Present Illness    Christal Romo is a 78 y.o. female seen today for Follow-up on Hypertension.    She took her blood pressure medication around noon. She does monitor her blood pressure at home. She takes Toprol 50 mg in the morning and 100 mg at night. Occasionally, she will take an extra one if she can stay home due to sedation side effect.     She has not seen the orthopedic clinician for her knee. She saw Dr. Medina in [place] 2 weeks ago about her feet swelling. They did an x-ray. They were supposed to order her a compound pain cream, but she has not received it yet. She has been rubbing her feet with castor oil. She uses an ice pack on her feet.     She does not take colchicine for gout because it causes diarrhea. She is not using steroid eyedrops. She is not taking Tricor because it causes lower extremity cramps. She states allopurinol gives her stomach pain. She takes it at night because it helps her sleep. She takes 2 Prograf at 11:00 AM and it elevates her blood pressure. She will eat lunch and take Tylenol because the elevated blood pressure gives her a headache. She states her liver transplant medication and allopurinol cause diarrhea.     She has been having allergies and sinus issues. She took Allegra but it caused her to itch. She uses Flonase nasal spray.     The following portions of the patient's history were reviewed and updated as appropriate: allergies, current medications, past social history and problem list.    Objective  BP (!) 193/83   Pulse  "75   Temp 98.6 °F (37 °C) (Temporal)   Resp 18   Ht 160 cm (63\")   Wt 64.9 kg (143 lb)   SpO2 98%   BMI 25.33 kg/m²               The following portions of the patient's history were reviewed and updated as appropriate: allergies, current medications, past family history, past medical history, past social history, past surgical history and problem list.    Review of Systems   Constitutional:  Positive for fatigue. Negative for activity change, appetite change, unexpected weight gain and unexpected weight loss.   HENT:  Positive for sinus pressure.    Respiratory:  Negative for cough and chest tightness.    Cardiovascular:  Negative for leg swelling.   Gastrointestinal:  Positive for diarrhea. Negative for constipation.   Genitourinary:  Negative for frequency, urgency and urinary incontinence.   Musculoskeletal:  Positive for arthralgias and myalgias.   Allergic/Immunologic: Positive for environmental allergies.   Neurological:  Positive for memory problem. Negative for dizziness, facial asymmetry, speech difficulty, weakness, light-headedness, headache and confusion.   Psychiatric/Behavioral:  Positive for stress.        Vitals:    09/26/23 1314   BP: (!) 193/83   Pulse: 75   Resp: 18   Temp: 98.6 °F (37 °C)   SpO2: 98%       Objective   Physical Exam  Vitals and nursing note reviewed.   Constitutional:       General: She is not in acute distress.     Appearance: She is well-developed. She is not ill-appearing or toxic-appearing.   HENT:      Head: Normocephalic and atraumatic.   Cardiovascular:      Rate and Rhythm: Normal rate and regular rhythm.      Heart sounds: Normal heart sounds. No murmur heard.  Pulmonary:      Effort: Pulmonary effort is normal. No respiratory distress.      Breath sounds: Normal breath sounds. No stridor. No wheezing, rhonchi or rales.   Skin:     General: Skin is warm and dry.      Findings: No rash.   Neurological:      Mental Status: She is alert and oriented to person, place, " and time.   Psychiatric:         Attention and Perception: Attention and perception normal.         Mood and Affect: Mood and affect normal. Mood is anxious.         Speech: Speech normal.         Behavior: Behavior normal. Behavior is cooperative.         Thought Content: Thought content normal.         Cognition and Memory: Cognition normal.         Judgment: Judgment normal.     MMSE= 24/30 today  Assessment & Plan   Diagnoses and all orders for this visit:    1. Elevated blood pressure reading in office with diagnosis of hypertension (Primary)    2. Memory deficit    3. Elevated uric acid in blood  -     Uric Acid    4. Immunization due  -     Fluzone High-Dose 65+yrs ([id number])    Other orders  -     donepezil (Aricept) 5 MG tablet; Take 1 tablet by mouth Every Night.  Dispense: 30 tablet; Refill: 1    Assessment & Plan  Health maintenance  Will administer influenza vaccine today.     Memory deficit  Memory testing was performed.   Will prescribe Aricept 5 mg to take at night. If it causes stomach issues, she will discontinue it.     Hypertension  Will continue current regimen.     Follow-up in 3 months for memory testing.     Transcribed from ambient dictation for Sara Zhao DO by Nilsa Escalera.  09/26/23   15:18Patient or patient representative verbalized consent to the visit recording.  I have personally performed the services described in this document as transcribed by the above individual, and it is both accurate and complete.   EDT

## 2023-09-26 NOTE — PROGRESS NOTES
Venipuncture performed in L ARM by RT Rashawn  with good hemostasis. Patient tolerated well. 09/26/23 Linda Adam, RT

## 2023-09-27 LAB — URATE SERPL-MCNC: 6 MG/DL (ref 2.4–5.7)

## 2023-10-02 ENCOUNTER — TELEPHONE (OUTPATIENT)
Dept: FAMILY MEDICINE CLINIC | Facility: CLINIC | Age: 78
End: 2023-10-02
Payer: MEDICARE

## 2023-10-02 NOTE — TELEPHONE ENCOUNTER
Incoming Refill Request      Medication requested (name and dose): Hydralazine 25mg    Pharmacy where request should be sent: [facility] [place]    Additional details provided by patient: n/a    Best call back number:     Does the patient have less than a 3 day supply:  [] Yes  [x] No    Keyanna Araujo, Uli Rep  10/02/23, 08:47 EDT

## 2023-10-18 RX ORDER — BACLOFEN 10 MG/1
10 TABLET ORAL NIGHTLY PRN
Qty: 30 TABLET | Refills: 3 | Status: SHIPPED | OUTPATIENT
Start: 2023-10-18

## 2023-10-18 RX ORDER — MECLIZINE HCL 12.5 MG/1
TABLET ORAL
Qty: 30 TABLET | Refills: 1 | Status: SHIPPED | OUTPATIENT
Start: 2023-10-18

## 2023-11-01 RX ORDER — PANTOPRAZOLE SODIUM 40 MG/1
40 TABLET, DELAYED RELEASE ORAL DAILY
Qty: 90 TABLET | Refills: 0 | Status: SHIPPED | OUTPATIENT
Start: 2023-11-01

## 2023-11-01 NOTE — TELEPHONE ENCOUNTER
Rx Refill Note  Requested Prescriptions     Pending Prescriptions Disp Refills    pantoprazole (PROTONIX) 40 MG EC tablet [Pharmacy Med Name: PANTOPRAZOLE SOD DR 40 MG TAB] 30 tablet 2     Sig: TAKE 1 TABLET BY MOUTH DAILY      Last office visit with prescribing clinician: 9/26/2023   Last telemedicine visit with prescribing clinician: Visit date not found   Next office visit with prescribing clinician: 12/28/2023        Comprehensive Metabolic Panel (08/22/2023 02:54)                  Would you like a call back once the refill request has been completed: [] Yes [] No    If the office needs to give you a call back, can they leave a voicemail: [] Yes [] No    Linda Adam, RT  11/01/23, 08:35 EDT

## 2023-12-13 RX ORDER — ALLOPURINOL 100 MG/1
200 TABLET ORAL DAILY
Qty: 60 TABLET | Refills: 1 | Status: SHIPPED | OUTPATIENT
Start: 2023-12-13

## 2023-12-28 ENCOUNTER — TELEPHONE (OUTPATIENT)
Dept: FAMILY MEDICINE CLINIC | Facility: CLINIC | Age: 78
End: 2023-12-28

## 2023-12-28 ENCOUNTER — OFFICE VISIT (OUTPATIENT)
Dept: FAMILY MEDICINE CLINIC | Facility: CLINIC | Age: 78
End: 2023-12-28
Payer: MEDICARE

## 2023-12-28 VITALS
TEMPERATURE: 98.6 F | HEART RATE: 89 BPM | OXYGEN SATURATION: 97 % | BODY MASS INDEX: 25.16 KG/M2 | RESPIRATION RATE: 16 BRPM | DIASTOLIC BLOOD PRESSURE: 82 MMHG | HEIGHT: 63 IN | SYSTOLIC BLOOD PRESSURE: 157 MMHG | WEIGHT: 142 LBS

## 2023-12-28 DIAGNOSIS — R30.0 DYSURIA: ICD-10-CM

## 2023-12-28 DIAGNOSIS — E79.0 ELEVATED URIC ACID IN BLOOD: ICD-10-CM

## 2023-12-28 DIAGNOSIS — Z94.4 STATUS POST LIVER TRANSPLANTATION: ICD-10-CM

## 2023-12-28 DIAGNOSIS — R06.02 SHORTNESS OF BREATH: ICD-10-CM

## 2023-12-28 DIAGNOSIS — I10 ELEVATED BLOOD PRESSURE READING WITH DIAGNOSIS OF HYPERTENSION: Primary | ICD-10-CM

## 2023-12-28 DIAGNOSIS — Z77.22 SECOND HAND SMOKE EXPOSURE: ICD-10-CM

## 2023-12-28 LAB
BILIRUB BLD-MCNC: ABNORMAL MG/DL
CLARITY, POC: CLEAR
COLOR UR: YELLOW
EXPIRATION DATE: ABNORMAL
GLUCOSE UR STRIP-MCNC: NEGATIVE MG/DL
KETONES UR QL: ABNORMAL
LEUKOCYTE EST, POC: NEGATIVE
Lab: ABNORMAL
NITRITE UR-MCNC: NEGATIVE MG/ML
PH UR: 5 [PH] (ref 5–8)
PROT UR STRIP-MCNC: ABNORMAL MG/DL
RBC # UR STRIP: NEGATIVE /UL
SP GR UR: 1.02 (ref 1–1.03)
UROBILINOGEN UR QL: ABNORMAL

## 2023-12-28 PROCEDURE — 1160F RVW MEDS BY RX/DR IN RCRD: CPT | Performed by: FAMILY MEDICINE

## 2023-12-28 PROCEDURE — 81003 URINALYSIS AUTO W/O SCOPE: CPT | Performed by: FAMILY MEDICINE

## 2023-12-28 PROCEDURE — 99213 OFFICE O/P EST LOW 20 MIN: CPT | Performed by: FAMILY MEDICINE

## 2023-12-28 PROCEDURE — 3079F DIAST BP 80-89 MM HG: CPT | Performed by: FAMILY MEDICINE

## 2023-12-28 PROCEDURE — 1159F MED LIST DOCD IN RCRD: CPT | Performed by: FAMILY MEDICINE

## 2023-12-28 PROCEDURE — 3077F SYST BP >= 140 MM HG: CPT | Performed by: FAMILY MEDICINE

## 2023-12-28 RX ORDER — TACROLIMUS 1 MG/1
CAPSULE ORAL
Status: SHIPPED
Start: 2023-12-28

## 2023-12-28 RX ORDER — BENZYL ALCOHOL, LIDOCAINE HYDROCHLORIDE 10; 4 G/100G; G/100G
CREAM PERCUTANEOUS; TOPICAL; TRANSDERMAL
COMMUNITY

## 2023-12-28 NOTE — PROGRESS NOTES
Subjective   Christal Romo is a 78 y.o. female.     Chief Complaint   Patient presents with    Hypertension    Med Refill     Patient wants a refill on Tramadol and sample of Trelegy inhaler    Asthma    Pain         Current Outpatient Medications:     acetaminophen (TYLENOL) 500 MG tablet, Take 1 tablet by mouth. prn, Disp: , Rfl:     allopurinol (ZYLOPRIM) 100 MG tablet, TAKE TWO TABLETS BY MOUTH DAILY, Disp: 60 tablet, Rfl: 1    aspirin 81 MG tablet, Take 1 tablet by mouth Daily., Disp: , Rfl:     baclofen (LIORESAL) 10 MG tablet, TAKE ONE TABLET BY MOUTH EVERY NIGHT AT BEDTIME AS NEEDED FOR MUSCLE SPASMS, Disp: 30 tablet, Rfl: 3    busPIRone (BUSPAR) 5 MG tablet, Take 1 tablet by mouth 3 (Three) Times a Day., Disp: 270 tablet, Rfl: 0    dicyclomine (BENTYL) 20 MG tablet, Take 1 tablet by mouth Every 6 (Six) Hours As Needed (diarrhea)., Disp: , Rfl:     diphenhydrAMINE (BENADRYL) 25 MG tablet, Take 1 tablet by mouth Every 6 (Six) Hours As Needed for Itching., Disp: , Rfl:     fexofenadine (ALLEGRA) 180 MG tablet, Take 1 tablet by mouth Daily., Disp: , Rfl:     fluconazole (Diflucan) 150 MG tablet, Take 1 tablet by mouth As Needed (vaginitis x 1 dose per episode)., Disp: 3 tablet, Rfl: 0    Fluticasone-Umeclidin-Vilant (Trelegy Ellipta) 100-62.5-25 MCG/ACT inhaler, Inhale 1 puff Daily., Disp: 60 each, Rfl: 0    Lidocaine HCl-Benzyl Alcohol (Salonpas Lidocaine Plus) 4-10 % cream, Apply  topically., Disp: , Rfl:     Lutein 20 MG capsule, Take 1 tablet by mouth Daily., Disp: , Rfl:     meclizine (ANTIVERT) 12.5 MG tablet, TAKE 1 TABLET BY MOUTH EVERY 6 HOURS AS NEEDED FOR DIZZINESS, Disp: 30 tablet, Rfl: 1    metoprolol succinate XL (Toprol XL) 100 MG 24 hr tablet, Take 1 tablet by mouth Every Night., Disp: 90 tablet, Rfl: 3    metoprolol succinate XL (TOPROL-XL) 50 MG 24 hr tablet, Take 1 tablet by mouth Every Morning., Disp: 90 tablet, Rfl: 3    Multiple Vitamins-Minerals (Multivitamin Adults 50+) tablet, Take 1  tablet by mouth Daily., Disp: , Rfl:     mycophenolate (MYFORTIC) 360 MG tablet delayed-release EC tablet, Take 1 tablet by mouth Every Morning., Disp: 120 tablet, Rfl:     nystatin (MYCOSTATIN) 100,000 unit/mL suspension, Swish and swallow 5 mL 4 (Four) Times a Day., Disp: 200 mL, Rfl: 1    ondansetron (Zofran) 4 MG tablet, Take 1 tablet by mouth Every 8 (Eight) Hours As Needed for Nausea or Vomiting. prn, Disp: 30 tablet, Rfl: 1    pantoprazole (PROTONIX) 40 MG EC tablet, TAKE 1 TABLET BY MOUTH DAILY, Disp: 90 tablet, Rfl: 0    promethazine (PHENERGAN) 6.25 MG/5ML syrup, 5- 10ml po q6hrs prn N/V, Disp: 118 mL, Rfl: 0    tacrolimus (PROGRAF) 1 MG capsule, 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 - Dr Ritter [facility]; CBC/Prograf/CMP/LIPIDS/A1C    MAMMO     NEG = 2018/ 2021/ 2022    Meniere's disease     OAB     Osteoporosis     PUD        Past Surgical History:   Procedure Laterality Date    BREAST LUMPECTOMY Left     Benign    CATARACT EXTRACTION WITH INTRAOCULAR LENS IMPLANT Left     COLONOSCOPY      Polyps = 2012/ 2015/ 2020, rech 2025   GSI    ERCP W/ PLASTIC STENT PLACEMENT  2014    Liver Stent    LIVER TRANSPLANTATION      [place] [facility]    SKIN CANCER EXCISION      [facility]    TOTAL ABDOMINAL HYSTERECTOMY  1979       Family History   Problem Relation Age of Onset    Diabetes Sister         Type II    Heart disease Sister     Hypertension Sister     No Known Problems Brother     Hemochromatosis Other        Social History     Socioeconomic  History    Marital status:    Tobacco Use    Smoking status: Former     Packs/day: 0.25     Years: 1.00     Additional pack years: 0.00     Total pack years: 0.25     Types: Cigarettes     Start date:      Quit date:      Years since quittin.0     Passive exposure: Past    Smokeless tobacco: Never    Tobacco comments:     Quit .    Vaping Use    Vaping Use: Never used   Substance and Sexual Activity    Alcohol use: No     Comment: very rarely     Drug use: No    Sexual activity: Defer       History of Present Illness     Christal Romo is a 78-year-old female who is here for 3-month follow-up on hypertension and pain medication as well as asthma/ COPD.     She has dyspnea. She has growth in her right lung but is unsure if it is causing her breathing symptoms. She was given Trelegy, which helps. She uses it once a day. She had a chest x-ray in 2022 when she was in the hospital. She had a stress test, which went well. She had a CT scan of her chest in , which showed granulomatous disease. She grew up with smokers in the house.    She has constant pain in her bilateral legs and feet. She denies having swelling. Her podiatrist, Dr. Domenico Medina, told her in 2022 that nothing would help her anymore. She went to a new podiatrist, Dr. Alba, last Wednesday for another opinion. She advised to use tea tree oil, so she placed it on the outer side of her toes. It caused her to sneeze and experience a burning sensation. She used a cold rag on her left toe. Her friend advised her to use coconut oil. She has not tried tumeric. Lidocaine helped some, but her feet are burning. She was told that she has arthritis and gout. She cannot take gabapentin because it makes her sleepy.     She has been taking tramadol. She took a tramadol this past Tuesday, 2023 and her blood pressure started to increase. The tramadol helped her feet, but she can tell when her blood pressure is elevated. Last  Tuesday morning, her systolic blood pressure was 205 and the diastolic was 100. She is tolerating the metoprolol well. She has an appointment with Dr. Clifton next month. She was given diltiazem 60 mg----but  she was sleepy all day long. She cannot take it every day. She takes 50 mg in the morning and 100 mg at night of the Beta Blocker. She took 100 mg this morning. She has not taken the diltiazem since Tuesday because it made her tired and sleepy. She checks her temperature daily.     She would like to have a urinalysis because she has polyuria. She takes BuSpar to calm her down.    She sees her cardiologist every 6 months. She has an appointment on 01/07/2023. Cardizem gave her chest pressure and heart palpitations. She took 50 mg of Toprol today.    She has diverticulitis. She had a liver transplant. She is on tacrolimus 1 mg 1 in the morning and 1 at night, which gives her headaches. She has not had any labs since 12/01/2022.    The following portions of the patient's history were reviewed and updated as appropriate: allergies, current medications, past family history, past medical history, past social history, past surgical history and problem list.    Review of Systems   Constitutional:  Negative for activity change, appetite change, unexpected weight gain and unexpected weight loss.   Respiratory:  Positive for shortness of breath. Negative for cough and wheezing.    Cardiovascular:  Negative for leg swelling.   Gastrointestinal:  Negative for constipation and diarrhea.   Genitourinary:  Positive for dysuria and frequency. Negative for difficulty urinating, flank pain and urgency.   Musculoskeletal:  Positive for arthralgias and myalgias.   Neurological:  Positive for numbness (burning pain in feet b/l).       Vitals:    12/28/23 1342   BP: 157/82   Pulse: 89   Resp: 16   Temp: 98.6 °F (37 °C)   SpO2: 97%       Objective   Physical Exam  Vitals and nursing note reviewed.   Constitutional:       General: She  is not in acute distress.     Appearance: She is well-developed. She is not ill-appearing or toxic-appearing.   HENT:      Head: Normocephalic and atraumatic.   Cardiovascular:      Rate and Rhythm: Normal rate and regular rhythm.      Heart sounds: Normal heart sounds. No murmur heard.  Pulmonary:      Effort: Pulmonary effort is normal. No respiratory distress.      Breath sounds: Normal breath sounds. No stridor. No wheezing, rhonchi or rales.   Skin:     General: Skin is warm and dry.      Findings: No rash.   Neurological:      Mental Status: She is alert and oriented to person, place, and time.      Cranial Nerves: No cranial nerve deficit.   Psychiatric:         Attention and Perception: Attention and perception normal.         Mood and Affect: Mood and affect normal.         Speech: Speech normal.         Behavior: Behavior normal. Behavior is cooperative.         Thought Content: Thought content normal.         Cognition and Memory: Cognition and memory normal.         Judgment: Judgment normal.       Assessment & Plan   Diagnoses and all orders for this visit:    1. Elevated blood pressure reading with diagnosis of hypertension (Primary)    2. Elevated uric acid in blood  -     Uric Acid; Future    3. Dysuria  -     Urinalysis With Culture If Indicated -; Future  -     POC Urinalysis Dipstick, Automated    4. Shortness of breath    5. Second hand smoke exposure    6. Status post liver transplantation    Other orders  -     tacrolimus (PROGRAF) 1 MG capsule; 1 po qam and 1 po qpm          1. Chronic obstructive pulmonary disease.   I will provide samples of Trelegy.    2. Hypertension.  Her blood pressure is elevated today at 157/82 mmHg. She was given a prescription for diltiazem 60 mg.    3. Foot pain.  She was advised to use coconut oil and mix with the tea tree oil. She was advised to use a cool pack prn. I will call for a report from Dr. Medina regarding her foot x-rays. If the x-ray does not seem  helpful, then I will order an x-ray.    4. Urinary frequency.  I will obtain a urinalysis today.    Sara Zhao DO    Transcribed from ambient dictation for Sara Zhao DO by Tamanna Dick.  12/28/23   15:07 EST    Patient or patient representative verbalized consent to the visit recording.  I have personally performed the services described in this document as transcribed by the above individual, and it is both accurate and complete.

## 2023-12-28 NOTE — TELEPHONE ENCOUNTER
Name,  and allergies verified with patient.  Patient escorted to exam room in stable condition.  Erin Britton was offered treatment for pain control: No.  Pain rated as 0 (zero).  Chief Complaint   Patient presents with   • Abdominal Pain     Patient's father states patient has emesis 2 weeks ago, abdominal pain on and off               When the patient was leaving she was asking about what you wanted to do about checking her for neuropathy. I told the patient I didn't see anything in the chart notes and we would have to call her back about it.

## 2023-12-29 ENCOUNTER — TELEPHONE (OUTPATIENT)
Dept: FAMILY MEDICINE CLINIC | Facility: CLINIC | Age: 78
End: 2023-12-29

## 2023-12-29 DIAGNOSIS — Z94.4 STATUS POST LIVER TRANSPLANTATION: ICD-10-CM

## 2023-12-29 DIAGNOSIS — M79.673 PAIN OF FOOT, UNSPECIFIED LATERALITY: ICD-10-CM

## 2023-12-29 DIAGNOSIS — I10 ESSENTIAL HYPERTENSION: ICD-10-CM

## 2023-12-29 DIAGNOSIS — R53.83 OTHER FATIGUE: ICD-10-CM

## 2023-12-29 DIAGNOSIS — M79.2 FOOT NEURALGIA: Primary | ICD-10-CM

## 2023-12-29 NOTE — TELEPHONE ENCOUNTER
Caller: Christal Romo    Relationship: Self    Best call back number: [phone]     What was the call regarding: PATIENT STATED THAT [facility] IS NOT IN NETWORK WITH HER INSURANCE. PATIENT IS REQUESTING FOR HER ORDER TO BE SENT TO [facility]    PLEASE ADVISE

## 2024-01-17 DIAGNOSIS — M54.2 CERVICALGIA: ICD-10-CM

## 2024-01-18 RX ORDER — TRAMADOL HYDROCHLORIDE 50 MG/1
50 TABLET ORAL EVERY 6 HOURS PRN
Qty: 30 TABLET | Refills: 0 | Status: SHIPPED | OUTPATIENT
Start: 2024-01-18

## 2024-01-18 NOTE — TELEPHONE ENCOUNTER
Rx Refill Note  Requested Prescriptions     Pending Prescriptions Disp Refills    traMADol (ULTRAM) 50 MG tablet [Pharmacy Med Name: traMADol HCL 50MG TABLET] 30 tablet      Sig: TAKE ONE TABLET BY MOUTH EVERY 6 HOURS AS NEEDED FOR MODERATE OR SEVERE PAIN      Last office visit with prescribing clinician: 12/28/2023   Last telemedicine visit with prescribing clinician: Visit date not found   Next office visit with prescribing clinician: 3/28/2024     Comprehensive Metabolic Panel (08/22/2023 02:54)   CBC & Differential (08/22/2023 02:54)                       Would you like a call back once the refill request has been completed: [] Yes [] No    If the office needs to give you a call back, can they leave a voicemail: [] Yes [] No    Nancy Chavis MA  01/18/24, 08:36 EST

## 2024-01-25 ENCOUNTER — TELEPHONE (OUTPATIENT)
Dept: FAMILY MEDICINE CLINIC | Facility: CLINIC | Age: 79
End: 2024-01-25

## 2024-01-25 NOTE — TELEPHONE ENCOUNTER
Caller: Christal Romo    Relationship: Self    Best call back number:     [phone]       What is the best time to reach you: ANY    Who are you requesting to speak with (clinical staff, provider,  specific staff member): PCP    Do you know the name of the person who called:     What was the call regarding: PATIENT WANTS PCP TO KNOW THAT SHE PLANS ON GETTING THE NEUROPATHY TEST DONE NEXT WEEK.    Is it okay if the provider responds through MyChart:

## 2024-01-29 ENCOUNTER — CLINICAL SUPPORT (OUTPATIENT)
Dept: FAMILY MEDICINE CLINIC | Facility: CLINIC | Age: 79
End: 2024-01-29
Payer: MEDICARE

## 2024-01-29 DIAGNOSIS — R30.0 DYSURIA: ICD-10-CM

## 2024-01-29 DIAGNOSIS — E79.0 ELEVATED URIC ACID IN BLOOD: ICD-10-CM

## 2024-01-29 PROCEDURE — 84550 ASSAY OF BLOOD/URIC ACID: CPT | Performed by: FAMILY MEDICINE

## 2024-01-29 PROCEDURE — 36415 COLL VENOUS BLD VENIPUNCTURE: CPT | Performed by: FAMILY MEDICINE

## 2024-01-29 NOTE — PROGRESS NOTES
Venipuncture performed in L ARM by RT Rashawn  with good hemostasis. Patient tolerated well. 01/29/24 Linda Adam, RT

## 2024-01-30 LAB — URATE SERPL-MCNC: 6.8 MG/DL (ref 2.4–5.7)

## 2024-01-31 RX ORDER — ALLOPURINOL 200 MG/1
200 TABLET ORAL 2 TIMES DAILY
Qty: 180 TABLET | Refills: 1 | Status: SHIPPED | OUTPATIENT
Start: 2024-01-31

## 2024-02-05 RX ORDER — PANTOPRAZOLE SODIUM 40 MG/1
40 TABLET, DELAYED RELEASE ORAL DAILY
Qty: 90 TABLET | Refills: 0 | Status: SHIPPED | OUTPATIENT
Start: 2024-02-05

## 2024-02-05 NOTE — TELEPHONE ENCOUNTER
Rx Refill Note  Requested Prescriptions     Pending Prescriptions Disp Refills    pantoprazole (PROTONIX) 40 MG EC tablet [Pharmacy Med Name: PANTOPRAZOLE SOD DR 40 MG TAB] 90 tablet 0     Sig: TAKE 1 TABLET BY MOUTH DAILY      Last office visit with prescribing clinician: 12/28/2023   Last telemedicine visit with prescribing clinician: Visit date not found   Next office visit with prescribing clinician: 3/28/2024     Comprehensive Metabolic Panel (08/22/2023 02:54)   CBC & Differential (08/22/2023 02:54)                     Would you like a call back once the refill request has been completed: [] Yes [] No    If the office needs to give you a call back, can they leave a voicemail: [] Yes [] No    Nancy Chavis MA  02/05/24, 09:32 EST

## 2024-02-16 NOTE — TELEPHONE ENCOUNTER
Incoming Refill Request      Medication requested (name and dose): Baclofen 10mg  Buspirone 5mg  Meclizine 12.5mg  Ondansetron 4mg    Pharmacy where request should be sent: [facility]    Additional details provided by patient: n/a    Best call back number:     Does the patient have less than a 3 day supply:  [] Yes  [x] No    Keyanna Araujo, RegSched Rep  02/16/24, 16:17 EST

## 2024-02-19 RX ORDER — BUSPIRONE HYDROCHLORIDE 5 MG/1
5 TABLET ORAL 3 TIMES DAILY
Qty: 270 TABLET | Refills: 0 | Status: SHIPPED | OUTPATIENT
Start: 2024-02-19

## 2024-02-19 RX ORDER — MECLIZINE HCL 12.5 MG/1
12.5 TABLET ORAL 3 TIMES DAILY PRN
Qty: 30 TABLET | Refills: 1 | Status: SHIPPED | OUTPATIENT
Start: 2024-02-19

## 2024-02-19 RX ORDER — ONDANSETRON 4 MG/1
4 TABLET, FILM COATED ORAL EVERY 8 HOURS PRN
Qty: 30 TABLET | Refills: 1 | Status: SHIPPED | OUTPATIENT
Start: 2024-02-19

## 2024-02-19 RX ORDER — BACLOFEN 10 MG/1
10 TABLET ORAL NIGHTLY PRN
Qty: 30 TABLET | Refills: 3 | Status: SHIPPED | OUTPATIENT
Start: 2024-02-19

## 2024-02-19 NOTE — TELEPHONE ENCOUNTER
Rx Refill Note  Requested Prescriptions     Pending Prescriptions Disp Refills    busPIRone (BUSPAR) 5 MG tablet 270 tablet 0     Sig: Take 1 tablet by mouth 3 (Three) Times a Day.    ondansetron (Zofran) 4 MG tablet 30 tablet 1     Sig: Take 1 tablet by mouth Every 8 (Eight) Hours As Needed for Nausea or Vomiting. prn    baclofen (LIORESAL) 10 MG tablet 30 tablet 3     Sig: Take 1 tablet by mouth At Night As Needed for Muscle Spasms.    meclizine (ANTIVERT) 12.5 MG tablet 30 tablet 1      Last office visit with prescribing clinician: 12/28/2023   Last telemedicine visit with prescribing clinician: Visit date not found   Next office visit with prescribing clinician: 3/28/2024                         Would you like a call back once the refill request has been completed: [] Yes [] No    If the office needs to give you a call back, can they leave a voicemail: [] Yes [] No    Linda Adam, RT  02/19/24, 09:56 EST

## 2024-02-23 ENCOUNTER — APPOINTMENT (OUTPATIENT)
Dept: GENERAL RADIOLOGY | Facility: HOSPITAL | Age: 79
End: 2024-02-23
Payer: MEDICARE

## 2024-02-23 ENCOUNTER — HOSPITAL ENCOUNTER (OUTPATIENT)
Facility: HOSPITAL | Age: 79
Setting detail: OBSERVATION
Discharge: HOME | End: 2024-02-24
Attending: EMERGENCY MEDICINE | Admitting: EMERGENCY MEDICINE
Payer: MEDICARE

## 2024-02-23 DIAGNOSIS — R07.9 CHEST PAIN, UNSPECIFIED TYPE: ICD-10-CM

## 2024-02-23 DIAGNOSIS — I10 UNCONTROLLED HYPERTENSION: Primary | ICD-10-CM

## 2024-02-23 DIAGNOSIS — Z88.8: ICD-10-CM

## 2024-02-23 DIAGNOSIS — F41.9 ANXIETY: ICD-10-CM

## 2024-02-23 LAB
ALBUMIN SERPL-MCNC: 4.8 G/DL (ref 3.5–5.2)
ALBUMIN/GLOB SERPL: 2.4 G/DL
ALP SERPL-CCNC: 80 U/L (ref 39–117)
ALT SERPL W P-5'-P-CCNC: 33 U/L (ref 1–33)
ANION GAP SERPL CALCULATED.3IONS-SCNC: 15 MMOL/L (ref 5–15)
AST SERPL-CCNC: 33 U/L (ref 1–32)
BASOPHILS # BLD AUTO: 0 10*3/MM3 (ref 0–0.2)
BASOPHILS NFR BLD AUTO: 0.4 % (ref 0–1.5)
BILIRUB SERPL-MCNC: 0.5 MG/DL (ref 0–1.2)
BUN SERPL-MCNC: 19 MG/DL (ref 8–23)
BUN/CREAT SERPL: 19.2 (ref 7–25)
CALCIUM SPEC-SCNC: 9.4 MG/DL (ref 8.6–10.5)
CHLORIDE SERPL-SCNC: 103 MMOL/L (ref 98–107)
CO2 SERPL-SCNC: 22 MMOL/L (ref 22–29)
CREAT SERPL-MCNC: 0.99 MG/DL (ref 0.57–1)
DEPRECATED RDW RBC AUTO: 46.4 FL (ref 37–54)
EGFRCR SERPLBLD CKD-EPI 2021: 58.8 ML/MIN/1.73
EOSINOPHIL # BLD AUTO: 0.1 10*3/MM3 (ref 0–0.4)
EOSINOPHIL NFR BLD AUTO: 1.2 % (ref 0.3–6.2)
ERYTHROCYTE [DISTWIDTH] IN BLOOD BY AUTOMATED COUNT: 14.8 % (ref 12.3–15.4)
GEN 5 2HR TROPONIN T REFLEX: 15 NG/L
GLOBULIN UR ELPH-MCNC: 2 GM/DL
GLUCOSE SERPL-MCNC: 114 MG/DL (ref 65–99)
HCT VFR BLD AUTO: 38 % (ref 34–46.6)
HGB BLD-MCNC: 12.5 G/DL (ref 12–15.9)
HOLD SPECIMEN: NORMAL
HOLD SPECIMEN: NORMAL
LYMPHOCYTES # BLD AUTO: 1.1 10*3/MM3 (ref 0.7–3.1)
LYMPHOCYTES NFR BLD AUTO: 20.6 % (ref 19.6–45.3)
MCH RBC QN AUTO: 28.1 PG (ref 26.6–33)
MCHC RBC AUTO-ENTMCNC: 33 G/DL (ref 31.5–35.7)
MCV RBC AUTO: 85.3 FL (ref 79–97)
MONOCYTES # BLD AUTO: 0.4 10*3/MM3 (ref 0.1–0.9)
MONOCYTES NFR BLD AUTO: 7.9 % (ref 5–12)
NEUTROPHILS NFR BLD AUTO: 3.9 10*3/MM3 (ref 1.7–7)
NEUTROPHILS NFR BLD AUTO: 69.9 % (ref 42.7–76)
NRBC BLD AUTO-RTO: 0.1 /100 WBC (ref 0–0.2)
PLATELET # BLD AUTO: 128 10*3/MM3 (ref 140–450)
PMV BLD AUTO: 9.3 FL (ref 6–12)
POTASSIUM SERPL-SCNC: 4.3 MMOL/L (ref 3.5–5.2)
PROT SERPL-MCNC: 6.8 G/DL (ref 6–8.5)
RBC # BLD AUTO: 4.45 10*6/MM3 (ref 3.77–5.28)
SODIUM SERPL-SCNC: 140 MMOL/L (ref 136–145)
TROPONIN T DELTA: -1 NG/L
TROPONIN T SERPL HS-MCNC: 16 NG/L
WBC NRBC COR # BLD AUTO: 5.6 10*3/MM3 (ref 3.4–10.8)
WHOLE BLOOD HOLD COAG: NORMAL
WHOLE BLOOD HOLD SPECIMEN: NORMAL

## 2024-02-23 PROCEDURE — 25010000002 ONDANSETRON PER 1 MG: Performed by: EMERGENCY MEDICINE

## 2024-02-23 PROCEDURE — 80053 COMPREHEN METABOLIC PANEL: CPT | Performed by: EMERGENCY MEDICINE

## 2024-02-23 PROCEDURE — 84484 ASSAY OF TROPONIN QUANT: CPT | Performed by: EMERGENCY MEDICINE

## 2024-02-23 PROCEDURE — 96375 TX/PRO/DX INJ NEW DRUG ADDON: CPT

## 2024-02-23 PROCEDURE — 96374 THER/PROPH/DIAG INJ IV PUSH: CPT

## 2024-02-23 PROCEDURE — 25010000002 HYDRALAZINE PER 20 MG: Performed by: EMERGENCY MEDICINE

## 2024-02-23 PROCEDURE — G0378 HOSPITAL OBSERVATION PER HR: HCPCS

## 2024-02-23 PROCEDURE — 99285 EMERGENCY DEPT VISIT HI MDM: CPT

## 2024-02-23 PROCEDURE — 93005 ELECTROCARDIOGRAM TRACING: CPT | Performed by: EMERGENCY MEDICINE

## 2024-02-23 PROCEDURE — 85025 COMPLETE CBC W/AUTO DIFF WBC: CPT | Performed by: EMERGENCY MEDICINE

## 2024-02-23 PROCEDURE — 71045 X-RAY EXAM CHEST 1 VIEW: CPT

## 2024-02-23 PROCEDURE — 36415 COLL VENOUS BLD VENIPUNCTURE: CPT

## 2024-02-23 RX ORDER — CHOLECALCIFEROL (VITAMIN D3) 125 MCG
5 CAPSULE ORAL NIGHTLY PRN
Status: DISCONTINUED | OUTPATIENT
Start: 2024-02-23 | End: 2024-02-24 | Stop reason: HOSPADM

## 2024-02-23 RX ORDER — SODIUM CHLORIDE 0.9 % (FLUSH) 0.9 %
10 SYRINGE (ML) INJECTION AS NEEDED
Status: DISCONTINUED | OUTPATIENT
Start: 2024-02-23 | End: 2024-02-24 | Stop reason: HOSPADM

## 2024-02-23 RX ORDER — ALPRAZOLAM 0.5 MG/1
0.5 TABLET ORAL ONCE
Status: COMPLETED | OUTPATIENT
Start: 2024-02-23 | End: 2024-02-23

## 2024-02-23 RX ORDER — ONDANSETRON 2 MG/ML
4 INJECTION INTRAMUSCULAR; INTRAVENOUS EVERY 6 HOURS PRN
Status: DISCONTINUED | OUTPATIENT
Start: 2024-02-23 | End: 2024-02-24 | Stop reason: HOSPADM

## 2024-02-23 RX ORDER — AMOXICILLIN 250 MG
2 CAPSULE ORAL 2 TIMES DAILY
Status: DISCONTINUED | OUTPATIENT
Start: 2024-02-23 | End: 2024-02-24 | Stop reason: HOSPADM

## 2024-02-23 RX ORDER — NITROGLYCERIN 0.4 MG/1
0.4 TABLET SUBLINGUAL
Status: DISCONTINUED | OUTPATIENT
Start: 2024-02-23 | End: 2024-02-24 | Stop reason: HOSPADM

## 2024-02-23 RX ORDER — TRAMADOL HYDROCHLORIDE 50 MG/1
50 TABLET ORAL ONCE
Status: COMPLETED | OUTPATIENT
Start: 2024-02-23 | End: 2024-02-23

## 2024-02-23 RX ORDER — PROMETHAZINE HYDROCHLORIDE 6.25 MG/5ML
6.25 SYRUP ORAL EVERY 4 HOURS PRN
Status: DISCONTINUED | OUTPATIENT
Start: 2024-02-23 | End: 2024-02-24 | Stop reason: HOSPADM

## 2024-02-23 RX ORDER — BACLOFEN 10 MG/1
10 TABLET ORAL NIGHTLY PRN
Status: DISCONTINUED | OUTPATIENT
Start: 2024-02-23 | End: 2024-02-24 | Stop reason: HOSPADM

## 2024-02-23 RX ORDER — BUSPIRONE HYDROCHLORIDE 5 MG/1
5 TABLET ORAL 3 TIMES DAILY
Status: DISCONTINUED | OUTPATIENT
Start: 2024-02-24 | End: 2024-02-24 | Stop reason: HOSPADM

## 2024-02-23 RX ORDER — ONDANSETRON 2 MG/ML
4 INJECTION INTRAMUSCULAR; INTRAVENOUS ONCE
Status: COMPLETED | OUTPATIENT
Start: 2024-02-23 | End: 2024-02-23

## 2024-02-23 RX ORDER — DICYCLOMINE HYDROCHLORIDE 10 MG/1
10 CAPSULE ORAL 3 TIMES DAILY
Status: DISCONTINUED | OUTPATIENT
Start: 2024-02-24 | End: 2024-02-24 | Stop reason: HOSPADM

## 2024-02-23 RX ORDER — PANTOPRAZOLE SODIUM 40 MG/1
40 TABLET, DELAYED RELEASE ORAL
Status: DISCONTINUED | OUTPATIENT
Start: 2024-02-24 | End: 2024-02-24

## 2024-02-23 RX ORDER — POLYETHYLENE GLYCOL 3350 17 G/17G
17 POWDER, FOR SOLUTION ORAL DAILY PRN
Status: DISCONTINUED | OUTPATIENT
Start: 2024-02-23 | End: 2024-02-24 | Stop reason: HOSPADM

## 2024-02-23 RX ORDER — LOPERAMIDE HYDROCHLORIDE 2 MG/1
2 CAPSULE ORAL ONCE
Status: COMPLETED | OUTPATIENT
Start: 2024-02-23 | End: 2024-02-23

## 2024-02-23 RX ORDER — ASPIRIN 81 MG/1
324 TABLET, CHEWABLE ORAL ONCE
Status: COMPLETED | OUTPATIENT
Start: 2024-02-23 | End: 2024-02-23

## 2024-02-23 RX ORDER — BISACODYL 10 MG
10 SUPPOSITORY, RECTAL RECTAL DAILY PRN
Status: DISCONTINUED | OUTPATIENT
Start: 2024-02-23 | End: 2024-02-24 | Stop reason: HOSPADM

## 2024-02-23 RX ORDER — METOPROLOL SUCCINATE 50 MG/1
100 TABLET, EXTENDED RELEASE ORAL NIGHTLY
Status: DISCONTINUED | OUTPATIENT
Start: 2024-02-24 | End: 2024-02-24 | Stop reason: HOSPADM

## 2024-02-23 RX ORDER — BISACODYL 5 MG/1
5 TABLET, DELAYED RELEASE ORAL DAILY PRN
Status: DISCONTINUED | OUTPATIENT
Start: 2024-02-23 | End: 2024-02-24 | Stop reason: HOSPADM

## 2024-02-23 RX ORDER — ACETAMINOPHEN 325 MG/1
650 TABLET ORAL EVERY 4 HOURS PRN
Status: DISCONTINUED | OUTPATIENT
Start: 2024-02-23 | End: 2024-02-24 | Stop reason: HOSPADM

## 2024-02-23 RX ORDER — HYDRALAZINE HYDROCHLORIDE 20 MG/ML
20 INJECTION INTRAMUSCULAR; INTRAVENOUS ONCE
Status: COMPLETED | OUTPATIENT
Start: 2024-02-23 | End: 2024-02-23

## 2024-02-23 RX ADMIN — ALPRAZOLAM 0.5 MG: 0.5 TABLET ORAL at 21:27

## 2024-02-23 RX ADMIN — ONDANSETRON 4 MG: 2 INJECTION INTRAMUSCULAR; INTRAVENOUS at 17:19

## 2024-02-23 RX ADMIN — TRAMADOL HYDROCHLORIDE 50 MG: 50 TABLET, COATED ORAL at 17:42

## 2024-02-23 RX ADMIN — HYDRALAZINE HYDROCHLORIDE 20 MG: 20 INJECTION INTRAMUSCULAR; INTRAVENOUS at 16:20

## 2024-02-23 RX ADMIN — LOPERAMIDE HYDROCHLORIDE 2 MG: 2 CAPSULE ORAL at 21:27

## 2024-02-23 RX ADMIN — ASPIRIN 81 MG CHEWABLE TABLET 324 MG: 81 TABLET CHEWABLE at 16:20

## 2024-02-23 NOTE — ED PROVIDER NOTES
Subjective   History of Present Illness  79-year-old female states her blood pressures have been high over the last 1 day despite her taking the increased dose of her blood pressure medication.  States it is associated with some chest pain as well as some neck pain and headache.  She reports no abrupt headache or any focal numbness or weakness or blurry vision.  She reports no cough or shortness of breath or fevers or chills.  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 - Dr Ritter [facility]; CBC/Prograf/CMP/LIPIDS/A1C    MAMMO     NEG = 2018/ 2021/ 2022    Meniere's disease     OAB     Osteoporosis     PUD        Allergies   Allergen Reactions    Amlodipine Itching    Atacand  [Candesartan Cilexetil] Other (See Comments)    Atenolol Palpitations    Azithromycin Nausea Only and GI Intolerance    Ibuprofen Unknown (See Comments)    Levofloxacin Nausea Only and Myalgia    Penicillin G Hives    Spironolactone Nausea And Vomiting    Sucralfate Swelling    Sulfamethoxazole-Trimethoprim Other (See Comments) and Rash     THROMBOCYTOPENIA    Triamterene-Hctz Myalgia    Venlafaxine Headache           Lisinopril Nausea Only, Anxiety and Headache    Cardizem Cd [Diltiazem Hcl Er Coated Beads] Other (See Comments)     Chest pressure    Clonidine Derivatives GI Intolerance    Codeine Hallucinations    Escitalopram Unknown - High Severity and Headache    Fluconazole Nausea Only and Headache    Hydralazine Headache    Lactulose Unknown - High Severity and Itching    Lipitor [Atorvastatin] Myalgia    Metronidazole Unknown - High Severity    Polyoxyethylene 40 Sorbitol Septaoleate [Sorbitan] Unknown - High Severity    Whey  Nausea And Vomiting    Zyrtec [Cetirizine] Itching    Citrus Other (See Comments)    Paroxetine Palpitations    Zoloft [Sertraline Hcl] Palpitations       Past Surgical History:   Procedure Laterality Date    BREAST LUMPECTOMY Left     Benign    CATARACT EXTRACTION WITH INTRAOCULAR LENS IMPLANT Left     COLONOSCOPY      Polyps = 2015/ , rech    [facility]    ERCP W/ PLASTIC STENT PLACEMENT      Liver Stent    LIVER TRANSPLANTATION      [place] [facility]    SKIN CANCER EXCISION      Rt [facility]    TOTAL ABDOMINAL HYSTERECTOMY  1979       Family History   Problem Relation Age of Onset    Diabetes Sister         Type II    Heart disease Sister     Hypertension Sister     No Known Problems Brother     Hemochromatosis Other        Social History     Socioeconomic History    Marital status:    Tobacco Use    Smoking status: Former     Packs/day: 0.25     Years: 1.00     Additional pack years: 0.00     Total pack years: 0.25     Types: Cigarettes     Start date:      Quit date:      Years since quittin.1     Passive exposure: Past    Smokeless tobacco: Never    Tobacco comments:     Quit .    Vaping Use    Vaping Use: Never used   Substance and Sexual Activity    Alcohol use: No     Comment: very rarely     Drug use: No    Sexual activity: Defer     Prior to Admission medications    Medication Sig Start Date End Date Taking? Authorizing Provider   acetaminophen (TYLENOL) 500 MG tablet Take 1 tablet by mouth. prn    ProviderPedro MD   allopurinol 200 MG tablet Take 200 mg by mouth 2 (Two) Times a Day. 24   Sara Zhao DO   aspirin 81 MG tablet Take 1 tablet by mouth Daily. 3/14/16   Pedro Clemente MD   baclofen (LIORESAL) 10 MG tablet Take 1 tablet by mouth At Night As Needed for Muscle Spasms. 24   Sara Zhao DO   busPIRone (BUSPAR) 5 MG tablet Take 1 tablet by mouth 3 (Three) Times a Day. 24   Sara Zhao, DO   dicyclomine (BENTYL) 20 MG tablet Take 1 tablet  by mouth Every 6 (Six) Hours As Needed (diarrhea). 4/10/23   Sara Zhao, DO   diphenhydrAMINE (BENADRYL) 25 MG tablet Take 1 tablet by mouth Every 6 (Six) Hours As Needed for Itching.    Pedro Clemente MD   fexofenadine (ALLEGRA) 180 MG tablet Take 1 tablet by mouth Daily.    Pedro Clemente MD   fluconazole (Diflucan) 150 MG tablet Take 1 tablet by mouth As Needed (vaginitis x 1 dose per episode). 4/10/23   Sara Zhao DO   Fluticasone-Umeclidin-Vilant (Trelegy Ellipta) 100-62.5-25 MCG/ACT inhaler Inhale 1 puff Daily. 6/26/23   Sara Zhao, DO   Lidocaine HCl-Benzyl Alcohol (Salonpas Lidocaine Plus) 4-10 % cream Apply  topically.    Pedro Clemente MD   Lutein 20 MG capsule Take 1 tablet by mouth Daily.    Pedro Clemente MD   meclizine (ANTIVERT) 12.5 MG tablet Take 1 tablet by mouth 3 (Three) Times a Day As Needed for Dizziness. 2/19/24   Sara Zhao DO   metoprolol succinate XL (Toprol XL) 100 MG 24 hr tablet Take 1 tablet by mouth Every Night. 9/7/23   Sara Zhao, DO   metoprolol succinate XL (TOPROL-XL) 50 MG 24 hr tablet Take 1 tablet by mouth Every Morning. 9/7/23   Sara Zhao, DO   Multiple Vitamins-Minerals (Multivitamin Adults 50+) tablet Take 1 tablet by mouth Daily.    Pedro Clemente MD   mycophenolate (MYFORTIC) 360 MG tablet delayed-release EC tablet Take 1 tablet by mouth Every Morning. 8/11/22   Sara Zhao, DO   nystatin (MYCOSTATIN) 100,000 unit/mL suspension Swish and swallow 5 mL 4 (Four) Times a Day. 1/26/23   Sara Zhao, DO   ondansetron (Zofran) 4 MG tablet Take 1 tablet by mouth Every 8 (Eight) Hours As Needed for Nausea or Vomiting. prn 2/19/24   Sara Zhao, DO   pantoprazole (PROTONIX) 40 MG EC tablet TAKE 1 TABLET BY MOUTH DAILY 2/5/24   Stroot, Sara, DO   promethazine (PHENERGAN) 6.25 MG/5ML syrup 5- 10ml po q6hrs prn N/V 6/26/23   Sara Zhao DO   tacrolimus (PROGRAF) 1 MG capsule 1 po qam and 1 po qpm  "12/28/23   Sara Zhao DO   traMADol (ULTRAM) 50 MG tablet TAKE ONE TABLET BY MOUTH EVERY 6 HOURS AS NEEDED FOR MODERATE OR SEVERE PAIN 1/18/24   Sara Zhao DO     /80   Pulse 80   Temp 98.7 °F (37.1 °C)   Resp 18   Ht 160 cm (63\")   Wt 70.3 kg (155 lb)   SpO2 98%   BMI 27.46 kg/m²         Objective   Physical Exam  General: Well-developed well-appearing, no acute distress, alert and appropriate  Eyes: Pupils round and equal, sclera nonicteric  HEENT: Mucous membranes moist, no mucosal swelling  Neck: Supple, no nuchal rigidity, no JVD  Respirations: Respirations nonlabored, equal breath sounds bilaterally, clear lungs  Heart regular rate and rhythm, no murmurs rubs or gallops,   Abdomen soft nontender nondistended, no hepatosplenomegaly, no hernia, no mass, normal bowel sounds, no CVA tenderness  Extremities no clubbing cyanosis or edema, calves are symmetric and nontender, equal pulses bilaterally  Neuro cranial nerves grossly intact, no focal limb deficits  Psych oriented, pleasant affect, somewhat anxious  Skin no rash, brisk cap refill  Procedures           ED Course  ED Course as of 02/23/24 1913 Fri Feb 23, 2024   1722 Is requesting tramadol which she takes chronically [SH]      ED Course User Index  [SH] Jose Wynn MD      Results for orders placed or performed during the hospital encounter of 02/23/24   Comprehensive Metabolic Panel    Specimen: Blood   Result Value Ref Range    Glucose 114 (H) 65 - 99 mg/dL    BUN 19 8 - 23 mg/dL    Creatinine 0.99 0.57 - 1.00 mg/dL    Sodium 140 136 - 145 mmol/L    Potassium 4.3 3.5 - 5.2 mmol/L    Chloride 103 98 - 107 mmol/L    CO2 22.0 22.0 - 29.0 mmol/L    Calcium 9.4 8.6 - 10.5 mg/dL    Total Protein 6.8 6.0 - 8.5 g/dL    Albumin 4.8 3.5 - 5.2 g/dL    ALT (SGPT) 33 1 - 33 U/L    AST (SGOT) 33 (H) 1 - 32 U/L    Alkaline Phosphatase 80 39 - 117 U/L    Total Bilirubin 0.5 0.0 - 1.2 mg/dL    Globulin 2.0 gm/dL    A/G Ratio 2.4 g/dL    " BUN/Creatinine Ratio 19.2 7.0 - 25.0    Anion Gap 15.0 5.0 - 15.0 mmol/L    eGFR 58.8 (L) >60.0 mL/min/1.73   High Sensitivity Troponin T    Specimen: Blood   Result Value Ref Range    HS Troponin T 16 (H) <14 ng/L   CBC Auto Differential    Specimen: Blood   Result Value Ref Range    WBC 5.60 3.40 - 10.80 10*3/mm3    RBC 4.45 3.77 - 5.28 10*6/mm3    Hemoglobin 12.5 12.0 - 15.9 g/dL    Hematocrit 38.0 34.0 - 46.6 %    MCV 85.3 79.0 - 97.0 fL    MCH 28.1 26.6 - 33.0 pg    MCHC 33.0 31.5 - 35.7 g/dL    RDW 14.8 12.3 - 15.4 %    RDW-SD 46.4 37.0 - 54.0 fl    MPV 9.3 6.0 - 12.0 fL    Platelets 128 (L) 140 - 450 10*3/mm3    Neutrophil % 69.9 42.7 - 76.0 %    Lymphocyte % 20.6 19.6 - 45.3 %    Monocyte % 7.9 5.0 - 12.0 %    Eosinophil % 1.2 0.3 - 6.2 %    Basophil % 0.4 0.0 - 1.5 %    Neutrophils, Absolute 3.90 1.70 - 7.00 10*3/mm3    Lymphocytes, Absolute 1.10 0.70 - 3.10 10*3/mm3    Monocytes, Absolute 0.40 0.10 - 0.90 10*3/mm3    Eosinophils, Absolute 0.10 0.00 - 0.40 10*3/mm3    Basophils, Absolute 0.00 0.00 - 0.20 10*3/mm3    nRBC 0.1 0.0 - 0.2 /100 WBC   ECG 12 Lead Chest Pain   Result Value Ref Range    QT Interval 392 ms    QTC Interval 448 ms   Green Top (Gel)   Result Value Ref Range    Extra Tube HOLD    Lavender Top   Result Value Ref Range    Extra Tube rack    Gold Top - SST   Result Value Ref Range    Extra Tube Hold for add-ons.    Light Blue Top   Result Value Ref Range    Extra Tube Hold for add-ons.      XR Chest 1 View    Result Date: 2/23/2024  Impression: No acute cardiopulmonary process. Electronically Signed: Arsen Horta MD  2/23/2024 4:35 PM EST  Workstation ID: [id number]                                          Medical Decision Making  Patient presents with concerns of elevated blood pressure and some chest discomfort differential diagnose including acute coronary syndrome, dissection, pulmonary embolus, pneumonia,    Patient is no signs of DVT, pulmonary embolus felt to be unlikely.  She is not  having any ongoing back pain to suggest dissection.  She does appear fairly anxious and family does confirm the patient does have history of anxiety disorder.  She was ordered some Xanax for anxiety as well as hydralazine for blood pressure.  Blood pressure was improving.  She was resting more comfortably on reexamination.  Patient is agreeable plan of hospitalization for further monitoring and cardiac evaluation.    Problems Addressed:  Anxiety: complicated acute illness or injury  Chest pain, unspecified type: complicated acute illness or injury  Uncontrolled hypertension: complicated acute illness or injury    Amount and/or Complexity of Data Reviewed  Labs: ordered. Decision-making details documented in ED Course.     Details: CBC normal except for borderline thrombocytopenia, comprehensive metabolic panel essentially normal, high-sensitivity troponin in the indeterminate range  Radiology: ordered and independent interpretation performed.     Details: My independent interpretation of chest x-ray image no congestive failure or pneumonia  ECG/medicine tests: ordered and independent interpretation performed.     Details: My EKG interpretation sinus rhythm rate of 79, no acute ST or T wave abnormality, PAC    Risk  OTC drugs.  Prescription drug management.  Decision regarding hospitalization.        Final diagnoses:   Uncontrolled hypertension   Chest pain, unspecified type   Anxiety       ED Disposition  ED Disposition       ED Disposition   Decision to Admit    Condition   --    Comment   --               No follow-up provider specified.       Medication List      No changes were made to your prescriptions during this visit.            Jose Wynn MD  02/23/24 [id number]

## 2024-02-24 ENCOUNTER — READMISSION MANAGEMENT (OUTPATIENT)
Dept: CALL CENTER | Facility: HOSPITAL | Age: 79
End: 2024-02-24
Payer: MEDICARE

## 2024-02-24 VITALS
BODY MASS INDEX: 24.41 KG/M2 | WEIGHT: 137.8 LBS | OXYGEN SATURATION: 97 % | SYSTOLIC BLOOD PRESSURE: 173 MMHG | TEMPERATURE: 98.5 F | HEART RATE: 74 BPM | HEIGHT: 63 IN | DIASTOLIC BLOOD PRESSURE: 99 MMHG | RESPIRATION RATE: 18 BRPM

## 2024-02-24 LAB
ANION GAP SERPL CALCULATED.3IONS-SCNC: 13 MMOL/L (ref 5–15)
B PARAPERT DNA SPEC QL NAA+PROBE: NOT DETECTED
B PERT DNA SPEC QL NAA+PROBE: NOT DETECTED
BASOPHILS # BLD AUTO: 0 10*3/MM3 (ref 0–0.2)
BASOPHILS NFR BLD AUTO: 0.5 % (ref 0–1.5)
BUN SERPL-MCNC: 21 MG/DL (ref 8–23)
BUN/CREAT SERPL: 18.4 (ref 7–25)
C PNEUM DNA NPH QL NAA+NON-PROBE: NOT DETECTED
CALCIUM SPEC-SCNC: 8.9 MG/DL (ref 8.6–10.5)
CHLORIDE SERPL-SCNC: 105 MMOL/L (ref 98–107)
CO2 SERPL-SCNC: 23 MMOL/L (ref 22–29)
CREAT SERPL-MCNC: 1.14 MG/DL (ref 0.57–1)
DEPRECATED RDW RBC AUTO: 45.9 FL (ref 37–54)
EGFRCR SERPLBLD CKD-EPI 2021: 49.7 ML/MIN/1.73
EOSINOPHIL # BLD AUTO: 0.1 10*3/MM3 (ref 0–0.4)
EOSINOPHIL NFR BLD AUTO: 1.3 % (ref 0.3–6.2)
ERYTHROCYTE [DISTWIDTH] IN BLOOD BY AUTOMATED COUNT: 15.1 % (ref 12.3–15.4)
FLUAV SUBTYP SPEC NAA+PROBE: NOT DETECTED
FLUBV RNA ISLT QL NAA+PROBE: NOT DETECTED
GLUCOSE SERPL-MCNC: 101 MG/DL (ref 65–99)
HADV DNA SPEC NAA+PROBE: NOT DETECTED
HCOV 229E RNA SPEC QL NAA+PROBE: NOT DETECTED
HCOV HKU1 RNA SPEC QL NAA+PROBE: NOT DETECTED
HCOV NL63 RNA SPEC QL NAA+PROBE: NOT DETECTED
HCOV OC43 RNA SPEC QL NAA+PROBE: NOT DETECTED
HCT VFR BLD AUTO: 35.2 % (ref 34–46.6)
HGB BLD-MCNC: 11.7 G/DL (ref 12–15.9)
HMPV RNA NPH QL NAA+NON-PROBE: NOT DETECTED
HPIV1 RNA ISLT QL NAA+PROBE: NOT DETECTED
HPIV2 RNA SPEC QL NAA+PROBE: NOT DETECTED
HPIV3 RNA NPH QL NAA+PROBE: NOT DETECTED
HPIV4 P GENE NPH QL NAA+PROBE: NOT DETECTED
LYMPHOCYTES # BLD AUTO: 1.7 10*3/MM3 (ref 0.7–3.1)
LYMPHOCYTES NFR BLD AUTO: 27.2 % (ref 19.6–45.3)
M PNEUMO IGG SER IA-ACNC: NOT DETECTED
MCH RBC QN AUTO: 28.9 PG (ref 26.6–33)
MCHC RBC AUTO-ENTMCNC: 33.3 G/DL (ref 31.5–35.7)
MCV RBC AUTO: 86.8 FL (ref 79–97)
MONOCYTES # BLD AUTO: 0.6 10*3/MM3 (ref 0.1–0.9)
MONOCYTES NFR BLD AUTO: 10.4 % (ref 5–12)
NEUTROPHILS NFR BLD AUTO: 3.7 10*3/MM3 (ref 1.7–7)
NEUTROPHILS NFR BLD AUTO: 60.6 % (ref 42.7–76)
NRBC BLD AUTO-RTO: 0.1 /100 WBC (ref 0–0.2)
NT-PROBNP SERPL-MCNC: 1418 PG/ML (ref 0–1800)
PLATELET # BLD AUTO: 135 10*3/MM3 (ref 140–450)
PMV BLD AUTO: 9.5 FL (ref 6–12)
POTASSIUM SERPL-SCNC: 4.4 MMOL/L (ref 3.5–5.2)
QT INTERVAL: 392 MS
QTC INTERVAL: 448 MS
RBC # BLD AUTO: 4.06 10*6/MM3 (ref 3.77–5.28)
RHINOVIRUS RNA SPEC NAA+PROBE: NOT DETECTED
RSV RNA NPH QL NAA+NON-PROBE: NOT DETECTED
SARS-COV-2 RNA NPH QL NAA+NON-PROBE: NOT DETECTED
SODIUM SERPL-SCNC: 141 MMOL/L (ref 136–145)
WBC NRBC COR # BLD AUTO: 6.1 10*3/MM3 (ref 3.4–10.8)

## 2024-02-24 PROCEDURE — 85025 COMPLETE CBC W/AUTO DIFF WBC: CPT | Performed by: EMERGENCY MEDICINE

## 2024-02-24 PROCEDURE — 83880 ASSAY OF NATRIURETIC PEPTIDE: CPT | Performed by: NURSE PRACTITIONER

## 2024-02-24 PROCEDURE — 80048 BASIC METABOLIC PNL TOTAL CA: CPT | Performed by: EMERGENCY MEDICINE

## 2024-02-24 PROCEDURE — 94640 AIRWAY INHALATION TREATMENT: CPT

## 2024-02-24 PROCEDURE — 63710000001 TACROLIMUS PER 1 MG: Performed by: PHYSICIAN ASSISTANT

## 2024-02-24 PROCEDURE — 94761 N-INVAS EAR/PLS OXIMETRY MLT: CPT

## 2024-02-24 PROCEDURE — G0378 HOSPITAL OBSERVATION PER HR: HCPCS

## 2024-02-24 PROCEDURE — 63710000001 MYCOPHENOLATE PER 180 MG: Performed by: PHYSICIAN ASSISTANT

## 2024-02-24 PROCEDURE — 94799 UNLISTED PULMONARY SVC/PX: CPT

## 2024-02-24 PROCEDURE — 94664 DEMO&/EVAL PT USE INHALER: CPT

## 2024-02-24 PROCEDURE — 0202U NFCT DS 22 TRGT SARS-COV-2: CPT | Performed by: NURSE PRACTITIONER

## 2024-02-24 PROCEDURE — 99214 OFFICE O/P EST MOD 30 MIN: CPT | Performed by: INTERNAL MEDICINE

## 2024-02-24 RX ORDER — ASPIRIN 81 MG/1
81 TABLET ORAL DAILY
Status: DISCONTINUED | OUTPATIENT
Start: 2024-02-24 | End: 2024-02-24 | Stop reason: HOSPADM

## 2024-02-24 RX ORDER — MYCOPHENOLIC ACID 180 MG/1
360 TABLET, DELAYED RELEASE ORAL
Status: DISCONTINUED | OUTPATIENT
Start: 2024-02-24 | End: 2024-02-24

## 2024-02-24 RX ORDER — METOPROLOL SUCCINATE 50 MG/1
50 TABLET, EXTENDED RELEASE ORAL DAILY
Status: DISCONTINUED | OUTPATIENT
Start: 2024-02-24 | End: 2024-02-24 | Stop reason: HOSPADM

## 2024-02-24 RX ORDER — HYDROCODONE BITARTRATE AND ACETAMINOPHEN 5; 325 MG/1; MG/1
1 TABLET ORAL ONCE AS NEEDED
Status: DISCONTINUED | OUTPATIENT
Start: 2024-02-24 | End: 2024-02-24 | Stop reason: HOSPADM

## 2024-02-24 RX ORDER — CHLORTHALIDONE 25 MG/1
25 TABLET ORAL DAILY
Status: DISCONTINUED | OUTPATIENT
Start: 2024-02-24 | End: 2024-02-24

## 2024-02-24 RX ORDER — CHLORTHALIDONE 25 MG/1
12.5 TABLET ORAL DAILY
Qty: 15 TABLET | Refills: 0 | Status: SHIPPED | OUTPATIENT
Start: 2024-02-24 | End: 2024-03-25

## 2024-02-24 RX ORDER — CHLORTHALIDONE 25 MG/1
12.5 TABLET ORAL DAILY
Status: DISCONTINUED | OUTPATIENT
Start: 2024-02-24 | End: 2024-02-24 | Stop reason: HOSPADM

## 2024-02-24 RX ORDER — MYCOPHENOLIC ACID 180 MG/1
360 TABLET, DELAYED RELEASE ORAL EVERY 12 HOURS SCHEDULED
Status: DISCONTINUED | OUTPATIENT
Start: 2024-02-24 | End: 2024-02-24 | Stop reason: HOSPADM

## 2024-02-24 RX ORDER — TACROLIMUS 1 MG/1
1 CAPSULE ORAL EVERY 12 HOURS
Status: DISCONTINUED | OUTPATIENT
Start: 2024-02-24 | End: 2024-02-24 | Stop reason: HOSPADM

## 2024-02-24 RX ORDER — HYDROCODONE BITARTRATE AND ACETAMINOPHEN 5; 325 MG/1; MG/1
1 TABLET ORAL EVERY 6 HOURS PRN
Status: DISCONTINUED | OUTPATIENT
Start: 2024-02-24 | End: 2024-02-24

## 2024-02-24 RX ORDER — ALLOPURINOL 100 MG/1
200 TABLET ORAL 2 TIMES DAILY
Status: DISCONTINUED | OUTPATIENT
Start: 2024-02-24 | End: 2024-02-24

## 2024-02-24 RX ORDER — TRAMADOL HYDROCHLORIDE 50 MG/1
50 TABLET ORAL EVERY 6 HOURS PRN
Status: DISCONTINUED | OUTPATIENT
Start: 2024-02-24 | End: 2024-02-24 | Stop reason: HOSPADM

## 2024-02-24 RX ORDER — BUDESONIDE AND FORMOTEROL FUMARATE DIHYDRATE 160; 4.5 UG/1; UG/1
2 AEROSOL RESPIRATORY (INHALATION)
Status: DISCONTINUED | OUTPATIENT
Start: 2024-02-24 | End: 2024-02-24 | Stop reason: HOSPADM

## 2024-02-24 RX ADMIN — METOPROLOL SUCCINATE 100 MG: 50 TABLET, EXTENDED RELEASE ORAL at 00:35

## 2024-02-24 RX ADMIN — Medication 5 MG: at 00:12

## 2024-02-24 RX ADMIN — BUSPIRONE HYDROCHLORIDE 5 MG: 5 TABLET ORAL at 00:35

## 2024-02-24 RX ADMIN — MYCOPHENOLIC ACID 360 MG: 180 TABLET, DELAYED RELEASE ORAL at 11:18

## 2024-02-24 RX ADMIN — TACROLIMUS 1 MG: 1 CAPSULE ORAL at 11:18

## 2024-02-24 RX ADMIN — BUDESONIDE AND FORMOTEROL FUMARATE DIHYDRATE 2 PUFF: 160; 4.5 AEROSOL RESPIRATORY (INHALATION) at 07:39

## 2024-02-24 RX ADMIN — ASPIRIN 81 MG: 81 TABLET, COATED ORAL at 09:08

## 2024-02-24 RX ADMIN — ACETAMINOPHEN 650 MG: 325 TABLET, FILM COATED ORAL at 05:34

## 2024-02-24 RX ADMIN — TIOTROPIUM BROMIDE INHALATION SPRAY 2 PUFF: 3.12 SPRAY, METERED RESPIRATORY (INHALATION) at 07:35

## 2024-02-24 RX ADMIN — DICYCLOMINE HYDROCHLORIDE 10 MG: 10 CAPSULE ORAL at 09:08

## 2024-02-24 RX ADMIN — DICYCLOMINE HYDROCHLORIDE 10 MG: 10 CAPSULE ORAL at 00:34

## 2024-02-24 RX ADMIN — ACETAMINOPHEN 650 MG: 325 TABLET, FILM COATED ORAL at 00:12

## 2024-02-24 RX ADMIN — TRAMADOL HYDROCHLORIDE 50 MG: 50 TABLET, COATED ORAL at 09:08

## 2024-02-24 RX ADMIN — METOPROLOL SUCCINATE 50 MG: 50 TABLET, EXTENDED RELEASE ORAL at 09:08

## 2024-02-24 NOTE — CONSULTS
Cardiology Consult Note    Patient Identification:  Name: Christal Romo  Age: 79 y.o.  Sex: female  :  1945  MRN: [id number]             Requesting Physician : Domenico Mark    Reason for Consultation / Chief Complaint :   Chest pain, hypertension    History of Present Illness:    Ms. Christal Romo has PMH of    - Hypertension  - B12 deficiency  - Hyperlipidemia  - Insulin lessons  - Liver disease and transplant for Menard  - Multiple drug allergies/intolerance including amlodipine, Atacand, atenolol, azithromycin, ibuprofen, Aldactone, Levaquin, penicillin, Bactrim, sucralfate, triamterene hydrochlorothiazide, venlafaxine, lisinopril, Cardizem, clonidine, codeine, escitalopram, fluconazole, hydralazine, lactulose, Lipitor, Flagyl, Zyrtec, Paxil, Zoloft  - TSH  - Family history of heart disease in sister  - Former smoker    Here with complaint of vague chest pain and poorly controlled blood pressure.  Patient has been having high blood pressure in spite of taking increasing dose of blood pressure medications for the last 1 day stated with some chest pain and neck pain and headache.  Patient has a lot of misconceptions about medication thinks had blood pressure medications that are making her drowsy, patient is on Xanax and baclofen Phenergan which potentially could cause drowsiness.  Patient took a lot of crackers because she was nauseous.    Data:  Labs 2024: HS troponin 15.  Creatinine 1.14, EGFR 48 CBC with a hemoglobin of 11.7, platelet count 136  Chest x-ray reveals no acute cardiopulmonary abnormality  EKG done 2024 reviewed/interpreted by me reveals sinus rhythm with poor R wave progression    Review of data:  Lexiscan Cardiolite 2023 negative for ischemia EF 70%  Echocardiogram 2023 reveals hypertensive cardiovascular disease with normal LV systolic function mild concentric LVH, left atrial enlargement and diastolic dysfunction      Assessment:  :    Chest pain  Uncontrolled  hypertension  Hypertensive cardiovascular disease      Recommendations / Plan:        Telemetry to monitor rhythm-is revealing sinus rhythm.  Check BNP level-proBNP is normal at 1418.  Check respiratory viral panel  Patient unfortunately has multiple drug allergies and intolerances would recommend sending her to a tertiary care center like [facility] or allergy immunologist to desensitize her to medication to help her with her problem and also evaluate other symptoms which potentially are not related to drug intolerance.  Discussed with Domenico Mark nurse practitioner taking care of patient.  Patient recently had a stress test in August 2023 which was negative would not recommend any cardiac workup.  Spent extended period of time trying to explain patient about true allergies versus intolerances versus coincidence of symptoms.  Discussed about salt restriction including crackers having salt..           Diagnosis Plan   1. Uncontrolled hypertension        2. Chest pain, unspecified type        3. 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 - Dr Ritter [facility]; CBC/Prograf/CMP/LIPIDS/A1C    MAMMO     NEG = 2018/ 2021/ 2022    Meniere's disease     OAB     Osteoporosis     PUD      Past Surgical History:  Past Surgical History:   Procedure Laterality Date    BREAST LUMPECTOMY Left     Benign    CATARACT EXTRACTION WITH INTRAOCULAR LENS IMPLANT Left     COLONOSCOPY      Polyps = 2012/ 2015/ 2020, rech 2025   GSI    ERCP W/ PLASTIC STENT PLACEMENT  2014    Liver Stent    LIVER TRANSPLANTATION      [place] [facility]    SKIN CANCER EXCISION      Rt UE    TOTAL ABDOMINAL  HYSTERECTOMY  1979      Allergies:  Allergies   Allergen Reactions    Amlodipine Itching    Atacand  [Candesartan Cilexetil] Other (See Comments)    Atenolol Palpitations    Azithromycin Nausea Only and GI Intolerance    Ibuprofen Unknown (See Comments)    Levofloxacin Nausea Only and Myalgia    Penicillin G Hives    Spironolactone Nausea And Vomiting    Sucralfate Swelling    Sulfamethoxazole-Trimethoprim Other (See Comments) and Rash     THROMBOCYTOPENIA    Triamterene-Hctz Myalgia    Venlafaxine Headache           Contrast Dye (Echo Or Unknown Ct/Mr) Nausea And Vomiting    Lisinopril Nausea Only, Anxiety and Headache    Cardizem Cd [Diltiazem Hcl Er Coated Beads] Other (See Comments)     Chest pressure    Clonidine Derivatives GI Intolerance    Codeine Hallucinations    Escitalopram Unknown - High Severity and Headache    Fluconazole Nausea Only and Headache    Hydralazine Headache    Lactulose Unknown - High Severity and Itching    Lipitor [Atorvastatin] Myalgia    Metronidazole Unknown - High Severity    Polyoxyethylene 40 Sorbitol Septaoleate [Sorbitan] Unknown - High Severity    Whey Nausea And Vomiting    Zyrtec [Cetirizine] Itching    Citrus Other (See Comments)    Paroxetine Palpitations    Zoloft [Sertraline Hcl] Palpitations     Home Meds:  Medications Prior to Admission   Medication Sig Dispense Refill Last Dose    tacrolimus (PROGRAF) 1 MG capsule 1 po qam and 1 po qpm   2/22/2024    acetaminophen (TYLENOL) 500 MG tablet Take 1 tablet by mouth. prn       allopurinol 200 MG tablet Take 200 mg by mouth 2 (Two) Times a Day. 180 tablet 1     aspirin 81 MG tablet Take 1 tablet by mouth Daily.       baclofen (LIORESAL) 10 MG tablet Take 1 tablet by mouth At Night As Needed for Muscle Spasms. 30 tablet 3     busPIRone (BUSPAR) 5 MG tablet Take 1 tablet by mouth 3 (Three) Times a Day. 270 tablet 0     dicyclomine (BENTYL) 20 MG tablet Take 1 tablet by mouth Every 6 (Six) Hours As Needed (diarrhea).        diphenhydrAMINE (BENADRYL) 25 MG tablet Take 1 tablet by mouth Every 6 (Six) Hours As Needed for Itching.       fexofenadine (ALLEGRA) 180 MG tablet Take 1 tablet by mouth Daily.       fluconazole (Diflucan) 150 MG tablet Take 1 tablet by mouth As Needed (vaginitis x 1 dose per episode). 3 tablet 0     Fluticasone-Umeclidin-Vilant (Trelegy Ellipta) 100-62.5-25 MCG/ACT inhaler Inhale 1 puff Daily. 60 each 0     Lidocaine HCl-Benzyl Alcohol (Salonpas Lidocaine Plus) 4-10 % cream Apply  topically.       Lutein 20 MG capsule Take 1 tablet by mouth Daily.       meclizine (ANTIVERT) 12.5 MG tablet Take 1 tablet by mouth 3 (Three) Times a Day As Needed for Dizziness. 30 tablet 1     metoprolol succinate XL (Toprol XL) 100 MG 24 hr tablet Take 1 tablet by mouth Every Night. 90 tablet 3     metoprolol succinate XL (TOPROL-XL) 50 MG 24 hr tablet Take 1 tablet by mouth Every Morning. 90 tablet 3     Multiple Vitamins-Minerals (Multivitamin Adults 50+) tablet Take 1 tablet by mouth Daily.       mycophenolate (MYFORTIC) 360 MG tablet delayed-release EC tablet Take 1 tablet by mouth Every Morning. 120 tablet      nystatin (MYCOSTATIN) 100,000 unit/mL suspension Swish and swallow 5 mL 4 (Four) Times a Day. 200 mL 1     ondansetron (Zofran) 4 MG tablet Take 1 tablet by mouth Every 8 (Eight) Hours As Needed for Nausea or Vomiting. prn 30 tablet 1     pantoprazole (PROTONIX) 40 MG EC tablet TAKE 1 TABLET BY MOUTH DAILY 90 tablet 0     promethazine (PHENERGAN) 6.25 MG/5ML syrup 5- 10ml po q6hrs prn N/V 118 mL 0     traMADol (ULTRAM) 50 MG tablet TAKE ONE TABLET BY MOUTH EVERY 6 HOURS AS NEEDED FOR MODERATE OR SEVERE PAIN 30 tablet 0      Current Meds:     Current Facility-Administered Medications:     acetaminophen (TYLENOL) tablet 650 mg, 650 mg, Oral, Q4H PRN, Jose Wynn MD, 650 mg at 02/24/24 0534    allopurinol (ZYLOPRIM) tablet 400 mg, 400 mg, Oral, Nightly, Domenico Mark PA-C    aspirin EC tablet 81 mg, 81 mg,  Oral, Daily, Domenico Mark PA-C, 81 mg at 02/24/24 0908    baclofen (LIORESAL) tablet 10 mg, 10 mg, Oral, Nightly PRN, Jose Wynn MD    sennosides-docusate (PERICOLACE) 8.6-50 MG per tablet 2 tablet, 2 tablet, Oral, BID **AND** polyethylene glycol (MIRALAX) packet 17 g, 17 g, Oral, Daily PRN **AND** bisacodyl (DULCOLAX) EC tablet 5 mg, 5 mg, Oral, Daily PRN **AND** bisacodyl (DULCOLAX) suppository 10 mg, 10 mg, Rectal, Daily PRN, Jose Wynn MD    budesonide-formoterol (SYMBICORT) 160-4.5 MCG/ACT inhaler 2 puff, 2 puff, Inhalation, BID - RT, 2 puff at 02/24/24 0739 **AND** tiotropium (SPIRIVA RESPIMAT) 2.5 mcg/act aerosol solution inhaler, 2 puff, Inhalation, Daily - RT, Domenico Mark PA-C, 2 puff at 02/24/24 0735    busPIRone (BUSPAR) tablet 5 mg, 5 mg, Oral, TID, Jose Wynn MD, 5 mg at 02/24/24 0035    dicyclomine (BENTYL) capsule 10 mg, 10 mg, Oral, TID, Jose Wynn MD, 10 mg at 02/24/24 0908    HYDROcodone-acetaminophen (NORCO) 5-325 MG per tablet 1 tablet, 1 tablet, Oral, Once PRN, Domenico Mark PA-C    melatonin tablet 5 mg, 5 mg, Oral, Nightly PRN, Jose Wynn MD, 5 mg at 02/24/24 0012    metoprolol succinate XL (TOPROL-XL) 24 hr tablet 100 mg, 100 mg, Oral, Nightly, Jose Wynn MD, 100 mg at 02/24/24 0035    metoprolol succinate XL (TOPROL-XL) 24 hr tablet 50 mg, 50 mg, Oral, Daily, Domenico Mark PA-C, 50 mg at 02/24/24 0908    mycophenolate (MYFORTIC) EC tablet 360 mg, 360 mg, Oral, Q12H, Domenico Mark PA-C, 360 mg at 02/24/24 1118    nitroglycerin (NITROSTAT) SL tablet 0.4 mg, 0.4 mg, Sublingual, Q5 Min PRN, Jose Wynn MD    nitroglycerin (NITROSTAT) SL tablet 0.4 mg, 0.4 mg, Sublingual, Q5 Min PRN, Jose Wynn MD    nitroglycerin (NITROSTAT) SL tablet 0.4 mg, 0.4 mg, Sublingual, Q5 Min PRN, Jose Wynn MD    ondansetron (ZOFRAN) injection 4 mg, 4 mg, Intravenous, Q6H PRN, Jose Wynn MD    Pharmacy  "Message, 1 each, Does not apply, Once, Danitza Keith, APRN    promethazine (PHENERGAN) 6.25 MG/5ML syrup 6.25 mg, 6.25 mg, Oral, Q4H PRN, Jose Wynn MD    sodium chloride 0.9 % flush 10 mL, 10 mL, Intravenous, PRN, Jose Wynn MD    tacrolimus (PROGRAF) capsule 1 mg, 1 mg, Oral, Q12H, Domenico Mark PA-C, 1 mg at 24 1118    traMADol (ULTRAM) tablet 50 mg, 50 mg, Oral, Q6H PRN, Domenico Mark PA-C, 50 mg at 24 0908  Social History:   Social History     Tobacco Use    Smoking status: Former     Packs/day: 0.25     Years: 1.00     Additional pack years: 0.00     Total pack years: 0.25     Types: Cigarettes     Start date:      Quit date:      Years since quittin.1     Passive exposure: Past    Smokeless tobacco: Never    Tobacco comments:     Quit .    Substance Use Topics    Alcohol use: No     Comment: very rarely       Family History:  Family History   Problem Relation Age of Onset    Diabetes Sister         Type II    Heart disease Sister     Hypertension Sister     No Known Problems Brother     Hemochromatosis Other         Review of Systems : ROS       Constitutional:  Temp:  [97.7 °F (36.5 °C)-98.8 °F (37.1 °C)] 98.5 °F (36.9 °C)  Heart Rate:  [69-88] 74  Resp:  [15-18] 18  BP: (125-201)/(62-99) 173/99    Physical Exam   /99 (BP Location: Right arm, Patient Position: Lying)   Pulse 74   Temp 98.5 °F (36.9 °C) (Oral)   Resp 18   Ht 160 cm (63\")   Wt 62.5 kg (137 lb 12.8 oz)   SpO2 97%   BMI 24.41 kg/m²   Physical Exam  General:  Appears in no acute distress  Eyes: Sclerae are anicteric,  conjunctivae are clear   HEENT:  No JVD. Thyroid not visibly enlarged. No mucosal pallor or cyanosis  Respiratory: Respirations regular and unlabored at rest.  Bilaterally good breath sounds with good air entry in all fields. No crackles, rubs or wheezes auscultated  Cardiovascular: S1,S2 Regular rate and rhythm. No murmur, rub or gallop auscultated. No " pretibial pitting edema  Gastrointestinal: Abdomen soft, flat, nontender. Bowel sounds present.   Musculoskeletal:  No abnormal movements  Extremities: No digital clubbing or cyanosis  Skin: Color pink. Skin warm and dry to touch. No rashes  No xanthoma  Neuro: Alert and awake, no lateralizing deficits appreciated    Cardiographics  ECG: EKG tracing was  personally reviewed/interpreted by me  ECG 12 Lead Chest Pain   Final Result   HEART RATE= 79  bpm   RR Interval= 764  ms   ID Interval= 179  ms   P Horizontal Axis= 1  deg   P Front Axis= 81  deg   QRSD Interval= 79  ms   QT Interval= 392  ms   QTcB= 448  ms   QRS Axis= -31  deg   T Wave Axis= 39  deg   - OTHERWISE NORMAL ECG -   Sinus rhythm   Atrial premature complex   Low voltage, precordial leads   Anterior Q waves, possibly due to LVH   When compared with ECG of 18-Aug-2023 14:38:15,   Significant axis, voltage or hypertrophy change   Electronically Signed By: Jose Wynn (Javier) 24-Feb-2024 06:58:26   Date and Time of Study: 2024-02-23 15:49:49      ECG 12 Lead Chest Pain    (Results Pending)   ECG 12 Lead Chest Pain    (Results Pending)       Telemetry: Sinus rhythm    Echocardiogram:   Results for orders placed during the hospital encounter of 08/18/23    Adult Transthoracic Echo Complete W/ Cont if Necessary Per Protocol    Interpretation Summary    Left ventricular systolic function is normal. Calculated left ventricular EF = 63%    Left ventricular wall thickness is consistent with mild concentric hypertrophy.    Left ventricular diastolic function is consistent with (grade I) impaired relaxation.    The left atrial cavity is mildly dilated.    Estimated right ventricular systolic pressure from tricuspid regurgitation is normal (<35 mmHg).      Imaging  Chest X-ray:   Imaging Results (Last 24 Hours)       Procedure Component Value Units Date/Time    XR Chest 1 View [[id number]] Collected: 02/23/24 1631     Updated: 02/23/24 1637    Narrative:      XR  CHEST 1 VW    Date of Exam: 2/23/2024 4:26 PM EST    Indication: Chest Pain Protocol  Chest Pain Protocol    Comparison: 8/18/2023    Findings:  The cardiomediastinal silhouette is within normal limits. Lungs are clear. No focal consolidation, pneumothorax, or significant pleural effusion. Slight cortical irregularity the posterior right eighth rib likely sequela of prior trauma.      Impression:      Impression:  No acute cardiopulmonary process.            Electronically Signed: Arsen Horta MD    2/23/2024 4:35 PM EST    Workstation ID: [id number]            Lab Review: I have reviewed the labs  Results from last 7 days   Lab Units 02/23/24  1908 02/23/24  1612   HSTROP T ng/L 15* 16*         Results from last 7 days   Lab Units 02/24/24  0322   SODIUM mmol/L 141   POTASSIUM mmol/L 4.4   BUN mg/dL 21   CREATININE mg/dL 1.14*   CALCIUM mg/dL 8.9         Results from last 7 days   Lab Units 02/24/24  0322   PROBNP pg/mL 1,418.0     Results from last 7 days   Lab Units 02/24/24  0322 02/23/24  1612   WBC 10*3/mm3 6.10 5.60   HEMOGLOBIN g/dL 11.7* 12.5   HEMATOCRIT % 35.2 38.0   PLATELETS 10*3/mm3 135* 128*                 Michael Bonilla MD  2/24/2024, 12:16 EST      EMR Dragon/Transcription:   Dictated utilizing Dragon dictation

## 2024-02-24 NOTE — OUTREACH NOTE
Prep Survey      Flowsheet Row Responses   [facility] patient discharged from? [facility]   Is LACE score < 7 ? No   Eligibility [facility]   Date of Admission 02/23/24   Date of Discharge 02/24/24   Discharge Disposition Home or Self Care   Discharge diagnosis Hypertension with chest, neck pain and headache   Does the patient have one of the following disease processes/diagnoses(primary or secondary)? Other   Does the patient have Home health ordered? No   Is there a DME ordered? No   Prep survey completed? Yes            MAR ESPINOZA - Registered Nurse

## 2024-02-24 NOTE — DISCHARGE SUMMARY
"[facility]    Sara Zhao DO    CHIEF COMPLAINT:     Hypertension with chest, neck pain and headache    HISTORY OF PRESENT ILLNESS:    Obtained from admitting physician HPI on 2/23/2024:  79-year-old female states her blood pressures have been high over the last 1 day despite her taking the increased dose of her blood pressure medication.  States it is associated with some chest pain as well as some neck pain and headache.  She reports no abrupt headache or any focal numbness or weakness or blurry vision.  She reports no cough or shortness of breath or fevers or chills.    02/24/24:  Patient confirms the HPI noted above including approximately 1 week history of increasing weakness and fatigue with some sensation of a flushed feeling as well as pain in her neck along with pressure across the anterior portion of her chest which she relates to increase in her blood pressure.  Pain radiates from her chest to her back and she does report some associated dizziness.  Patient confirms compliance with her outpatient medical therapies and notes that she has been tried on other antihypertensives in the past but that she is \"very sensitive to medicine\" and has had untoward effects with several attempts at different medications.  She does have a history of liver transplantation currently on mycophenolate as well as Prograf which she is compliant with.  She is also recently had her allopurinol dose increased to 400 mg nightly.  No dyspnea, cough, fever, nausea vomiting or diaphoresis are reported.  She denies any focal neurologic complaints or sensory changes.            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 - Dr Ritter [facility]; CBC/Prograf/CMP/LIPIDS/A1C    MAMMO     NEG = 2022    Meniere's disease     OAB     Osteoporosis     PUD      Past Surgical History:   Procedure Laterality Date    BREAST LUMPECTOMY Left     Benign    CATARACT EXTRACTION WITH INTRAOCULAR LENS IMPLANT Left     COLONOSCOPY      Polyps = / / , rech    GSI    ERCP W/ PLASTIC STENT PLACEMENT      Liver Stent    LIVER TRANSPLANTATION      [place] [facility]    SKIN CANCER EXCISION      Rt UE    TOTAL ABDOMINAL HYSTERECTOMY  1979     Family History   Problem Relation Age of Onset    Diabetes Sister         Type II    Heart disease Sister     Hypertension Sister     No Known Problems Brother     Hemochromatosis Other      Social History     Tobacco Use    Smoking status: Former     Packs/day: 0.25     Years: 1.00     Additional pack years: 0.00     Total pack years: 0.25     Types: Cigarettes     Start date:      Quit date:      Years since quittin.1     Passive exposure: Past    Smokeless tobacco: Never    Tobacco comments:     Quit .    Vaping Use    Vaping Use: Never used   Substance Use Topics    Alcohol use: No     Comment: very rarely     Drug use: No     Medications Prior to Admission   Medication Sig Dispense Refill Last Dose    tacrolimus (PROGRAF) 1 MG capsule 1 po qam and 1 po qpm   2024    acetaminophen (TYLENOL) 500 MG tablet Take 1 tablet by mouth. prn       allopurinol 200 MG tablet Take 200 mg by mouth 2 (Two) Times a Day. 180 tablet 1     aspirin 81 MG tablet Take 1 tablet by mouth Daily.       baclofen (LIORESAL) 10 MG tablet Take 1 tablet by mouth At Night As Needed for Muscle Spasms. 30 tablet 3     busPIRone (BUSPAR) 5 MG tablet Take 1 tablet by mouth 3 (Three) Times a Day. 270 tablet 0     dicyclomine (BENTYL) 20 MG tablet Take 1 tablet by mouth Every 6 (Six) Hours As Needed (diarrhea).       diphenhydrAMINE (BENADRYL) 25 MG tablet Take 1 tablet by mouth Every 6 (Six) Hours As  Needed for Itching.       fexofenadine (ALLEGRA) 180 MG tablet Take 1 tablet by mouth Daily.       fluconazole (Diflucan) 150 MG tablet Take 1 tablet by mouth As Needed (vaginitis x 1 dose per episode). 3 tablet 0     Fluticasone-Umeclidin-Vilant (Trelegy Ellipta) 100-62.5-25 MCG/ACT inhaler Inhale 1 puff Daily. 60 each 0     Lidocaine HCl-Benzyl Alcohol (Salonpas Lidocaine Plus) 4-10 % cream Apply  topically.       Lutein 20 MG capsule Take 1 tablet by mouth Daily.       meclizine (ANTIVERT) 12.5 MG tablet Take 1 tablet by mouth 3 (Three) Times a Day As Needed for Dizziness. 30 tablet 1     metoprolol succinate XL (Toprol XL) 100 MG 24 hr tablet Take 1 tablet by mouth Every Night. 90 tablet 3     metoprolol succinate XL (TOPROL-XL) 50 MG 24 hr tablet Take 1 tablet by mouth Every Morning. 90 tablet 3     Multiple Vitamins-Minerals (Multivitamin Adults 50+) tablet Take 1 tablet by mouth Daily.       mycophenolate (MYFORTIC) 360 MG tablet delayed-release EC tablet Take 1 tablet by mouth Every Morning. 120 tablet      nystatin (MYCOSTATIN) 100,000 unit/mL suspension Swish and swallow 5 mL 4 (Four) Times a Day. 200 mL 1     ondansetron (Zofran) 4 MG tablet Take 1 tablet by mouth Every 8 (Eight) Hours As Needed for Nausea or Vomiting. prn 30 tablet 1     pantoprazole (PROTONIX) 40 MG EC tablet TAKE 1 TABLET BY MOUTH DAILY 90 tablet 0     promethazine (PHENERGAN) 6.25 MG/5ML syrup 5- 10ml po q6hrs prn N/V 118 mL 0     traMADol (ULTRAM) 50 MG tablet TAKE ONE TABLET BY MOUTH EVERY 6 HOURS AS NEEDED FOR MODERATE OR SEVERE PAIN 30 tablet 0      Allergies:  Amlodipine, Atacand  [candesartan cilexetil], Atenolol, Azithromycin, Ibuprofen, Levofloxacin, Penicillin g, Spironolactone, Sucralfate, Sulfamethoxazole-trimethoprim, Triamterene-hctz, Venlafaxine, Contrast dye (echo or unknown ct/mr), Lisinopril, Cardizem cd [diltiazem hcl er coated beads], Clonidine derivatives, Codeine, Escitalopram, Fluconazole, Hydralazine, Lactulose,  Lipitor [atorvastatin], Metronidazole, Polyoxyethylene 40 sorbitol septaoleate [sorbitan], Whey, Zyrtec [cetirizine], Citrus, Paroxetine, and Zoloft [sertraline hcl]    Immunization History   Administered Date(s) Administered    COVID-19 (PFIZER) Purple Cap Monovalent 08/17/2021    Fluad Quad 65+ 09/17/2020    Fluzone (or Fluarix & Flulaval for VFC) >6mos 04/20/2022    Fluzone High Dose =>65 Years ([facility] ONLY) 09/26/2012, 12/14/2013, 09/20/2015, 01/30/2017, 10/18/2017, 10/11/2018, 10/25/2019    Fluzone High-Dose 65+yrs 10/18/2021, 09/27/2022, 09/26/2023    H1N1 Inj 11/09/2009    Hepatitis A 04/02/2012, 08/09/2018    Influenza Quad Vaccine (Inpatient) 01/30/2017    Influenza TIV (IM) 11/01/2013, 10/15/2014, 09/18/2015    Pneumococcal Conjugate 13-Valent (PCV13) 09/01/2012, 09/20/2015, 04/20/2022    Pneumococcal Polysaccharide (PPSV23) 01/01/2012, 09/15/2013, 09/18/2015, 10/19/2020    Td, Unspecified 10/29/2017           REVIEW OF SYSTEMS:    Review of Systems   Constitutional: Positive for malaise/fatigue.   HENT: Negative.     Eyes: Negative.    Cardiovascular: Negative.    Respiratory: Negative.     Skin:  Positive for flushing.   Musculoskeletal: Negative.    Gastrointestinal: Negative.    Genitourinary: Negative.    Neurological: Negative.    Psychiatric/Behavioral: Negative.       Vital Signs  Temp:  [97.7 °F (36.5 °C)-98.8 °F (37.1 °C)] 98.5 °F (36.9 °C)  Heart Rate:  [69-88] 74  Resp:  [15-18] 18  BP: (125-201)/(62-99) 173/99          Physical Exam:  Physical Exam  Vitals reviewed.   Constitutional:       General: She is not in acute distress.     Appearance: Normal appearance. She is normal weight. She is not ill-appearing, toxic-appearing or diaphoretic.   HENT:      Head: Normocephalic.      Right Ear: External ear normal.      Left Ear: External ear normal.      Nose: Nose normal.      Mouth/Throat:      Mouth: Mucous membranes are moist.   Eyes:      Extraocular Movements: Extraocular movements intact.    Cardiovascular:      Rate and Rhythm: Normal rate and regular rhythm.      Pulses: Normal pulses.   Pulmonary:      Effort: Pulmonary effort is normal.      Breath sounds: Normal breath sounds.   Abdominal:      General: Bowel sounds are normal.      Palpations: Abdomen is soft.   Musculoskeletal:         General: Normal range of motion.      Cervical back: Normal range of motion.      Right lower leg: No edema.      Left lower leg: No edema.   Skin:     General: Skin is warm and dry.      Capillary Refill: Capillary refill takes less than 2 seconds.   Neurological:      General: No focal deficit present.      Mental Status: She is alert.   Psychiatric:         Mood and Affect: Mood normal.         Behavior: Behavior normal.         Thought Content: Thought content normal.         Judgment: Judgment normal.       Emotional Behavior:   Normal   Debilities:  None  Results Review:    I reviewed the patient's new clinical results.  Lab Results (most recent)       Procedure Component Value Units Date/Time    Respiratory Panel PCR w/COVID-19(SARS-CoV-2) ADILENE/LUCY/HIGINIO/PAD/COR/MARIA ALEJANDRA In-House, NP Swab in UTM/VTM, 2 HR TAT - Swab, Nasopharynx [[id number]] Collected: 02/24/24 0910    Specimen: Swab from Nasopharynx Updated: 02/24/24 0921    BNP [[id number]]  (Normal) Collected: 02/24/24 0322    Specimen: Blood from Arm, Right Updated: 02/24/24 0839     proBNP 1,418.0 pg/mL     Narrative:      This assay is used as an aid in the diagnosis of individuals suspected of having heart failure. It can be used as an aid in the diagnosis of acute decompensated heart failure (ADHF) in patients presenting with signs and symptoms of ADHF to the emergency department (ED). In addition, NT-proBNP of <300 pg/mL indicates ADHF is not likely.    Age Range Result Interpretation  NT-proBNP Concentration (pg/mL:      <50             Positive            >450                   Gray                 300-450                    Negative              <300    50-75           Positive            >900                  Gray                300-900                  Negative            <300      >75             Positive            >1800                  Gray                300-1800                  Negative            <300    Basic Metabolic Panel [[id number]]  (Abnormal) Collected: 02/24/24 0322    Specimen: Blood from Arm, Right Updated: 02/24/24 0509     Glucose 101 mg/dL      BUN 21 mg/dL      Creatinine 1.14 mg/dL      Sodium 141 mmol/L      Potassium 4.4 mmol/L      Chloride 105 mmol/L      CO2 23.0 mmol/L      Calcium 8.9 mg/dL      BUN/Creatinine Ratio 18.4     Anion Gap 13.0 mmol/L      eGFR 49.7 mL/min/1.73     Narrative:      GFR Normal >60  Chronic Kidney Disease <60  Kidney Failure <15    The GFR formula is only valid for adults with stable renal function between ages 18 and 70.    CBC & Differential [[id number]]  (Abnormal) Collected: 02/24/24 0322    Specimen: Blood from Arm, Right Updated: 02/24/24 0429    Narrative:      The following orders were created for panel order CBC & Differential.  Procedure                               Abnormality         Status                     ---------                               -----------         ------                     CBC Auto Differential[[id number]]        Abnormal            Final result                 Please view results for these tests on the individual orders.    CBC Auto Differential [[id number]]  (Abnormal) Collected: 02/24/24 0322    Specimen: Blood from Arm, Right Updated: 02/24/24 0429     WBC 6.10 10*3/mm3      RBC 4.06 10*6/mm3      Hemoglobin 11.7 g/dL      Hematocrit 35.2 %      MCV 86.8 fL      MCH 28.9 pg      MCHC 33.3 g/dL      RDW 15.1 %      RDW-SD 45.9 fl      MPV 9.5 fL      Platelets 135 10*3/mm3      Neutrophil % 60.6 %      Lymphocyte % 27.2 %      Monocyte % 10.4 %      Eosinophil % 1.3 %      Basophil % 0.5 %      Neutrophils, Absolute 3.70 10*3/mm3      Lymphocytes, Absolute 1.70  10*3/mm3      Monocytes, Absolute 0.60 10*3/mm3      Eosinophils, Absolute 0.10 10*3/mm3      Basophils, Absolute 0.00 10*3/mm3      nRBC 0.1 /100 WBC     High Sensitivity Troponin T 2Hr [[id number]]  (Abnormal) Collected: 02/23/24 1908    Specimen: Blood Updated: 02/23/24 1942     HS Troponin T 15 ng/L      Troponin T Delta -1 ng/L     Narrative:      High Sensitive Troponin T Reference Range:  <14.0 ng/L- Negative Female for AMI  <22.0 ng/L- Negative Male for AMI  >=14 - Abnormal Female indicating possible myocardial injury.  >=22 - Abnormal Male indicating possible myocardial injury.   Clinicians would have to utilize clinical acumen, EKG, Troponin, and serial changes to determine if it is an Acute Myocardial Infarction or myocardial injury due to an underlying chronic condition.         [place] Draw [[id number]] Collected: 02/23/24 1612    Specimen: Blood Updated: 02/23/24 1716    Narrative:      The following orders were created for panel order [place] Draw.  Procedure                               Abnormality         Status                     ---------                               -----------         ------                     Green Top (Gel)[[id number]]                                  Final result               Lavender Top[[id number]]                                     Final result               Gold Top - SST[[id number]]                                   Final result               Light Blue Top[[id number]]                                   Final result                 Please view results for these tests on the individual orders.    Gold Top - SST [[id number]] Collected: 02/23/24 1612    Specimen: Blood Updated: 02/23/24 1716     Extra Tube Hold for add-ons.     Comment: Auto resulted.       Light Blue Top [[id number]] Collected: 02/23/24 1612    Specimen: Blood Updated: 02/23/24 1716     Extra Tube Hold for add-ons.     Comment: Auto resulted       Green Top (Gel) [[id number]] Collected: 02/23/24 1612     Specimen: Blood Updated: 02/23/24 1702     Extra Tube HOLD    Comprehensive Metabolic Panel [[id number]]  (Abnormal) Collected: 02/23/24 1612    Specimen: Blood Updated: 02/23/24 1652     Glucose 114 mg/dL      BUN 19 mg/dL      Creatinine 0.99 mg/dL      Sodium 140 mmol/L      Potassium 4.3 mmol/L      Chloride 103 mmol/L      CO2 22.0 mmol/L      Calcium 9.4 mg/dL      Total Protein 6.8 g/dL      Albumin 4.8 g/dL      ALT (SGPT) 33 U/L      AST (SGOT) 33 U/L      Alkaline Phosphatase 80 U/L      Total Bilirubin 0.5 mg/dL      Globulin 2.0 gm/dL      A/G Ratio 2.4 g/dL      BUN/Creatinine Ratio 19.2     Anion Gap 15.0 mmol/L      eGFR 58.8 mL/min/1.73     Narrative:      GFR Normal >60  Chronic Kidney Disease <60  Kidney Failure <15    The GFR formula is only valid for adults with stable renal function between ages 18 and 70.    High Sensitivity Troponin T [[id number]]  (Abnormal) Collected: 02/23/24 1612    Specimen: Blood Updated: 02/23/24 1652     HS Troponin T 16 ng/L     Narrative:      High Sensitive Troponin T Reference Range:  <14.0 ng/L- Negative Female for AMI  <22.0 ng/L- Negative Male for AMI  >=14 - Abnormal Female indicating possible myocardial injury.  >=22 - Abnormal Male indicating possible myocardial injury.   Clinicians would have to utilize clinical acumen, EKG, Troponin, and serial changes to determine if it is an Acute Myocardial Infarction or myocardial injury due to an underlying chronic condition.         Lavender Top [[id number]] Collected: 02/23/24 1612    Specimen: Blood Updated: 02/23/24 1629     Extra Tube rack    CBC & Differential [[id number]]  (Abnormal) Collected: 02/23/24 1612    Specimen: Blood Updated: 02/23/24 1625    Narrative:      The following orders were created for panel order CBC & Differential.  Procedure                               Abnormality         Status                     ---------                               -----------         ------                     CBC  Auto Differential[[id number]]        Abnormal            Final result                 Please view results for these tests on the individual orders.    CBC Auto Differential [[id number]]  (Abnormal) Collected: 02/23/24 1612    Specimen: Blood Updated: 02/23/24 1625     WBC 5.60 10*3/mm3      RBC 4.45 10*6/mm3      Hemoglobin 12.5 g/dL      Hematocrit 38.0 %      MCV 85.3 fL      MCH 28.1 pg      MCHC 33.0 g/dL      RDW 14.8 %      RDW-SD 46.4 fl      MPV 9.3 fL      Platelets 128 10*3/mm3      Neutrophil % 69.9 %      Lymphocyte % 20.6 %      Monocyte % 7.9 %      Eosinophil % 1.2 %      Basophil % 0.4 %      Neutrophils, Absolute 3.90 10*3/mm3      Lymphocytes, Absolute 1.10 10*3/mm3      Monocytes, Absolute 0.40 10*3/mm3      Eosinophils, Absolute 0.10 10*3/mm3      Basophils, Absolute 0.00 10*3/mm3      nRBC 0.1 /100 WBC             Imaging Results (Most Recent)       Procedure Component Value Units Date/Time    XR Chest 1 View [[id number]] Collected: 02/23/24 1631     Updated: 02/23/24 1637    Narrative:      XR CHEST 1 VW    Date of Exam: 2/23/2024 4:26 PM EST    Indication: Chest Pain Protocol  Chest Pain Protocol    Comparison: 8/18/2023    Findings:  The cardiomediastinal silhouette is within normal limits. Lungs are clear. No focal consolidation, pneumothorax, or significant pleural effusion. Slight cortical irregularity the posterior right eighth rib likely sequela of prior trauma.      Impression:      Impression:  No acute cardiopulmonary process.            Electronically Signed: Arsen Horta MD    2/23/2024 4:35 PM EST    Workstation ID: [id number]          reviewed    ECG/EMG Results (most recent)       Procedure Component Value Units Date/Time    ECG 12 Lead Chest Pain [[id number]] Collected: 02/23/24 1549     Updated: 02/24/24 0658     QT Interval 392 ms      QTC Interval 448 ms     Narrative:      HEART RATE= 79  bpm  RR Interval= 764  ms  GA Interval= 179  ms  P Horizontal Axis= 1  deg  P Front Axis= 81   deg  QRSD Interval= 79  ms  QT Interval= 392  ms  QTcB= 448  ms  QRS Axis= -31  deg  T Wave Axis= 39  deg  - OTHERWISE NORMAL ECG -  Sinus rhythm  Atrial premature complex  Low voltage, precordial leads  Anterior Q waves, possibly due to LVH  When compared with ECG of 18-Aug-2023 14:38:15,  Significant axis, voltage or hypertrophy change  Electronically Signed By: Jose Wynn (Higinio) 24-Feb-2024 06:58:26  Date and Time of Study: 2024-02-23 15:49:49          reviewed    Results for orders placed during the hospital encounter of 07/05/21    Vascular screening (bundle) CAR    Interpretation Summary  · Normal screening vascular ultrasound study      Results for orders placed during the hospital encounter of 08/18/23    Adult Transthoracic Echo Complete W/ Cont if Necessary Per Protocol    Interpretation Summary    Left ventricular systolic function is normal. Calculated left ventricular EF = 63%    Left ventricular wall thickness is consistent with mild concentric hypertrophy.    Left ventricular diastolic function is consistent with (grade I) impaired relaxation.    The left atrial cavity is mildly dilated.    Estimated right ventricular systolic pressure from tricuspid regurgitation is normal (<35 mmHg).      Microbiology Results (last 10 days)       Procedure Component Value - Date/Time    Respiratory Panel PCR w/COVID-19(SARS-CoV-2) ADILENE/LUCY/HIGINIO/PAD/COR/MARIA ALEJANDRA In-House, NP Swab in UTM/VTM, 2 HR TAT - Swab, Nasopharynx [[id number]]  (Normal) Collected: 02/24/24 0910    Lab Status: Final result Specimen: Swab from Nasopharynx Updated: 02/24/24 1021     ADENOVIRUS, PCR Not Detected     Coronavirus 229E Not Detected     Coronavirus HKU1 Not Detected     Coronavirus NL63 Not Detected     Coronavirus OC43 Not Detected     COVID19 Not Detected     Human Metapneumovirus Not Detected     Human Rhinovirus/Enterovirus Not Detected     Influenza A PCR Not Detected     Influenza B PCR Not Detected     Parainfluenza Virus 1 Not  Detected     Parainfluenza Virus 2 Not Detected     Parainfluenza Virus 3 Not Detected     Parainfluenza Virus 4 Not Detected     RSV, PCR Not Detected     Bordetella pertussis pcr Not Detected     Bordetella parapertussis PCR Not Detected     Chlamydophila pneumoniae PCR Not Detected     Mycoplasma pneumo by PCR Not Detected    Narrative:      In the setting of a positive respiratory panel with a viral infection PLUS a negative procalcitonin without other underlying concern for bacterial infection, consider observing off antibiotics or discontinuation of antibiotics and continue supportive care. If the respiratory panel is positive for atypical bacterial infection (Bordetella pertussis, Chlamydophila pneumoniae, or Mycoplasma pneumoniae), consider antibiotic de-escalation to target atypical bacterial infection.            Assessment & Plan     Chest pain     Hypertensive urgency with chest pain  -BP in the ED max 201/93  Lab Results   Component Value Date    TROPONINT 15 (H) 02/23/2024    TROPONINT 16 (H) 02/23/2024    TROPONINT 14 (H) 08/19/2023   -proBNP: 1418.0  -Chest X-ray: No acute cardiopulmonary process  -EKG: Sinus rhythm at 79 with PAC but no obvious acute changes and a QTc of 448 ms  -In the ED pt given IV hydralazine as well as 324 mg aspirin and Xanax  -Review of record shows patient had stress testing in August 2023 which showed normal myocardial perfusion imaging without evidence of ischemia, echocardiogram at that time showed an EF of 63% with grade 1 diastolic dysfunction  -Continue metoprolol  -Cardiology consulted in ED who did add chlorthalidone and recommended referral to allergy/immunology for evaluation and possible desensitization therapy  -Telemetry    COPD  -Continue Trelegy    GERD  -PPI    Gout  -Allopurinol    History of liver transplantation  -Continue Prograf and Myfortic    I discussed the patients findings and my recommendations with patient and nursing staff.     Discharge  Diagnosis:      Chest pain      Hospital Course  Patient is a 79 y.o. female presented with chest pain and elevated blood pressure with an HPI noted above.  Blood pressure noted as high as 201/93 in the ED.  Serial troponins were assessed and found to be 16, 15 with a proBNP within normal limits at 1418.0.  Chest x-ray showed no acute cardiopulmonary process and EKG showed sinus rhythm at 79 with a PAC.  She was continued on telemetry without significant events reported.  Patient was given IV hydralazine in the ED as well as 324 mg aspirin and Xanax and her home metoprolol was resumed.  Blood pressure continued to improve though remained above goal with finding of 156/78 on the morning following admission.  Cardiology was consulted who discussed case with patient at some length.  Initiated chlorthalidone therapy and consultation with pharmacy and recommended referral to allergist/immunologist for further evaluation and possible desensitization therapy to assist in blood pressure control.  At this time patient is felt to be in good condition for discharge with close follow-up with her PCP as well as cardiology on an outpatient basis.  Her full testing/results and plan were discussed with patient along with concerning/alarm symptoms for which to call 911/return to the ED. ambulatory referral placed for allergy/immunology.  All questions were answered and she verbalizes her understanding and agreement.    Past Medical History:     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 - Dr Ritter [facility]; CBC/Prograf/CMP/LIPIDS/A1C    MAMMO     NEG = 2018/ 2021/ 2022    Meniere's disease     OAB     Osteoporosis     PUD        Past  Surgical History:     Past Surgical History:   Procedure Laterality Date    BREAST LUMPECTOMY Left     Benign    CATARACT EXTRACTION WITH INTRAOCULAR LENS IMPLANT Left     COLONOSCOPY      Polyps = 2015/ , rech    GSI    ERCP W/ PLASTIC STENT PLACEMENT      Liver Stent    LIVER TRANSPLANTATION      [place] [facility]    SKIN CANCER EXCISION      Rt UE    TOTAL ABDOMINAL HYSTERECTOMY         Social History:   Social History     Socioeconomic History    Marital status:    Tobacco Use    Smoking status: Former     Packs/day: 0.25     Years: 1.00     Additional pack years: 0.00     Total pack years: 0.25     Types: Cigarettes     Start date:      Quit date:      Years since quittin.1     Passive exposure: Past    Smokeless tobacco: Never    Tobacco comments:     Quit .    Vaping Use    Vaping Use: Never used   Substance and Sexual Activity    Alcohol use: No     Comment: very rarely     Drug use: No    Sexual activity: Defer       Procedures Performed         Consults:   Consults       Date and Time Order Name Status Description    2024 11:13 AM Inpatient Cardiology Consult      2024  9:22 PM Inpatient Cardiology Consult Completed             Condition on Discharge:     Stable    Discharge Disposition  Home or Self Care    Discharge Medications     Discharge Medications        New Medications        Instructions Start Date   chlorthalidone 25 MG tablet  Commonly known as: HYGROTON   12.5 mg, Oral, Daily             Continue These Medications        Instructions Start Date   acetaminophen 500 MG tablet  Commonly known as: TYLENOL   500 mg, Oral, prn      Allopurinol 200 MG tablet   200 mg, Oral, 2 Times Daily      aspirin 81 MG tablet   1 tablet, Oral, Daily      baclofen 10 MG tablet  Commonly known as: LIORESAL   10 mg, Oral, Nightly PRN      busPIRone 5 MG tablet  Commonly known as: BUSPAR   5 mg, Oral, 3 Times Daily      dicyclomine 20 MG tablet  Commonly known as: BENTYL    20 mg, Oral, Every 6 Hours PRN      diphenhydrAMINE 25 MG tablet  Commonly known as: BENADRYL   25 mg, Oral, Every 6 Hours PRN      fexofenadine 180 MG tablet  Commonly known as: ALLEGRA   180 mg, Oral, Daily      fluconazole 150 MG tablet  Commonly known as: Diflucan   150 mg, Oral, As Needed      Fluticasone-Umeclidin-Vilant 100-62.5-25 MCG/ACT inhaler  Commonly known as: Trelegy Ellipta   1 puff, Inhalation, Daily - RT      Lutein 20 MG capsule   1 tablet, Oral, Daily      meclizine 12.5 MG tablet  Commonly known as: ANTIVERT   12.5 mg, Oral, 3 Times Daily PRN      metoprolol succinate  MG 24 hr tablet  Commonly known as: Toprol XL   100 mg, Oral, Nightly      metoprolol succinate XL 50 MG 24 hr tablet  Commonly known as: TOPROL-XL   50 mg, Oral, Every Early Morning      Multivitamin Adults 50+ tablet tablet  Generic drug: multivitamin with minerals   1 tablet, Oral, Daily      mycophenolate 360 MG tablet delayed-release EC tablet  Commonly known as: MYFORTIC   360 mg, Oral, Every Early Morning      nystatin 100,000 unit/mL suspension  Commonly known as: MYCOSTATIN   500,000 Units, Swish & Swallow, 4 Times Daily      ondansetron 4 MG tablet  Commonly known as: Zofran   4 mg, Oral, Every 8 Hours PRN, prn      pantoprazole 40 MG EC tablet  Commonly known as: PROTONIX   40 mg, Oral, Daily      promethazine 6.25 MG/5ML syrup  Commonly known as: PHENERGAN   5- 10ml po q6hrs prn N/V      Salonpas Lidocaine Plus 4-10 % cream  Generic drug: Lidocaine HCl-Benzyl Alcohol   Apply externally      tacrolimus 1 MG capsule  Commonly known as: PROGRAF   1 po qam and 1 po qpm      traMADol 50 MG tablet  Commonly known as: ULTRAM   50 mg, Oral, Every 6 Hours PRN               Discharge Diet:     Activity at Discharge:     Follow-up Appointments  Future Appointments   Date Time Provider Department Center   2/28/2024  1:45 PM Domenico Clifton DO [facility]   3/28/2024  2:00 PM Sara Zhao DO [facility]   5/10/2024 11:30 AM  [facility] None     Additional Instructions for the Follow-ups that You Need to Schedule       Discharge Follow-up with PCP   As directed       Currently Documented PCP:    Sara Zhao DO    PCP Phone Number:    [phone]     Follow Up Details: 5-7 days        Discharge Follow-up with Specified Provider: cardiology   As directed      To: cardiology                Test Results Pending at Discharge         Risk for Readmission (LACE) Score: 7 (2/24/2024  6:00 AM)      Greater than 30 minutes spent in discharge activities for this patient    Signature:Electronically signed by Domenico Mark PA-C, 02/24/24, 1:52 PM EST.  ridley

## 2024-02-24 NOTE — PLAN OF CARE
Goal Outcome Evaluation:  Plan of Care Reviewed With: patient        Progress: improving  Outcome Evaluation: Patient alert and oriented. Patient on room air. Patient denies chest pain or SOB, c/o 6/10 headache. Patient was given ultram. Patient up adlib in room. Patient has resp panel pending. Cardiology consulted to see patient, have not rounded yet and patient is NPO.

## 2024-02-24 NOTE — PLAN OF CARE
Goal Outcome Evaluation:  Plan of Care Reviewed With: patient        Progress: improving  Cardiologist states patient is okay to discharge and follow-up. Patient was just seen in 8/2023 and does not recommend cardiac work up

## 2024-02-25 NOTE — SIGNIFICANT NOTE
Case Management Discharge Note      Final Note: (P) d/c home         Selected Continued Care - Discharged on 2/24/2024 Admission date: 2/23/2024 - Discharge disposition: Home or Self Care              Final Discharge Disposition Code: (P) 01 - home or self-care

## 2024-02-26 ENCOUNTER — TRANSITIONAL CARE MANAGEMENT TELEPHONE ENCOUNTER (OUTPATIENT)
Dept: CALL CENTER | Facility: HOSPITAL | Age: 79
End: 2024-02-26
Payer: MEDICARE

## 2024-02-26 NOTE — OUTREACH NOTE
Call Center TCM Note      Flowsheet Row Responses   [facility] patient discharged from? [facility]   Does the patient have one of the following disease processes/diagnoses(primary or secondary)? Other   TCM attempt successful? Yes  [No current VR]   Call start time 0947   Call end time 1002   Discharge diagnosis Hypertension with chest, neck pain and headache   Meds reviewed with patient/caregiver? Yes   Is the patient having any side effects they believe may be caused by any medication additions or changes? No   Does the patient have all medications ordered at discharge? Yes   Is the patient taking all medications as directed (includes completed medication regime)? Yes   Medication comments Will  after 4pm today as that is when it is ready.   Comments HOSP DC FU appt 3/1/24 4 pm.   Does the patient have an appointment with their PCP within 7-14 days of discharge? Yes   Has home health visited the patient within 72 hours of discharge? N/A   Psychosocial issues? No   Did the patient receive a copy of their discharge instructions? Yes   Nursing interventions Reviewed instructions with patient   What is the patient's perception of their health status since discharge? Improving   Is the patient/caregiver able to teach back signs and symptoms related to disease process for when to call PCP? Yes   Is the patient/caregiver able to teach back signs and symptoms related to disease process for when to call 911? Yes   Is the patient/caregiver able to teach back the hierarchy of who to call/visit for symptoms/problems? PCP, Specialist, Home health nurse, Urgent Care, ED, 911 Yes   TCM call completed? Yes   Wrap up additional comments Pt unhappy with care in ER. Pt is keeping track of BP on log and will take to to appt.   Call end time 1002            Syl Banegas RN    2/26/2024, 10:02 EST

## 2024-02-26 NOTE — PROGRESS NOTES
Cardiology Office Visit      Encounter Date:  02/28/2024    Patient ID:   Christal Romo is a 79 y.o. female.    Reason For Followup:  Valvular heart disease  Hypertension    Brief Clinical History:  Dear Sara Espino, DO    I had the pleasure of seeing Christal Romo today. As you are well aware, this is a 79 y.o. female with no known history of ischemic heart disease.  She does have a history of nonalcoholic steatohepatitis and liver cirrhosis and is status post liver transplant.  She has additional history that includes hypertension, palpitations, and aortic insufficiency. She presents today for follow-up on the above conditions.    Interval History:  She denies any shortness of breath.  She denies any PND orthopnea.  She denies any PND orthopnea.  She denies any syncope or near syncope.  She reports feeling in her usual cardiac state of health.    Her blood pressure is elevated today.  As I am sure you will recall, she has an intolerance to multiple medications including multiple antihypertensives.  This makes treating her hypertension difficult.  She reports that she was started on metoprolol and this causes her drowsiness so she takes it in the evening.  She was also placed on valsartan 160 mg daily.  She reports that she started taking this and became dizzy lightheaded and felt like she was going to pass out.  She completely stopped the medication.  She started back on a reduced dose at 40 mg daily and reports that she had a headache with this and stopped taking it.  She now takes a 40 mg dose as she feels like she needs it.    Her most recent echocardiogram performed in December 2020.  Normal LV systolic function was noted.  Mild MR, TR, AI and PI was noted.  This is unchanged from prior studies.    She underwent a heart and vascular screening in July 2021.  Her coronary calcium score was 23.2.  Her ABIs were normal.  No evidence of carotid or abdominal aortic disease was noted.    Her most recent  laboratory data is from April 2023.  White blood cell count is 6.9 hemoglobin 12.8 hematocrit 38.6 platelets 160,000.  Sodium 140 potassium 4.9 chloride 109 CO2 24 glucose 100 BUN 32 creatinine 1.4.  Total cholesterol 139 triglycerides 382 HDL 32 LDL 31.  AST 14 ALT 26 alkaline phosphatase 73.      02/28/2024        She was started on a new medication (chlorthalidone) and is having issues cutting it in half. She does report chest pressure when her blood pressure is elevated. She has a significant amount of anxiety and lot of her pressure elevations are driven by that. She has ankle pain. She was admitted with vague chest pain that was likely anxiety and blood pressure related in February of 2024. She was recommended to go to  for desensitization therapy.She is very nervous about driving and is likely not going to consider this.  She will call us back if she changes her mind.    Assessment & Plan    Impressions:  Hypertension     Significant situational component  Palpitations.  History of abnormal troponin secondary to mouse antigen interaction with troponin assay reagent.  Cirrhosis status post liver transplant 2016  Poor dentition.  Valvular heart disease     Mild MR, TR, AI, PI Echocardiogram December 2020  Hyperlipidemia with a preponderance of hypertriglyceridemia  Depression  Multiple medication sensitivities and intolerances    Recommendations:  Continuation of her current cardiovascular regimen at the present time.     This includes antiplatelet therapy, and antihypertensives.  Continue to log blood pressures and bring values in for next visit  Management of this patient's blood pressure is extremely difficult given her intolerances/sensitivities to medications.  Patient will consider going to the immunology clinic at  [facility] and see if there is a desensitization therapy that may work for her.  Follow-up in 6 months time.    Diagnoses and all orders for this visit:    1. Multiple drug allergies  "(Primary)    2. Mixed hyperlipidemia    3. Primary hypertension    4. Valvular heart disease            Objective:    Vitals:  Vitals:    02/28/24 1339   BP: (!) 185/82   Pulse: 80   SpO2: 98%   Weight: 64.4 kg (142 lb)   Height: 160 cm (63\")       Body mass index is 25.15 kg/m².      Physical Exam:    General: Alert, cooperative, no distress, appears stated age  Head:  Normocephalic, atraumatic, mucous membranes moist  Eyes:  Conjunctiva/corneas clear, EOM's intact     Neck:  Supple,  no bruit    Lungs: Clear to auscultation bilaterally, no wheezes rhonchi rales are noted  Chest wall: No tenderness  Heart::  Regular rate and rhythm, S1 and S2 normal, 1/6 holosystolic murmur.  No rub or gallop  Abdomen: Soft, non-tender, nondistended bowel sounds active  Extremities: No cyanosis, clubbing, or edema.  Ankle braces  Pulses: Pedal pulses nonpalpable because of ankle braces  Skin:  No rashes or lesions  Neuro/psych: A&O x3. CN II through XII are grossly intact with appropriate affect      Allergies:  Allergies   Allergen Reactions    Amlodipine Itching    Atacand  [Candesartan Cilexetil] Other (See Comments)    Atenolol Palpitations    Azithromycin Nausea Only and GI Intolerance    Ibuprofen Unknown (See Comments)    Levofloxacin Nausea Only and Myalgia    Penicillin G Hives    Spironolactone Nausea And Vomiting    Sucralfate Swelling    Sulfamethoxazole-Trimethoprim Other (See Comments) and Rash     THROMBOCYTOPENIA    Triamterene-Hctz Myalgia    Venlafaxine Headache           Contrast Dye (Echo Or Unknown Ct/Mr) Nausea And Vomiting    Lisinopril Nausea Only, Anxiety and Headache    Cardizem Cd [Diltiazem Hcl Er Coated Beads] Other (See Comments)     Chest pressure    Clonidine Derivatives GI Intolerance    Codeine Hallucinations    Escitalopram Unknown - High Severity and Headache    Fluconazole Nausea Only and Headache    Hydralazine Headache    Lactulose Unknown - High Severity and Itching    Lipitor [Atorvastatin] " Myalgia    Metronidazole Unknown - High Severity    Polyoxyethylene 40 Sorbitol Septaoleate [Sorbitan] Unknown - High Severity    Whey Nausea And Vomiting    Zyrtec [Cetirizine] Itching    Citrus Other (See Comments)    Paroxetine Palpitations    Zoloft [Sertraline Hcl] Palpitations       Medication Review:     Current Outpatient Medications:     acetaminophen (TYLENOL) 500 MG tablet, Take 1 tablet by mouth. prn, Disp: , Rfl:     allopurinol 200 MG tablet, Take 200 mg by mouth 2 (Two) Times a Day., Disp: 180 tablet, Rfl: 1    aspirin 81 MG tablet, Take 1 tablet by mouth Daily., Disp: , Rfl:     baclofen (LIORESAL) 10 MG tablet, Take 1 tablet by mouth At Night As Needed for Muscle Spasms., Disp: 30 tablet, Rfl: 3    busPIRone (BUSPAR) 5 MG tablet, Take 1 tablet by mouth 3 (Three) Times a Day., Disp: 270 tablet, Rfl: 0    chlorthalidone (HYGROTON) 25 MG tablet, Take 0.5 tablets by mouth Daily for 30 days., Disp: 15 tablet, Rfl: 0    dicyclomine (BENTYL) 20 MG tablet, Take 1 tablet by mouth Every 6 (Six) Hours As Needed (diarrhea)., Disp: , Rfl:     diphenhydrAMINE (BENADRYL) 25 MG tablet, Take 1 tablet by mouth Every 6 (Six) Hours As Needed for Itching., Disp: , Rfl:     fexofenadine (ALLEGRA) 180 MG tablet, Take 1 tablet by mouth Daily., Disp: , Rfl:     fluconazole (Diflucan) 150 MG tablet, Take 1 tablet by mouth As Needed (vaginitis x 1 dose per episode)., Disp: 3 tablet, Rfl: 0    Fluticasone-Umeclidin-Vilant (Trelegy Ellipta) 100-62.5-25 MCG/ACT inhaler, Inhale 1 puff Daily., Disp: 60 each, Rfl: 0    Lidocaine HCl-Benzyl Alcohol (Salonpas Lidocaine Plus) 4-10 % cream, Apply  topically., Disp: , Rfl:     Lutein 20 MG capsule, Take 1 tablet by mouth Daily., Disp: , Rfl:     meclizine (ANTIVERT) 12.5 MG tablet, Take 1 tablet by mouth 3 (Three) Times a Day As Needed for Dizziness., Disp: 30 tablet, Rfl: 1    metoprolol succinate XL (Toprol XL) 100 MG 24 hr tablet, Take 1 tablet by mouth Every Night., Disp: 90 tablet,  Rfl: 3    metoprolol succinate XL (TOPROL-XL) 50 MG 24 hr tablet, Take 1 tablet by mouth Every Morning., Disp: 90 tablet, Rfl: 3    Multiple Vitamins-Minerals (Multivitamin Adults 50+) tablet, Take 1 tablet by mouth Daily., Disp: , Rfl:     mycophenolate (MYFORTIC) 360 MG tablet delayed-release EC tablet, Take 1 tablet by mouth Every Morning., Disp: 120 tablet, Rfl:     nystatin (MYCOSTATIN) 100,000 unit/mL suspension, Swish and swallow 5 mL 4 (Four) Times a Day., Disp: 200 mL, Rfl: 1    ondansetron (Zofran) 4 MG tablet, Take 1 tablet by mouth Every 8 (Eight) Hours As Needed for Nausea or Vomiting. prn, Disp: 30 tablet, Rfl: 1    pantoprazole (PROTONIX) 40 MG EC tablet, TAKE 1 TABLET BY MOUTH DAILY, Disp: 90 tablet, Rfl: 0    promethazine (PHENERGAN) 6.25 MG/5ML syrup, 5- 10ml po q6hrs prn N/V, Disp: 118 mL, Rfl: 0    tacrolimus (PROGRAF) 1 MG capsule, 1 po qam and 1 po qpm, Disp: , Rfl:     traMADol (ULTRAM) 50 MG tablet, TAKE ONE TABLET BY MOUTH EVERY 6 HOURS AS NEEDED FOR MODERATE OR SEVERE PAIN, Disp: 30 tablet, Rfl: 0    Family History:  Family History   Problem Relation Age of Onset    Diabetes Sister         Type II    Heart disease Sister     Hypertension Sister     No Known Problems Brother     Hemochromatosis Other        Past Medical History:  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 - Dr Ritter [facility]; CBC/Prograf/CMP/LIPIDS/A1C    MAMMO     NEG = 2018/ 2021/ 2022    Meniere's disease     OAB     Osteoporosis     PUD        Past Surgical History:  Past Surgical History:   Procedure Laterality Date    BREAST LUMPECTOMY Left     Benign    CATARACT EXTRACTION WITH INTRAOCULAR LENS IMPLANT Left     COLONOSCOPY       Polyps = / / , rech    GSI    ERCP W/ PLASTIC STENT PLACEMENT  2014    Liver Stent    LIVER TRANSPLANTATION      [place] [facility]    SKIN CANCER EXCISION      Rt UE    TOTAL ABDOMINAL HYSTERECTOMY         Social History:  Social History     Socioeconomic History    Marital status:    Tobacco Use    Smoking status: Former     Packs/day: 0.25     Years: 1.00     Additional pack years: 0.00     Total pack years: 0.25     Types: Cigarettes     Start date:      Quit date:      Years since quittin.2     Passive exposure: Past    Smokeless tobacco: Never    Tobacco comments:     Quit .    Vaping Use    Vaping Use: Never used   Substance and Sexual Activity    Alcohol use: No     Comment: very rarely     Drug use: No    Sexual activity: Defer       Review of Systems:  The following systems were reviewed as they relate to the cardiovascular system: Constitutional, Eyes, ENT, Cardiovascular, Respiratory, Gastrointestinal, Integumentary, Neurological, Psychiatric, Hematologic, Endocrine, Musculoskeletal, and Genitourinary. The pertinent cardiovascular findings are reported above with all other cardiovascular points within those systems being negative.    Diagnostic Study Review:     Current Electrocardiogram:  Procedures no new EKG.  EKG dated 2024 demonstrates sinus rhythm with a ventricular rate of 79 bpm.    Laboratory Data:  Lab Results   Component Value Date    GLUCOSE 101 (H) 2024    BUN 21 2024    CREATININE 1.14 (H) 2024    EGFRIFNONA 31 (L) 2020    BCR 18.4 2024    K 4.4 2024    CO2 23.0 2024    CALCIUM 8.9 2024    ALBUMIN 4.8 2024    AST 33 (H) 2024    ALT 33 2024     Lab Results   Component Value Date    GLUCOSE 101 (H) 2024    CALCIUM 8.9 2024     2024    K 4.4 2024    CO2 23.0 2024     2024    BUN 21 2024    CREATININE 1.14 (H) 2024     "EGFRIFNONA 31 (L) 04/20/2020    BCR 18.4 02/24/2024    ANIONGAP 13.0 02/24/2024     Lab Results   Component Value Date    WBC 6.10 02/24/2024    HGB 11.7 (L) 02/24/2024    HCT 35.2 02/24/2024    MCV 86.8 02/24/2024     (L) 02/24/2024     No results found for: \"CHOL\", \"CHLPL\", \"TRIG\", \"HDL\", \"LDL\", \"LDLDIRECT\"  Lab Results   Component Value Date    HGBA1C 5.2 09/20/2021     No results found for: \"INR\", \"PROTIME\"    Most Recent Echo:  Results for orders placed during the hospital encounter of 08/18/23    Adult Transthoracic Echo Complete W/ Cont if Necessary Per Protocol    Interpretation Summary    Left ventricular systolic function is normal. Calculated left ventricular EF = 63%    Left ventricular wall thickness is consistent with mild concentric hypertrophy.    Left ventricular diastolic function is consistent with (grade I) impaired relaxation.    The left atrial cavity is mildly dilated.    Estimated right ventricular systolic pressure from tricuspid regurgitation is normal (<35 mmHg).       Most Recent Stress Test:  Results for orders placed during the hospital encounter of 08/18/23    Stress Test With Myocardial Perfusion One Day    Interpretation Summary    Myocardial perfusion imaging indicates a normal myocardial perfusion study with no evidence of ischemia.    Left ventricular ejection fraction is normal (Calculated EF = 70%).    Impressions are consistent with a low risk study.       Most Recent Cardiac Catheterization:   No results found for this or any previous visit.       NOTE:     Today,02/28/2024 ,the following portions of the patient's note were reviewed, confirmed and/or updated as appropriate: History of present illness/Interval history, physical examination, assessment & plan, allergies, current medications, past family history, past medical history, past social history, past surgical history and problem list.    Labs pertinent to today's visit on 02/28/2024 (including but not limited to " "CBC, CMP, and lipid profiles) were requested from the patient's primary care provider/hospital/clinical laboratory.  If the labs were available for the visit, they were reviewed with the patient.  If they were not available, when received, special interest will be made to the labs pertinent to this visit.  The patient's most recent \"in-house\" labs are noted below and have been reviewed.  Outside labs pertinent to this visit are scanned into the record and have been reviewed.    Discussions held today, 02/28/2024,regarding procedures included risk, benefits, and options including but not limited to: Death, MI, stroke, pain, bleeding, infection, and possible need for vascular/thoracic/cardiothoracic surgery.    Copied information within this note was reviewed and is current as of 02/28/2024.    Assessment and plan noted herein represents the current plan of care as of 02/28/2024.    Significant resources from our office and staff are inherent in engaging this patient today,02/28/2024,and in a continuous and active collaborative plan of care related to their chronic cardiovascular conditions outlined below.  The management of these conditions requires the direction of our service with specialized clinical knowledge, skills, and experience.  This collaborative care includes but is not limited to patient education, expectations and responsibilities, shared decision making around therapeutic goals, and shared commitments to achieve those goals.  "

## 2024-02-28 ENCOUNTER — OFFICE VISIT (OUTPATIENT)
Dept: CARDIOLOGY | Facility: CLINIC | Age: 79
End: 2024-02-28
Payer: MEDICARE

## 2024-02-28 VITALS
HEIGHT: 63 IN | WEIGHT: 142 LBS | BODY MASS INDEX: 25.16 KG/M2 | DIASTOLIC BLOOD PRESSURE: 82 MMHG | HEART RATE: 80 BPM | SYSTOLIC BLOOD PRESSURE: 185 MMHG | OXYGEN SATURATION: 98 %

## 2024-02-28 DIAGNOSIS — E78.2 MIXED HYPERLIPIDEMIA: ICD-10-CM

## 2024-02-28 DIAGNOSIS — Z88.9 MULTIPLE DRUG ALLERGIES: Primary | ICD-10-CM

## 2024-02-28 DIAGNOSIS — I38 VALVULAR HEART DISEASE: ICD-10-CM

## 2024-02-28 DIAGNOSIS — I10 PRIMARY HYPERTENSION: ICD-10-CM

## 2024-02-28 PROCEDURE — 3077F SYST BP >= 140 MM HG: CPT | Performed by: INTERNAL MEDICINE

## 2024-02-28 PROCEDURE — 1159F MED LIST DOCD IN RCRD: CPT | Performed by: INTERNAL MEDICINE

## 2024-02-28 PROCEDURE — 1160F RVW MEDS BY RX/DR IN RCRD: CPT | Performed by: INTERNAL MEDICINE

## 2024-02-28 PROCEDURE — G2211 COMPLEX E/M VISIT ADD ON: HCPCS | Performed by: INTERNAL MEDICINE

## 2024-02-28 PROCEDURE — 99214 OFFICE O/P EST MOD 30 MIN: CPT | Performed by: INTERNAL MEDICINE

## 2024-02-28 PROCEDURE — 3079F DIAST BP 80-89 MM HG: CPT | Performed by: INTERNAL MEDICINE

## 2024-03-05 ENCOUNTER — OFFICE VISIT (OUTPATIENT)
Dept: FAMILY MEDICINE CLINIC | Facility: CLINIC | Age: 79
End: 2024-03-05
Payer: MEDICARE

## 2024-03-05 ENCOUNTER — READMISSION MANAGEMENT (OUTPATIENT)
Dept: CALL CENTER | Facility: HOSPITAL | Age: 79
End: 2024-03-05
Payer: MEDICARE

## 2024-03-05 VITALS
HEIGHT: 63 IN | DIASTOLIC BLOOD PRESSURE: 60 MMHG | WEIGHT: 143 LBS | BODY MASS INDEX: 25.34 KG/M2 | OXYGEN SATURATION: 98 % | TEMPERATURE: 98 F | RESPIRATION RATE: 18 BRPM | SYSTOLIC BLOOD PRESSURE: 204 MMHG | HEART RATE: 74 BPM

## 2024-03-05 DIAGNOSIS — G57.93 UNSPECIFIED MONONEUROPATHY OF BILATERAL LOWER LIMBS: ICD-10-CM

## 2024-03-05 DIAGNOSIS — M79.2 FOOT NEURALGIA: ICD-10-CM

## 2024-03-05 DIAGNOSIS — Z88.9 MULTIPLE DRUG ALLERGIES: ICD-10-CM

## 2024-03-05 DIAGNOSIS — M79.673 PAIN OF FOOT, UNSPECIFIED LATERALITY: ICD-10-CM

## 2024-03-05 DIAGNOSIS — E79.0 ELEVATED URIC ACID IN BLOOD: ICD-10-CM

## 2024-03-05 DIAGNOSIS — Z94.4 STATUS POST LIVER TRANSPLANTATION: ICD-10-CM

## 2024-03-05 DIAGNOSIS — R10.13 EPIGASTRIC ABDOMINAL PAIN: Primary | ICD-10-CM

## 2024-03-05 RX ORDER — GABAPENTIN 100 MG/1
100 CAPSULE ORAL NIGHTLY PRN
Qty: 30 CAPSULE | Refills: 0 | Status: SHIPPED | OUTPATIENT
Start: 2024-03-05

## 2024-03-05 RX ORDER — PREDNISONE 10 MG/1
TABLET ORAL
Qty: 18 TABLET | Refills: 0 | Status: SHIPPED | OUTPATIENT
Start: 2024-03-05

## 2024-03-05 RX ORDER — PANTOPRAZOLE SODIUM 40 MG/1
40 TABLET, DELAYED RELEASE ORAL 2 TIMES DAILY
Status: SHIPPED
Start: 2024-03-05

## 2024-03-05 NOTE — OUTREACH NOTE
Medical Week 2 Survey      Flowsheet Row Responses   [facility] discharged [facility]? [facility]   Does the patient have one of the following disease processes/diagnoses(primary or secondary)? Other   Week 2 attempt successful? No   Unsuccessful attempts Attempt 1  [will defer call as noted pt had just arrived to PCP appt]            VIRI RICHARDSON - Registered Nurse

## 2024-03-05 NOTE — PROGRESS NOTES
Subjective   Christal Romo is a 79 y.o. female.     Chief Complaint   Patient presents with    Transitional Care Management     Hospital follow up          Current Outpatient Medications:     acetaminophen (TYLENOL) 500 MG tablet, Take 1 tablet by mouth. prn, Disp: , Rfl:     aspirin 81 MG tablet, Take 1 tablet by mouth Daily., Disp: , Rfl:     baclofen (LIORESAL) 10 MG tablet, Take 1 tablet by mouth At Night As Needed for Muscle Spasms., Disp: 30 tablet, Rfl: 3    busPIRone (BUSPAR) 5 MG tablet, Take 1 tablet by mouth 3 (Three) Times a Day., Disp: 270 tablet, Rfl: 0    chlorthalidone (HYGROTON) 25 MG tablet, Take 0.5 tablets by mouth Daily for 30 days., Disp: 15 tablet, Rfl: 0    dicyclomine (BENTYL) 20 MG tablet, Take 1 tablet by mouth Every 6 (Six) Hours As Needed (diarrhea)., Disp: , Rfl:     diphenhydrAMINE (BENADRYL) 25 MG tablet, Take 1 tablet by mouth Every 6 (Six) Hours As Needed for Itching., Disp: , Rfl:     fexofenadine (ALLEGRA) 180 MG tablet, Take 1 tablet by mouth Daily., Disp: , Rfl:     fluconazole (Diflucan) 150 MG tablet, Take 1 tablet by mouth As Needed (vaginitis x 1 dose per episode)., Disp: 3 tablet, Rfl: 0    Fluticasone-Umeclidin-Vilant (Trelegy Ellipta) 100-62.5-25 MCG/ACT inhaler, Inhale 1 puff Daily., Disp: 60 each, Rfl: 0    Lidocaine HCl-Benzyl Alcohol (Salonpas Lidocaine Plus) 4-10 % cream, Apply  topically., Disp: , Rfl:     Lutein 20 MG capsule, Take 1 tablet by mouth Daily., Disp: , Rfl:     meclizine (ANTIVERT) 12.5 MG tablet, Take 1 tablet by mouth 3 (Three) Times a Day As Needed for Dizziness., Disp: 30 tablet, Rfl: 1    metoprolol succinate XL (Toprol XL) 100 MG 24 hr tablet, Take 1 tablet by mouth Every Night., Disp: 90 tablet, Rfl: 3    metoprolol succinate XL (TOPROL-XL) 50 MG 24 hr tablet, Take 1 tablet by mouth Every Morning., Disp: 90 tablet, Rfl: 3    Multiple Vitamins-Minerals (Multivitamin Adults 50+) tablet, Take 1 tablet by mouth Daily., Disp: , Rfl:     mycophenolate  (MYFORTIC) 360 MG tablet delayed-release EC tablet, Take 1 tablet by mouth Every Morning., Disp: 120 tablet, Rfl:     nystatin (MYCOSTATIN) 100,000 unit/mL suspension, Swish and swallow 5 mL 4 (Four) Times a Day., Disp: 200 mL, Rfl: 1    ondansetron (Zofran) 4 MG tablet, Take 1 tablet by mouth Every 8 (Eight) Hours As Needed for Nausea or Vomiting. prn, Disp: 30 tablet, Rfl: 1    pantoprazole (PROTONIX) 40 MG EC tablet, Take 1 tablet by mouth 2 (Two) Times a Day., Disp: , Rfl:     promethazine (PHENERGAN) 6.25 MG/5ML syrup, 5- 10ml po q6hrs prn N/V, Disp: 118 mL, Rfl: 0    tacrolimus (PROGRAF) 1 MG capsule, 1 po qam and 1 po qpm, Disp: , Rfl:     traMADol (ULTRAM) 50 MG tablet, TAKE ONE TABLET BY MOUTH EVERY 6 HOURS AS NEEDED FOR MODERATE OR SEVERE PAIN, Disp: 30 tablet, Rfl: 0    allopurinol (Zyloprim) 100 MG tablet, 1 po qday w/ 300mg =400mg qday, Disp: 90 tablet, Rfl: 1    allopurinol (Zyloprim) 300 MG tablet, 1 po qday w/ 100mg= 400mg, Disp: 90 tablet, Rfl: 1    gabapentin (NEURONTIN) 100 MG capsule, Take 1 capsule by mouth At Night As Needed (b/l burning foot pain)., Disp: 30 capsule, Rfl: 0    predniSONE (DELTASONE) 10 MG tablet, 3 po qam x 3 days then 2 po qam x 3 days then 1 po qam  x 3 days, Disp: 18 tablet, Rfl: 0    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 - Dr Ritter [facility]; CBC/Prograf/CMP/LIPIDS/A1C    MAMMO     NEG = 2018/ 2021/ 2022    Meniere's disease     OAB     Osteoporosis     PUD        Past Surgical History:   Procedure Laterality Date    BREAST LUMPECTOMY Left     Benign    CATARACT EXTRACTION WITH INTRAOCULAR LENS IMPLANT Left     COLONOSCOPY      Polyps = 2012/ 2015/ 2020, rech 2025   GSI    ERCP  W/ PLASTIC STENT PLACEMENT      Liver Stent    LIVER TRANSPLANTATION      [place] [facility]    SKIN CANCER EXCISION      Rt [facility]    TOTAL ABDOMINAL HYSTERECTOMY  1979       Family History   Problem Relation Age of Onset    Diabetes Sister         Type II    Heart disease Sister     Hypertension Sister     No Known Problems Brother     Hemochromatosis Other        Social History     Socioeconomic History    Marital status:    Tobacco Use    Smoking status: Former     Current packs/day: 0.00     Average packs/day: 0.3 packs/day for 1 year (0.3 ttl pk-yrs)     Types: Cigarettes     Start date:      Quit date:      Years since quittin.2     Passive exposure: Past    Smokeless tobacco: Never    Tobacco comments:     Quit .    Vaping Use    Vaping status: Never Used   Substance and Sexual Activity    Alcohol use: No     Comment: very rarely     Drug use: No    Sexual activity: Defer       History of Present Illness     The patient is a 79-year-old female who is here for follow-up from the hospital for CP/ HA/ Neck pain    She spent the night in the hospital from 2024 to 2024 for chest pain, headache, and neck pain. Her systolic blood pressure was 246 mmHg in the ambulance. She was not given any medication in the ambulance. She has not taken her blood pressure medication today because it makes her sleepy and her blood pressure is up. She cannot drive here if she is sleepy. She had 100 mg of blood pressure medication, but it is still high. She has a blood pressure today of 204/60 mmHg.    She has gastroesophageal reflux. She took Tums on the way here. She takes pantoprazole, but it is not improving her symptoms. She is barely eating anything. She does not eat meat anymore. She ate garlic chicken the other night and it has been bothering her ever since. She takes a nausea pill when she goes home. She takes  protonix, but it does not seem to be improving her symptoms. She took 2 omeprazole  today.    Her feet are awful. They are not swollen. She has gout pain and arthritis. Today, her left big toe is throbbing. She is taking allopurinol 200 mg at night since 01/29/2024. Her left great toe is bothering her today. It stings around where the nail meets the toe. She wears support  hose. She bought Aspercreme with lidocaine on 03/04/2024, but it did not seem to help last night.    Her triglycerides are high. She wonders if she needs to go back to the GI doctor because the Prograf and Myfortic causes nausea and diarrhea. The transplant clinic is managing it. She does not go back until 10/2024. The cardiologist wants her to go to immunology to find out what she is allergic to. She can not take a lot of medications because they make her sleepy or give her bad headaches. She has lost weight. She is watching what she eats.    She has tried gabapentin in the past, but it made her sleepy. She has never tried Lyrica. Her daughter-in-law has ordered an electric blood cream machine. She checks it all the time. The other night, her feet were hurting bad, so she took BuSpar 5 mg, which helped her sleep.    She has always had trouble with her bowel movements. She had gangrene when she was 8 years old. She has a big hole in her side where they took out her appendix. She has been sick all her life since then.    She has a family history of blood disorder.    She is allergic to Carafate.    She has a couple of packages of Diflucan. She has been urinating a lot. She has tried Azo before, but it makes her sick. She is not on a water pill. She was on prednisone when she first had the transplant. She is going to the foot doctor to get her toenails trimmed on 03/07/2024.    The following portions of the patient's history were reviewed and updated as appropriate: allergies, current medications, past family history, past medical history, past social history, past surgical history and problem list.    Review of Systems    Constitutional:  Positive for appetite change. Negative for activity change, fatigue, unexpected weight gain and unexpected weight loss.   Respiratory:  Negative for cough, chest tightness, shortness of breath and wheezing.    Cardiovascular:  Negative for chest pain, palpitations and leg swelling.   Gastrointestinal:  Positive for nausea and GERD. Negative for vomiting.   Genitourinary:  Positive for frequency. Negative for urgency and urinary incontinence.   Musculoskeletal:  Positive for arthralgias, gait problem and joint swelling. Negative for myalgias.   Neurological:  Positive for numbness. Negative for dizziness, facial asymmetry, speech difficulty, weakness, light-headedness, headache, memory problem and confusion.   Psychiatric/Behavioral:  Positive for sleep disturbance.        Vitals:    03/05/24 1555   BP: (!) 204/60   Pulse: 74   Resp: 18   Temp: 98 °F (36.7 °C)   SpO2: 98%       Objective   Physical Exam  Vitals and nursing note reviewed.   Constitutional:       General: She is not in acute distress.     Appearance: She is well-developed. She is not ill-appearing or toxic-appearing.   HENT:      Head: Normocephalic and atraumatic.   Cardiovascular:      Rate and Rhythm: Normal rate and regular rhythm.      Heart sounds: Normal heart sounds. No murmur heard.  Pulmonary:      Effort: Pulmonary effort is normal. No respiratory distress.      Breath sounds: Normal breath sounds. No wheezing, rhonchi or rales.   Abdominal:      General: Abdomen is flat. There is no distension.      Palpations: Abdomen is soft.      Tenderness: There is abdominal tenderness in the epigastric area.   Skin:     General: Skin is warm and dry.      Findings: No rash.   Neurological:      Mental Status: She is alert and oriented to person, place, and time.      Cranial Nerves: No cranial nerve deficit.   Psychiatric:         Attention and Perception: Attention normal.         Mood and Affect: Mood is anxious.         Speech:  Speech normal.         Behavior: Behavior normal. Behavior is cooperative.         Thought Content: Thought content normal.         Cognition and Memory: Cognition normal.         Judgment: Judgment normal.       Assessment & Plan   Diagnoses and all orders for this visit:    1. Epigastric abdominal pain (Primary)  -     Lipase; Future  -     Comprehensive Metabolic Panel    2. Foot neuralgia    3. Multiple drug allergies  -     Ambulatory Referral to Allergy    4. Status post liver transplantation  -     Ambulatory Referral to Allergy    5. Elevated uric acid in blood  -     Uric Acid; Future    6. Pain of foot, unspecified laterality  -     EMG & Nerve Conduction Test; Future    7. Unspecified mononeuropathy of bilateral lower limbs  -     EMG & Nerve Conduction Test; Future    Other orders  -     pantoprazole (PROTONIX) 40 MG EC tablet; Take 1 tablet by mouth 2 (Two) Times a Day.  -     predniSONE (DELTASONE) 10 MG tablet; 3 po qam x 3 days then 2 po qam x 3 days then 1 po qam  x 3 days  Dispense: 18 tablet; Refill: 0  -     gabapentin (NEURONTIN) 100 MG capsule; Take 1 capsule by mouth At Night As Needed (b/l burning foot pain).  Dispense: 30 capsule; Refill: 0    1. Gastroesophageal reflux.  - Her blood pressure today is 200/60 mmHg. She will continue Protonix twice a day.    2. Gout.  - Her uric acid in 01/2024 was 6.8. She was advised not to eat heavy things like gravies, red meat, and alcohol. I will check uric acid. I will start her on prednisone 10 mg every 3 days. I will refer her to allergy and immunology.    3. Hypertriglyceridemia.  - Her triglycerides could be due to her transplant drugs. I will check pancreas enzymes.    4. Left great toe pain.  - It is probably neuropathy. I will refer her to podiatry.    5. Neuropathy.  I will prescribe prednisone to be taken in the morning for 9 days. I will also prescribe gabapentin to be taken at night as needed for burning and foot pain. I will order an  EMG.    6. Health maintenance.  - Her blood pressure is elevated today. I will order follow-up labs for pancreas and uric acid.  - I will check her uric acid.    7. Bowel issues.  - I will try pantoprazole twice a day first.               Transcribed from ambient dictation for Sara Zhao DO by Carolynn Ventura.  03/05/24   18:34 EST    Patient or patient representative verbalized consent to the visit recording.  I have personally performed the services described in this document as transcribed by the above individual, and it is both accurate and complete.

## 2024-03-08 ENCOUNTER — CLINICAL SUPPORT (OUTPATIENT)
Dept: FAMILY MEDICINE CLINIC | Facility: CLINIC | Age: 79
End: 2024-03-08
Payer: MEDICARE

## 2024-03-08 DIAGNOSIS — E79.0 ELEVATED URIC ACID IN BLOOD: ICD-10-CM

## 2024-03-08 DIAGNOSIS — R10.13 EPIGASTRIC ABDOMINAL PAIN: ICD-10-CM

## 2024-03-08 PROCEDURE — 36415 COLL VENOUS BLD VENIPUNCTURE: CPT | Performed by: FAMILY MEDICINE

## 2024-03-08 PROCEDURE — 84550 ASSAY OF BLOOD/URIC ACID: CPT | Performed by: FAMILY MEDICINE

## 2024-03-08 PROCEDURE — 80053 COMPREHEN METABOLIC PANEL: CPT | Performed by: FAMILY MEDICINE

## 2024-03-08 PROCEDURE — 83690 ASSAY OF LIPASE: CPT | Performed by: FAMILY MEDICINE

## 2024-03-08 NOTE — PROGRESS NOTES
Venipuncture performed in L ARM by RT Rashawn  with good hemostasis. Patient tolerated well. 03/08/24 Linda Adam, RT

## 2024-03-09 LAB
ALBUMIN SERPL-MCNC: 4.2 G/DL (ref 3.5–5.2)
ALBUMIN/GLOB SERPL: 2.2 G/DL
ALP SERPL-CCNC: 76 U/L (ref 39–117)
ALT SERPL W P-5'-P-CCNC: 19 U/L (ref 1–33)
ANION GAP SERPL CALCULATED.3IONS-SCNC: 12.2 MMOL/L (ref 5–15)
AST SERPL-CCNC: 24 U/L (ref 1–32)
BILIRUB SERPL-MCNC: 0.4 MG/DL (ref 0–1.2)
BUN SERPL-MCNC: 25 MG/DL (ref 8–23)
BUN/CREAT SERPL: 18.5 (ref 7–25)
CALCIUM SPEC-SCNC: 8.5 MG/DL (ref 8.6–10.5)
CHLORIDE SERPL-SCNC: 106 MMOL/L (ref 98–107)
CO2 SERPL-SCNC: 21.8 MMOL/L (ref 22–29)
CREAT SERPL-MCNC: 1.35 MG/DL (ref 0.57–1)
EGFRCR SERPLBLD CKD-EPI 2021: 40.6 ML/MIN/1.73
GLOBULIN UR ELPH-MCNC: 1.9 GM/DL
GLUCOSE SERPL-MCNC: 128 MG/DL (ref 65–99)
LIPASE SERPL-CCNC: 15 U/L (ref 13–60)
POTASSIUM SERPL-SCNC: 4.6 MMOL/L (ref 3.5–5.2)
PROT SERPL-MCNC: 6.1 G/DL (ref 6–8.5)
SODIUM SERPL-SCNC: 140 MMOL/L (ref 136–145)
URATE SERPL-MCNC: 4.9 MG/DL (ref 2.4–5.7)

## 2024-03-11 RX ORDER — ALLOPURINOL 100 MG/1
TABLET ORAL
Qty: 90 TABLET | Refills: 1 | Status: SHIPPED | OUTPATIENT
Start: 2024-03-11

## 2024-03-11 RX ORDER — ALLOPURINOL 300 MG/1
TABLET ORAL
Qty: 90 TABLET | Refills: 1 | Status: SHIPPED | OUTPATIENT
Start: 2024-03-11

## 2024-03-11 NOTE — PROGRESS NOTES
Pt informed. States the allopurinol was $400+ for the 200mg tabs bid. It's less expensive if you could send in a 300mg with a 100mg, instead. - Johana

## 2024-04-02 RX ORDER — GABAPENTIN 100 MG/1
CAPSULE ORAL
Qty: 30 CAPSULE | Refills: 0 | Status: SHIPPED | OUTPATIENT
Start: 2024-04-02

## 2024-04-02 NOTE — TELEPHONE ENCOUNTER
Rx Refill Note  Requested Prescriptions     Pending Prescriptions Disp Refills    gabapentin (NEURONTIN) 100 MG capsule [Pharmacy Med Name: GABAPENTIN 100 MG CAPSULE] 30 capsule 0     Sig: TAKE 1 CAPSULE BY MOUTH EVERY NIGHT AT NEEDED      Last office visit with prescribing clinician: 3/5/2024   Last telemedicine visit with prescribing clinician: Visit date not found   Next office visit with prescribing clinician: 4/4/2024        LABS SCANNED (02/20/2024)                  Would you like a call back once the refill request has been completed: [] Yes [] No    If the office needs to give you a call back, can they leave a voicemail: [] Yes [] No    Linda Adam, RT  04/02/24, 08:52 EDT

## 2024-04-03 ENCOUNTER — TELEPHONE (OUTPATIENT)
Dept: FAMILY MEDICINE CLINIC | Facility: CLINIC | Age: 79
End: 2024-04-03

## 2024-04-03 NOTE — TELEPHONE ENCOUNTER
Caller: Christal Romo    Relationship to patient: Self    Best call back number: [phone]    Patient is needing:     PATIENT SAID SHE IS DIZZY AND IS NOT SURE SHE CAN MAKE IT IN FOR HER APPOINTMENT TOMORROW, 04/04/24    SHE IS WANTING TO SEE IF DR. PAZ WANTS HER TO KEEP THE APPOINTMENT OR IF SHE SHOULD RESCHEDULE    SHE SAID SHE DOES NOT HAVE ANYONE ABLE TO DRIVE HER TO THE APPOINTMENT AT THE MOMENT

## 2024-04-04 NOTE — TELEPHONE ENCOUNTER
Name: Christal Romo    Relationship: Self    Best Callback Number:     [phone]       HUB PROVIDED THE RELAY MESSAGE FROM THE OFFICE   PATIENT VOICED UNDERSTANDING AND HAS NO FURTHER QUESTIONS AT THIS TIME    ADDITIONAL INFORMATION: PATIENT STATES THAT SHE CAN'T TAKE MUCINEX, IT MAKES HER JITTERY.

## 2024-04-04 NOTE — TELEPHONE ENCOUNTER
Pt states she take a benadryl during day, and allegra at night. She will try to change the allegra to zyrtec or claritin. She is also doing the neti pot sinus rinses.

## 2024-04-04 NOTE — TELEPHONE ENCOUNTER
Caller: Christal Romo    Relationship: Self    Best call back number: [phone]    What medication are you requesting: SOMETHING FOR SYMPTOMS    What are your current symptoms: COUGH, DRAINAGE, HEAD CONGESTION, CHILLS (YESTERDAY), VERTIGO, DIZZY, NAUSEATED    How long have you been experiencing symptoms: TWO WEEKS    Have you had these symptoms before:    [x] Yes  [] No    Have you been treated for these symptoms before:   [x] Yes  [] No    If a prescription is needed, what is your preferred pharmacy and phone number: [facility] [id number] [place] [address] AT [address] [facility] [address] - [phone]  - [phone]      Additional notes: PATIENT STATED SHE IS TAKING HER MECLIZINE FOR THE VERTIGO AND DIZZINESS,    PATIENT STATED HER ALLERGIES ARE REALLY BAD RIGHT NOW.

## 2024-04-11 ENCOUNTER — OFFICE VISIT (OUTPATIENT)
Dept: FAMILY MEDICINE CLINIC | Facility: CLINIC | Age: 79
End: 2024-04-11
Payer: MEDICARE

## 2024-04-11 VITALS
DIASTOLIC BLOOD PRESSURE: 69 MMHG | TEMPERATURE: 97.5 F | HEART RATE: 69 BPM | SYSTOLIC BLOOD PRESSURE: 170 MMHG | HEIGHT: 63 IN | WEIGHT: 140 LBS | BODY MASS INDEX: 24.8 KG/M2 | RESPIRATION RATE: 18 BRPM | OXYGEN SATURATION: 97 %

## 2024-04-11 DIAGNOSIS — R19.7 DIARRHEA, UNSPECIFIED TYPE: Primary | ICD-10-CM

## 2024-04-11 DIAGNOSIS — E79.0 ELEVATED URIC ACID IN BLOOD: ICD-10-CM

## 2024-04-11 DIAGNOSIS — M54.2 CERVICALGIA: ICD-10-CM

## 2024-04-11 DIAGNOSIS — N28.9 RENAL INSUFFICIENCY: ICD-10-CM

## 2024-04-11 DIAGNOSIS — I10 PRIMARY HYPERTENSION: ICD-10-CM

## 2024-04-11 DIAGNOSIS — R10.84 GENERALIZED ABDOMINAL PAIN: ICD-10-CM

## 2024-04-11 PROCEDURE — 3078F DIAST BP <80 MM HG: CPT | Performed by: FAMILY MEDICINE

## 2024-04-11 PROCEDURE — 3077F SYST BP >= 140 MM HG: CPT | Performed by: FAMILY MEDICINE

## 2024-04-11 PROCEDURE — 1159F MED LIST DOCD IN RCRD: CPT | Performed by: FAMILY MEDICINE

## 2024-04-11 PROCEDURE — 99214 OFFICE O/P EST MOD 30 MIN: CPT | Performed by: FAMILY MEDICINE

## 2024-04-11 PROCEDURE — 1160F RVW MEDS BY RX/DR IN RCRD: CPT | Performed by: FAMILY MEDICINE

## 2024-04-11 RX ORDER — PANTOPRAZOLE SODIUM 40 MG/1
40 TABLET, DELAYED RELEASE ORAL 2 TIMES DAILY
Qty: 60 TABLET | Refills: 1 | Status: SHIPPED | OUTPATIENT
Start: 2024-04-11

## 2024-04-11 RX ORDER — ALLOPURINOL 300 MG/1
300 TABLET ORAL NIGHTLY
Qty: 90 TABLET | Refills: 1 | Status: SHIPPED | OUTPATIENT
Start: 2024-04-11

## 2024-04-11 RX ORDER — TRAMADOL HYDROCHLORIDE 50 MG/1
50 TABLET ORAL EVERY 6 HOURS PRN
Qty: 30 TABLET | Refills: 0 | Status: SHIPPED | OUTPATIENT
Start: 2024-04-11

## 2024-04-11 RX ORDER — MYCOPHENOLIC ACID 360 MG/1
360 TABLET, DELAYED RELEASE ORAL EVERY 12 HOURS SCHEDULED
Status: SHIPPED
Start: 2024-04-11

## 2024-04-11 RX ORDER — BACLOFEN 10 MG/1
10 TABLET ORAL NIGHTLY PRN
Qty: 30 TABLET | Refills: 3 | Status: SHIPPED | OUTPATIENT
Start: 2024-04-11

## 2024-04-11 RX ORDER — HYOSCYAMINE SULFATE 0.12 MG/1
TABLET SUBLINGUAL
Qty: 60 EACH | Refills: 0 | Status: SHIPPED | OUTPATIENT
Start: 2024-04-11

## 2024-04-11 NOTE — PROGRESS NOTES
Subjective   Christal Romo is a 79 y.o. female.     Chief Complaint   Patient presents with    Hypertension    Abdominal Cramping    Diarrhea         Current Outpatient Medications:     acetaminophen (TYLENOL) 500 MG tablet, Take 1 tablet by mouth. prn, Disp: , Rfl:     allopurinol (Zyloprim) 300 MG tablet, Take 1 tablet by mouth Every Night., Disp: 90 tablet, Rfl: 1    aspirin 81 MG tablet, Take 1 tablet by mouth Daily., Disp: , Rfl:     baclofen (LIORESAL) 10 MG tablet, Take 1 tablet by mouth At Night As Needed for Muscle Spasms., Disp: 30 tablet, Rfl: 3    busPIRone (BUSPAR) 5 MG tablet, Take 1 tablet by mouth 3 (Three) Times a Day., Disp: 270 tablet, Rfl: 0    chlorthalidone (HYGROTON) 25 MG tablet, Take 0.5 tablets by mouth Daily for 30 days., Disp: 15 tablet, Rfl: 0    diphenhydrAMINE (BENADRYL) 25 MG tablet, Take 1 tablet by mouth Every 6 (Six) Hours As Needed for Itching., Disp: , Rfl:     fexofenadine (ALLEGRA) 180 MG tablet, Take 1 tablet by mouth Daily., Disp: , Rfl:     fluconazole (Diflucan) 150 MG tablet, Take 1 tablet by mouth As Needed (vaginitis x 1 dose per episode)., Disp: 3 tablet, Rfl: 0    Fluticasone-Umeclidin-Vilant (Trelegy Ellipta) 100-62.5-25 MCG/ACT inhaler, Inhale 1 puff Daily., Disp: 60 each, Rfl: 0    gabapentin (NEURONTIN) 100 MG capsule, TAKE 1 CAPSULE BY MOUTH EVERY NIGHT AT NEEDED, Disp: 30 capsule, Rfl: 0    Lidocaine HCl-Benzyl Alcohol (Salonpas Lidocaine Plus) 4-10 % cream, Apply  topically., Disp: , Rfl:     Lutein 20 MG capsule, Take 1 tablet by mouth Daily., Disp: , Rfl:     meclizine (ANTIVERT) 12.5 MG tablet, Take 1 tablet by mouth 3 (Three) Times a Day As Needed for Dizziness., Disp: 30 tablet, Rfl: 1    metoprolol succinate XL (Toprol XL) 100 MG 24 hr tablet, Take 1 tablet by mouth Every Night., Disp: 90 tablet, Rfl: 3    metoprolol succinate XL (TOPROL-XL) 50 MG 24 hr tablet, Take 1 tablet by mouth Every Morning., Disp: 90 tablet, Rfl: 3    Multiple Vitamins-Minerals  (Multivitamin Adults 50+) tablet, Take 1 tablet by mouth Daily., Disp: , Rfl:     mycophenolate (MYFORTIC) 360 MG tablet delayed-release EC tablet, Take 1 tablet by mouth Every 12 (Twelve) Hours., Disp: , Rfl:     nystatin (MYCOSTATIN) 100,000 unit/mL suspension, Swish and swallow 5 mL 4 (Four) Times a Day., Disp: 200 mL, Rfl: 1    ondansetron (Zofran) 4 MG tablet, Take 1 tablet by mouth Every 8 (Eight) Hours As Needed for Nausea or Vomiting. prn, Disp: 30 tablet, Rfl: 1    pantoprazole (PROTONIX) 40 MG EC tablet, Take 1 tablet by mouth 2 (Two) Times a Day., Disp: 60 tablet, Rfl: 1    promethazine (PHENERGAN) 6.25 MG/5ML syrup, 5- 10ml po q6hrs prn N/V, Disp: 118 mL, Rfl: 0    tacrolimus (PROGRAF) 1 MG capsule, 1 po qam and 1 po qpm, Disp: , Rfl:     traMADol (ULTRAM) 50 MG tablet, Take 1 tablet by mouth Every 6 (Six) Hours As Needed for Moderate Pain or Severe Pain., Disp: 30 tablet, Rfl: 0    Hyoscyamine Sulfate SL (Levsin/SL) 0.125 MG sublingual tablet, 1-2 po q4hrs prn abd bloating and cramping, Disp: 60 each, Rfl: 0    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 - Dr Ritter [facility]; CBC/Prograf/CMP/LIPIDS/A1C    MAMMO     NEG = 2018/ 2021/ 2022    Meniere's disease     OAB     Osteoporosis     PUD        Past Surgical History:   Procedure Laterality Date    BREAST LUMPECTOMY Left     Benign    CATARACT EXTRACTION WITH INTRAOCULAR LENS IMPLANT Left     COLONOSCOPY      Polyps = 2012/ 2015/ 2020, rech 2025   GSI    ERCP W/ PLASTIC STENT PLACEMENT  2014    Liver Stent    [place] TRANSPL[place]ATION      [place] [facility]    SKIN CANCER EXCISION      Rt UE    TOTAL ABDOMINAL HYSTERECTOMY  1979       Family History   Problem Relation Age of  Onset    Diabetes Sister         Type II    Heart disease Sister     Hypertension Sister     No Known Problems Brother     Hemochromatosis Other        Social History     Socioeconomic History    Marital status:    Tobacco Use    Smoking status: Former     Current packs/day: 0.00     Average packs/day: 0.3 packs/day for 1 year (0.3 ttl pk-yrs)     Types: Cigarettes     Start date:      Quit date:      Years since quittin.3     Passive exposure: Past    Smokeless tobacco: Never    Tobacco comments:     Quit .    Vaping Use    Vaping status: Never Used   Substance and Sexual Activity    Alcohol use: No     Comment: very rarely     Drug use: No    Sexual activity: Defer       History of Present Illness   The patient is a 79-year-old female who is here for a 3-month follow-up.    She reports experiencing abdominal pain, cramping, and diarrhea, the cause of which remains undetermined. She recounts an incident of fecal incontinence while attempting to visit a [employer] bathroom at [employer], necessitating a change of panties and a diaper. She also experienced cramping for the remainder of the day. Despite attempts to alleviate her symptoms with over-the-counter Imodium, her symptoms persisted. She attempted to alleviate her symptoms with Benadryl and Zofran, which unfortunately did not alleviate her symptoms. She also reports feeling sleepy today, which she attributes to her allergies. She suspects that her nausea and diarrhea are a side effect of her liver medication, Taxotere and Myfortic, which she takes twice daily. She is scheduled for blood work in 2024. A previous colonoscopy revealed erythema in her stomach.    She requests an examination of her left ear due to impaired hearing, suspecting wax accumulation. She reports constant noise in her ear, which is the only source of pain. She also mentions the presence of insects at her residence, which she believes may be contributing to  her allergies. She has a history of allergies to trees and has previously received injections without relief.    She is currently taking pantoprazole twice daily for heartburn, which she reports as minimally effective. She is uncertain if increasing the dosage to twice daily could be beneficial.    She is currently taking allopurinol 400 mg at bedtime for elevated uric acid levels. She reports frequent illness and is unable to undergo a neuropathy test at [facility] due to severe foot pain, which was diagnosed as arthritis, gout, and neuropathy.    She reports experiencing fatigue today. She recounts an episode of illness that necessitated her administration of her anti-nausea medication.  She suspects her stomach to be erythematous, attributing it to her extensive medication regimen. She reports a weight loss of 5 pounds. She also mentions occasional consumption of yogurt with milk, which induces diarrhea. She attempted to procure Lactaid but was unsuccessful in procuring it. Her daughter-in-law suggested the use of Pedialyte during episodes of diarrhea. She attempted to alleviate the itching of her skin with topical deneen oil, which resulted in increased bowel movements. She consumed toast with a small amount of butter this morning, but reports no improvement in her symptoms.    There is a family history of inherited pancreatic disease on her father's family.    The following portions of the patient's history were reviewed and updated as appropriate: allergies, current medications, past family history, past medical history, past social history, past surgical history and problem list.    Review of Systems   Constitutional:  Positive for fatigue and unexpected weight loss. Negative for unexpected weight gain.   HENT:  Positive for hearing loss and tinnitus.    Gastrointestinal:  Positive for abdominal pain, diarrhea, nausea and GERD. Negative for vomiting.   Musculoskeletal:  Positive for arthralgias and gait problem.    Allergic/Immunologic: Positive for environmental allergies.   Neurological:  Positive for numbness.       Vitals:    04/11/24 1401   BP: 170/69   Pulse: 69   Resp: 18   Temp: 97.5 °F (36.4 °C)   SpO2: 97%       Objective   Physical Exam  Vitals and nursing note reviewed.   Constitutional:       General: She is not in acute distress.     Appearance: She is not ill-appearing or toxic-appearing.   HENT:      Head: Normocephalic and atraumatic.      Right Ear: Tympanic membrane, ear canal and external ear normal. A middle ear effusion is present. There is no impacted cerumen.      Left Ear: Tympanic membrane, ear canal and external ear normal. There is no impacted cerumen.   Cardiovascular:      Rate and Rhythm: Normal rate and regular rhythm.      Heart sounds: No murmur heard.  Pulmonary:      Effort: Pulmonary effort is normal. No respiratory distress.      Breath sounds: No stridor. No wheezing or rales.   Abdominal:      General: Bowel sounds are normal. There is no distension.      Palpations: Abdomen is soft. There is no mass.      Hernia: No hernia is present.   Neurological:      Mental Status: She is alert and oriented to person, place, and time.      Gait: Gait abnormal.   Psychiatric:         Attention and Perception: Attention and perception normal.         Mood and Affect: Mood and affect normal.         Speech: Speech normal.         Behavior: Behavior normal. Behavior is cooperative.         Thought Content: Thought content normal.         Cognition and Memory: Cognition and memory normal.         Judgment: Judgment normal.         BMI is within normal parameters. No other follow-up for BMI required.    Results Review   Laboratory Studies  Kidney function was 4.6 in March.    Assessment & Plan   Diagnoses and all orders for this visit:    1. Diarrhea, unspecified type (Primary)    2. Generalized abdominal pain    3. Primary hypertension    4. Elevated uric acid in blood  -     Uric Acid; Future    5.  Renal insufficiency  -     Comprehensive Metabolic Panel; Future    6. Cervicalgia  -     traMADol (ULTRAM) 50 MG tablet; Take 1 tablet by mouth Every 6 (Six) Hours As Needed for Moderate Pain or Severe Pain.  Dispense: 30 tablet; Refill: 0    Other orders  -     Hyoscyamine Sulfate SL (Levsin/SL) 0.125 MG sublingual tablet; 1-2 po q4hrs prn abd bloating and cramping  Dispense: 60 each; Refill: 0  -     baclofen (LIORESAL) 10 MG tablet; Take 1 tablet by mouth At Night As Needed for Muscle Spasms.  Dispense: 30 tablet; Refill: 3  -     pantoprazole (PROTONIX) 40 MG EC tablet; Take 1 tablet by mouth 2 (Two) Times a Day.  Dispense: 60 tablet; Refill: 1  -     allopurinol (Zyloprim) 300 MG tablet; Take 1 tablet by mouth Every Night.  Dispense: 90 tablet; Refill: 1  -     mycophenolate (MYFORTIC) 360 MG tablet delayed-release EC tablet; Take 1 tablet by mouth Every 12 (Twelve) Hours.    1. Abdominal cramping  A prescription for hyoscyamine sublingual, to be taken 1 to 2 tablets every 4 hours as needed for bloating and cramping, has been issued.    2. Hearing loss in the left ear  The tympanic membrane appears normal in color, however, the reflection could be slightly improved. The ear canal is clean.    3. Gastroesophageal reflux disease  A prescription refill for pantoprazole has been issued.    4. Hyperuricemia  Her allopurinol dosage has been reduced to 300 mg. A uric acid level and kidney function test will be ordered in 05/2024.    5. Neuropathy  Gabapentin 50 mg per 1 mL has been prescribed.    6. Diarrhea.  She was educated on the potential causes of gastrointestinal irritation, including stress, caffeine, tomato paste, oranges, bety, greasy foods, spicy foods, mint, and chocolate. She was advised to take her medication with food. She was also advised to monitor her intake of cheese, yogurt, and milk. The use of Lactaid was recommended. A prescription for dicyclomine was provided.      Transcribed from ambient  dictation for Sara Zhao DO by Flores Bernal.   04/11/24   16:13 EDT    Patient or patient representative verbalized consent to the visit recording.  I have personally performed the services described in this document as transcribed by the above individual, and it is both accurate and complete.

## 2024-04-30 ENCOUNTER — OFFICE VISIT (OUTPATIENT)
Dept: FAMILY MEDICINE CLINIC | Facility: CLINIC | Age: 79
End: 2024-04-30
Payer: MEDICARE

## 2024-04-30 VITALS
HEART RATE: 71 BPM | DIASTOLIC BLOOD PRESSURE: 76 MMHG | OXYGEN SATURATION: 97 % | BODY MASS INDEX: 24.63 KG/M2 | WEIGHT: 139 LBS | SYSTOLIC BLOOD PRESSURE: 172 MMHG | HEIGHT: 63 IN | TEMPERATURE: 97.8 F

## 2024-04-30 DIAGNOSIS — I10 ELEVATED BLOOD PRESSURE READING IN OFFICE WITH DIAGNOSIS OF HYPERTENSION: ICD-10-CM

## 2024-04-30 DIAGNOSIS — G89.29 CHRONIC MIDLINE LOW BACK PAIN WITH RIGHT-SIDED SCIATICA: Primary | ICD-10-CM

## 2024-04-30 DIAGNOSIS — M54.41 CHRONIC MIDLINE LOW BACK PAIN WITH RIGHT-SIDED SCIATICA: Primary | ICD-10-CM

## 2024-04-30 DIAGNOSIS — R30.0 DYSURIA: ICD-10-CM

## 2024-04-30 LAB
BILIRUB BLD-MCNC: NEGATIVE MG/DL
CLARITY, POC: CLEAR
COLOR UR: YELLOW
EXPIRATION DATE: ABNORMAL
GLUCOSE UR STRIP-MCNC: NEGATIVE MG/DL
KETONES UR QL: NEGATIVE
LEUKOCYTE EST, POC: NEGATIVE
Lab: ABNORMAL
NITRITE UR-MCNC: NEGATIVE MG/ML
PH UR: 6 [PH] (ref 5–8)
PROT UR STRIP-MCNC: ABNORMAL MG/DL
RBC # UR STRIP: NEGATIVE /UL
SP GR UR: 1.01 (ref 1–1.03)
UROBILINOGEN UR QL: NORMAL

## 2024-04-30 PROCEDURE — 3078F DIAST BP <80 MM HG: CPT | Performed by: FAMILY MEDICINE

## 2024-04-30 PROCEDURE — 81003 URINALYSIS AUTO W/O SCOPE: CPT | Performed by: FAMILY MEDICINE

## 2024-04-30 PROCEDURE — 99213 OFFICE O/P EST LOW 20 MIN: CPT | Performed by: FAMILY MEDICINE

## 2024-04-30 PROCEDURE — 3077F SYST BP >= 140 MM HG: CPT | Performed by: FAMILY MEDICINE

## 2024-04-30 RX ORDER — METOPROLOL SUCCINATE 100 MG/1
100 TABLET, EXTENDED RELEASE ORAL NIGHTLY
Qty: 90 TABLET | Refills: 1 | Status: SHIPPED | OUTPATIENT
Start: 2024-04-30

## 2024-04-30 RX ORDER — METOPROLOL SUCCINATE 50 MG/1
50 TABLET, EXTENDED RELEASE ORAL
Qty: 90 TABLET | Refills: 1 | Status: SHIPPED | OUTPATIENT
Start: 2024-04-30

## 2024-04-30 RX ORDER — METHYLPREDNISOLONE 4 MG/1
TABLET ORAL
Qty: 21 TABLET | Refills: 0 | Status: SHIPPED | OUTPATIENT
Start: 2024-04-30

## 2024-04-30 NOTE — PROGRESS NOTES
Subjective   Christal Romo is a 79 y.o. female.     Chief Complaint   Patient presents with    Back Pain    Difficulty Urinating     Symptoms started a week ago          Current Outpatient Medications:     acetaminophen (TYLENOL) 500 MG tablet, Take 1 tablet by mouth. prn, Disp: , Rfl:     allopurinol (Zyloprim) 300 MG tablet, Take 1 tablet by mouth Every Night., Disp: 90 tablet, Rfl: 1    aspirin 81 MG tablet, Take 1 tablet by mouth Daily., Disp: , Rfl:     baclofen (LIORESAL) 10 MG tablet, Take 1 tablet by mouth At Night As Needed for Muscle Spasms., Disp: 30 tablet, Rfl: 3    busPIRone (BUSPAR) 5 MG tablet, Take 1 tablet by mouth 3 (Three) Times a Day., Disp: 270 tablet, Rfl: 0    diphenhydrAMINE (BENADRYL) 25 MG tablet, Take 1 tablet by mouth Every 6 (Six) Hours As Needed for Itching., Disp: , Rfl:     fexofenadine (ALLEGRA) 180 MG tablet, Take 1 tablet by mouth Daily., Disp: , Rfl:     fluconazole (Diflucan) 150 MG tablet, Take 1 tablet by mouth As Needed (vaginitis x 1 dose per episode)., Disp: 3 tablet, Rfl: 0    Hyoscyamine Sulfate SL (Levsin/SL) 0.125 MG sublingual tablet, 1-2 po q4hrs prn abd bloating and cramping, Disp: 60 each, Rfl: 0    Lidocaine HCl-Benzyl Alcohol (Salonpas Lidocaine Plus) 4-10 % cream, Apply  topically., Disp: , Rfl:     Lutein 20 MG capsule, Take 1 tablet by mouth Daily., Disp: , Rfl:     meclizine (ANTIVERT) 12.5 MG tablet, Take 1 tablet by mouth 3 (Three) Times a Day As Needed for Dizziness., Disp: 30 tablet, Rfl: 1    metoprolol succinate XL (Toprol XL) 100 MG 24 hr tablet, Take 1 tablet by mouth Every Night., Disp: 90 tablet, Rfl: 1    metoprolol succinate XL (TOPROL-XL) 50 MG 24 hr tablet, Take 1 tablet by mouth Every Morning., Disp: 90 tablet, Rfl: 1    Multiple Vitamins-Minerals (Multivitamin Adults 50+) tablet, Take 1 tablet by mouth Daily., Disp: , Rfl:     mycophenolate (MYFORTIC) 360 MG tablet delayed-release EC tablet, Take 1 tablet by mouth Every 12 (Twelve) Hours.,  Disp: , Rfl:     nystatin (MYCOSTATIN) 100,000 unit/mL suspension, Swish and swallow 5 mL 4 (Four) Times a Day., Disp: 200 mL, Rfl: 1    ondansetron (Zofran) 4 MG tablet, Take 1 tablet by mouth Every 8 (Eight) Hours As Needed for Nausea or Vomiting. prn, Disp: 30 tablet, Rfl: 1    pantoprazole (PROTONIX) 40 MG EC tablet, Take 1 tablet by mouth 2 (Two) Times a Day., Disp: 60 tablet, Rfl: 1    promethazine (PHENERGAN) 6.25 MG/5ML syrup, 5- 10ml po q6hrs prn N/V, Disp: 118 mL, Rfl: 0    tacrolimus (PROGRAF) 1 MG capsule, 1 po qam and 1 po qpm, Disp: , Rfl:     traMADol (ULTRAM) 50 MG tablet, Take 1 tablet by mouth Every 6 (Six) Hours As Needed for Moderate Pain or Severe Pain., Disp: 30 tablet, Rfl: 0    gabapentin (NEURONTIN) 100 MG capsule, TAKE 1 CAPSULE BY MOUTH EVERY NIGHT AT NEEDED (Patient not taking: Reported on 4/30/2024), Disp: 30 capsule, Rfl: 0    methylPREDNISolone (MEDROL) 4 MG dose pack, Take as directed on package instructions., Disp: 21 tablet, Rfl: 0    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 - Dr Ritter [facility]; CBC/Prograf/CMP/LIPIDS/A1C    MAMMO     NEG = 2018/ 2021/ 2022    Meniere's disease     OAB     Osteoporosis     PUD        Past Surgical History:   Procedure Laterality Date    BREAST LUMPECTOMY Left     Benign    CATARACT EXTRACTION WITH INTRAOCULAR LENS IMPLANT Left     COLONOSCOPY      Polyps = 2012/ 2015/ 2020, rech 2025   GSI    ERCP W/ PLASTIC STENT PLACEMENT  2014    Liver Stent    [place] TRANSPL[place]ATION      [facility]    SKIN CANCER EXCISION      Rt UE    TOTAL ABDOMINAL HYSTERECTOMY  1979       Family History   Problem Relation Age of Onset    Diabetes Sister         Type II    Heart disease  Sister     Hypertension Sister     No Known Problems Brother     Hemochromatosis Other        Social History     Socioeconomic History    Marital status:    Tobacco Use    Smoking status: Former     Current packs/day: 0.00     Average packs/day: 0.3 packs/day for 1 year (0.3 ttl pk-yrs)     Types: Cigarettes     Start date:      Quit date:      Years since quittin.3     Passive exposure: Past    Smokeless tobacco: Never    Tobacco comments:     Quit .    Vaping Use    Vaping status: Never Used   Substance and Sexual Activity    Alcohol use: No     Comment: very rarely     Drug use: No    Sexual activity: Defer       History of Present Illness  The patient is a 77-year-old female who is here with complaints of back pain and urinary frequency with recent labs done at [facility]    The patient reports experiencing dysuria yesterday, however, the burning sensation has since subsided. She has abstained from coffee and oranges for the past 2 days.    The patient is experiencing severe pain in her hip and back, which radiates to her buttock and extends to her left shoulder blade. She denies any specific incident that could have precipitated this pain. She describes the pain as being severe in her right buttock. She attempted to alleviate the pain with tramadol and baclofen, which provided minimal relief. Heat application was unsuccessful, and she applied an ice pack last night, which allowed her to sleep well. She vomited twice yesterday, which she describes as bile-like. She suspects she may have passed a kidney stone due to the radiating pain. She does not recall any specific incident that could have exacerbated her sciatica.     She drove extensively last Thursday to find new shoes. She took both baclofen and tramadol simultaneously yesterday. She has never tried an oral steroid pack. She is unable to take ibuprofen. She received an injection in her right hip approximately 15 years ago by a pain  specialist at [facility]. She suspects that moving her bread spread from her bedroom may have contributed to her back pain. She uses Aspercreme with lidocaine for her feet. She uses a cane for mobility, but does not use a walker much in her house.    Supplemental Information  She is not taking chlorthalidone. She has Diflucan to keep on hand for yeast infection. She was taken off the Trelegy inhaler about 6 months ago. She is not taking gabapentin because she is trying to read about it and see if it is safe to take because she has never had it. She had taken it once and it made her too sleepy. She is scheduled for a neuropathy test on 05/16/2024. Her daughter-in-law is investigating the vagus nerve test for her. She is mentally and physically sick all the time.      The following portions of the patient's history were reviewed and updated as appropriate: allergies, current medications, past family history, past medical history, past social history, past surgical history and problem list.    Review of Systems   Constitutional:  Negative for activity change, appetite change, chills, unexpected weight gain and unexpected weight loss.   Gastrointestinal:  Positive for nausea and vomiting.   Genitourinary:  Positive for dysuria.   Musculoskeletal:  Positive for arthralgias, back pain, gait problem and myalgias.   Skin:  Negative for rash.       Vitals:    04/30/24 1137   BP: 172/76   Pulse: 71   Temp: 97.8 °F (36.6 °C)   SpO2: 97%       Objective   Physical Exam  Vitals and nursing note reviewed.   Constitutional:       General: She is not in acute distress.     Appearance: She is not ill-appearing or toxic-appearing.   HENT:      Head: Normocephalic and atraumatic.   Pulmonary:      Effort: Pulmonary effort is normal.   Musculoskeletal:      Lumbar back: Spasms and tenderness present.        Back:       Comments: +TTP @ Rt SI joint   Skin:     General: Skin is warm and dry.      Findings: No erythema or rash.    Neurological:      Mental Status: She is alert and oriented to person, place, and time. Mental status is at baseline.      Cranial Nerves: No cranial nerve deficit.   Psychiatric:         Attention and Perception: Attention and perception normal.         Mood and Affect: Mood normal.         Speech: Speech normal.         Behavior: Behavior normal. Behavior is cooperative.         Cognition and Memory: Cognition and memory normal.         Judgment: Judgment normal.       Physical Exam       BMI is within normal parameters. No other follow-up for BMI required.      Assessment & Plan   Diagnoses and all orders for this visit:    1. Chronic midline low back pain with right-sided sciatica (Primary)  -     POC Urinalysis Dipstick, Automated    2. Dysuria  -     POC Urinalysis Dipstick, Automated    3. Elevated blood pressure reading in office with diagnosis of hypertension    Other orders  -     methylPREDNISolone (MEDROL) 4 MG dose pack; Take as directed on package instructions.  Dispense: 21 tablet; Refill: 0  -     metoprolol succinate XL (TOPROL-XL) 50 MG 24 hr tablet; Take 1 tablet by mouth Every Morning.  Dispense: 90 tablet; Refill: 1  -     metoprolol succinate XL (Toprol XL) 100 MG 24 hr tablet; Take 1 tablet by mouth Every Night.  Dispense: 90 tablet; Refill: 1      Assessment & Plan  1. Sciatica.  A prescription for a Medrol Dosepak has been issued. The patient is advised to apply Aspercreme with lidocaine and apply ice to the affected area.    2. Urinary frequency.  The patient's symptoms may be attributed to menopause, vaginal dryness, or excessive caffeine intake.     Results  Laboratory Studies  No blood in urine, some protein, no white cells, no nitrites, no sugar, no ketones, no blood.          Patient or patient representative verbalized consent for the use of Ambient Listening during the visit with  Sara Zhao DO for chart documentation. 5/4/2024  12:15 EDT

## 2024-05-13 RX ORDER — GABAPENTIN 100 MG/1
CAPSULE ORAL
Qty: 30 CAPSULE | Refills: 0 | Status: SHIPPED | OUTPATIENT
Start: 2024-05-13

## 2024-05-13 NOTE — TELEPHONE ENCOUNTER
Rx Refill Note  Requested Prescriptions     Pending Prescriptions Disp Refills    gabapentin (NEURONTIN) 100 MG capsule [Pharmacy Med Name: GABAPENTIN 100 MG CAPSULE] 30 capsule 0     Sig: TAKE ONE CAPSULE BY MOUTH EVERY EVENING AS NEEDED      Last office visit with prescribing clinician: 4/30/2024   Last telemedicine visit with prescribing clinician: Visit date not found   Next office visit with prescribing clinician: 7/11/2024        Uric Acid (04/29/2024 13:45)                  Would you like a call back once the refill request has been completed: [] Yes [] No    If the office needs to give you a call back, can they leave a voicemail: [] Yes [] No    Linda Adam, RT  05/13/24, 10:29 EDT

## 2024-05-31 DIAGNOSIS — G89.29 CHRONIC MIDLINE LOW BACK PAIN WITH RIGHT-SIDED SCIATICA: Primary | ICD-10-CM

## 2024-05-31 DIAGNOSIS — M54.41 CHRONIC MIDLINE LOW BACK PAIN WITH RIGHT-SIDED SCIATICA: Primary | ICD-10-CM

## 2024-07-16 ENCOUNTER — OFFICE VISIT (OUTPATIENT)
Dept: FAMILY MEDICINE CLINIC | Facility: CLINIC | Age: 79
End: 2024-07-16
Payer: MEDICARE

## 2024-07-16 VITALS
TEMPERATURE: 98.2 F | BODY MASS INDEX: 24.27 KG/M2 | HEART RATE: 70 BPM | DIASTOLIC BLOOD PRESSURE: 77 MMHG | OXYGEN SATURATION: 100 % | WEIGHT: 137 LBS | HEIGHT: 63 IN | SYSTOLIC BLOOD PRESSURE: 192 MMHG

## 2024-07-16 DIAGNOSIS — I10 ELEVATED BLOOD PRESSURE READING IN OFFICE WITH DIAGNOSIS OF HYPERTENSION: Primary | ICD-10-CM

## 2024-07-16 DIAGNOSIS — M54.2 CERVICALGIA: ICD-10-CM

## 2024-07-16 DIAGNOSIS — K21.9 GASTROESOPHAGEAL REFLUX DISEASE WITHOUT ESOPHAGITIS: ICD-10-CM

## 2024-07-16 PROCEDURE — 1160F RVW MEDS BY RX/DR IN RCRD: CPT | Performed by: FAMILY MEDICINE

## 2024-07-16 PROCEDURE — 3077F SYST BP >= 140 MM HG: CPT | Performed by: FAMILY MEDICINE

## 2024-07-16 PROCEDURE — 1125F AMNT PAIN NOTED PAIN PRSNT: CPT | Performed by: FAMILY MEDICINE

## 2024-07-16 PROCEDURE — 1159F MED LIST DOCD IN RCRD: CPT | Performed by: FAMILY MEDICINE

## 2024-07-16 PROCEDURE — 99213 OFFICE O/P EST LOW 20 MIN: CPT | Performed by: FAMILY MEDICINE

## 2024-07-16 PROCEDURE — 3078F DIAST BP <80 MM HG: CPT | Performed by: FAMILY MEDICINE

## 2024-07-16 RX ORDER — MECLIZINE HCL 12.5 MG/1
12.5 TABLET ORAL 3 TIMES DAILY PRN
Qty: 30 TABLET | Refills: 1 | Status: SHIPPED | OUTPATIENT
Start: 2024-07-16

## 2024-07-16 RX ORDER — BACLOFEN 10 MG/1
10 TABLET ORAL NIGHTLY PRN
Qty: 30 TABLET | Refills: 3 | Status: SHIPPED | OUTPATIENT
Start: 2024-07-16

## 2024-07-16 RX ORDER — FAMOTIDINE 40 MG/1
40 TABLET, FILM COATED ORAL DAILY
Qty: 90 TABLET | Refills: 0 | Status: SHIPPED | OUTPATIENT
Start: 2024-07-16

## 2024-07-16 RX ORDER — ONDANSETRON 4 MG/1
4 TABLET, FILM COATED ORAL EVERY 8 HOURS PRN
Qty: 30 TABLET | Refills: 1 | Status: SHIPPED | OUTPATIENT
Start: 2024-07-16

## 2024-07-16 RX ORDER — CHLORTHALIDONE 25 MG/1
12.5 TABLET ORAL DAILY PRN
Start: 2024-07-16

## 2024-07-16 RX ORDER — TRAMADOL HYDROCHLORIDE 50 MG/1
50 TABLET ORAL EVERY 6 HOURS PRN
Qty: 30 TABLET | Refills: 0 | Status: SHIPPED | OUTPATIENT
Start: 2024-07-16

## 2024-07-16 NOTE — PROGRESS NOTES
Subjective   Christal Romo is a 79 y.o. female.     Chief Complaint   Patient presents with    Hyperlipidemia    Hypertension     3 mo f/u         Current Outpatient Medications:     acetaminophen (TYLENOL) 500 MG tablet, Take 1 tablet by mouth. prn, Disp: , Rfl:     allopurinol (Zyloprim) 300 MG tablet, Take 1 tablet by mouth Every Night., Disp: 90 tablet, Rfl: 1    aspirin 81 MG tablet, Take 1 tablet by mouth Daily., Disp: , Rfl:     baclofen (LIORESAL) 10 MG tablet, Take 1 tablet by mouth At Night As Needed for Muscle Spasms., Disp: 30 tablet, Rfl: 3    busPIRone (BUSPAR) 5 MG tablet, Take 1 tablet by mouth 3 (Three) Times a Day., Disp: 270 tablet, Rfl: 0    diphenhydrAMINE (BENADRYL) 25 MG tablet, Take 1 tablet by mouth Every Night., Disp: , Rfl:     Lidocaine HCl-Benzyl Alcohol (Salonpas Lidocaine Plus) 4-10 % cream, Apply  topically., Disp: , Rfl:     Lutein 20 MG capsule, Take 1 tablet by mouth Daily., Disp: , Rfl:     meclizine (ANTIVERT) 12.5 MG tablet, Take 1 tablet by mouth 3 (Three) Times a Day As Needed for Dizziness., Disp: 30 tablet, Rfl: 1    metoprolol succinate XL (Toprol XL) 100 MG 24 hr tablet, Take 1 tablet by mouth Every Night., Disp: 90 tablet, Rfl: 1    metoprolol succinate XL (TOPROL-XL) 50 MG 24 hr tablet, Take 1 tablet by mouth Every Morning., Disp: 90 tablet, Rfl: 1    Multiple Vitamins-Minerals (Multivitamin Adults 50+) tablet, Take 1 tablet by mouth Daily., Disp: , Rfl:     mycophenolate (MYFORTIC) 360 MG tablet delayed-release EC tablet, Take 1 tablet by mouth Every 12 (Twelve) Hours., Disp: , Rfl:     nystatin (MYCOSTATIN) 100,000 unit/mL suspension, Swish and swallow 5 mL 4 (Four) Times a Day., Disp: 200 mL, Rfl: 1    ondansetron (Zofran) 4 MG tablet, Take 1 tablet by mouth Every 8 (Eight) Hours As Needed for Nausea or Vomiting. prn, Disp: 30 tablet, Rfl: 1    tacrolimus (PROGRAF) 1 MG capsule, 1 po qam and 1 po qpm, Disp: , Rfl:     traMADol (ULTRAM) 50 MG tablet, Take 1 tablet by  Telephone Encounter by Yolanda Suárez RN at 09/24/18 08:27 AM     Author:  Yolanda Suárez RN Service:  (none) Author Type:  Registered Nurse     Filed:  09/24/18 08:27 AM Encounter Date:  9/24/2018 Status:  Signed     :  Yolanda Suárez RN (Registered Nurse)       From: Colette Dennis  To: Miriam Andrews MD  Sent: 9/24/2018  8:13 AM CDT  Subject: Medication Renewal Request    Original authorizing provider: MD Colette Hill would like a refill of the following medications:  MICROGESTIN FE 1/20 1-20 MG-MCG per tablet [Miriam Andrews MD]    Preferred pharmacy: [facility] [place], [place]    Comment:  Hello, [facility] in [place] on [address] and [address] said they needed to   contact my doctor before they can refill. Just wanted to check on that.   Thank you.       Revision History        Date/Time User Provider Type Action    > 09/24/18 08:27 AM Yolanda Suárez RN Registered Nurse Sign    Attribution information within the note text is not available.             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 - Dr Ritter [facility]; CBC/Prograf/CMP/LIPIDS/A1C    MAMMO     NEG = 2021/     Meniere's disease     OAB     Osteoporosis     PUD        Past Surgical History:   Procedure Laterality Date    BREAST LUMPECTOMY Left     Benign    CATARACT EXTRACTION WITH INTRAOCULAR LENS IMPLANT Left     COLONOSCOPY      Polyps = / / , rech    GSI    ERCP W/ PLASTIC STENT PLACEMENT      Liver Stent    LIVER TRANSPLANTATION      [place]    SKIN CANCER EXCISION      Rt UE    TOTAL ABDOMINAL HYSTERECTOMY         Family History   Problem Relation Age of Onset    Diabetes Sister         Type II    Heart disease Sister     Hypertension Sister     No Known Problems Brother     Hemochromatosis Other        Social History     Socioeconomic History    Marital status:    Tobacco Use    Smoking status: Former     Current packs/day: 0.00     Average packs/day: 0.3 packs/day for 1 year (0.3 ttl pk-yrs)     Types: Cigarettes     Start date:      Quit date:      Years since quittin.6     Passive exposure: Past    Smokeless tobacco: Never    Tobacco comments:     Quit .    Vaping Use    Vaping status: Never Used   Substance and Sexual Activity    Alcohol use: No     Comment: very rarely     Drug use: No    Sexual activity: Defer       History of Present  Illness  The patient is a 79-year-old female who presents for f/u on HTN/ CHOL    The patient experienced a fall in 2017, which resulted in a facial injury. She initially suspected a precancerous lesion on her face, however, a definitive diagnosis was made by Dr. Munoz at [facility] in Dermatology. The patient expressed dissatisfaction with the application of liquid nitrogen, but [facility] was unable to procure the Rx she was given for it. She experiences persistent itching on her scalp, a condition that has been persistent for several years. Dr. Munoz suggested a nerve issue on her back, which is associated with pruritus. A lotion was prescribed for scalp itching, but it caused significant hair issues and prolonged washing.     The patient also reports small, pruritic spots on her forehead. A chemotherapy cream was prescribed for nose and face treatment, but she was unable to obtain it. Her next appointment with Dr. Munoz is scheduled for next week. The patient also reports a sore on her arm and other areas of her body, which is more concerning than her lip.     She requires refills for tramadol, omeprazole 40 mg, Zofran for nausea and vomiting, baclofen, and Myfortic, which she takes twice daily every 12 hours. She has recently refilled her baclofen prescription. A nerve conduction study was ordered by Dr. Forbes. She experienced sharp pain in her left leg and shins the previous day, which was severe enough to prevent her from walking. Dr. Forbes informed her that her neuropathy is not severe and recommended a VAGUE test.     The patient reported waking up early on Monday morning with a headache, which she attributed to her blood pressure. She is currently taking metoprolol and furosemide 20 mg, half a tablet as needed. She plans to take chlorthalidone tomorrow to assess her weight. She reports frequent urination. She also experiences inner ear issues. She reported having a runny nose and abdominal pain  last week, but tested negative for COVID-19 last week. She takes Benadryl at night to aid sleep and itching on her head. She has tried various shampoos, including Selsun blue shampoo and Head and Shoulders, which provided some relief.   She is allergic to BUG SPRAY.        The following portions of the patient's history were reviewed and updated as appropriate: allergies, current medications, past family history, past medical history, past social history, past surgical history and problem list.    Review of Systems   Constitutional:  Negative for activity change, appetite change, unexpected weight gain and unexpected weight loss.   HENT:  Positive for rhinorrhea. Negative for postnasal drip.    Respiratory:  Negative for cough, shortness of breath and wheezing.    Genitourinary:  Positive for frequency. Negative for dysuria.   Musculoskeletal:  Positive for arthralgias and gait problem.   Neurological:  Positive for headache.   Psychiatric/Behavioral:  Positive for stress.        Vitals:    07/16/24 1509   BP: (!) 192/77   Pulse: 70   Temp:    SpO2: 100%       Objective   Physical Exam  Vitals and nursing note reviewed.   Constitutional:       General: She is not in acute distress.     Appearance: She is well-developed. She is not ill-appearing or toxic-appearing.   HENT:      Head: Normocephalic and atraumatic.   Cardiovascular:      Rate and Rhythm: Normal rate and regular rhythm.      Heart sounds: Normal heart sounds. No murmur heard.  Pulmonary:      Effort: Pulmonary effort is normal. No respiratory distress.      Breath sounds: Normal breath sounds. No wheezing, rhonchi or rales.   Skin:     General: Skin is warm and dry.      Findings: No rash.   Neurological:      Mental Status: She is alert and oriented to person, place, and time.      Cranial Nerves: No cranial nerve deficit.      Gait: Gait abnormal.   Psychiatric:         Attention and Perception: Attention and perception normal.         Mood and  Affect: Mood and affect normal.         Speech: Speech normal.         Behavior: Behavior normal. Behavior is cooperative.         Thought Content: Thought content normal.         Cognition and Memory: Cognition and memory normal.         Judgment: Judgment normal.       Physical Exam       BMI is within normal parameters. No other follow-up for BMI required.      Assessment & Plan   Diagnoses and all orders for this visit:    1. Elevated blood pressure reading in office with diagnosis of hypertension (Primary)    2. Cervicalgia  -     traMADol (ULTRAM) 50 MG tablet; Take 1 tablet by mouth Every 6 (Six) Hours As Needed for Moderate Pain or Severe Pain.  Dispense: 30 tablet; Refill: 0    3. Gastroesophageal reflux disease without esophagitis    Other orders  -     ondansetron (Zofran) 4 MG tablet; Take 1 tablet by mouth Every 8 (Eight) Hours As Needed for Nausea or Vomiting. prn  Dispense: 30 tablet; Refill: 1  -     meclizine (ANTIVERT) 12.5 MG tablet; Take 1 tablet by mouth 3 (Three) Times a Day As Needed for Dizziness.  Dispense: 30 tablet; Refill: 1  -     famotidine (Pepcid) 40 MG tablet; Take 1 tablet by mouth Daily.  Dispense: 90 tablet; Refill: 0  -     baclofen (LIORESAL) 10 MG tablet; Take 1 tablet by mouth At Night As Needed for Muscle Spasms.  Dispense: 30 tablet; Refill: 3  -     chlorthalidone (HYGROTON) 25 MG tablet; Take 0.5 tablets by mouth Daily As Needed (LEG swelling).      Assessment & Plan  1. Facial lesion.   A referral to Dr. Munoz at [facility] in Dermatology was made.    2. Medication refill.  A prescription for omeprazole 40 mg was issued, along with Pepcid 40 mg once daily. Zofran was prescribed for nausea and vomiting. Baclofen was refilled. The patient was advised to take chlorthalidone 25 mg as needed.     Results  Imaging  X-ray of the lower back showed mild to moderate arthritic changes.    Testing  Nerve conduction study showed L5/S1 radiculopathy          Patient or patient  representative verbalized consent for the use of Ambient Listening during the visit with  Sara Zhao DO for chart documentation. 8/4/2024  10:44 EDT   yes

## 2024-08-04 PROBLEM — K31.89 OTHER DISEASES OF STOMACH AND DUODENUM: Status: RESOLVED | Noted: 2020-07-23 | Resolved: 2024-08-04

## 2024-08-04 PROBLEM — K58.9 IRRITABLE BOWEL SYNDROME: Status: RESOLVED | Noted: 2019-07-01 | Resolved: 2024-08-04

## 2024-08-04 PROBLEM — R11.2 NAUSEA AND VOMITING: Status: RESOLVED | Noted: 2023-06-26 | Resolved: 2024-08-04

## 2024-08-04 PROBLEM — K21.9 GASTROESOPHAGEAL REFLUX DISEASE: Status: RESOLVED | Noted: 2017-09-20 | Resolved: 2024-08-04

## 2024-08-04 PROBLEM — Z94.4 HISTORY OF LIVER TRANSPLANT: Status: RESOLVED | Noted: 2023-06-26 | Resolved: 2024-08-04

## 2024-08-15 ENCOUNTER — HOSPITAL ENCOUNTER (EMERGENCY)
Facility: HOSPITAL | Age: 79
Discharge: HOME | End: 2024-08-15
Attending: EMERGENCY MEDICINE
Payer: MEDICARE

## 2024-08-15 ENCOUNTER — APPOINTMENT (OUTPATIENT)
Dept: GENERAL RADIOLOGY | Facility: HOSPITAL | Age: 79
End: 2024-08-15
Payer: MEDICARE

## 2024-08-15 VITALS
BODY MASS INDEX: 28.35 KG/M2 | WEIGHT: 160 LBS | HEART RATE: 69 BPM | DIASTOLIC BLOOD PRESSURE: 64 MMHG | TEMPERATURE: 98.5 F | OXYGEN SATURATION: 96 % | RESPIRATION RATE: 17 BRPM | SYSTOLIC BLOOD PRESSURE: 150 MMHG | HEIGHT: 63 IN

## 2024-08-15 DIAGNOSIS — S39.012A STRAIN OF LUMBAR REGION, INITIAL ENCOUNTER: Primary | ICD-10-CM

## 2024-08-15 DIAGNOSIS — I10 HYPERTENSION, UNSPECIFIED TYPE: ICD-10-CM

## 2024-08-15 LAB
ALBUMIN SERPL-MCNC: 5 G/DL (ref 3.5–5.2)
ALBUMIN/GLOB SERPL: 2.4 G/DL
ALP SERPL-CCNC: 81 U/L (ref 39–117)
ALT SERPL W P-5'-P-CCNC: 19 U/L (ref 1–33)
ANION GAP SERPL CALCULATED.3IONS-SCNC: 15.4 MMOL/L (ref 5–15)
AST SERPL-CCNC: 22 U/L (ref 1–32)
BASOPHILS # BLD AUTO: 0.03 10*3/MM3 (ref 0–0.2)
BASOPHILS NFR BLD AUTO: 0.4 % (ref 0–1.5)
BILIRUB SERPL-MCNC: 0.4 MG/DL (ref 0–1.2)
BUN SERPL-MCNC: 25 MG/DL (ref 8–23)
BUN/CREAT SERPL: 22.9 (ref 7–25)
CALCIUM SPEC-SCNC: 9.6 MG/DL (ref 8.6–10.5)
CHLORIDE SERPL-SCNC: 101 MMOL/L (ref 98–107)
CO2 SERPL-SCNC: 23.6 MMOL/L (ref 22–29)
CREAT SERPL-MCNC: 1.09 MG/DL (ref 0.57–1)
DEPRECATED RDW RBC AUTO: 42.2 FL (ref 37–54)
EGFRCR SERPLBLD CKD-EPI 2021: 52.4 ML/MIN/1.73
EOSINOPHIL # BLD AUTO: 0 10*3/MM3 (ref 0–0.4)
EOSINOPHIL NFR BLD AUTO: 0 % (ref 0.3–6.2)
ERYTHROCYTE [DISTWIDTH] IN BLOOD BY AUTOMATED COUNT: 13.4 % (ref 12.3–15.4)
GLOBULIN UR ELPH-MCNC: 2.1 GM/DL
GLUCOSE SERPL-MCNC: 111 MG/DL (ref 65–99)
HCT VFR BLD AUTO: 39.2 % (ref 34–46.6)
HGB BLD-MCNC: 13 G/DL (ref 12–15.9)
IMM GRANULOCYTES # BLD AUTO: 0.25 10*3/MM3 (ref 0–0.05)
IMM GRANULOCYTES NFR BLD AUTO: 2.9 % (ref 0–0.5)
INR PPP: 0.99 (ref 0.93–1.1)
LYMPHOCYTES # BLD AUTO: 1.4 10*3/MM3 (ref 0.7–3.1)
LYMPHOCYTES NFR BLD AUTO: 16.4 % (ref 19.6–45.3)
MCH RBC QN AUTO: 29 PG (ref 26.6–33)
MCHC RBC AUTO-ENTMCNC: 33.2 G/DL (ref 31.5–35.7)
MCV RBC AUTO: 87.5 FL (ref 79–97)
MONOCYTES # BLD AUTO: 0.64 10*3/MM3 (ref 0.1–0.9)
MONOCYTES NFR BLD AUTO: 7.5 % (ref 5–12)
NEUTROPHILS NFR BLD AUTO: 6.24 10*3/MM3 (ref 1.7–7)
NEUTROPHILS NFR BLD AUTO: 72.8 % (ref 42.7–76)
NRBC BLD AUTO-RTO: 0 /100 WBC (ref 0–0.2)
NT-PROBNP SERPL-MCNC: 1183 PG/ML (ref 0–1800)
PLATELET # BLD AUTO: 149 10*3/MM3 (ref 140–450)
PMV BLD AUTO: 11.1 FL (ref 6–12)
POTASSIUM SERPL-SCNC: 4.3 MMOL/L (ref 3.5–5.2)
PROT SERPL-MCNC: 7.1 G/DL (ref 6–8.5)
PROTHROMBIN TIME: 10.8 SECONDS (ref 9.6–11.7)
RBC # BLD AUTO: 4.48 10*6/MM3 (ref 3.77–5.28)
SODIUM SERPL-SCNC: 140 MMOL/L (ref 136–145)
TROPONIN T SERPL HS-MCNC: 14 NG/L
WBC NRBC COR # BLD AUTO: 8.56 10*3/MM3 (ref 3.4–10.8)

## 2024-08-15 PROCEDURE — 72110 X-RAY EXAM L-2 SPINE 4/>VWS: CPT

## 2024-08-15 PROCEDURE — 99283 EMERGENCY DEPT VISIT LOW MDM: CPT

## 2024-08-15 PROCEDURE — 83880 ASSAY OF NATRIURETIC PEPTIDE: CPT | Performed by: NURSE PRACTITIONER

## 2024-08-15 PROCEDURE — 85610 PROTHROMBIN TIME: CPT | Performed by: NURSE PRACTITIONER

## 2024-08-15 PROCEDURE — 25010000002 DEXAMETHASONE SODIUM PHOSPHATE 10 MG/ML SOLUTION: Performed by: NURSE PRACTITIONER

## 2024-08-15 PROCEDURE — 71045 X-RAY EXAM CHEST 1 VIEW: CPT

## 2024-08-15 PROCEDURE — 25010000002 HYDROMORPHONE 1 MG/ML SOLUTION: Performed by: NURSE PRACTITIONER

## 2024-08-15 PROCEDURE — 96372 THER/PROPH/DIAG INJ SC/IM: CPT

## 2024-08-15 PROCEDURE — 84484 ASSAY OF TROPONIN QUANT: CPT | Performed by: NURSE PRACTITIONER

## 2024-08-15 PROCEDURE — 85025 COMPLETE CBC W/AUTO DIFF WBC: CPT | Performed by: NURSE PRACTITIONER

## 2024-08-15 PROCEDURE — 80053 COMPREHEN METABOLIC PANEL: CPT | Performed by: NURSE PRACTITIONER

## 2024-08-15 RX ORDER — DEXAMETHASONE SODIUM PHOSPHATE 10 MG/ML
10 INJECTION, SOLUTION INTRAMUSCULAR; INTRAVENOUS ONCE
Status: COMPLETED | OUTPATIENT
Start: 2024-08-15 | End: 2024-08-15

## 2024-08-15 RX ORDER — METHOCARBAMOL 500 MG/1
500 TABLET, FILM COATED ORAL ONCE
Status: COMPLETED | OUTPATIENT
Start: 2024-08-15 | End: 2024-08-15

## 2024-08-15 RX ORDER — HYDROCODONE BITARTRATE AND ACETAMINOPHEN 7.5; 325 MG/1; MG/1
1 TABLET ORAL ONCE
Status: COMPLETED | OUTPATIENT
Start: 2024-08-15 | End: 2024-08-15

## 2024-08-15 RX ORDER — SODIUM CHLORIDE 0.9 % (FLUSH) 0.9 %
10 SYRINGE (ML) INJECTION AS NEEDED
Status: DISCONTINUED | OUTPATIENT
Start: 2024-08-15 | End: 2024-08-15 | Stop reason: HOSPADM

## 2024-08-15 RX ORDER — CLONIDINE HYDROCHLORIDE 0.1 MG/1
0.1 TABLET ORAL ONCE
Status: COMPLETED | OUTPATIENT
Start: 2024-08-15 | End: 2024-08-15

## 2024-08-15 RX ORDER — HYDROCODONE BITARTRATE AND ACETAMINOPHEN 5; 325 MG/1; MG/1
1 TABLET ORAL 4 TIMES DAILY PRN
Qty: 12 TABLET | Refills: 0 | Status: SHIPPED | OUTPATIENT
Start: 2024-08-15

## 2024-08-15 RX ADMIN — CLONIDINE HYDROCHLORIDE 0.1 MG: 0.1 TABLET ORAL at 15:15

## 2024-08-15 RX ADMIN — HYDROCODONE BITARTRATE AND ACETAMINOPHEN 1 TABLET: 7.5; 325 TABLET ORAL at 16:28

## 2024-08-15 RX ADMIN — METHOCARBAMOL 500 MG: 500 TABLET ORAL at 15:15

## 2024-08-15 RX ADMIN — HYDROMORPHONE HYDROCHLORIDE 0.5 MG: 1 INJECTION, SOLUTION INTRAMUSCULAR; INTRAVENOUS; SUBCUTANEOUS at 15:12

## 2024-08-15 RX ADMIN — DEXAMETHASONE SODIUM PHOSPHATE 10 MG: 10 INJECTION, SOLUTION INTRAMUSCULAR; INTRAVENOUS at 15:13

## 2024-08-15 RX ADMIN — CLONIDINE HYDROCHLORIDE 0.1 MG: 0.1 TABLET ORAL at 16:28

## 2024-08-15 NOTE — ED PROVIDER NOTES
Subjective   History of Present Illness  Is a 79-year-old female who presents with severe low back pain and hypertension.  She states that she is a liver transplant patient at [facility] from 2016 she states she takes her meds as appropriate.  States that she has neck pain that she states she wakes up with every morning and she relates this to her blood pressure.  She does have a history of degenerative disc today disease but today she has pain that is into her low back and radiates into her hips and she rates it a 9/10.  She is tearful on my exam.  She states that her blood pressure was elevated today and that is really why she came to the emergency room.  She states she took some tramadol and metoprolol at home prior to arrival for pain but that is all she has at home for pain.    Denies any numbness or tingling or saddle anesthesia-no loss of bowel or bladder control      Review of Systems   Musculoskeletal:  Positive for back pain.   Psychiatric/Behavioral:  The patient is nervous/anxious.        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 - Dr Ritter [facility]; CBC/Prograf/CMP/LIPIDS/A1C    MAMMO     NEG = 2018/ 2021/ 2022    Meniere's disease     OAB     Osteoporosis     PUD        Allergies   Allergen Reactions    Amlodipine Itching    Atacand  [Candesartan Cilexetil] Other (See Comments)    Atenolol Palpitations    Azithromycin Nausea Only and GI Intolerance    Ibuprofen Unknown (See Comments)    Levofloxacin Nausea Only and Myalgia    Penicillin G Hives    Spironolactone Nausea And Vomiting    Sucralfate Swelling    Sulfamethoxazole-Trimethoprim Other (See Comments) and Rash     THROMBOCYTOPENIA    Triamterene-Hctz Myalgia     Venlafaxine Headache           Contrast Dye (Echo Or Unknown Ct/Mr) Nausea And Vomiting    Lisinopril Nausea Only, Anxiety and Headache    Cardizem Cd [Diltiazem Hcl Er Coated Beads] Other (See Comments)     Chest pressure    Clonidine Derivatives GI Intolerance    Codeine Hallucinations    Escitalopram Unknown - High Severity and Headache    Fluconazole Nausea Only and Headache    Hydralazine Headache    Lactulose Unknown - High Severity and Itching    Lipitor [Atorvastatin] Myalgia    Metronidazole Unknown - High Severity    Polyoxyethylene 40 Sorbitol Septaoleate [Sorbitan] Unknown - High Severity    Whey Nausea And Vomiting    Zyrtec [Cetirizine] Itching    Citrus Other (See Comments)    Paroxetine Palpitations    Zoloft [Sertraline Hcl] Palpitations       Past Surgical History:   Procedure Laterality Date    BREAST LUMPECTOMY Left     Benign    CATARACT EXTRACTION WITH INTRAOCULAR LENS IMPLANT Left     COLONOSCOPY      Polyps = / / , rech    GSI    ERCP W/ PLASTIC STENT PLACEMENT      Liver Stent    LIVER TRANSPLANTATION      [place]    SKIN CANCER EXCISION      Rt UE    TOTAL ABDOMINAL HYSTERECTOMY         Family History   Problem Relation Age of Onset    Diabetes Sister         Type II    Heart disease Sister     Hypertension Sister     No Known Problems Brother     Hemochromatosis Other        Social History     Socioeconomic History    Marital status:    Tobacco Use    Smoking status: Former     Current packs/day: 0.00     Average packs/day: 0.3 packs/day for 1 year (0.3 ttl pk-yrs)     Types: Cigarettes     Start date:      Quit date:      Years since quittin.6     Passive exposure: Past    Smokeless tobacco: Never    Tobacco comments:     Quit .    Vaping Use    Vaping status: Never Used   Substance and Sexual Activity    Alcohol use: No     Comment: very rarely     Drug use: No    Sexual activity: Defer           Objective   Physical Exam  Vitals  "reviewed.   Constitutional:       Appearance: She is well-developed.   HENT:      Head: Normocephalic and atraumatic.   Eyes:      Conjunctiva/sclera: Conjunctivae normal.      Pupils: Pupils are equal, round, and reactive to light.   Cardiovascular:      Rate and Rhythm: Normal rate and regular rhythm.      Heart sounds: Normal heart sounds.   Pulmonary:      Effort: Pulmonary effort is normal. No respiratory distress.      Breath sounds: Normal breath sounds. No wheezing.   Abdominal:      General: Bowel sounds are normal.      Palpations: Abdomen is soft.      Tenderness: There is no abdominal tenderness.   Musculoskeletal:         General: No deformity. Normal range of motion.      Cervical back: Normal, normal range of motion and neck supple.      Thoracic back: Normal.      Lumbar back: Spasms and tenderness present. Decreased range of motion.      Comments: Bilateral SI joint tenderness   Skin:     General: Skin is warm and dry.      Capillary Refill: Capillary refill takes 2 to 3 seconds.   Neurological:      General: No focal deficit present.      Mental Status: She is alert and oriented to person, place, and time.      GCS: GCS eye subscore is 4. GCS verbal subscore is 5. GCS motor subscore is 6.      Motor: No weakness.   Psychiatric:         Mood and Affect: Mood normal.         Behavior: Behavior normal.         Procedures           ED Course  ED Course as of 08/16/24 0058   Thu Aug 15, 2024   1618 Patient's blood pressure is still elevated after the clonidine which was given about 1 hour ago.  She will be given another clonidine at this time and also have an additional Norco for pain. [KW]   1659 Patient feeling better but blood pressure still elevated second clonidine given at this time [KW]      ED Course User Index  [KW] Margy Reinoso, APRN                                   /64   Pulse 69   Temp 98.5 °F (36.9 °C)   Resp 17   Ht 160 cm (63\")   Wt 72.6 kg (160 lb)   SpO2 96%   BMI " 28.34 kg/m²   Labs Reviewed   COMPREHENSIVE METABOLIC PANEL - Abnormal; Notable for the following components:       Result Value    Glucose 111 (*)     BUN 25 (*)     Creatinine 1.09 (*)     Anion Gap 15.4 (*)     eGFR 52.4 (*)     All other components within normal limits    Narrative:     GFR Normal >60  Chronic Kidney Disease <60  Kidney Failure <15    The GFR formula is only valid for adults with stable renal function between ages 18 and 70.   SINGLE HS TROPONIN T - Abnormal; Notable for the following components:    HS Troponin T 14 (*)     All other components within normal limits    Narrative:     High Sensitive Troponin T Reference Range:  <14.0 ng/L- Negative Female for AMI  <22.0 ng/L- Negative Male for AMI  >=14 - Abnormal Female indicating possible myocardial injury.  >=22 - Abnormal Male indicating possible myocardial injury.   Clinicians would have to utilize clinical acumen, EKG, Troponin, and serial changes to determine if it is an Acute Myocardial Infarction or myocardial injury due to an underlying chronic condition.        CBC WITH AUTO DIFFERENTIAL - Abnormal; Notable for the following components:    Lymphocyte % 16.4 (*)     Eosinophil % 0.0 (*)     Immature Grans % 2.9 (*)     Immature Grans, Absolute 0.25 (*)     All other components within normal limits   PROTIME-INR - Normal   BNP (IN-HOUSE) - Normal    Narrative:     This assay is used as an aid in the diagnosis of individuals suspected of having heart failure. It can be used as an aid in the diagnosis of acute decompensated heart failure (ADHF) in patients presenting with signs and symptoms of ADHF to the emergency department (ED). In addition, NT-proBNP of <300 pg/mL indicates ADHF is not likely.    Age Range Result Interpretation  NT-proBNP Concentration (pg/mL:      <50             Positive            >450                   Gray                 300-450                    Negative             <300    50-75           Positive             >900                  Finn                300-900                  Negative            <300      >75             Positive            >1800                  Gray                300-1800                  Negative            <300   CBC AND DIFFERENTIAL    Narrative:     The following orders were created for panel order CBC & Differential.  Procedure                               Abnormality         Status                     ---------                               -----------         ------                     CBC Auto Differential[[id number]]        Abnormal            Final result                 Please view results for these tests on the individual orders.     Medications   cloNIDine (CATAPRES) tablet 0.1 mg (0.1 mg Oral Given 8/15/24 1515)   dexAMETHasone sodium phosphate injection 10 mg (10 mg Intramuscular Given 8/15/24 1513)   methocarbamol (ROBAXIN) tablet 500 mg (500 mg Oral Given 8/15/24 1515)   HYDROmorphone (DILAUDID) injection 0.5 mg (0.5 mg Subcutaneous Given 8/15/24 1512)   cloNIDine (CATAPRES) tablet 0.1 mg (0.1 mg Oral Given 8/15/24 1628)   HYDROcodone-acetaminophen (NORCO) 7.5-325 MG per tablet 1 tablet (1 tablet Oral Given 8/15/24 1628)     XR Spine Lumbar Complete 4+VW    Result Date: 8/15/2024  Impression: 1. Osteopenia with minimal degenerative spur formation. No fractures or acute abnormalities. Electronically Signed: Janak Knott MD  8/15/2024 3:56 PM EDT  Workstation ID: [id number]    XR Chest 1 View    Result Date: 8/15/2024  Impression: No acute cardiopulmonary findings. Electronically Signed: Aaron Alberto MD  8/15/2024 3:20 PM EDT  Workstation ID: [id number]             Medical Decision Making  Patient is a 79-year-old female with low back pain and hypertension that she states she has not been able to control at home he is only have tramadol at home for pain.  She does have a history of liver transplant in 2016 and states she takes her meds appropriately and she has not had any difficulty with  the transplant.  She is in severe pain today she denies any injury or fall.  She had IV established and blood work was obtained and reviewed the patient was found to have a troponin that was 14 chart review shows that she is always 14 or 15 she denies any chest pain.    She was treated her blood pressure reduced nicely and she wished to go home she was given some Norco for acute pain for the next 3 days and was advised to follow-up with her primary care provider for any further problems and further pain management if needed she verbalized understood discharge instructions taken home by her  at bedside    Problems Addressed:  Hypertension, unspecified type: complicated acute illness or injury  Strain of lumbar region, initial encounter: complicated acute illness or injury    Amount and/or Complexity of Data Reviewed  External Data Reviewed: labs, ECG and notes.  Labs: ordered. Decision-making details documented in ED Course.  Radiology: ordered and independent interpretation performed. Decision-making details documented in ED Course.  ECG/medicine tests: ordered and independent interpretation performed. Decision-making details documented in ED Course.    Risk  OTC drugs.  Prescription drug management.        Final diagnoses:   Strain of lumbar region, initial encounter   Hypertension, unspecified type       ED Disposition  ED Disposition       ED Disposition   Discharge    Condition   Stable    Comment   --               Sara Zhao,   [address]    [facility] [address] [phone]      As needed, If symptoms worsen         Medication List        New Prescriptions      HYDROcodone-acetaminophen 5-325 MG per tablet  Commonly known as: NORCO  Take 1 tablet by mouth 4 (Four) Times a Day As Needed for Moderate Pain.               Where to Get Your Medications        These medications were sent to [facility] [id number] [place] [address] [address] - [phone]  -  [phone]   [address] [place] [address]      Phone: [phone]   HYDROcodone-acetaminophen 5-325 MG per tablet            Margy Reinoso, APRN  08/16/24 0058

## 2024-08-15 NOTE — DISCHARGE INSTRUCTIONS
Warm compresses and static stretches to the sore area.  Use Norco as needed for pain do not drive or mix with alcohol do not take any additional Tylenol with this medication.  Follow-up with your primary care provider for any further problems and continue to use your home medications as prescribed  Return if worse

## 2024-08-27 ENCOUNTER — TELEPHONE (OUTPATIENT)
Dept: FAMILY MEDICINE CLINIC | Facility: CLINIC | Age: 79
End: 2024-08-27

## 2024-08-28 ENCOUNTER — OFFICE VISIT (OUTPATIENT)
Dept: CARDIOLOGY | Facility: CLINIC | Age: 79
End: 2024-08-28
Payer: MEDICARE

## 2024-08-28 VITALS
BODY MASS INDEX: 25.58 KG/M2 | DIASTOLIC BLOOD PRESSURE: 104 MMHG | HEART RATE: 80 BPM | HEIGHT: 62 IN | SYSTOLIC BLOOD PRESSURE: 180 MMHG | OXYGEN SATURATION: 98 % | WEIGHT: 139 LBS

## 2024-08-28 DIAGNOSIS — E78.2 MIXED HYPERLIPIDEMIA: ICD-10-CM

## 2024-08-28 DIAGNOSIS — I10 PRIMARY HYPERTENSION: ICD-10-CM

## 2024-08-28 DIAGNOSIS — Z88.9 MULTIPLE DRUG ALLERGIES: Primary | ICD-10-CM

## 2024-08-28 RX ORDER — CLONIDINE 0.1 MG/24H
1 PATCH, EXTENDED RELEASE TRANSDERMAL WEEKLY
Qty: 4 PATCH | Refills: 3 | Status: SHIPPED | OUTPATIENT
Start: 2024-08-28

## 2024-08-28 RX ORDER — PANTOPRAZOLE SODIUM 40 MG/1
40 TABLET, DELAYED RELEASE ORAL DAILY
COMMUNITY

## 2024-08-28 NOTE — PROGRESS NOTES
Cardiology Office Visit      Encounter Date:  08/28/2024    Patient ID:   Christal Romo is a 79 y.o. female.    Reason For Followup:  Valvular heart disease  Hypertension    Brief Clinical History:  Dear Sara Espino, DO    I had the pleasure of seeing Christal Romo today. As you are well aware, this is a 79 y.o. female with no known history of ischemic heart disease.  She does have a history of nonalcoholic steatohepatitis and liver cirrhosis and is status post liver transplant.  She has additional history that includes hypertension, palpitations, and aortic insufficiency. She presents today for follow-up on the above conditions.    Interval History:  She denies any shortness of breath.  She denies any PND orthopnea.  She denies any PND orthopnea.  She denies any syncope or near syncope.  She reports feeling in her usual cardiac state of health.    Her blood pressure is elevated today.  As I am sure you will recall, she has an intolerance to multiple medications including multiple antihypertensives.  This makes treating her hypertension difficult.  She reports that she was started on metoprolol and this causes her drowsiness so she takes it in the evening.  She was also placed on valsartan 160 mg daily.  She reports that she started taking this and became dizzy lightheaded and felt like she was going to pass out.  She completely stopped the medication.  She started back on a reduced dose at 40 mg daily and reports that she had a headache with this and stopped taking it.  She now takes a 40 mg dose as she feels like she needs it.    Her most recent echocardiogram performed in December 2020.  Normal LV systolic function was noted.  Mild MR, TR, AI and PI was noted.  This is unchanged from prior studies.    She underwent a heart and vascular screening in July 2021.  Her coronary calcium score was 23.2.  Her ABIs were normal.  No evidence of carotid or abdominal aortic disease was noted.    Her most recent  laboratory data is from April 2023.  White blood cell count is 6.9 hemoglobin 12.8 hematocrit 38.6 platelets 160,000.  Sodium 140 potassium 4.9 chloride 109 CO2 24 glucose 100 BUN 32 creatinine 1.4.  Total cholesterol 139 triglycerides 382 HDL 32 LDL 31.  AST 14 ALT 26 alkaline phosphatase 73.      02/28/2024        She was started on a new medication (chlorthalidone) and is having issues cutting it in half. She does report chest pressure when her blood pressure is elevated. She has a significant amount of anxiety and lot of her pressure elevations are driven by that. She has ankle pain. She was admitted with vague chest pain that was likely anxiety and blood pressure related in February of 2024. She was recommended to go to  for desensitization therapy.She is very nervous about driving and is likely not going to consider this.  She will call us back if she changes her mind.    08/28/2024        She continues to struggle with blood pressure control. She has multiple allergies. We have tried countless antihypertensives and she always has an issue with the medications. She has significant anxiety and a significant amount of situational hypertension. She was in the ER recently and she was given clonidine and she tolerated that OK other than a little nausea. We will try a catapres patch since that will give a more sustained release of medication. She has a lot of back pain as well that contributes to her hypertension as well. She reports that she is taking guaifenesin as a decongestant to help with her seasonal allergies.     She mentioned that her son wants her to try his cardiologist to see if they will have better luck managing her hypertension.     Assessment & Plan    Impressions:  Hypertension     Significant situational component  Palpitations.  History of abnormal troponin secondary to mouse antigen interaction with troponin assay reagent.  Cirrhosis status post liver transplant 2016  Poor dentition.  Valvular  "heart disease     Mild MR, TR, AI, PI Echocardiogram December 2020  Hyperlipidemia with a preponderance of hypertriglyceridemia  Depression  Multiple medication sensitivities and intolerances    Recommendations:  Continuation of her current cardiovascular regimen at the present time.     This includes antiplatelet therapy, and antihypertensives.  Try catapres patch 0.1mg  Continue to log blood pressures and bring values in for next visit  Management of this patient's blood pressure is extremely difficult given her intolerances/sensitivities to medications.  Follow-up in 3 months time.    Diagnoses and all orders for this visit:    1. Multiple drug allergies (Primary)  -     ECG 12 Lead    2. Primary hypertension  -     ECG 12 Lead    3. Mixed hyperlipidemia  -     ECG 12 Lead    Other orders  -     cloNIDine (CATAPRES-TTS) 0.1 MG/24HR patch; Place 1 patch on the skin as directed by provider 1 (One) Time Per Week.  Dispense: 4 patch; Refill: 3              Objective:    Vitals:  Vitals:    08/28/24 1348   BP: (!) 180/104   Pulse: 80   SpO2: 98%   Weight: 63 kg (139 lb)   Height: 157.5 cm (62\")       Body mass index is 25.42 kg/m².      Physical Exam:    General: Alert, cooperative, no distress, appears stated age  Head:  Normocephalic, atraumatic, mucous membranes moist  Eyes:  Conjunctiva/corneas clear, EOM's intact     Neck:  Supple,  no bruit    Lungs: Clear to auscultation bilaterally, no wheezes rhonchi rales are noted  Chest wall: No tenderness  Heart::  Regular rate and rhythm, S1 and S2 normal, 1/6 holosystolic murmur.  No rub or gallop  Abdomen: Soft, non-tender, nondistended bowel sounds active  Extremities: No cyanosis, clubbing, or edema.  Ankle braces  Pulses: Pedal pulses nonpalpable because of ankle braces  Skin:  No rashes or lesions  Neuro/psych: A&O x3. CN II through XII are grossly intact with appropriate affect      Allergies:  Allergies   Allergen Reactions    Amlodipine Itching    Atacand  " [Candesartan Cilexetil] Other (See Comments)    Atenolol Palpitations    Azithromycin Nausea Only and GI Intolerance    Ibuprofen Unknown (See Comments)    Levofloxacin Nausea Only and Myalgia    Penicillin G Hives    Spironolactone Nausea And Vomiting    Sucralfate Swelling    Sulfamethoxazole-Trimethoprim Other (See Comments) and Rash     THROMBOCYTOPENIA    Triamterene-Hctz Myalgia    Venlafaxine Headache           Contrast Dye (Echo Or Unknown Ct/Mr) Nausea And Vomiting    Lisinopril Nausea Only, Anxiety and Headache    Cardizem Cd [Diltiazem Hcl Er Coated Beads] Other (See Comments)     Chest pressure    Clonidine Derivatives GI Intolerance    Codeine Hallucinations    Escitalopram Unknown - High Severity and Headache    Fluconazole Nausea Only and Headache    Hydralazine Headache    Lactulose Unknown - High Severity and Itching    Lipitor [Atorvastatin] Myalgia    Metronidazole Unknown - High Severity    Polyoxyethylene 40 Sorbitol Septaoleate [Sorbitan] Unknown - High Severity    Whey Nausea And Vomiting    Zyrtec [Cetirizine] Itching    Citrus Other (See Comments)    Paroxetine Palpitations    Zoloft [Sertraline Hcl] Palpitations       Medication Review:     Current Outpatient Medications:     acetaminophen (TYLENOL) 500 MG tablet, Take 1 tablet by mouth. prn, Disp: , Rfl:     allopurinol (Zyloprim) 300 MG tablet, Take 1 tablet by mouth Every Night., Disp: 90 tablet, Rfl: 1    aspirin 81 MG tablet, Take 1 tablet by mouth Daily., Disp: , Rfl:     baclofen (LIORESAL) 10 MG tablet, Take 1 tablet by mouth At Night As Needed for Muscle Spasms., Disp: 30 tablet, Rfl: 3    busPIRone (BUSPAR) 5 MG tablet, Take 1 tablet by mouth 3 (Three) Times a Day., Disp: 270 tablet, Rfl: 0    chlorthalidone (HYGROTON) 25 MG tablet, Take 0.5 tablets by mouth Daily As Needed (LEG swelling)., Disp: , Rfl:     diphenhydrAMINE (BENADRYL) 25 MG tablet, Take 1 tablet by mouth Every Night., Disp: , Rfl:     famotidine (Pepcid) 40 MG  tablet, Take 1 tablet by mouth Daily., Disp: 90 tablet, Rfl: 0    HYDROcodone-acetaminophen (NORCO) 5-325 MG per tablet, Take 1 tablet by mouth 4 (Four) Times a Day As Needed for Moderate Pain., Disp: 12 tablet, Rfl: 0    Lidocaine HCl-Benzyl Alcohol (Salonpas Lidocaine Plus) 4-10 % cream, Apply  topically., Disp: , Rfl:     Lutein 20 MG capsule, Take 1 tablet by mouth Daily., Disp: , Rfl:     meclizine (ANTIVERT) 12.5 MG tablet, Take 1 tablet by mouth 3 (Three) Times a Day As Needed for Dizziness., Disp: 30 tablet, Rfl: 1    metoprolol succinate XL (Toprol XL) 100 MG 24 hr tablet, Take 1 tablet by mouth Every Night. (Patient taking differently: Take 1 tablet by mouth Every Night. Self dosing- 2-3 times a day), Disp: 90 tablet, Rfl: 1    metoprolol succinate XL (TOPROL-XL) 50 MG 24 hr tablet, Take 1 tablet by mouth Every Morning., Disp: 90 tablet, Rfl: 1    Multiple Vitamins-Minerals (Multivitamin Adults 50+) tablet, Take 1 tablet by mouth Daily., Disp: , Rfl:     mycophenolate (MYFORTIC) 360 MG tablet delayed-release EC tablet, Take 1 tablet by mouth Every 12 (Twelve) Hours., Disp: , Rfl:     nystatin (MYCOSTATIN) 100,000 unit/mL suspension, Swish and swallow 5 mL 4 (Four) Times a Day., Disp: 200 mL, Rfl: 1    ondansetron (Zofran) 4 MG tablet, Take 1 tablet by mouth Every 8 (Eight) Hours As Needed for Nausea or Vomiting. prn, Disp: 30 tablet, Rfl: 1    pantoprazole (PROTONIX) 40 MG EC tablet, Take 1 tablet by mouth Daily., Disp: , Rfl:     tacrolimus (PROGRAF) 1 MG capsule, 1 po qam and 1 po qpm, Disp: , Rfl:     traMADol (ULTRAM) 50 MG tablet, Take 1 tablet by mouth Every 6 (Six) Hours As Needed for Moderate Pain or Severe Pain., Disp: 30 tablet, Rfl: 0    cloNIDine (CATAPRES-TTS) 0.1 MG/24HR patch, Place 1 patch on the skin as directed by provider 1 (One) Time Per Week., Disp: 4 patch, Rfl: 3    Family History:  Family History   Problem Relation Age of Onset    Diabetes Sister         Type II    Heart disease  Sister     Hypertension Sister     No Known Problems Brother     Hemochromatosis Other        Past Medical History:  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 - Dr Ritter [facility]; CBC/Prograf/CMP/LIPIDS/A1C    MAMMO     NEG = 2021/     Meniere's disease     OAB     Osteoporosis     PUD        Past Surgical History:  Past Surgical History:   Procedure Laterality Date    BREAST LUMPECTOMY Left     Benign    CATARACT EXTRACTION WITH INTRAOCULAR LENS IMPLANT Left     COLONOSCOPY      Polyps = 2015/ , rech    GSI    ERCP W/ PLASTIC STENT PLACEMENT      Liver Stent    LIVER TRANSPLANTATION      [place]    SKIN CANCER EXCISION      Rt UE    TOTAL ABDOMINAL HYSTERECTOMY         Social History:  Social History     Socioeconomic History    Marital status:    Tobacco Use    Smoking status: Former     Current packs/day: 0.00     Average packs/day: 0.3 packs/day for 1 year (0.3 ttl pk-yrs)     Types: Cigarettes     Start date:      Quit date:      Years since quittin.6     Passive exposure: Past    Smokeless tobacco: Never    Tobacco comments:     Quit .    Vaping Use    Vaping status: Never Used   Substance and Sexual Activity    Alcohol use: No     Comment: very rarely     Drug use: No    Sexual activity: Defer       Review of Systems:  The following systems were reviewed as they relate to the cardiovascular system: Constitutional, Eyes, ENT, Cardiovascular, Respiratory, Gastrointestinal, Integumentary, Neurological, Psychiatric, Hematologic, Endocrine, Musculoskeletal, and Genitourinary. The pertinent cardiovascular findings are reported above with all other cardiovascular points  within those systems being negative.    Diagnostic Study Review:     Current Electrocardiogram:    ECG 12 Lead    Date/Time: 8/28/2024 4:15 PM  Performed by: Domenico Clifton DO    Authorized by: Domenico Clifton DO  Comparison: not compared with previous ECG   Previous ECG: no previous ECG available  Comments: Normal sinus rhythm with a ventricular rate of 78 bpm.  Borderline left axis deviation.  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  Normal QT and QTc intervals.          Laboratory Data:  Lab Results   Component Value Date    GLUCOSE 111 (H) 08/15/2024    BUN 25 (H) 08/15/2024    CREATININE 1.09 (H) 08/15/2024    EGFRIFNONA 31 (L) 04/20/2020    BCR 22.9 08/15/2024    K 4.3 08/15/2024    CO2 23.6 08/15/2024    CALCIUM 9.6 08/15/2024    ALBUMIN 5.0 08/15/2024    AST 22 08/15/2024    ALT 19 08/15/2024     Lab Results   Component Value Date    GLUCOSE 111 (H) 08/15/2024    CALCIUM 9.6 08/15/2024     08/15/2024    K 4.3 08/15/2024    CO2 23.6 08/15/2024     08/15/2024    BUN 25 (H) 08/15/2024    CREATININE 1.09 (H) 08/15/2024    EGFRIFNONA 31 (L) 04/20/2020    BCR 22.9 08/15/2024    ANIONGAP 15.4 (H) 08/15/2024     Lab Results   Component Value Date    WBC 8.56 08/15/2024    HGB 13.0 08/15/2024    HCT 39.2 08/15/2024    MCV 87.5 08/15/2024     08/15/2024     Lab Results   Component Value Date    CHLPL 149 05/28/2024    TRIG 758 (H) 05/28/2024    HDL 24 (L) 05/28/2024    LDL UNABLE TO CALCULATE 05/28/2024     Lab Results   Component Value Date    HGBA1C 5.2 09/20/2021     Lab Results   Component Value Date    INR 0.99 08/15/2024    PROTIME 10.8 08/15/2024       Most Recent Echo:  Results for orders placed during the hospital encounter of 08/18/23    Adult Transthoracic Echo Complete W/ Cont if Necessary Per Protocol    Interpretation Summary    Left ventricular systolic function is normal. Calculated left ventricular EF = 63%    Left ventricular wall thickness is consistent with mild concentric  "hypertrophy.    Left ventricular diastolic function is consistent with (grade I) impaired relaxation.    The left atrial cavity is mildly dilated.    Estimated right ventricular systolic pressure from tricuspid regurgitation is normal (<35 mmHg).       Most Recent Stress Test:  Results for orders placed during the hospital encounter of 08/18/23    Stress Test With Myocardial Perfusion One Day    Interpretation Summary    Myocardial perfusion imaging indicates a normal myocardial perfusion study with no evidence of ischemia.    Left ventricular ejection fraction is normal (Calculated EF = 70%).    Impressions are consistent with a low risk study.       Most Recent Cardiac Catheterization:   No results found for this or any previous visit.       NOTE:     Today,08/28/2024 ,the following portions of the patient's note were reviewed, confirmed and/or updated as appropriate: History of present illness/Interval history, physical examination, assessment & plan, allergies, current medications, past family history, past medical history, past social history, past surgical history and problem list.    Labs pertinent to today's visit on 08/28/2024 (including but not limited to CBC, CMP, and lipid profiles) were requested from the patient's primary care provider/hospital/clinical laboratory.  If the labs were available for the visit, they were reviewed with the patient.  If they were not available, when received, special interest will be made to the labs pertinent to this visit.  The patient's most recent \"in-house\" labs are noted below and have been reviewed.  Outside labs pertinent to this visit are scanned into the record and have been reviewed.    Discussions held today, 08/28/2024,regarding procedures included risk, benefits, and options including but not limited to: Death, MI, stroke, pain, bleeding, infection, and possible need for vascular/thoracic/cardiothoracic surgery.    Copied information within this note was " reviewed and is current as of 08/28/2024.    Assessment and plan noted herein represents the current plan of care as of 08/28/2024.    Significant resources from our office and staff are inherent in engaging this patient today,08/28/2024,and in a continuous and active collaborative plan of care related to their chronic cardiovascular conditions outlined below.  The management of these conditions requires the direction of our service with specialized clinical knowledge, skills, and experience.  This collaborative care includes but is not limited to patient education, expectations and responsibilities, shared decision making around therapeutic goals, and shared commitments to achieve those goals.

## 2024-09-09 RX ORDER — ALLOPURINOL 300 MG/1
TABLET ORAL
Qty: 90 TABLET | Refills: 1 | Status: SHIPPED | OUTPATIENT
Start: 2024-09-09 | End: 2024-09-12 | Stop reason: SDUPTHER

## 2024-09-12 ENCOUNTER — OFFICE VISIT (OUTPATIENT)
Dept: FAMILY MEDICINE CLINIC | Facility: CLINIC | Age: 79
End: 2024-09-12
Payer: MEDICARE

## 2024-09-12 VITALS
DIASTOLIC BLOOD PRESSURE: 82 MMHG | HEART RATE: 75 BPM | RESPIRATION RATE: 12 BRPM | OXYGEN SATURATION: 98 % | HEIGHT: 62 IN | BODY MASS INDEX: 25.58 KG/M2 | TEMPERATURE: 98.4 F | SYSTOLIC BLOOD PRESSURE: 173 MMHG | WEIGHT: 139 LBS

## 2024-09-12 DIAGNOSIS — Z12.31 VISIT FOR SCREENING MAMMOGRAM: ICD-10-CM

## 2024-09-12 DIAGNOSIS — M54.16 LUMBAR RADICULOPATHY, CHRONIC: ICD-10-CM

## 2024-09-12 DIAGNOSIS — I10 ELEVATED BLOOD PRESSURE READING IN OFFICE WITH DIAGNOSIS OF HYPERTENSION: Primary | ICD-10-CM

## 2024-09-12 DIAGNOSIS — F41.9 ANXIETY: ICD-10-CM

## 2024-09-12 DIAGNOSIS — M54.2 CERVICALGIA: ICD-10-CM

## 2024-09-12 RX ORDER — METHYLPREDNISOLONE 4 MG
TABLET, DOSE PACK ORAL
Qty: 21 TABLET | Refills: 0 | Status: SHIPPED | OUTPATIENT
Start: 2024-09-12

## 2024-09-12 RX ORDER — METOPROLOL SUCCINATE 100 MG/1
100 TABLET, EXTENDED RELEASE ORAL NIGHTLY
Qty: 90 TABLET | Refills: 1 | Status: SHIPPED | OUTPATIENT
Start: 2024-09-12

## 2024-09-12 RX ORDER — TRAMADOL HYDROCHLORIDE 50 MG/1
50 TABLET ORAL EVERY 6 HOURS PRN
Qty: 30 TABLET | Refills: 0 | Status: SHIPPED | OUTPATIENT
Start: 2024-09-12

## 2024-09-12 RX ORDER — METOPROLOL SUCCINATE 50 MG/1
50 TABLET, EXTENDED RELEASE ORAL
Qty: 90 TABLET | Refills: 1 | Status: SHIPPED | OUTPATIENT
Start: 2024-09-12

## 2024-09-12 RX ORDER — PANTOPRAZOLE SODIUM 40 MG/1
40 TABLET, DELAYED RELEASE ORAL DAILY
Qty: 90 TABLET | Refills: 1 | Status: SHIPPED | OUTPATIENT
Start: 2024-09-12

## 2024-09-12 RX ORDER — ALLOPURINOL 300 MG/1
300 TABLET ORAL DAILY
Qty: 90 TABLET | Refills: 1 | Status: SHIPPED | OUTPATIENT
Start: 2024-09-12

## 2024-09-12 RX ORDER — BACLOFEN 10 MG/1
TABLET ORAL
Qty: 40 TABLET | Refills: 0
Start: 2024-09-12

## 2024-09-12 RX ORDER — CITALOPRAM HYDROBROMIDE 10 MG/1
5 TABLET ORAL NIGHTLY
Qty: 30 TABLET | Refills: 0 | Status: SHIPPED | OUTPATIENT
Start: 2024-09-12

## 2024-09-12 NOTE — PROGRESS NOTES
Subjective   Christal Romo is a 79 y.o. female.     Chief Complaint   Patient presents with    Transitional Care Management     Hospital follow up Low Back pain         Current Outpatient Medications:     acetaminophen (TYLENOL) 500 MG tablet, Take 1 tablet by mouth. prn, Disp: , Rfl:     allopurinol (ZYLOPRIM) 300 MG tablet, Take 1 tablet by mouth Daily., Disp: 90 tablet, Rfl: 1    aspirin 81 MG tablet, Take 1 tablet by mouth Daily., Disp: , Rfl:     baclofen (LIORESAL) 10 MG tablet, 1/2 - 1 po qday prn and 1-2 po qhs prn, Disp: 40 tablet, Rfl: 0    busPIRone (BUSPAR) 5 MG tablet, Take 1 tablet by mouth 3 (Three) Times a Day., Disp: 270 tablet, Rfl: 0    chlorthalidone (HYGROTON) 25 MG tablet, Take 0.5 tablets by mouth Daily As Needed (LEG swelling)., Disp: , Rfl:     cloNIDine (CATAPRES-TTS) 0.1 MG/24HR patch, Place 1 patch on the skin as directed by provider 1 (One) Time Per Week., Disp: 4 patch, Rfl: 3    diphenhydrAMINE (BENADRYL) 25 MG tablet, Take 1 tablet by mouth Every Night., Disp: , Rfl:     famotidine (Pepcid) 40 MG tablet, Take 1 tablet by mouth Daily., Disp: 90 tablet, Rfl: 0    HYDROcodone-acetaminophen (NORCO) 5-325 MG per tablet, Take 1 tablet by mouth 4 (Four) Times a Day As Needed for Moderate Pain., Disp: 12 tablet, Rfl: 0    Lidocaine HCl-Benzyl Alcohol (Salonpas Lidocaine Plus) 4-10 % cream, Apply  topically., Disp: , Rfl:     Lutein 20 MG capsule, Take 1 tablet by mouth Daily., Disp: , Rfl:     meclizine (ANTIVERT) 12.5 MG tablet, Take 1 tablet by mouth 3 (Three) Times a Day As Needed for Dizziness., Disp: 30 tablet, Rfl: 1    metoprolol succinate XL (Toprol XL) 100 MG 24 hr tablet, Take 1 tablet by mouth Every Night., Disp: 90 tablet, Rfl: 1    metoprolol succinate XL (TOPROL-XL) 50 MG 24 hr tablet, Take 1 tablet by mouth Every Morning., Disp: 90 tablet, Rfl: 1    Multiple Vitamins-Minerals (Multivitamin Adults 50+) tablet, Take 1 tablet by mouth Daily., Disp: , Rfl:     mycophenolate  (MYFORTIC) 360 MG tablet delayed-release EC tablet, Take 1 tablet by mouth Every 12 (Twelve) Hours., Disp: , Rfl:     nystatin (MYCOSTATIN) 100,000 unit/mL suspension, Swish and swallow 5 mL 4 (Four) Times a Day., Disp: 200 mL, Rfl: 1    ondansetron (Zofran) 4 MG tablet, Take 1 tablet by mouth Every 8 (Eight) Hours As Needed for Nausea or Vomiting. prn, Disp: 30 tablet, Rfl: 1    pantoprazole (PROTONIX) 40 MG EC tablet, Take 1 tablet by mouth Daily., Disp: 90 tablet, Rfl: 1    tacrolimus (PROGRAF) 1 MG capsule, 1 po qam and 1 po qpm, Disp: , Rfl:     traMADol (ULTRAM) 50 MG tablet, Take 1 tablet by mouth Every 6 (Six) Hours As Needed for Moderate Pain or Severe Pain., Disp: 30 tablet, Rfl: 0    citalopram (CeleXA) 10 MG tablet, Take 0.5 tablets by mouth Every Night., Disp: 30 tablet, Rfl: 0    methylPREDNISolone (MEDROL) 4 MG dose pack, Take as directed on package instructions., Disp: 21 tablet, Rfl: 0    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 - Dr Ritter [facility]; CBC/Prograf/CMP/LIPIDS/A1C    MAMMO     NEG = 2018/ 2021/ 2022    Meniere's disease     OAB     Osteoporosis     PUD        Past Surgical History:   Procedure Laterality Date    BREAST LUMPECTOMY Left     Benign    CATARACT EXTRACTION WITH INTRAOCULAR LENS IMPLANT Left     COLONOSCOPY      Polyps = 2012/ 2015/ 2020, rech 2025   GSI    ERCP W/ PLASTIC STENT PLACEMENT  2014    Liver Stent    [place] TRANSPL[place]ATION      [place] [facility]    SKIN CANCER EXCISION      Rt UE    TOTAL ABDOMINAL HYSTERECTOMY  1979       Family History   Problem Relation Age of Onset    Diabetes Sister         Type II    Heart disease Sister     Hypertension Sister     No Known Problems Brother      Hemochromatosis Other        Social History     Socioeconomic History    Marital status:    Tobacco Use    Smoking status: Former     Current packs/day: 0.00     Average packs/day: 0.3 packs/day for 1 year (0.3 ttl pk-yrs)     Types: Cigarettes     Start date:      Quit date:      Years since quittin.7     Passive exposure: Past    Smokeless tobacco: Never    Tobacco comments:     Quit .    Vaping Use    Vaping status: Never Used   Substance and Sexual Activity    Alcohol use: No     Comment: very rarely     Drug use: No    Sexual activity: Defer       History of Present Illness  The patient presents for an ER follow-up.    She is experiencing severe neck and back pain, which began on a Tuesday. Despite initial relief from a cortisone injection, the pain returned on Wednesday. She was diagnosed with severe arthritis in her back and a deteriorating slipped disc. She received a steroid injection at the hospital and was given clonidine and pain medication, which provided some relief but caused nausea. Her blood pressure spiked to 230, nearly causing her to faint, and remained elevated despite taking 100 mg of her BP medication. An EMT confirmed the high reading. Her blood pressure improved after the clonidine.     She has been using a clonidine patch without adverse reactions, except for headaches, which she attributes to sinus issues. She took a muscle relaxer yesterday, which alleviated her neck pain, and applied Biofreeze today. Salonpas patches did not provide significant relief. She was given exercises to do at home. She has previously undergone back therapy and received three epidural injections in her right hip, which were beneficial. She also received two injections in her neck years ago. She is considering trying prednisone first.     She spent the morning in a recliner with heat applied to her back and neck. She is currently taking baclofen 10 mcg once daily at night, which she finds  helpful. She takes her medications at 9 PM, but they wear off by 3 or 4 AM. She took an extra dose of baclofen yesterday due to neck pain. She has tried gabapentin twice for nerve pain in her legs, but it made her feel confused and did not alleviate the pain. A nerve conduction study revealed a pinched nerve on the right side of her low back. She has not had an MRI in a long time. She used to see Dr. Duff for her neck. She believes her current condition may be due to heavy lifting she did when she worked. She is unable to lift her legs now. She is requesting a refill of tramadol.    She is scheduled to see a cardiologist next month due to concerns about uncontrolled hypertension. She has never had a cardiac catheterization. She is considering waiting until after seeing the cardiologist before starting any new medications. She is unsure what could be causing her heart issues. She is currently taking clonidine 50 mg in the morning and 100 mg at night. If her blood pressure remains high at lunchtime, she takes an additional 50 mg. However, even after taking 100 mg at night, her blood pressure is still high in the morning. She is unsure why her blood pressure is not being adequately controlled.    She is currently taking BuSpar 5 mg for anxiety, but it causes headaches. She experiences diarrhea when she becomes upset or angry. She finds aging and the associated health issues to be a source of anxiety. She also experienced the sudden loss of her , which has been difficult for her. She has tried Lexapro, Zoloft, and Paxil in the past, but they caused palpitations. She is considering trying Vraylar, which her granddaughter takes for bipolar disorder. She took Tylenol 500 mg for her headache, which provided some relief.    She is due for a mammogram. She has been experiencing hot flashes, which were severe for a while but have since improved. She is unsure what could be causing them. She started experiencing them  one to two months ago, waking up drenched in sweat. She is unsure if they are medication-related. She has not started any new medications recently. She uses a box fan to help manage the hot flashes.    She is currently taking allopurinol 300 mg, with an additional 100 mg as needed. She has not had her uric acid levels checked in a long time. She tried fenofibrate, but it caused severe leg cramps. She admits to consuming a lot of junk food. She ate a salad with ranch dressing over the weekend, which resulted in severe diarrhea on Sunday and Monday. She is unsure if the diarrhea was caused by the salad or the soybean chicken alysha she ate. She also ate potatoes and green beans yesterday. She is unsure what she will be able to eat at her daughter-in-law's birthday dinner tonight. She is wondering if she might be lactose intolerant.    She has been applying 5-FU to her face twice daily for two weeks as prescribed by her dermatologist, Dr. Shelbie Mnuoz. She has an appointment with her next week. She has been using a topical solution containing amitriptyline and gabapentin for itching on her head, which she finds helpful. She was told it was a nerve issue. She was supposed to receive a shampoo from [facility] but did not receive it. She was told that the 5-FU would make her skin look worse before it improved. She still has a small area on her face that has not resolved. She was unable to use the 5-FU near her eyes, but her nose, which was rough, is now smooth.     She had cataract surgery on the left eye. She never had anything done on the right one, and it is bothering her. She is going to try to get in to see Dr. Nieto. Her regular eye doctor is Shonda Cueva.    FAMILY HISTORY  Her mother was allergic to yeast. Her father had liver problems and was an alcoholic.    ALLERGIES  She has 29 allergies.        The following portions of the patient's history were reviewed and updated as appropriate: allergies, current  medications, past family history, past medical history, past social history, past surgical history and problem list.    Review of Systems   Constitutional:  Positive for activity change. Negative for appetite change, unexpected weight gain and unexpected weight loss.   Gastrointestinal:  Positive for diarrhea. Negative for constipation.   Musculoskeletal:  Positive for back pain, neck pain and neck stiffness.   Skin:  Positive for color change and skin lesions. Negative for rash and wound.   Neurological:  Positive for headache.   Psychiatric/Behavioral:  Positive for dysphoric mood, sleep disturbance, depressed mood and stress. The patient is nervous/anxious.        Vitals:    09/12/24 1400   BP: 173/82   Pulse: 75   Resp: 12   Temp: 98.4 °F (36.9 °C)   SpO2: 98%       Objective   Physical Exam  Vitals and nursing note reviewed.   Constitutional:       General: She is not in acute distress.     Appearance: Normal appearance. She is well-developed. She is not ill-appearing or toxic-appearing.   HENT:      Head: Normocephalic and atraumatic.   Cardiovascular:      Rate and Rhythm: Normal rate and regular rhythm.      Heart sounds: Normal heart sounds. No murmur heard.  Pulmonary:      Effort: Pulmonary effort is normal. No respiratory distress.      Breath sounds: Normal breath sounds. No wheezing, rhonchi or rales.   Skin:     General: Skin is warm and dry.      Findings: No rash.   Neurological:      Mental Status: She is alert and oriented to person, place, and time.      Cranial Nerves: No cranial nerve deficit.      Gait: Gait normal.   Psychiatric:         Attention and Perception: Attention and perception normal.         Mood and Affect: Mood is anxious.         Speech: Speech normal.         Behavior: Behavior normal. Behavior is cooperative.         Thought Content: Thought content normal.         Cognition and Memory: Cognition and memory normal.         Judgment: Judgment normal.       Physical Exam          Assessment & Plan   Diagnoses and all orders for this visit:    1. Elevated blood pressure reading in office with diagnosis of hypertension (Primary)    2. Cervicalgia  -     traMADol (ULTRAM) 50 MG tablet; Take 1 tablet by mouth Every 6 (Six) Hours As Needed for Moderate Pain or Severe Pain.  Dispense: 30 tablet; Refill: 0    3. Lumbar radiculopathy, chronic  -     MRI Lumbar Spine Without Contrast; Future    4. Visit for screening mammogram  -     Mammo Screening Digital Tomosynthesis Bilateral With CAD; Future    5. Anxiety    Other orders  -     methylPREDNISolone (MEDROL) 4 MG dose pack; Take as directed on package instructions.  Dispense: 21 tablet; Refill: 0  -     baclofen (LIORESAL) 10 MG tablet; 1/2 - 1 po qday prn and 1-2 po qhs prn  Dispense: 40 tablet; Refill: 0  -     citalopram (CeleXA) 10 MG tablet; Take 0.5 tablets by mouth Every Night.  Dispense: 30 tablet; Refill: 0  -     allopurinol (ZYLOPRIM) 300 MG tablet; Take 1 tablet by mouth Daily.  Dispense: 90 tablet; Refill: 1  -     pantoprazole (PROTONIX) 40 MG EC tablet; Take 1 tablet by mouth Daily.  Dispense: 90 tablet; Refill: 1  -     metoprolol succinate XL (TOPROL-XL) 50 MG 24 hr tablet; Take 1 tablet by mouth Every Morning.  Dispense: 90 tablet; Refill: 1  -     metoprolol succinate XL (Toprol XL) 100 MG 24 hr tablet; Take 1 tablet by mouth Every Night.  Dispense: 90 tablet; Refill: 1      Assessment & Plan  1. Back pain.  The back pain is likely due to a pinched nerve, as indicated by a nerve conduction study conducted at [place] in May 2024. The study revealed tightness in the right lower back (L5-S1 paraspinals). A referral to [facility] will be made for a potential epidural injection. An MRI of the lumbar spine will be ordered to get a better view of the affected area. Prednisone has been prescribed. Baclofen can be taken as needed during the day and at night. A refill for tramadol will be sent.    2. Uncontrolled  hypertension.  A referral to a cardiologist will be made to address the uncontrolled hypertension. Clonidine 0.1 mg patches will be continued. The patient has been advised to take 50 mg in the morning and 100 mg at night, adjusting as needed based on blood pressure readings.    3. Anxiety.  The dosage of BuSpar (buspirone) can be increased every few days as needed. Celexa (citalopram) 5 mg at bedtime will be started, with a plan to increase the dose after several weeks if well tolerated.    4. Hot flashes.  Clonidine can be used for hot flashes. The patient has been advised to continue using it if it helps manage the symptoms.    5. Hypertriglyceridemia.  Triglycerides are elevated at 917. She is advised to avoid raw vegetables and dairy products, which may be contributing to gastrointestinal symptoms and elevated triglycerides.    6. Facial cyst.  She is advised to continue applying 5-FU (fluorouracil) to her face twice daily for two weeks as prescribed by her dermatologist. She should keep her appointment with Dr. Shelbie Escudero next week for further evaluation.    Follow-up  She will follow up on 10/29/2024.     Results  Laboratory Studies  Potassium was 5.1. Triglycerides were 917.    Testing  Nerve conduction study of the legs was within normal limits for age except on the right L5-S1 paraspinals.          Patient or patient representative verbalized consent for the use of Ambient Listening during the visit with  Sara Zhao DO for chart documentation. 9/15/2024  13:46 EDT

## 2024-10-09 ENCOUNTER — TELEPHONE (OUTPATIENT)
Dept: FAMILY MEDICINE CLINIC | Facility: CLINIC | Age: 79
End: 2024-10-09
Payer: COMMERCIAL

## 2024-10-09 NOTE — TELEPHONE ENCOUNTER
Pt states not necessarily. The pain is just all over. Says she has appt on 10/29, and can talk to you about it all then.

## 2024-10-09 NOTE — TELEPHONE ENCOUNTER
----- Message from Sara Zhao sent at 10/7/2024  4:49 PM EDT -----  MRI L Spine-----Disc bulge or mild disc herniation on most levels but at L5/S1 has some Left sided narrowing...... is the pain at Left low back?

## 2024-10-29 ENCOUNTER — OFFICE VISIT (OUTPATIENT)
Dept: FAMILY MEDICINE CLINIC | Facility: CLINIC | Age: 79
End: 2024-10-29
Payer: MEDICARE

## 2024-10-29 VITALS
WEIGHT: 135 LBS | DIASTOLIC BLOOD PRESSURE: 76 MMHG | BODY MASS INDEX: 24.84 KG/M2 | HEART RATE: 73 BPM | HEIGHT: 62 IN | TEMPERATURE: 97.8 F | OXYGEN SATURATION: 97 % | RESPIRATION RATE: 16 BRPM | SYSTOLIC BLOOD PRESSURE: 168 MMHG

## 2024-10-29 DIAGNOSIS — I10 ELEVATED BLOOD PRESSURE READING IN OFFICE WITH DIAGNOSIS OF HYPERTENSION: ICD-10-CM

## 2024-10-29 DIAGNOSIS — E78.2 MIXED HYPERLIPIDEMIA: ICD-10-CM

## 2024-10-29 DIAGNOSIS — N39.46 MIXED STRESS AND URGE URINARY INCONTINENCE: ICD-10-CM

## 2024-10-29 DIAGNOSIS — N28.9 RENAL INSUFFICIENCY: ICD-10-CM

## 2024-10-29 DIAGNOSIS — Z23 NEED FOR INFLUENZA VACCINATION: ICD-10-CM

## 2024-10-29 DIAGNOSIS — M54.2 CERVICALGIA: ICD-10-CM

## 2024-10-29 LAB
BILIRUB BLD-MCNC: ABNORMAL MG/DL
CLARITY, POC: CLEAR
COLOR UR: YELLOW
EXPIRATION DATE: ABNORMAL
GLUCOSE UR STRIP-MCNC: NEGATIVE MG/DL
KETONES UR QL: NEGATIVE
LEUKOCYTE EST, POC: NEGATIVE
Lab: ABNORMAL
NITRITE UR-MCNC: NEGATIVE MG/ML
PH UR: 5.5 [PH] (ref 5–8)
PROT UR STRIP-MCNC: ABNORMAL MG/DL
RBC # UR STRIP: NEGATIVE /UL
SP GR UR: 1.02 (ref 1–1.03)
UROBILINOGEN UR QL: NORMAL

## 2024-10-29 RX ORDER — TRAMADOL HYDROCHLORIDE 50 MG/1
50 TABLET ORAL EVERY 6 HOURS PRN
Qty: 30 TABLET | Refills: 0 | Status: SHIPPED | OUTPATIENT
Start: 2024-10-29

## 2024-10-29 RX ORDER — MIRABEGRON 25 MG/1
25 TABLET, FILM COATED, EXTENDED RELEASE ORAL DAILY
Qty: 30 TABLET | Refills: 0 | COMMUNITY
Start: 2024-10-29

## 2024-10-29 RX ORDER — TACROLIMUS 0.5 MG/1
5 CAPSULE ORAL 2 TIMES DAILY
Status: SHIPPED
Start: 2024-10-29

## 2024-10-29 RX ORDER — METOPROLOL SUCCINATE 100 MG/1
100 TABLET, EXTENDED RELEASE ORAL NIGHTLY
Qty: 90 TABLET | Refills: 1 | Status: SHIPPED | OUTPATIENT
Start: 2024-10-29

## 2024-10-29 RX ORDER — METOPROLOL SUCCINATE 50 MG/1
50 TABLET, EXTENDED RELEASE ORAL
Qty: 90 TABLET | Refills: 1 | Status: SHIPPED | OUTPATIENT
Start: 2024-10-29

## 2024-10-29 RX ORDER — BUSPIRONE HYDROCHLORIDE 5 MG/1
5 TABLET ORAL 3 TIMES DAILY
Qty: 270 TABLET | Refills: 0 | Status: SHIPPED | OUTPATIENT
Start: 2024-10-29

## 2024-10-29 NOTE — PROGRESS NOTES
Subjective   Christal Romo is a 79 y.o. female.     Chief Complaint   Patient presents with    Hypertension    Med Refill     Patient needs a refill on Metoprolol 100mg and 50mg, Buspirone 5mg to [facility].  And Tramadol goes to [facility]    Flu Vaccine     Patient was given the high dose influenza injection while in the office today.    Anxiety    Back Pain         Current Outpatient Medications:     acetaminophen (TYLENOL) 500 MG tablet, Take 1 tablet by mouth. prn, Disp: , Rfl:     allopurinol (ZYLOPRIM) 300 MG tablet, Take 1 tablet by mouth Daily., Disp: 90 tablet, Rfl: 1    aspirin 81 MG tablet, Take 1 tablet by mouth Daily., Disp: , Rfl:     baclofen (LIORESAL) 10 MG tablet, 1/2 - 1 po qday prn and 1-2 po qhs prn, Disp: 40 tablet, Rfl: 0    busPIRone (BUSPAR) 5 MG tablet, Take 1 tablet by mouth 3 (Three) Times a Day., Disp: 270 tablet, Rfl: 0    chlorthalidone (HYGROTON) 25 MG tablet, Take 0.5 tablets by mouth Daily As Needed (LEG swelling)., Disp: , Rfl:     citalopram (CeleXA) 10 MG tablet, Take 0.5 tablets by mouth Every Night., Disp: 30 tablet, Rfl: 0    cloNIDine (CATAPRES-TTS) 0.1 MG/24HR patch, Place 1 patch on the skin as directed by provider 1 (One) Time Per Week., Disp: 4 patch, Rfl: 3    diphenhydrAMINE (BENADRYL) 25 MG tablet, Take 1 tablet by mouth Every Night., Disp: , Rfl:     HYDROcodone-acetaminophen (NORCO) 5-325 MG per tablet, Take 1 tablet by mouth 4 (Four) Times a Day As Needed for Moderate Pain., Disp: 12 tablet, Rfl: 0    Lidocaine HCl-Benzyl Alcohol (Salonpas Lidocaine Plus) 4-10 % cream, Apply  topically., Disp: , Rfl:     Lutein 20 MG capsule, Take 1 tablet by mouth Daily., Disp: , Rfl:     meclizine (ANTIVERT) 12.5 MG tablet, Take 1 tablet by mouth 3 (Three) Times a Day As Needed for Dizziness., Disp: 30 tablet, Rfl: 1    metoprolol succinate XL (Toprol XL) 100 MG 24 hr tablet, Take 1 tablet by mouth Every Night., Disp: 90 tablet, Rfl: 1    metoprolol succinate XL (TOPROL-XL) 50 MG 24 hr  tablet, Take 1 tablet by mouth Every Morning., Disp: 90 tablet, Rfl: 1    Multiple Vitamins-Minerals (Multivitamin Adults 50+) tablet, Take 1 tablet by mouth Daily., Disp: , Rfl:     mycophenolate (MYFORTIC) 360 MG tablet delayed-release EC tablet, Take 1 tablet by mouth Every 12 (Twelve) Hours., Disp: , Rfl:     nystatin (MYCOSTATIN) 100,000 unit/mL suspension, Swish and swallow 5 mL 4 (Four) Times a Day., Disp: 200 mL, Rfl: 1    ondansetron (Zofran) 4 MG tablet, Take 1 tablet by mouth Every 8 (Eight) Hours As Needed for Nausea or Vomiting. prn, Disp: 30 tablet, Rfl: 1    pantoprazole (PROTONIX) 40 MG EC tablet, Take 1 tablet by mouth Daily., Disp: 90 tablet, Rfl: 1    tacrolimus (PROGRAF) 0.5 MG capsule, Take 10 capsules by mouth 2 (Two) Times a Day. 1 po qam and 1 po qpm, Disp: , Rfl:     traMADol (ULTRAM) 50 MG tablet, Take 1 tablet by mouth Every 6 (Six) Hours As Needed for Moderate Pain or Severe Pain., Disp: 30 tablet, Rfl: 0    Mirabegron ER (Myrbetriq) 25 MG tablet sustained-release 24 hour 24 hr tablet, Take 1 tablet by mouth Daily., Disp: 30 tablet, Rfl: 0    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 - Dr Ritter [facility]; CBC/Prograf/CMP/LIPIDS/A1C    MAMMO     NEG = 2018/ 2021/ 2022/ 2024    Meniere's disease     OAB     Osteoporosis     PUD        Past Surgical History:   Procedure Laterality Date    BREAST LUMPECTOMY Left     Benign    CATARACT EXTRACTION WITH INTRAOCULAR LENS IMPLANT Left     COLONOSCOPY      Polyps = 2012/ 2015/ 2020, rech 2025   GSI    ERCP W/ PLASTIC STENT PLACEMENT  2014    Liver Stent    [place] TRANS[place]ATION      [place] [facility]    SKIN CANCER EXCISION      Rt UE    TOTAL ABDOMINAL HYSTERECTOMY   1979       Family History   Problem Relation Age of Onset    Diabetes Sister         Type II    Heart disease Sister     Hypertension Sister     No Known Problems Brother     Hemochromatosis Other        Social History     Socioeconomic History    Marital status:    Tobacco Use    Smoking status: Former     Current packs/day: 0.00     Average packs/day: 0.3 packs/day for 1 year (0.3 ttl pk-yrs)     Types: Cigarettes     Start date:      Quit date:      Years since quittin.9     Passive exposure: Past    Smokeless tobacco: Never    Tobacco comments:     Quit .    Vaping Use    Vaping status: Never Used   Substance and Sexual Activity    Alcohol use: No     Comment: very rarely     Drug use: No    Sexual activity: Defer       History of Present Illness  80 y/o C female here for f/u on HTN/ Anxiety/ Back pain        The following portions of the patient's history were reviewed and updated as appropriate: allergies, current medications, past family history, past medical history, past social history, past surgical history and problem list.    Review of Systems   Constitutional:  Negative for activity change, appetite change, fatigue, unexpected weight gain and unexpected weight loss.   Respiratory:  Negative for cough, chest tightness and shortness of breath.    Cardiovascular:  Negative for chest pain, palpitations and leg swelling.   Genitourinary:  Positive for urgency and urinary incontinence. Negative for dysuria and frequency.   Musculoskeletal:  Positive for neck pain. Negative for arthralgias and myalgias.   Neurological:  Negative for dizziness, facial asymmetry, speech difficulty, weakness, light-headedness, headache, memory problem and confusion.   Psychiatric/Behavioral:  The patient is not nervous/anxious.        Vitals:    10/29/24 1507   BP: 168/76   Pulse: 73   Resp: 16   Temp: 97.8 °F (36.6 °C)   SpO2: 97%       Objective   Physical Exam  Vitals and nursing note reviewed.    Constitutional:       General: She is not in acute distress.     Appearance: She is not ill-appearing or toxic-appearing.   HENT:      Head: Normocephalic and atraumatic.   Pulmonary:      Effort: Pulmonary effort is normal.   Neurological:      Mental Status: She is alert and oriented to person, place, and time.      Cranial Nerves: No cranial nerve deficit.      Gait: Gait normal.   Psychiatric:         Attention and Perception: Attention and perception normal.         Mood and Affect: Mood and affect normal.         Speech: Speech normal.         Behavior: Behavior normal. Behavior is cooperative.         Thought Content: Thought content normal.         Cognition and Memory: Cognition and memory normal.         Judgment: Judgment normal.       Physical Exam       BMI is within normal parameters. No other follow-up for BMI required.      Assessment & Plan   Diagnoses and all orders for this visit:    1. Elevated blood pressure reading in office with diagnosis of hypertension    2. Cervicalgia  -     traMADol (ULTRAM) 50 MG tablet; Take 1 tablet by mouth Every 6 (Six) Hours As Needed for Moderate Pain or Severe Pain.  Dispense: 30 tablet; Refill: 0    3. Renal insufficiency  -     Basic Metabolic Panel; Future    4. Mixed hyperlipidemia  -     Lipid Panel; Future    5. Mixed stress and urge urinary incontinence  -     POCT urinalysis dipstick, automated    6. Need for influenza vaccination  -     Fluzone High-Dose 65+yrs    Other orders  -     metoprolol succinate XL (Toprol XL) 100 MG 24 hr tablet; Take 1 tablet by mouth Every Night.  Dispense: 90 tablet; Refill: 1  -     metoprolol succinate XL (TOPROL-XL) 50 MG 24 hr tablet; Take 1 tablet by mouth Every Morning.  Dispense: 90 tablet; Refill: 1  -     busPIRone (BUSPAR) 5 MG tablet; Take 1 tablet by mouth 3 (Three) Times a Day.  Dispense: 270 tablet; Refill: 0  -     tacrolimus (PROGRAF) 0.5 MG capsule; Take 10 capsules by mouth 2 (Two) Times a Day. 1 po qam  and 1 po qpm  -     Mirabegron ER (Myrbetriq) 25 MG tablet sustained-release 24 hour 24 hr tablet; Take 1 tablet by mouth Daily.  Dispense: 30 tablet; Refill: 0      Assessment & Plan       Results            Patient or patient representative verbalized consent for the use of Ambient Listening during the visit with  Sara Zhao DO for chart documentation. 11/10/2024  21:32 EST

## 2024-12-11 DIAGNOSIS — M54.2 CERVICALGIA: ICD-10-CM

## 2024-12-11 RX ORDER — TRAMADOL HYDROCHLORIDE 50 MG/1
50 TABLET ORAL EVERY 6 HOURS PRN
Qty: 30 TABLET | Refills: 0 | Status: SHIPPED | OUTPATIENT
Start: 2024-12-11

## 2024-12-11 RX ORDER — BACLOFEN 10 MG/1
TABLET ORAL
Qty: 30 TABLET | Refills: 0 | Status: SHIPPED | OUTPATIENT
Start: 2024-12-11

## 2024-12-11 NOTE — TELEPHONE ENCOUNTER
[facility]    Rx Refill Note  Requested Prescriptions     Pending Prescriptions Disp Refills    traMADol (ULTRAM) 50 MG tablet [Pharmacy Med Name: traMADol HCL 50MG TABLET] 30 tablet      Sig: TAKE ONE TABLET BY MOUTH EVERY 6 HOURS AS NEEDED FOR MODERATE OR SEVERE PAIN    baclofen (LIORESAL) 10 MG tablet [Pharmacy Med Name: BACLOFEN 10 MG TABLET] 30 tablet      Sig: TAKE 1 TABLET BY MOUTH AT NIGHT AS NEEDED FOR MUSCLE SPASMS      Last office visit with prescribing clinician: 10/29/2024   Last telemedicine visit with prescribing clinician: Visit date not found   Next office visit with prescribing clinician: 12/16/2024                         Would you like a call back once the refill request has been completed: [] Yes [] No    If the office needs to give you a call back, can they leave a voicemail: [] Yes [] No    Linda Adam, RT  12/11/24, 10:13 EST

## 2024-12-26 ENCOUNTER — OFFICE VISIT (OUTPATIENT)
Dept: FAMILY MEDICINE CLINIC | Facility: CLINIC | Age: 79
End: 2024-12-26
Payer: MEDICARE

## 2024-12-26 VITALS
BODY MASS INDEX: 25.4 KG/M2 | SYSTOLIC BLOOD PRESSURE: 200 MMHG | DIASTOLIC BLOOD PRESSURE: 80 MMHG | TEMPERATURE: 98.2 F | HEART RATE: 78 BPM | WEIGHT: 138 LBS | HEIGHT: 62 IN | OXYGEN SATURATION: 98 %

## 2024-12-26 DIAGNOSIS — F41.9 ANXIETY: ICD-10-CM

## 2024-12-26 DIAGNOSIS — G89.29 CHRONIC NECK PAIN: ICD-10-CM

## 2024-12-26 DIAGNOSIS — M54.2 CHRONIC NECK PAIN: ICD-10-CM

## 2024-12-26 DIAGNOSIS — Q83.3 ACCESSORY NIPPLE IN FEMALE: ICD-10-CM

## 2024-12-26 DIAGNOSIS — I10 SEVERE UNCONTROLLED HYPERTENSION: Primary | ICD-10-CM

## 2024-12-26 PROCEDURE — 99214 OFFICE O/P EST MOD 30 MIN: CPT | Performed by: FAMILY MEDICINE

## 2024-12-26 PROCEDURE — 3077F SYST BP >= 140 MM HG: CPT | Performed by: FAMILY MEDICINE

## 2024-12-26 PROCEDURE — 3079F DIAST BP 80-89 MM HG: CPT | Performed by: FAMILY MEDICINE

## 2024-12-26 PROCEDURE — 1126F AMNT PAIN NOTED NONE PRSNT: CPT | Performed by: FAMILY MEDICINE

## 2024-12-26 RX ORDER — HYDROCORTISONE 10 MG/ML
LOTION TOPICAL 2 TIMES DAILY PRN
Start: 2024-12-26

## 2024-12-26 RX ORDER — CITALOPRAM HYDROBROMIDE 10 MG/1
5 TABLET ORAL NIGHTLY
Qty: 30 TABLET | Refills: 0 | Status: SHIPPED | OUTPATIENT
Start: 2024-12-26

## 2024-12-26 NOTE — PROGRESS NOTES
Subjective   Christal Romo is a 79 y.o. female.     Chief Complaint   Patient presents with    Hypertension    Anxiety         Current Outpatient Medications:     acetaminophen (TYLENOL) 500 MG tablet, Take 1 tablet by mouth. prn, Disp: , Rfl:     allopurinol (ZYLOPRIM) 300 MG tablet, Take 1 tablet by mouth Daily., Disp: 90 tablet, Rfl: 1    aspirin 81 MG tablet, Take 1 tablet by mouth Daily., Disp: , Rfl:     baclofen (LIORESAL) 10 MG tablet, TAKE 1 TABLET BY MOUTH AT NIGHT AS NEEDED FOR MUSCLE SPASMS, Disp: 30 tablet, Rfl: 0    busPIRone (BUSPAR) 5 MG tablet, Take 1 tablet by mouth 3 (Three) Times a Day., Disp: 270 tablet, Rfl: 0    chlorthalidone (HYGROTON) 25 MG tablet, Take 0.5 tablets by mouth Daily As Needed (LEG swelling)., Disp: , Rfl:     citalopram (CeleXA) 10 MG tablet, Take 0.5 tablets by mouth Every Night., Disp: 30 tablet, Rfl: 0    cloNIDine (CATAPRES-TTS) 0.1 MG/24HR patch, Place 1 patch on the skin as directed by provider 1 (One) Time Per Week., Disp: 4 patch, Rfl: 3    diphenhydrAMINE (BENADRYL) 25 MG tablet, Take 1 tablet by mouth Every Night., Disp: , Rfl:     Lidocaine HCl-Benzyl Alcohol (Salonpas Lidocaine Plus) 4-10 % cream, Apply  topically., Disp: , Rfl:     Lutein 20 MG capsule, Take 1 tablet by mouth Daily., Disp: , Rfl:     meclizine (ANTIVERT) 12.5 MG tablet, Take 1 tablet by mouth 3 (Three) Times a Day As Needed for Dizziness., Disp: 30 tablet, Rfl: 1    metoprolol succinate XL (Toprol XL) 100 MG 24 hr tablet, Take 1 tablet by mouth Every Night., Disp: 90 tablet, Rfl: 1    metoprolol succinate XL (TOPROL-XL) 50 MG 24 hr tablet, Take 1 tablet by mouth Every Morning., Disp: 90 tablet, Rfl: 1    Mirabegron ER (Myrbetriq) 25 MG tablet sustained-release 24 hour 24 hr tablet, Take 1 tablet by mouth Daily., Disp: 30 tablet, Rfl: 0    Multiple Vitamins-Minerals (Multivitamin Adults 50+) tablet, Take 1 tablet by mouth Daily., Disp: , Rfl:     mycophenolate (MYFORTIC) 360 MG tablet  delayed-release EC tablet, Take 1 tablet by mouth Every 12 (Twelve) Hours., Disp: , Rfl:     nystatin (MYCOSTATIN) 100,000 unit/mL suspension, Swish and swallow 5 mL 4 (Four) Times a Day., Disp: 200 mL, Rfl: 1    ondansetron (Zofran) 4 MG tablet, Take 1 tablet by mouth Every 8 (Eight) Hours As Needed for Nausea or Vomiting. prn, Disp: 30 tablet, Rfl: 1    pantoprazole (PROTONIX) 40 MG EC tablet, Take 1 tablet by mouth Daily., Disp: 90 tablet, Rfl: 1    tacrolimus (PROGRAF) 0.5 MG capsule, Take 10 capsules by mouth 2 (Two) Times a Day. 1 po qam and 1 po qpm, Disp: , Rfl:     traMADol (ULTRAM) 50 MG tablet, TAKE ONE TABLET BY MOUTH EVERY 6 HOURS AS NEEDED FOR MODERATE OR SEVERE PAIN, Disp: 30 tablet, Rfl: 0    hydrocortisone 1 % lotion, Apply  topically to the appropriate area as directed 2 (Two) Times a Day As Needed for Irritation (Left chest irritation)., Disp: , Rfl:     omega-3 acid ethyl esters (Lovaza) 1 g capsule, Take 2 capsules by mouth 2 (Two) Times a Day., Disp: , Rfl:     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 - Dr Ritter [facility]; CBC/Prograf/CMP/LIPIDS/A1C    MAMMO     NEG = 2018/ 2021/ 2022/ 2024    Meniere's disease     OAB     Osteoporosis     PUD        Past Surgical History:   Procedure Laterality Date    BREAST LUMPECTOMY Left     Benign    CATARACT EXTRACTION WITH INTRAOCULAR LENS IMPLANT Left     COLONOSCOPY      Polyps = 2012/ 2015/ 2020, rech 2025   GSI    ERCP W/ PLASTIC STENT PLACEMENT  2014    Liver Stent    LIVER TRANSPLANTATION      [place] [facility]    SKIN CANCER EXCISION      [facility]    TOTAL ABDOMINAL HYSTERECTOMY  1979       Family History   Problem Relation Age of Onset    Diabetes Sister         Type  II    Heart disease Sister     Hypertension Sister     No Known Problems Brother     Hemochromatosis Other        Social History     Socioeconomic History    Marital status:    Tobacco Use    Smoking status: Former     Current packs/day: 0.00     Average packs/day: 0.3 packs/day for 1 year (0.3 ttl pk-yrs)     Types: Cigarettes     Start date:      Quit date:      Years since quittin.0     Passive exposure: Past    Smokeless tobacco: Never    Tobacco comments:     Quit .    Vaping Use    Vaping status: Never Used   Substance and Sexual Activity    Alcohol use: No     Comment: very rarely     Drug use: No    Sexual activity: Defer       History of Present Illness  The patient is a 79-year-old female here for follow-up on hypertension and anxiety.    She reports that her blood pressure has been consistently elevated since her liver transplant. She has been using a clonidine patch for the past 2 months but does not believe it is effective. She has an appointment with Dr. Clifton next week. She has never undergone a heart catheterization. She has a history of stress tests and echocardiograms. She has a home blood pressure monitor, which she used to record a reading of 200 systolic.      She has a supply of tramadol for pain management and plans to take it upon returning home. She has a history of headaches, which she attributes to her glasses. She recently acquired new glasses but has not yet received them. She also experiences neck pain, which she manages with tramadol. She has a history of liver transplant and a fall in 2017 that resulted in cervical issues. She is seeking a new spine doctor and is considering resuming neck injections. She has previously undergone an MRI of her back and knee. She uses heat or ice at night to manage her symptoms. She has a history of a pinched nerve on the right side of her back, as confirmed by a nerve conduction test. She is not interested in surgical  intervention but is open to injections.     She has a history of nausea and diarrhea. She has a history of high triglycerides, which she believes are due to her Prograf. She was prescribed fish oil in October 2024 but discontinued it due to urinary odor. She has a history of Phenergan use, which she discontinued due to leg cramps. She has not taken Lipitor. She has a history of dark urine in the morning, which lightens throughout the day. She does not drink much water.     She has been experiencing pruritus in the area of a mole located along her bra line. The itching has been persistent for several days. She has not attempted to express any fluid from the mole. She has been applying a topical otc treatment, which provides some relief. She does not wear bras frequently and notes that the itching is less bothersome when she is not wearing one. She has had the mole for an extended period, but this is the first instance of itching. She has also been using a zinc oxide cream, which she finds beneficial. The itching occurs sporadically and is not limited to when she is wearing a bra.    She has been prescribed Myrbetriq for her bladder condition, which has resulted in nocturia. She reports that the medication induces drowsiness, but the frequent urination disrupts her sleep. She plans to adjust the timing of her medication intake to the morning hours.    She has been prescribed Celexa for her anxiety but has discontinued its use due to poss associated headaches. She is considering resuming the medication.    ALLERGIES  The patient has 38 allergies    MEDICATIONS  Current: Myrbetriq, tramadol, Tylenol, allopurinol, baby aspirin, baclofen, BuSpar, chlorthalidone, clonidine patch        The following portions of the patient's history were reviewed and updated as appropriate: allergies, current medications, past family history, past medical history, past social history, past surgical history and problem list.    Review of  Systems   Constitutional:  Negative for activity change and appetite change.   Genitourinary:  Positive for urinary incontinence.   Skin:  Positive for color change and skin lesions. Negative for rash and wound.   Neurological:  Positive for tremors.   Psychiatric/Behavioral:  Positive for stress.        Vitals:    12/26/24 1536   BP: (!) 200/80   Pulse:    Temp:    SpO2:        Objective   Physical Exam  Vitals and nursing note reviewed.   Constitutional:       General: She is not in acute distress.     Appearance: She is not ill-appearing or toxic-appearing.   Chest:          Comments: +2-3mm round sessile soft brown pigmented skin lesion w/o surr erythema  Skin:     General: Skin is warm and dry.   Neurological:      Mental Status: She is alert.   Psychiatric:         Attention and Perception: Attention and perception normal.         Mood and Affect: Mood and affect normal. Mood is anxious.         Speech: Speech normal.         Behavior: Behavior normal. Behavior is cooperative.         Thought Content: Thought content normal.         Cognition and Memory: Cognition and memory normal.         Judgment: Judgment normal.       Physical Exam  Vital Signs  NV=642/90.    Assessment & Plan   Diagnoses and all orders for this visit:    1. Severe uncontrolled hypertension (Primary)    2. Chronic neck pain  -     XR Spine Cervical Complete 4 or 5 View; Future    3. Accessory nipple in female    4. Anxiety    Other orders  -     hydrocortisone 1 % lotion; Apply  topically to the appropriate area as directed 2 (Two) Times a Day As Needed for Irritation (Left chest irritation).  -     citalopram (CeleXA) 10 MG tablet; Take 0.5 tablets by mouth Every Night.  Dispense: 30 tablet; Refill: 0      Assessment & Plan  1. Hypertension.  Her blood pressure readings were significantly elevated during this visit, with systolic values exceeding 200 mmHg. She reported that her current clonidine patch is not effectively managing her  blood pressure. She was advised to consult with her cardiologist regarding the potential need to increase the dosage of her clonidine patch. She was also advised to maintain a record of her blood pressure readings for future reference.    2. Anxiety.  She was advised to resume her Celexa medication, initially at a low dose, and gradually increase it as tolerated. A prescription for Celexa was provided.    3. Pruritus.  The pruritus is likely due to irritation from her bra line. She was advised to apply cortisone cream to the affected area and to use a Band-Aid to prevent further irritation from her bra.    4. Overactive Bladder.  She reported that the Myrbetriq sample caused nocturnal polyuria. She was advised to take the medication in the morning to see if it reduces nighttime symptoms.    5. Cervicalgia.  She reported neck pain and a history of cervical issues following a fall in 2017. She was advised to use heat or ice at night to manage symptoms. An x-ray of her neck was ordered to assess the current status. If the x-ray shows significant issues, further imaging or referral to a spine specialist will be considered.    6. Hypertriglyceridemia.  She reported high triglycerides, which may be related to her Prograf medication. She was advised to call and confirm the name of the fish oil prescription she received in October 2023. If the current treatment is ineffective, fenofibric acid (TriCor) will be considered.     Results  Imaging  MRI of low back showed mild to moderate left-sided narrowing on the lowest L5-S1.          Patient or patient representative verbalized consent for the use of Ambient Listening during the visit with  Sara Zhao DO for chart documentation. 12/29/2024  14:07 EST

## 2024-12-27 ENCOUNTER — TELEPHONE (OUTPATIENT)
Dept: FAMILY MEDICINE CLINIC | Facility: CLINIC | Age: 79
End: 2024-12-27

## 2024-12-27 RX ORDER — OMEGA-3-ACID ETHYL ESTERS 1 G/1
2 CAPSULE, LIQUID FILLED ORAL 2 TIMES DAILY
Start: 2024-12-27 | End: 2024-12-30

## 2024-12-27 NOTE — TELEPHONE ENCOUNTER
Caller: Christal Romo    Relationship: Self    Best call back number: [phone]      PATIENT WANTED TO LET DR PAZ KNOW THAT TRIGLYCERIDE MEDICATION SHE IS ON IS CALLED:     LOVAZA 1 MG

## 2024-12-30 ENCOUNTER — OFFICE VISIT (OUTPATIENT)
Dept: CARDIOLOGY | Facility: CLINIC | Age: 79
End: 2024-12-30
Payer: MEDICARE

## 2024-12-30 VITALS
HEIGHT: 62 IN | BODY MASS INDEX: 25.24 KG/M2 | DIASTOLIC BLOOD PRESSURE: 92 MMHG | OXYGEN SATURATION: 98 % | HEART RATE: 78 BPM | SYSTOLIC BLOOD PRESSURE: 180 MMHG

## 2024-12-30 DIAGNOSIS — I10 ESSENTIAL HYPERTENSION: Primary | ICD-10-CM

## 2024-12-30 RX ORDER — OMEGA-3-ACID ETHYL ESTERS 1 G/1
1 CAPSULE, LIQUID FILLED ORAL DAILY
Status: SHIPPED
Start: 2024-12-30

## 2024-12-30 RX ORDER — CHLORTHALIDONE 25 MG/1
25 TABLET ORAL DAILY PRN
Start: 2024-12-30

## 2024-12-30 RX ORDER — CLONIDINE 0.2 MG/24H
1 PATCH, EXTENDED RELEASE TRANSDERMAL WEEKLY
Qty: 4 PATCH | Refills: 11 | Status: SHIPPED | OUTPATIENT
Start: 2024-12-30 | End: 2025-12-30

## 2024-12-30 NOTE — TELEPHONE ENCOUNTER
Pt states she takes it once a day.    Also, wants to know if you can go ahead and order a neck xray here. She missed the chair yest, and sat hard on the floor, which has made her neck pain even worse. Says she can wait until next wk, if needed.

## 2024-12-30 NOTE — PATIENT INSTRUCTIONS
Thank you for following up with cardiology today.  We encourage you to continue taking her medications and engaging healthy lifestyle habits including physical activity and diet with low sodium levels.      We are increasing clonidine patch to 0.2 mg and increasing chlorthalidone to 25 mg daily.    If you have any questions please let us know.

## 2024-12-30 NOTE — PROGRESS NOTES
Cardiology Office Visit      Encounter Date:  2024    PATIENT IDENTIFICATION    Name: Christal Romo  Age: 79 y.o. Sex: female : 1945  MRN: [id number]    Reason For Followup:  Elevated blood pressure    Brief Clinical History:  Patient 79-year-old female with history of cirrhosis status post liver transplant 2016, poorly controlled hypertension, valvular heart disease, depression and chronic palpitations.    She comes today for evaluation of hypertension.    Patient states that last week she sustained mechanical fall at home.  She has had back pain and neck pain since then.  Patient planning to have neck x-ray this week.  Over the past few weeks she has noted increased blood pressure levels, in the 180s to 190s range. She denies chest pain, shortness of breath, palpitations, dizziness, lightheadedness, presyncope or syncope.    Her current medications for hypertension includes clonidine patch 0.1 mg, chlorthalidone 12.5 mg daily, metoprolol succinate 200 mg daily.    Most recent cardiac workup TTE 2023    Left ventricular systolic function is normal. Calculated left ventricular EF = 63%    Left ventricular wall thickness is consistent with mild concentric hypertrophy.    Left ventricular diastolic function is consistent with (grade I) impaired relaxation.    The left atrial cavity is mildly dilated.    Estimated right ventricular systolic pressure from tricuspid regurgitation is normal (<35 mmHg).      Assessment & Plan    Impressions:  Essential hypertension, poorly controlled  Cirrhosis status post liver transplant   Valvular heart disease, including mild MR TR AI and PI  Depression  Chronic palpitations        Recommendations:  Increase clonidine patch to 0.2 mg  Increase chlorthalidone to 25 mg daily  Recommend patient to engage in low-sodium diet, she verbalized understanding and agreed.  Close follow-up for reassessment of blood pressure in the next visit    There are no diagnoses  "linked to this encounter.      Objective:    Vitals:  Vitals:    12/30/24 1446   BP: 180/92   BP Location: Left arm   Patient Position: Sitting   Cuff Size: Adult   Pulse: 78   SpO2: 98%   Height: 157.5 cm (62\")     Body mass index is 25.24 kg/m².      Physical Exam:  General: Alert, cooperative, no distress, appears stated age  Lungs:  Clear to auscultation bilaterally, no wheezes, rhonchi or rales are noted  Chest wall: No tenderness  Heart::  Regular rate and rhythm, S1 and S2 normal, soft systolic murmur left lower sternal border  Abdomen: Soft, nontender, nondistended, bowel sounds active  Extremities: No cyanosis, clubbing, or edema  Pulses: 2+ and symmetric all extremities  Neuro/psych: No gross focal deficits        Allergies:  Allergies   Allergen Reactions    Amlodipine Itching    Atacand  [Candesartan Cilexetil] Other (See Comments)    Atenolol Palpitations    Azithromycin GI Intolerance and Nausea Only    Candesartan Headache, Unknown - High Severity and Unknown (See Comments)    Ibuprofen Unknown (See Comments)    Levofloxacin Myalgia, Nausea Only, Other (See Comments) and GI Intolerance    Penicillins Hives     denies    Spironolactone Nausea And Vomiting    Sucralfate Swelling    Sulfamethoxazole-Trimethoprim Other (See Comments) and Rash     THROMBOCYTOPENIA    BACTRIM CAUSED THROMBOCYTOPENIA PER PATIENT      THROMBOCYTOPENIA    Triamterene-Hctz Myalgia    Venlafaxine Headache           Contrast Dye (Echo Or Unknown Ct/Mr) Nausea And Vomiting    Lisinopril Nausea Only, Anxiety and Headache    Apple Unknown - High Severity    Atorvastatin Myalgia    Clonidine Derivatives GI Intolerance    Codeine Hallucinations    Diltiazem Hcl Er Coated Beads Other (See Comments) and Unknown (See Comments)     Chest pressure    Escitalopram Headache, Unknown - High Severity and Unknown (See Comments)    Everolimus Unknown - High Severity    Fluconazole Nausea Only and Headache    Hydralazine Headache    Metronidazole " Unknown - High Severity and Unknown (See Comments)    Misc. Sulfonamide Containing Compounds Unknown - High Severity    Sorbitan Unknown - High Severity and Other (See Comments)    Humphrey (Diagnostic) Unknown - High Severity    Sulfa Antibiotics Unknown - High Severity    Valganciclovir Unknown - High Severity    Venlafaxine Hcl Unknown - High Severity    Whey Nausea And Vomiting    Zyrtec [Cetirizine] Itching    Citrus Other (See Comments) and Unknown (See Comments)    Lactulose Itching, Unknown - High Severity, Nausea Only, Rash and Unknown (See Comments)     HAS SULFA IN IT    Paroxetine Palpitations    Sertraline Palpitations    Zoloft [Sertraline Hcl] Palpitations       Medication Review:     Current Outpatient Medications:     acetaminophen (TYLENOL) 500 MG tablet, Take 1 tablet by mouth. prn, Disp: , Rfl:     allopurinol (ZYLOPRIM) 300 MG tablet, Take 1 tablet by mouth Daily., Disp: 90 tablet, Rfl: 1    aspirin 81 MG tablet, Take 1 tablet by mouth Daily., Disp: , Rfl:     baclofen (LIORESAL) 10 MG tablet, TAKE 1 TABLET BY MOUTH AT NIGHT AS NEEDED FOR MUSCLE SPASMS, Disp: 30 tablet, Rfl: 0    busPIRone (BUSPAR) 5 MG tablet, Take 1 tablet by mouth 3 (Three) Times a Day., Disp: 270 tablet, Rfl: 0    chlorthalidone (HYGROTON) 25 MG tablet, Take 0.5 tablets by mouth Daily As Needed (LEG swelling)., Disp: , Rfl:     citalopram (CeleXA) 10 MG tablet, Take 0.5 tablets by mouth Every Night., Disp: 30 tablet, Rfl: 0    cloNIDine (CATAPRES-TTS) 0.1 MG/24HR patch, Place 1 patch on the skin as directed by provider 1 (One) Time Per Week., Disp: 4 patch, Rfl: 3    diphenhydrAMINE (BENADRYL) 25 MG tablet, Take 1 tablet by mouth Every Night., Disp: , Rfl:     hydrocortisone 1 % lotion, Apply  topically to the appropriate area as directed 2 (Two) Times a Day As Needed for Irritation (Left chest irritation)., Disp: , Rfl:     Lidocaine HCl-Benzyl Alcohol (Salonpas Lidocaine Plus) 4-10 % cream, Apply  topically., Disp: ,  Rfl:     Lutein 20 MG capsule, Take 1 tablet by mouth Daily., Disp: , Rfl:     meclizine (ANTIVERT) 12.5 MG tablet, Take 1 tablet by mouth 3 (Three) Times a Day As Needed for Dizziness., Disp: 30 tablet, Rfl: 1    metoprolol succinate XL (Toprol XL) 100 MG 24 hr tablet, Take 1 tablet by mouth Every Night., Disp: 90 tablet, Rfl: 1    metoprolol succinate XL (TOPROL-XL) 50 MG 24 hr tablet, Take 1 tablet by mouth Every Morning., Disp: 90 tablet, Rfl: 1    Mirabegron ER (Myrbetriq) 25 MG tablet sustained-release 24 hour 24 hr tablet, Take 1 tablet by mouth Daily., Disp: 30 tablet, Rfl: 0    Multiple Vitamins-Minerals (Multivitamin Adults 50+) tablet, Take 1 tablet by mouth Daily., Disp: , Rfl:     mycophenolate (MYFORTIC) 360 MG tablet delayed-release EC tablet, Take 1 tablet by mouth Every 12 (Twelve) Hours., Disp: , Rfl:     nystatin (MYCOSTATIN) 100,000 unit/mL suspension, Swish and swallow 5 mL 4 (Four) Times a Day., Disp: 200 mL, Rfl: 1    omega-3 acid ethyl esters (Lovaza) 1 g capsule, Take 2 capsules by mouth 2 (Two) Times a Day., Disp: , Rfl:     ondansetron (Zofran) 4 MG tablet, Take 1 tablet by mouth Every 8 (Eight) Hours As Needed for Nausea or Vomiting. prn, Disp: 30 tablet, Rfl: 1    pantoprazole (PROTONIX) 40 MG EC tablet, Take 1 tablet by mouth Daily., Disp: 90 tablet, Rfl: 1    tacrolimus (PROGRAF) 0.5 MG capsule, Take 10 capsules by mouth 2 (Two) Times a Day. 1 po qam and 1 po qpm, Disp: , Rfl:     traMADol (ULTRAM) 50 MG tablet, TAKE ONE TABLET BY MOUTH EVERY 6 HOURS AS NEEDED FOR MODERATE OR SEVERE PAIN, Disp: 30 tablet, Rfl: 0    Family History:  Family History   Problem Relation Age of Onset    Diabetes Sister         Type II    Heart disease Sister     Hypertension Sister     No Known Problems Brother     Hemochromatosis Other        Past Medical History:  Past Medical History:   Diagnosis Date    Allergic     Anxiety     Arthritis     B12 deficiency     Cataract Rt     Chronic diarrhea      Depression     GERD     Headache     Hyperlipidemia     Hypertension     Insulin resistance     Liver disease     s/p Transplant    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 - Dr Ritter [facility]; CBC/Prograf/CMP/LIPIDS/A1C    MAMMO     NEG = 2021/ 2024    Meniere's disease     OAB     Osteoporosis     PUD        Past Surgical History:  Past Surgical History:   Procedure Laterality Date    BREAST LUMPECTOMY Left     Benign    CATARACT EXTRACTION WITH INTRAOCULAR LENS IMPLANT Left     COLONOSCOPY      Polyps = 2020, rech    GSI    ERCP W/ PLASTIC STENT PLACEMENT      Liver Stent    LIVER TRANSPLANTATION      [place] [facility]    SKIN CANCER EXCISION      Rt UE    TOTAL ABDOMINAL HYSTERECTOMY         Social History:  Social History     Socioeconomic History    Marital status:    Tobacco Use    Smoking status: Former     Current packs/day: 0.00     Average packs/day: 0.3 packs/day for 1 year (0.3 ttl pk-yrs)     Types: Cigarettes     Start date:      Quit date:      Years since quittin.0     Passive exposure: Past    Smokeless tobacco: Never    Tobacco comments:     Quit .    Vaping Use    Vaping status: Never Used   Substance and Sexual Activity    Alcohol use: No     Comment: very rarely     Drug use: No    Sexual activity: Defer       Review of Systems:  The following systems were reviewed as they relate to the cardiovascular system: Constitutional, Eyes, ENT, Cardiovascular, Respiratory, Gastrointestinal, Integumentary, Neurological, Psychiatric, Hematologic, Endocrine, Musculoskeletal, and Genitourinary. The pertinent cardiovascular findings are reported above with all other cardiovascular points within those systems being negative.    Diagnostic Study Review:     Current Electrocardiogram:  Procedures    Laboratory Data:  Lab Results   Component Value Date    GLUCOSE 111 (H) 08/15/2024    BUN 25 (H) 08/15/2024    CREATININE 1.09 (H)  08/15/2024    EGFRIFNONA 31 (L) 04/20/2020    BCR 22.9 08/15/2024    K 4.3 08/15/2024    CO2 23.6 08/15/2024    CALCIUM 9.6 08/15/2024    ALBUMIN 5.0 08/15/2024    AST 22 08/15/2024    ALT 19 08/15/2024     Lab Results   Component Value Date    GLUCOSE 111 (H) 08/15/2024    CALCIUM 9.6 08/15/2024     08/15/2024    K 4.3 08/15/2024    CO2 23.6 08/15/2024     08/15/2024    BUN 25 (H) 08/15/2024    CREATININE 1.09 (H) 08/15/2024    EGFRIFNONA 31 (L) 04/20/2020    BCR 22.9 08/15/2024    ANIONGAP 15.4 (H) 08/15/2024     Lab Results   Component Value Date    WBC 5.04 09/03/2024    HGB 11.7 (L) 09/03/2024    HCT 37.1 09/03/2024    MCV 93.2 09/03/2024     (L) 09/03/2024     Lab Results   Component Value Date    CHLPL 161 09/03/2024    TRIG 917 (H) 09/03/2024    HDL 23 (L) 09/03/2024    LDL UNABLE TO CALCULATE 09/03/2024     Lab Results   Component Value Date    HGBA1C 5.2 09/20/2021     Lab Results   Component Value Date    INR 0.99 08/15/2024    PROTIME 10.8 08/15/2024       Most Recent Echo:  Results for orders placed during the hospital encounter of 08/18/23    Adult Transthoracic Echo Complete W/ Cont if Necessary Per Protocol    Interpretation Summary    Left ventricular systolic function is normal. Calculated left ventricular EF = 63%    Left ventricular wall thickness is consistent with mild concentric hypertrophy.    Left ventricular diastolic function is consistent with (grade I) impaired relaxation.    The left atrial cavity is mildly dilated.    Estimated right ventricular systolic pressure from tricuspid regurgitation is normal (<35 mmHg).       Most Recent Stress Test:  Results for orders placed during the hospital encounter of 08/18/23    Stress Test With Myocardial Perfusion One Day    Interpretation Summary    Myocardial perfusion imaging indicates a normal myocardial perfusion study with no evidence of ischemia.    Left ventricular ejection fraction is normal (Calculated EF = 70%).     "Impressions are consistent with a low risk study.       Most Recent Cardiac Catheterization:   No results found for this or any previous visit.       NOTE: The following portions of the patient's note were reviewed, confirmed and/or updated this visit as appropriate: History of present illness/Interval history, physical examination, assessment & plan, allergies, current medications, past family history, past medical history, past social history, past surgical history and problem list.    Labs pertinent to today's visit on 12/30/2024 (including but not limited to CBC, CMP, and lipid profiles) were requested from the patient's primary care provider/hospital/clinical laboratory.  If the labs were available for the visit, they were reviewed with the patient.  If they were not available, when received, special interest will be made to the labs pertinent to this visit.  The patient's most recent \"in-house\" labs are noted below and have been reviewed.  Outside labs pertinent to this visit are scanned into the record and have been reviewed.    Discussions held today, 12/30/2024,regarding procedures included risk, benefits, and options including but not limited to: Death, MI, stroke, pain, bleeding, infection, and possible need for vascular/thoracic/cardiothoracic surgery.    Copied information within this note was reviewed and is current as of 12/30/2024.    Assessment and plan noted herein represents the current plan of care as of 12/30/2024.    Significant resources from our office and staff are inherent in engaging this patient in a continuous and active collaborative plan of care related to their chronic cardiovascular conditions outlined herein.  The management of these conditions requires the direction of our service with specialized clinical knowledge, skills, and experience.  This collaborative care includes but is not limited to patient education, expectations and responsibilities, shared decision making around " therapeutic goals, and shared commitments to achieve those goals.

## 2025-01-13 DIAGNOSIS — M54.2 CERVICALGIA: ICD-10-CM

## 2025-01-13 RX ORDER — PANTOPRAZOLE SODIUM 40 MG/1
40 TABLET, DELAYED RELEASE ORAL DAILY
Qty: 90 TABLET | Refills: 1 | Status: CANCELLED | OUTPATIENT
Start: 2025-01-13

## 2025-01-13 RX ORDER — ALLOPURINOL 300 MG/1
300 TABLET ORAL DAILY
Qty: 90 TABLET | Refills: 1 | Status: CANCELLED | OUTPATIENT
Start: 2025-01-13

## 2025-01-13 RX ORDER — TRAMADOL HYDROCHLORIDE 50 MG/1
50 TABLET ORAL EVERY 6 HOURS PRN
Qty: 30 TABLET | Refills: 0 | Status: SHIPPED | OUTPATIENT
Start: 2025-01-13 | End: 2025-01-20 | Stop reason: SDUPTHER

## 2025-01-13 RX ORDER — TRAMADOL HYDROCHLORIDE 50 MG/1
50 TABLET ORAL EVERY 6 HOURS PRN
Qty: 30 TABLET | Refills: 0 | Status: CANCELLED | OUTPATIENT
Start: 2025-01-13

## 2025-01-13 RX ORDER — BUSPIRONE HYDROCHLORIDE 5 MG/1
5 TABLET ORAL 3 TIMES DAILY
Qty: 270 TABLET | Refills: 3 | Status: SHIPPED | OUTPATIENT
Start: 2025-01-13

## 2025-01-13 NOTE — TELEPHONE ENCOUNTER
Incoming Refill Request      Medication requested (name and dose): allopurinol 300mg  Pantoprazole 40mg  Tramadol 50mg    Pharm[facility] where [facility] be sent: [facility]    Additional details provided by patient:     Best call back number:     Does the patient have less than a 3 day supply:  [] Yes  [x] No    Keyanna Araujo, RegSched Rep  01/13/25, 08:48 EST

## 2025-01-15 ENCOUNTER — TELEPHONE (OUTPATIENT)
Dept: FAMILY MEDICINE CLINIC | Facility: CLINIC | Age: 80
End: 2025-01-15

## 2025-01-15 NOTE — TELEPHONE ENCOUNTER
Caller: Christal Romo    Relationship: Self    Best call back number: [phone]    What is the best time to reach you: ANYTIME    Who are you requesting to speak with (clinical staff, provider,  specific staff member): CLINICAL        What was the call regarding: PATIENT IS REQUESTING A CALLBACK TO SCHEDULE HER 1/7 XRAY     Is it okay if the provider responds through MyChart: NO

## 2025-01-20 DIAGNOSIS — M54.2 CERVICALGIA: ICD-10-CM

## 2025-01-20 RX ORDER — PANTOPRAZOLE SODIUM 40 MG/1
40 TABLET, DELAYED RELEASE ORAL DAILY
Qty: 90 TABLET | Refills: 0 | Status: SHIPPED | OUTPATIENT
Start: 2025-01-20 | End: 2025-01-24 | Stop reason: SDUPTHER

## 2025-01-20 RX ORDER — ALLOPURINOL 300 MG/1
300 TABLET ORAL DAILY
Qty: 90 TABLET | Refills: 0 | Status: SHIPPED | OUTPATIENT
Start: 2025-01-20 | End: 2025-01-24 | Stop reason: SDUPTHER

## 2025-01-20 RX ORDER — TRAMADOL HYDROCHLORIDE 50 MG/1
50 TABLET ORAL EVERY 6 HOURS PRN
Qty: 30 TABLET | Refills: 0 | Status: SHIPPED | OUTPATIENT
Start: 2025-01-20

## 2025-01-20 NOTE — TELEPHONE ENCOUNTER
Incoming Refill Request      Medication requested (name and dose): ALLOPURINOL 300 MG  PANTOPRAZOLE 40MG  TRAMADOL 50MG    Pharmacy where request should be sent: [facility]    Additional details provided by patient:     Best call back number:     Does the patient have less than a 3 day supply:  [] Yes  [x] No    Keyanna Araujo, RegSched Rep  01/20/25, 09:40 EST

## 2025-01-21 ENCOUNTER — TELEPHONE (OUTPATIENT)
Dept: FAMILY MEDICINE CLINIC | Facility: CLINIC | Age: 80
End: 2025-01-21

## 2025-01-21 NOTE — TELEPHONE ENCOUNTER
Pt called to cancel and reschedule the appointment she had scheduled for today due to her BP being 206+.  Pt states this happens frequently for her and she does not need to go the ER, and her cardiologist and transplant Dr's have advised her to take a second dose of BP medication when this happens.  The medication makes her sleepy and she does not want to drive after she takes it.  Pt again reiterated that she did not need to go to the ER, this happens frequently.  Pt rescheduled appt for later this week.

## 2025-01-24 ENCOUNTER — OFFICE VISIT (OUTPATIENT)
Dept: FAMILY MEDICINE CLINIC | Facility: CLINIC | Age: 80
End: 2025-01-24
Payer: MEDICARE

## 2025-01-24 VITALS
RESPIRATION RATE: 18 BRPM | TEMPERATURE: 97.8 F | DIASTOLIC BLOOD PRESSURE: 75 MMHG | BODY MASS INDEX: 25.21 KG/M2 | SYSTOLIC BLOOD PRESSURE: 186 MMHG | WEIGHT: 137 LBS | HEART RATE: 70 BPM | OXYGEN SATURATION: 99 % | HEIGHT: 62 IN

## 2025-01-24 DIAGNOSIS — R35.0 URINARY FREQUENCY: Primary | ICD-10-CM

## 2025-01-24 DIAGNOSIS — R73.9 HYPERGLYCEMIA: ICD-10-CM

## 2025-01-24 DIAGNOSIS — I10 ELEVATED BLOOD PRESSURE READING IN OFFICE WITH WHITE COAT SYNDROME, WITH DIAGNOSIS OF HYPERTENSION: ICD-10-CM

## 2025-01-24 DIAGNOSIS — Z94.4 HISTORY OF LIVER TRANSPLANT: ICD-10-CM

## 2025-01-24 LAB
BILIRUB BLD-MCNC: NEGATIVE MG/DL
CLARITY, POC: CLEAR
COLOR UR: YELLOW
EXPIRATION DATE: ABNORMAL
EXPIRATION DATE: NORMAL
GLUCOSE UR STRIP-MCNC: NEGATIVE MG/DL
HBA1C MFR BLD: 5.5 % (ref 4.5–5.7)
KETONES UR QL: NEGATIVE
LEUKOCYTE EST, POC: NEGATIVE
Lab: ABNORMAL
Lab: NORMAL
NITRITE UR-MCNC: NEGATIVE MG/ML
PH UR: 6 [PH] (ref 5–8)
PROT UR STRIP-MCNC: ABNORMAL MG/DL
RBC # UR STRIP: NEGATIVE /UL
SP GR UR: 1.02 (ref 1–1.03)
UROBILINOGEN UR QL: NORMAL

## 2025-01-24 RX ORDER — PANTOPRAZOLE SODIUM 40 MG/1
40 TABLET, DELAYED RELEASE ORAL DAILY
Qty: 90 TABLET | Refills: 0 | Status: SHIPPED | OUTPATIENT
Start: 2025-01-24

## 2025-01-24 RX ORDER — ALLOPURINOL 100 MG/1
100 TABLET ORAL DAILY
Qty: 90 TABLET | Refills: 0 | Status: SHIPPED | OUTPATIENT
Start: 2025-01-24 | End: 2025-01-27

## 2025-01-24 RX ORDER — DICYCLOMINE HCL 20 MG
20 TABLET ORAL 4 TIMES DAILY PRN
Start: 2025-01-24

## 2025-01-24 RX ORDER — PANTOPRAZOLE SODIUM 40 MG/1
40 TABLET, DELAYED RELEASE ORAL DAILY
Qty: 90 TABLET | Refills: 0 | Status: CANCELLED | OUTPATIENT
Start: 2025-01-24

## 2025-01-24 RX ORDER — ALLOPURINOL 300 MG/1
300 TABLET ORAL DAILY
Qty: 90 TABLET | Refills: 0 | Status: SHIPPED | OUTPATIENT
Start: 2025-01-24 | End: 2025-01-27 | Stop reason: SDUPTHER

## 2025-01-24 RX ORDER — ALLOPURINOL 100 MG/1
100 TABLET ORAL DAILY
COMMUNITY
End: 2025-01-24 | Stop reason: SDUPTHER

## 2025-01-24 RX ORDER — ALLOPURINOL 300 MG/1
300 TABLET ORAL DAILY
Qty: 90 TABLET | Refills: 0 | Status: CANCELLED | OUTPATIENT
Start: 2025-01-24

## 2025-01-24 RX ORDER — ALLOPURINOL 100 MG/1
100 TABLET ORAL DAILY
Qty: 90 TABLET | Refills: 0 | Status: CANCELLED | OUTPATIENT
Start: 2025-01-24

## 2025-01-24 RX ORDER — SOLIFENACIN SUCCINATE 5 MG/1
5 TABLET, FILM COATED ORAL DAILY PRN
Qty: 30 TABLET | Refills: 0 | Status: SHIPPED | OUTPATIENT
Start: 2025-01-24 | End: 2025-01-24

## 2025-01-24 NOTE — PROGRESS NOTES
Subjective   Christal Romo is a 79 y.o. female.     Chief Complaint   Patient presents with    Results     Discuss xray results         Current Outpatient Medications:     acetaminophen (TYLENOL) 500 MG tablet, Take 1 tablet by mouth. prn, Disp: , Rfl:     allopurinol (ZYLOPRIM) 100 MG tablet, Take 1 tablet by mouth Daily., Disp: , Rfl:     allopurinol (ZYLOPRIM) 300 MG tablet, Take 1 tablet by mouth Daily., Disp: 90 tablet, Rfl: 0    aspirin 81 MG tablet, Take 1 tablet by mouth Daily., Disp: , Rfl:     baclofen (LIORESAL) 10 MG tablet, TAKE 1 TABLET BY MOUTH AT NIGHT AS NEEDED FOR MUSCLE SPASMS, Disp: 30 tablet, Rfl: 0    busPIRone (BUSPAR) 5 MG tablet, TAKE 1 TABLET THREE TIMES DAILY, Disp: 270 tablet, Rfl: 3    chlorthalidone (HYGROTON) 25 MG tablet, Take 1 tablet by mouth Daily As Needed (LEG swelling)., Disp: , Rfl:     citalopram (CeleXA) 10 MG tablet, Take 0.5 tablets by mouth Every Night., Disp: 30 tablet, Rfl: 0    cloNIDine (CATAPRES-TTS) 0.2 MG/24HR patch, Place 1 patch on the skin as directed by provider 1 (One) Time Per Week., Disp: 4 patch, Rfl: 11    diphenhydrAMINE (BENADRYL) 25 MG tablet, Take 1 tablet by mouth Every Night., Disp: , Rfl:     Lidocaine HCl-Benzyl Alcohol (Salonpas Lidocaine Plus) 4-10 % cream, Apply  topically., Disp: , Rfl:     Lutein 20 MG capsule, Take 1 tablet by mouth Daily., Disp: , Rfl:     meclizine (ANTIVERT) 12.5 MG tablet, Take 1 tablet by mouth 3 (Three) Times a Day As Needed for Dizziness., Disp: 30 tablet, Rfl: 1    metoprolol succinate XL (Toprol XL) 100 MG 24 hr tablet, Take 1 tablet by mouth Every Night., Disp: 90 tablet, Rfl: 1    metoprolol succinate XL (TOPROL-XL) 50 MG 24 hr tablet, Take 1 tablet by mouth Every Morning., Disp: 90 tablet, Rfl: 1    Multiple Vitamins-Minerals (Multivitamin Adults 50+) tablet, Take 1 tablet by mouth Daily., Disp: , Rfl:     mycophenolate (MYFORTIC) 360 MG tablet delayed-release EC tablet, Take 1 tablet by mouth Every 12 (Twelve)  Hours., Disp: , Rfl:     nystatin (MYCOSTATIN) 100,000 unit/mL suspension, Swish and swallow 5 mL 4 (Four) Times a Day., Disp: 200 mL, Rfl: 1    omega-3 acid ethyl esters (Lovaza) 1 g capsule, Take 1 capsule by mouth Daily. (Patient taking differently: Take 1 capsule by mouth 2 (Two) Times a Day.), Disp: , Rfl:     ondansetron (Zofran) 4 MG tablet, Take 1 tablet by mouth Every 8 (Eight) Hours As Needed for Nausea or Vomiting. prn, Disp: 30 tablet, Rfl: 1    pantoprazole (PROTONIX) 40 MG EC tablet, Take 1 tablet by mouth Daily., Disp: 90 tablet, Rfl: 0    tacrolimus (PROGRAF) 0.5 MG capsule, Take 10 capsules by mouth 2 (Two) Times a Day. 1 po qam and 1 po qpm, Disp: , Rfl:     traMADol (ULTRAM) 50 MG tablet, Take 1 tablet by mouth Every 6 (Six) Hours As Needed for Moderate Pain or Severe Pain., Disp: 30 tablet, Rfl: 0    hydrocortisone 1 % lotion, Apply  topically to the appropriate area as directed 2 (Two) Times a Day As Needed for Irritation (Left chest irritation). (Patient not taking: Reported on 1/24/2025), Disp: , Rfl:     Mirabegron ER (Myrbetriq) 25 MG tablet sustained-release 24 hour 24 hr tablet, Take 1 tablet by mouth Daily. 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 - Dr Ritter [facility]; CBC/Prograf/CMP/LIPIDS/A1C    MAMMO     NEG = 2018/ 2021/ 2022/ 2024    Meniere's disease     OAB     Osteoporosis     PUD        Past Surgical History:   Procedure Laterality Date    BREAST LUMPECTOMY Left     Benign    CATARACT EXTRACTION WITH INTRAOCULAR LENS IMPLANT Left     COLONOSCOPY      Polyps = 2012/ 2015/ 2020, rech 2025   GSI    ERCP W/ PLASTIC  STENT PLACEMENT      Liver Stent    [place] TRANS[place]ATION      [place] [facility]    SKIN CANCER EXCISION      Rt UE    TOTAL ABDOMINAL HYSTERECTOMY         Family History   Problem Relation Age of Onset    Diabetes Sister         Type II    Heart disease Sister     Hypertension Sister     No Known Problems Brother     Hemochromatosis Other        Social History     Socioeconomic History    Marital status:    Tobacco Use    Smoking status: Former     Current packs/day: 0.00     Average packs/day: 0.3 packs/day for 1 year (0.3 ttl pk-yrs)     Types: Cigarettes     Start date:      Quit date:      Years since quittin.1     Passive exposure: Past    Smokeless tobacco: Never    Tobacco comments:     Quit .    Vaping Use    Vaping status: Never Used   Substance and Sexual Activity    Alcohol use: No     Comment: very rarely     Drug use: No    Sexual activity: Defer       History of Present Illness  The patient presents for f/u of neuropathy, diabetes mellitus, and urinary frequency.    She has been informed by her new podiatrist that injections would not be beneficial for her neuropathy. She is experiencing pain on the side of her foot and plans to request an x-ray during her upcoming appointment on Monday. She has previously tried a compounded pain cream for neuropathic pain, which was ineffective. Additionally, she has attempted to alleviate her symptoms with a mixture of tea tree oil and coconut oil.    She expresses a desire to have her A1c levels checked today. She reports a decrease in her triglyceride levels.        The following portions of the patient's history were reviewed and updated as appropriate: allergies, current medications, past family history, past medical history, past social history, past surgical history and problem list.    Review of Systems   Constitutional:  Negative for activity change, appetite change, fatigue, unexpected weight gain and unexpected weight loss.    Respiratory:  Negative for cough, chest tightness and shortness of breath.    Cardiovascular:  Negative for chest pain, palpitations and leg swelling.   Gastrointestinal:  Positive for diarrhea.   Genitourinary:  Positive for frequency and urinary incontinence. Negative for dysuria and urgency.   Musculoskeletal:  Positive for arthralgias and gait problem. Negative for myalgias.   Neurological:  Positive for numbness. Negative for dizziness, facial asymmetry, speech difficulty, weakness, light-headedness, headache, memory problem and confusion.       Vitals:    01/24/25 1503   BP: (!) 186/75   Pulse: 70   Resp: 18   Temp: 97.8 °F (36.6 °C)   SpO2: 99%       Objective   Physical Exam  Vitals and nursing note reviewed.   Constitutional:       General: She is not in acute distress.     Appearance: She is well-developed. She is not ill-appearing or toxic-appearing.   HENT:      Head: Normocephalic and atraumatic.   Cardiovascular:      Rate and Rhythm: Normal rate and regular rhythm.      Heart sounds: Normal heart sounds. No murmur heard.  Pulmonary:      Effort: Pulmonary effort is normal. No respiratory distress.      Breath sounds: Normal breath sounds. No wheezing, rhonchi or rales.   Skin:     General: Skin is warm and dry.      Findings: No rash.   Neurological:      Mental Status: She is alert and oriented to person, place, and time. Mental status is at baseline.   Psychiatric:         Mood and Affect: Mood normal.         Behavior: Behavior normal.         Thought Content: Thought content normal.         Judgment: Judgment normal.       Physical Exam             Assessment & Plan   Diagnoses and all orders for this visit:    1. Abnormal urinalysis (Primary)  -     POC Urinalysis Dipstick, Automated      Assessment & Plan  1. Neuropathy.  The patient reports that her foot doctor mentioned injections would not be beneficial. A compounded pain cream for neuropathic pain from [facility] was discussed, but she  has tried it before without success. She also uses a mix of tea tree oil and coconut oil for relief. An x-ray of her foot will be considered if her foot doctor does not order one.    2. Diabetes Mellitus.  Her urine test shows no sugar, infection, or blood, indicating good control. A fingerstick A1c test will be conducted today to monitor her blood sugar levels.    3. Urinary Frequency.  A prescription for a bladder medication has been issued to manage her urinary frequency. She is advised to take the medication as needed.     Results  Laboratory Studies  Urine test shows no sugar, no infection, no blood.          Patient or patient representative verbalized consent for the use of Ambient Listening during the visit with  Sara Zhao DO for chart documentation. 2/2/2025  16:32 EST

## 2025-01-24 NOTE — PROGRESS NOTES
Fingerstick performed in RIGHT MIDDLE FINGER with good hemostasis. Patient tolerated well. Nancy RINCON MA

## 2025-01-27 RX ORDER — ALLOPURINOL 100 MG/1
TABLET ORAL
Qty: 90 TABLET | Refills: 0 | Status: SHIPPED | OUTPATIENT
Start: 2025-01-27 | End: 2025-01-27 | Stop reason: SDUPTHER

## 2025-01-27 RX ORDER — ALLOPURINOL 100 MG/1
TABLET ORAL
Qty: 90 TABLET | Refills: 0 | Status: SHIPPED | OUTPATIENT
Start: 2025-01-27

## 2025-01-27 RX ORDER — ALLOPURINOL 300 MG/1
TABLET ORAL
Qty: 90 TABLET | Refills: 0 | Status: SHIPPED | OUTPATIENT
Start: 2025-01-27

## 2025-01-30 ENCOUNTER — TELEPHONE (OUTPATIENT)
Dept: FAMILY MEDICINE CLINIC | Facility: CLINIC | Age: 80
End: 2025-01-30
Payer: COMMERCIAL

## 2025-01-30 NOTE — TELEPHONE ENCOUNTER
Incoming Refill Request      Medication requested (name and dose): solifenacin 5mg (do not see on active med list)    [facility] where [facility] sent[facility]    Additional details provided by patient:     Best call back number:     Does the patient have less than a 3 day supply:  [] Yes  [x] No    Keyanna Araujo, Jasmyneed Rep  01/30/25, 14:36 EST

## 2025-02-17 ENCOUNTER — TELEPHONE (OUTPATIENT)
Dept: FAMILY MEDICINE CLINIC | Facility: CLINIC | Age: 80
End: 2025-02-17
Payer: COMMERCIAL

## 2025-02-17 NOTE — TELEPHONE ENCOUNTER
Incoming Refill Request      Medication requested (name and dose): GABAPENTIN 100MG  (NOT ON ACTIVE MED LIST)    Ph[facility] where [facility] be sent: [facility]    Additional details provided by patient: N/A    Best call back number:     Does the patient have less than a 3 day supply:  [] Yes  [x] No    Uli Dial Rep  02/17/25, 15:46 EST

## 2025-02-25 ENCOUNTER — TELEPHONE (OUTPATIENT)
Dept: FAMILY MEDICINE CLINIC | Facility: CLINIC | Age: 80
End: 2025-02-25
Payer: COMMERCIAL

## 2025-02-25 NOTE — TELEPHONE ENCOUNTER
Incoming Refill Request      Medication requested (name and dose): GABAPENTIN 100MG (NOT ON ACTIVE MED LIST)    Pharmacy where request should be sent: [facility]    Additional details provided by patient: NEW PRESCRIPTION REQUEST    Best call back number:     Does the patient have less than a 3 day supply:  [] Yes  [x] No    Uli Dial Rep  02/25/25, 08:08 EST

## 2025-03-07 NOTE — TELEPHONE ENCOUNTER
Rx Refill Note  Requested Prescriptions     Pending Prescriptions Disp Refills    baclofen (LIORESAL) 10 MG tablet [Pharmacy Med Name: Baclofen Oral Tablet 10 MG] 30 tablet 11     Sig: TAKE 1 TABLET EVERY NIGHT AS NEEDED FOR MUSCLE SPASM(S)      Last office visit with prescribing clinician: 1/24/2025   Last telemedicine visit with prescribing clinician: Visit date not found   Next office visit with prescribing clinician: 3/27/2025     LIPID PANEL (12/30/2024 12:04)   COMPREHENSIVE METABOLIC PANEL (12/30/2024 12:04)   CBC AND DIFFERENTIAL (12/30/2024 12:04)                       Would you like a call back once the refill request has been completed: [] Yes [] No    If the office needs to give you a call back, can they leave a voicemail: [] Yes [] No    Nancy Chavis MA  03/07/25, 12:00 EST

## 2025-03-17 RX ORDER — BACLOFEN 10 MG/1
TABLET ORAL
Qty: 30 TABLET | Refills: 11 | Status: SHIPPED | OUTPATIENT
Start: 2025-03-17

## 2025-03-17 NOTE — TELEPHONE ENCOUNTER
Caller: Christal Romo    Relationship to patient: Self    Best call back number: [phone]      Patient is needing: ONLY HAS 2 LEFT

## 2025-03-27 ENCOUNTER — OFFICE VISIT (OUTPATIENT)
Dept: FAMILY MEDICINE CLINIC | Facility: CLINIC | Age: 80
End: 2025-03-27
Payer: MEDICARE

## 2025-03-27 VITALS
OXYGEN SATURATION: 100 % | BODY MASS INDEX: 24.84 KG/M2 | DIASTOLIC BLOOD PRESSURE: 74 MMHG | SYSTOLIC BLOOD PRESSURE: 187 MMHG | RESPIRATION RATE: 18 BRPM | TEMPERATURE: 97.7 F | HEART RATE: 71 BPM | HEIGHT: 62 IN | WEIGHT: 135 LBS

## 2025-03-27 DIAGNOSIS — G62.9 NEUROPATHY: ICD-10-CM

## 2025-03-27 DIAGNOSIS — R30.0 DYSURIA: Primary | ICD-10-CM

## 2025-03-27 DIAGNOSIS — R35.0 URINARY FREQUENCY: ICD-10-CM

## 2025-03-27 DIAGNOSIS — M54.2 CERVICALGIA: ICD-10-CM

## 2025-03-27 DIAGNOSIS — I10 ELEVATED BLOOD PRESSURE READING WITH DIAGNOSIS OF HYPERTENSION: ICD-10-CM

## 2025-03-27 DIAGNOSIS — K52.1 DIARRHEA DUE TO DRUG: ICD-10-CM

## 2025-03-27 RX ORDER — METOPROLOL SUCCINATE 100 MG/1
100 TABLET, EXTENDED RELEASE ORAL NIGHTLY
Qty: 90 TABLET | Refills: 1 | Status: SHIPPED | OUTPATIENT
Start: 2025-03-27

## 2025-03-27 RX ORDER — METOPROLOL SUCCINATE 50 MG/1
50 TABLET, EXTENDED RELEASE ORAL
Qty: 90 TABLET | Refills: 1 | Status: SHIPPED | OUTPATIENT
Start: 2025-03-27

## 2025-03-27 RX ORDER — HYOSCYAMINE SULFATE 0.12 MG/1
TABLET SUBLINGUAL
Qty: 40 TABLET | Refills: 1 | Status: SHIPPED | OUTPATIENT
Start: 2025-03-27

## 2025-03-27 RX ORDER — CLONIDINE 0.2 MG/24H
1 PATCH, EXTENDED RELEASE TRANSDERMAL WEEKLY
Qty: 4 PATCH | Refills: 0 | Status: SHIPPED | OUTPATIENT
Start: 2025-03-27

## 2025-03-27 RX ORDER — TRAMADOL HYDROCHLORIDE 50 MG/1
50 TABLET ORAL EVERY 6 HOURS PRN
Qty: 30 TABLET | Refills: 0 | Status: SHIPPED | OUTPATIENT
Start: 2025-03-27

## 2025-03-27 NOTE — PROGRESS NOTES
Subjective   Christal Romo is a 80 y.o. female.     Chief Complaint   Patient presents with    Hypertension    Diarrhea         Current Outpatient Medications:     acetaminophen (TYLENOL) 500 MG tablet, Take 1 tablet by mouth. prn, Disp: , Rfl:     allopurinol (ZYLOPRIM) 100 MG tablet, 1 po qday w/ 300mg qday= 400mg qday, Disp: 90 tablet, Rfl: 0    allopurinol (ZYLOPRIM) 300 MG tablet, 1 po qday w/ 100mg qday = 400mg qday, Disp: 90 tablet, Rfl: 0    aspirin 81 MG tablet, Take 1 tablet by mouth Daily., Disp: , Rfl:     baclofen (LIORESAL) 10 MG tablet, TAKE 1 TABLET EVERY NIGHT AS NEEDED FOR MUSCLE SPASM(S), Disp: 30 tablet, Rfl: 11    busPIRone (BUSPAR) 5 MG tablet, TAKE 1 TABLET THREE TIMES DAILY, Disp: 270 tablet, Rfl: 3    chlorthalidone (HYGROTON) 25 MG tablet, Take 1 tablet by mouth Daily As Needed (LEG swelling)., Disp: , Rfl:     citalopram (CeleXA) 10 MG tablet, Take 0.5 tablets by mouth Every Night., Disp: 30 tablet, Rfl: 0    cloNIDine (CATAPRES-TTS) 0.2 MG/24HR patch, Place 1 patch on the skin as directed by provider 1 (One) Time Per Week., Disp: 4 patch, Rfl: 0    diphenhydrAMINE (BENADRYL) 25 MG tablet, Take 1 tablet by mouth Every Night., Disp: , Rfl:     hyoscyamine (LEVSIN) 0.125 MG SL tablet, 1-2 po q4hrs prn abd bloating and cramping, Disp: 40 tablet, Rfl: 1    Lidocaine HCl-Benzyl Alcohol (Salonpas Lidocaine Plus) 4-10 % cream, Apply  topically., Disp: , Rfl:     Lutein 20 MG capsule, Take 1 tablet by mouth Daily., Disp: , Rfl:     meclizine (ANTIVERT) 12.5 MG tablet, Take 1 tablet by mouth 3 (Three) Times a Day As Needed for Dizziness., Disp: 30 tablet, Rfl: 1    metoprolol succinate XL (Toprol XL) 100 MG 24 hr tablet, Take 1 tablet by mouth Every Night., Disp: 90 tablet, Rfl: 1    metoprolol succinate XL (TOPROL-XL) 50 MG 24 hr tablet, Take 1 tablet by mouth Every Morning., Disp: 90 tablet, Rfl: 1    Multiple Vitamins-Minerals (Multivitamin Adults 50+) tablet, Take 1 tablet by mouth Daily.,  Disp: , Rfl:     mycophenolate (MYFORTIC) 360 MG tablet delayed-release EC tablet, Take 1 tablet by mouth Every 12 (Twelve) Hours., Disp: , Rfl:     nystatin (MYCOSTATIN) 100,000 unit/mL suspension, Swish and swallow 5 mL 4 (Four) Times a Day., Disp: 200 mL, Rfl: 1    omega-3 acid ethyl esters (Lovaza) 1 g capsule, Take 1 capsule by mouth Daily. (Patient taking differently: Take 1 capsule by mouth 2 (Two) Times a Day.), Disp: , Rfl:     ondansetron (Zofran) 4 MG tablet, Take 1 tablet by mouth Every 8 (Eight) Hours As Needed for Nausea or Vomiting. prn, Disp: 30 tablet, Rfl: 1    pantoprazole (PROTONIX) 40 MG EC tablet, Take 1 tablet by mouth Daily., Disp: 90 tablet, Rfl: 0    tacrolimus (PROGRAF) 0.5 MG capsule, Take 10 capsules by mouth 2 (Two) Times a Day. 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 - Dr Ritter [facility]; CBC/Prograf/CMP/LIPIDS/A1C    MAMMO     NEG = 2018/ 2021/ 2022/ 2024    Meniere's disease     OAB     Osteoporosis     PUD        Past Surgical History:   Procedure Laterality Date    BREAST LUMPECTOMY Left     Benign    CATARACT EXTRACTION WITH INTRAOCULAR LENS IMPLANT Left     COLONOSCOPY      Polyps = 2012/ 2015/ 2020, rech 2025   [facility]SI    ERCP W/ PLASTIC STENT PLACEMENT  2014    Liver Stent    [place]ATION      [place] [facility]    SKIN CANCER EXCISION      Rt UE    TOTAL ABDOMINAL HYSTERECTOMY  1979       Family History   Problem Relation Age of Onset    Diabetes Sister         Type II    Heart disease Sister      Hypertension Sister     No Known Problems Brother     Hemochromatosis Other        Social History     Socioeconomic History    Marital status:    Tobacco Use    Smoking status: Former     Current packs/day: 0.00     Average packs/day: 0.3 packs/day for 1 year (0.3 ttl pk-yrs)     Types: Cigarettes     Start date:      Quit date:      Years since quittin.3     Passive exposure: Past    Smokeless tobacco: Never    Tobacco comments:     Quit .    Vaping Use    Vaping status: Never Used   Substance and Sexual Activity    Alcohol use: No     Comment: very rarely     Drug use: No    Sexual activity: Defer       History of Present Illness  The patient presents for evaluation of diarrhea, bladder issues, hypertension, neuropathy, pruritus, and skin spots.    She reports experiencing diarrhea, which she attributes to her liver medication. She has been managing this with over-the-counter antidiarrheal medications and Pepto-Bismol, although she has been advised against excessive use of the latter. She also experiences bloating and cramping following episodes of severe diarrhea, which she manages with a sublingual medication that requires her to rest afterward. She is uncertain about the use of dicyclomine.    She continues to experience frequent urination, necessitating bathroom visits every 5 minutes. She has been informed by others that she may have a urinary tract infection (UTI) due to her constant need to urinate. She does not experience any burning sensation during urination but occasionally experiences pain afterward. She has an upcoming appointment with a urologist on 2025. She has been advised to undergo a CT scan and x-ray of the bladder but has been instructed to wait until her appointment for further guidance.    She has been monitoring her blood pressure at home and reports that it remains elevated. She is currently using a clonidine patch, which she applies once a week, and has been  doing so since 10/2024. She also takes Toprol twice daily and chlorthalidone, although she admits to not taking the latter regularly due to her frequent urination. She has been informed that Prograf may be contributing to her elevated blood pressure and causing headaches. She has an upcoming appointment with Dr. Briggs on 04/02/2025.    She has been diagnosed with neuropathy in her feet. She has not tried compounded neuropathy pain cream due to its high cost. She has found relief from tramadol, which she takes at bedtime. She also uses Biofreeze for symptom management.    She reports experiencing itching on her scalp after using certain shampoos. She has not yet undergone allergy testing. She has tried various shampoos, including Vanicream, which was prescribed by her dermatologist approximately 5 to 6 years ago. She has been advised to avoid using regular soap and instead use Dove soap and to avoid products containing glycerin.    She has noticed some spots on her face, which she describes as brown that have turned gray with purple lines. She has a history of precancerous lesions on her face, for which she underwent chemotherapy last summer. She has not yet scheduled her annual dermatology appointment.    MEDICATIONS  Current: clonidine patch, Toprol, chlorthalidone, Prograf, Myfortic, tramadol        The following portions of the patient's history were reviewed and updated as appropriate: allergies, current medications, past family history, past medical history, past social history, past surgical history and problem list.    Review of Systems   Constitutional:  Negative for activity change, appetite change, unexpected weight gain and unexpected weight loss.   Gastrointestinal:  Positive for abdominal distention, abdominal pain and diarrhea. Negative for constipation.   Genitourinary:  Positive for dysuria and frequency.   Musculoskeletal:  Positive for arthralgias.   Neurological:  Positive for numbness and  headache.   Psychiatric/Behavioral:  Positive for stress. Negative for sleep disturbance.        Vitals:    03/27/25 1357   BP: (!) 187/74   Pulse: 71   Resp: 18   Temp: 97.7 °F (36.5 °C)   SpO2: 100%       Objective   Physical Exam  Vitals and nursing note reviewed.   Constitutional:       General: She is not in acute distress.     Appearance: She is well-developed. She is not ill-appearing or toxic-appearing.   HENT:      Head: Normocephalic and atraumatic.   Cardiovascular:      Rate and Rhythm: Normal rate and regular rhythm.      Heart sounds: Normal heart sounds. No murmur heard.  Pulmonary:      Effort: Pulmonary effort is normal. No respiratory distress.      Breath sounds: Normal breath sounds. No wheezing, rhonchi or rales.   Skin:     General: Skin is warm and dry.      Findings: No rash.   Neurological:      Mental Status: She is alert and oriented to person, place, and time.      Cranial Nerves: No cranial nerve deficit.      Gait: Gait normal.   Psychiatric:         Mood and Affect: Mood normal.         Behavior: Behavior normal.         Thought Content: Thought content normal.         Judgment: Judgment normal.       Physical Exam    BMI is within normal parameters. No other follow-up for BMI required.      Assessment & Plan   Diagnoses and all orders for this visit:    1. Dysuria (Primary)  -     POCT urinalysis dipstick, automated; Standing  -     POCT urinalysis dipstick, automated    2. Neuropathy    3. Diarrhea due to drug    4. Urinary frequency    5. Elevated blood pressure reading with diagnosis of hypertension  -     cloNIDine (CATAPRES-TTS) 0.2 MG/24HR patch; Place 1 patch on the skin as directed by provider 1 (One) Time Per Week.  Dispense: 4 patch; Refill: 0    6. Cervicalgia  -     traMADol (ULTRAM) 50 MG tablet; Take 1 tablet by mouth Every 6 (Six) Hours As Needed for Moderate Pain or Severe Pain.  Dispense: 30 tablet; Refill: 0    Other orders  -     metoprolol succinate XL (Toprol XL)  100 MG 24 hr tablet; Take 1 tablet by mouth Every Night.  Dispense: 90 tablet; Refill: 1  -     metoprolol succinate XL (TOPROL-XL) 50 MG 24 hr tablet; Take 1 tablet by mouth Every Morning.  Dispense: 90 tablet; Refill: 1  -     hyoscyamine (LEVSIN) 0.125 MG SL tablet; 1-2 po q4hrs prn abd bloating and cramping  Dispense: 40 tablet; Refill: 1      Assessment & Plan  1. Diarrhea.  She experiences diarrhea daily, likely due to her liver medication. A prescription for Lomotil will be provided for emergency use. She is advised to continue using her current medication for abdominal pain until it expires.    2. Bladder issues.  She reports frequent urination and occasional burning post-urination. A standing order for a urine dip will be placed every 4 weeks for 4 times. A urine sample will be collected today for analysis.    3. Hypertension.  Her systolic blood pressure remains elevated despite current treatment. A prescription for clonidine patches (0.2 mg) will be provided, with a supply sufficient for 1 month (4 patches). The prescription will be sent to [facility]. She will follow up with Dr. Briggs on 04/02/2025 to discuss potential adjustments to her clonidine dosage.    4. Neuropathy.  She reports neuropathy in her feet, which causes pain and numbness. She is advised to try a compounded neuropathy pain cream, which can be ordered from [facility] or [facility] and mailed to her if not covered by insurance. If the cream is ineffective, Lyrica or gabapentin may be considered.    5. Pruritus.  She reports itching on her scalp, possibly due to shampoo use. She is advised to try Vanicream shampoo and conditioner, which are free of parabens and other potential irritants. She is also advised to use distilled water for washing her hair and to avoid using tap water.    6. Skin spots.  She reports new spots on her face, which are likely age-related changes. She is advised to schedule an appointment with her  dermatologist for further evaluation.     Results            Patient or patient representative verbalized consent for the use of Ambient Listening during the visit with  Sara Zhao DO for chart documentation. 4/8/2025  14:34 EDT

## 2025-04-04 ENCOUNTER — TELEPHONE (OUTPATIENT)
Dept: FAMILY MEDICINE CLINIC | Facility: CLINIC | Age: 80
End: 2025-04-04

## 2025-04-04 NOTE — TELEPHONE ENCOUNTER
Caller: Christal Romo    Relationship: Self    Best call back number: [phone]     Caller requesting test results: CHRISTAL    What test was performed: LABS    When was the test performed: 3/27/25    Where was the test performed: [facility]    Additional notes:

## 2025-04-08 NOTE — TELEPHONE ENCOUNTER
PATIENT CALLED BACK FOR RESULTS, PATIENT WAS NOTIFIED THAT EVERYTHING CAME BACK NEGATIVE. PATIENT VOICED UNDERSTANDING AND STATES SHE WILL BE SEEING [facility] SOON.

## 2025-04-16 ENCOUNTER — OFFICE VISIT (OUTPATIENT)
Dept: CARDIOLOGY | Facility: CLINIC | Age: 80
End: 2025-04-16
Payer: MEDICARE

## 2025-04-16 VITALS
WEIGHT: 135 LBS | BODY MASS INDEX: 24.84 KG/M2 | SYSTOLIC BLOOD PRESSURE: 212 MMHG | DIASTOLIC BLOOD PRESSURE: 79 MMHG | OXYGEN SATURATION: 97 % | HEART RATE: 69 BPM | HEIGHT: 62 IN

## 2025-04-16 DIAGNOSIS — I10 ELEVATED BLOOD PRESSURE READING WITH DIAGNOSIS OF HYPERTENSION: ICD-10-CM

## 2025-04-16 DIAGNOSIS — I10 ESSENTIAL HYPERTENSION: ICD-10-CM

## 2025-04-16 RX ORDER — METOPROLOL SUCCINATE 100 MG/1
100 TABLET, EXTENDED RELEASE ORAL 2 TIMES DAILY
Qty: 90 TABLET | Refills: 1 | Status: SHIPPED | OUTPATIENT
Start: 2025-04-16

## 2025-04-16 RX ORDER — CHLORTHALIDONE 25 MG/1
25 TABLET ORAL DAILY
Start: 2025-04-16

## 2025-04-16 NOTE — PROGRESS NOTES
Cardiology Office Visit      Encounter Date:  2025    PATIENT IDENTIFICATION    Name: Christal Romo  Age: 80 y.o. Sex: female : 1945  MRN: [id number]    Reason For Followup:  Hypertension.    Brief Clinical History:  Patient is a 80 y.o.  female  coming to cardiology office for follow up.    Patient has medical history of cirrhosis status post liver transplant , poorly controlled hypertension, valvular heart disease, depression and chronic palpitations.     Patient was seen in the office in 2024.  At that time we increase clonidine patch to 0.2 mg, increase chlorthalidone to 25 mg.    At today's visit, patient states she is not taking chlorthalidone.  She is already on clonidine patch 0.2, and metoprolol 150 mg daily.  Blood pressure significantly elevated in the office today, 210/79.  Patient reports at home blood pressure in the 180s range.  She denies chest pain, shortness of breath, palpitations, dizziness, lightheadedness, presyncope or syncope.     EKG in the office today shows normal sinus rhythm, LVH change.    Assessment & Plan    Impressions:  Essential hypertension, poorly controlled  Cirrhosis status post liver transplant 2016  Valvular heart disease, including mild MR TR AI and PI  Depression  Chronic palpitations  History of side effects related to amlodipine, spironolactone.      Recommendations:  History of side effects related to amlodipine, spironolactone.  Recent creatinine elevated at 1.37.  Patient's blood pressure severely elevated in the office today but reports better control at home.  Increase metoprolol to 100 mg twice daily.  Start chlorthalidone 25 mg daily.  Continue clonidine patch 0.2 mg.  Close follow-up in 4 weeks, asked patient to bring home blood pressure cuff.  Advised patient to engage in low-sodium diet and be compliant with her medications.    Diagnoses and all orders for this visit:    1. Elevated blood pressure reading with diagnosis of  "hypertension  -     metoprolol succinate XL (Toprol XL) 100 MG 24 hr tablet; Take 1 tablet by mouth 2 (Two) Times a Day.  Dispense: 90 tablet; Refill: 1  -     ECG 12 Lead    2. Essential hypertension  -     chlorthalidone (HYGROTON) 25 MG tablet; Take 1 tablet by mouth Daily.  -     ECG 12 Lead          Objective:    Vitals:  Vitals:    04/16/25 1411   BP: (!) 212/79   Pulse: 69   SpO2: 97%   Weight: 61.2 kg (135 lb)   Height: 157.5 cm (62\")     Body mass index is 24.69 kg/m².      Physical Exam:  General: Alert, cooperative, no distress, appears stated age  Lungs:  Clear to auscultation bilaterally, no wheezes, rhonchi or rales are noted  Chest wall: No tenderness  Heart::  Regular rate and rhythm, S1 and S2 normal, no murmur.  No rub or gallop  Abdomen: Soft, nontender, nondistended, bowel sounds active  Extremities: No cyanosis, clubbing, or edema  Pulses: 2+ and symmetric all extremities  Neuro/psych: No gross focal deficits        Allergies:  Allergies   Allergen Reactions    Amlodipine Itching    Atacand  [Candesartan Cilexetil] Other (See Comments)    Atenolol Palpitations    Azithromycin GI Intolerance and Nausea Only    Candesartan Headache, Unknown - High Severity and Unknown (See Comments)    Ibuprofen Unknown (See Comments)    Levofloxacin Myalgia, Nausea Only, Other (See Comments) and GI Intolerance    Penicillins Hives     denies    Spironolactone Nausea And Vomiting    Sucralfate Swelling    Venlafaxine Headache           Contrast Dye (Echo Or Unknown Ct/Mr) Nausea And Vomiting    Lisinopril Nausea Only, Anxiety and Headache    Apple Unknown - High Severity    Atorvastatin Myalgia    Clonidine Derivatives GI Intolerance    Codeine Hallucinations    Diltiazem Hcl Er Coated Beads Other (See Comments) and Unknown (See Comments)     Chest pressure    Escitalopram Headache, Unknown - High Severity and Unknown (See Comments)    Everolimus Unknown - High Severity    Fluconazole Nausea Only and Headache    " Hydralazine Headache    Metronidazole Unknown - High Severity and Unknown (See Comments)    Sorbitan Unknown - High Severity and Other (See Comments)    Oto (Diagnostic) Unknown - High Severity    Sulfa Antibiotics Unknown - High Severity    Valganciclovir Unknown - High Severity    Whey Nausea And Vomiting    Zyrtec [Cetirizine] Itching    Citrus Other (See Comments) and Unknown (See Comments)    Lactulose Itching, Unknown - High Severity, Nausea Only, Rash and Unknown (See Comments)     HAS SULFA IN IT    Paroxetine Palpitations    Sertraline Palpitations    Zoloft [Sertraline Hcl] Palpitations       Medication Review:     Current Outpatient Medications:     acetaminophen (TYLENOL) 500 MG tablet, Take 1 tablet by mouth. prn, Disp: , Rfl:     allopurinol (ZYLOPRIM) 100 MG tablet, 1 po qday w/ 300mg qday= 400mg qday, Disp: 90 tablet, Rfl: 0    allopurinol (ZYLOPRIM) 300 MG tablet, 1 po qday w/ 100mg qday = 400mg qday, Disp: 90 tablet, Rfl: 0    aspirin 81 MG tablet, Take 1 tablet by mouth Daily., Disp: , Rfl:     baclofen (LIORESAL) 10 MG tablet, TAKE 1 TABLET EVERY NIGHT AS NEEDED FOR MUSCLE SPASM(S), Disp: 30 tablet, Rfl: 11    busPIRone (BUSPAR) 5 MG tablet, TAKE 1 TABLET THREE TIMES DAILY, Disp: 270 tablet, Rfl: 3    chlorthalidone (HYGROTON) 25 MG tablet, Take 1 tablet by mouth Daily., Disp: , Rfl:     citalopram (CeleXA) 10 MG tablet, Take 0.5 tablets by mouth Every Night., Disp: 30 tablet, Rfl: 0    cloNIDine (CATAPRES-TTS) 0.2 MG/24HR patch, Place 1 patch on the skin as directed by provider 1 (One) Time Per Week., Disp: 4 patch, Rfl: 0    diphenhydrAMINE (BENADRYL) 25 MG tablet, Take 1 tablet by mouth Every Night., Disp: , Rfl:     hyoscyamine (LEVSIN) 0.125 MG SL tablet, 1-2 po q4hrs prn abd bloating and cramping, Disp: 40 tablet, Rfl: 1    Lidocaine HCl-Benzyl Alcohol (Salonpas Lidocaine Plus) 4-10 % cream, Apply  topically., Disp: , Rfl:     Lutein 20 MG capsule, Take 1 tablet by mouth Daily.,  Disp: , Rfl:     meclizine (ANTIVERT) 12.5 MG tablet, Take 1 tablet by mouth 3 (Three) Times a Day As Needed for Dizziness., Disp: 30 tablet, Rfl: 1    metoprolol succinate XL (Toprol XL) 100 MG 24 hr tablet, Take 1 tablet by mouth 2 (Two) Times a Day., Disp: 90 tablet, Rfl: 1    Multiple Vitamins-Minerals (Multivitamin Adults 50+) tablet, Take 1 tablet by mouth Daily., Disp: , Rfl:     mycophenolate (MYFORTIC) 360 MG tablet delayed-release EC tablet, Take 1 tablet by mouth Every 12 (Twelve) Hours., Disp: , Rfl:     nystatin (MYCOSTATIN) 100,000 unit/mL suspension, Swish and swallow 5 mL 4 (Four) Times a Day., Disp: 200 mL, Rfl: 1    omega-3 acid ethyl esters (Lovaza) 1 g capsule, Take 1 capsule by mouth Daily. (Patient taking differently: Take 1 capsule by mouth 2 (Two) Times a Day.), Disp: , Rfl:     ondansetron (Zofran) 4 MG tablet, Take 1 tablet by mouth Every 8 (Eight) Hours As Needed for Nausea or Vomiting. prn, Disp: 30 tablet, Rfl: 1    pantoprazole (PROTONIX) 40 MG EC tablet, Take 1 tablet by mouth Daily., Disp: 90 tablet, Rfl: 0    tacrolimus (PROGRAF) 0.5 MG capsule, Take 10 capsules by mouth 2 (Two) Times a Day. 1 po qam and 1 po qpm, Disp: , Rfl:     traMADol (ULTRAM) 50 MG tablet, Take 1 tablet by mouth Every 6 (Six) Hours As Needed for Moderate Pain or Severe Pain., Disp: 30 tablet, Rfl: 0    Family History:  Family History   Problem Relation Age of Onset    Diabetes Sister         Type II    Heart disease Sister     Hypertension Sister     No Known Problems Brother     Hemochromatosis Other        Past Medical History:  Past Medical History:   Diagnosis Date    Allergic     Anxiety     Arthritis     B12 deficiency     Cataract Rt     Chronic diarrhea     Depression     GERD     Headache     Hyperlipidemia     Hypertension     Insulin resistance     Liver disease     s/p Transplant    Liver transplant recipient     Due to CISNEORS cirrhosis with thrombocytopenia - Dr Ritter [facility]; CBC/Prograf/CMP/LIPIDS/A1C     MAMMO     NEG = 2021/ /     Meniere's disease     OAB     Osteoporosis     PUD        Past Surgical History:  Past Surgical History:   Procedure Laterality Date    BREAST LUMPECTOMY Left     Benign    CATARACT EXTRACTION WITH INTRAOCULAR LENS IMPLANT Left     COLONOSCOPY      Polyps = / / , rech    GSI    ERCP W/ PLASTIC STENT PLACEMENT      Liver Stent    LIVER TRANSPLANTATION      [place] [facility]    SKIN CANCER EXCISION      Rt UE    TOTAL ABDOMINAL HYSTERECTOMY  1979       Social History:  Social History     Socioeconomic History    Marital status:    Tobacco Use    Smoking status: Former     Current packs/day: 0.00     Average packs/day: 0.3 packs/day for 1 year (0.3 ttl pk-yrs)     Types: Cigarettes     Start date:      Quit date:      Years since quittin.3     Passive exposure: Past    Smokeless tobacco: Never    Tobacco comments:     Quit .    Vaping Use    Vaping status: Never Used   Substance and Sexual Activity    Alcohol use: No     Comment: very rarely     Drug use: No    Sexual activity: Defer       Review of Systems:  The following systems were reviewed as they relate to the cardiovascular system: Constitutional, Eyes, ENT, Cardiovascular, Respiratory, Gastrointestinal, Integumentary, Neurological, Psychiatric, Hematologic, Endocrine, Musculoskeletal, and Genitourinary. The pertinent cardiovascular findings are reported above with all other cardiovascular points within those systems being negative.    Diagnostic Study Review:     Current Electrocardiogram:    ECG 12 Lead    Date/Time: 2025 2:40 PM  Performed by: Satya Lawrence MD    Authorized by: Satya Lawrence MD  Comparison: compared with previous ECG   Similar to previous ECG  Rhythm: sinus rhythm  Rate: normal  QRS axis: normal  Other findings: left ventricular hypertrophy    Clinical impression: abnormal EKG          Laboratory Data:  Lab  Results   Component Value Date    GLUCOSE 111 (H) 08/15/2024    BUN 25 (H) 08/15/2024    CREATININE 1.09 (H) 08/15/2024    EGFRIFNONA 31 (L) 04/20/2020    BCR 22.9 08/15/2024    K 4.3 08/15/2024    CO2 23.6 08/15/2024    CALCIUM 9.6 08/15/2024    ALBUMIN 5.0 08/15/2024    AST 22 08/15/2024    ALT 19 08/15/2024     Lab Results   Component Value Date    GLUCOSE 111 (H) 08/15/2024    CALCIUM 9.6 08/15/2024     08/15/2024    K 4.3 08/15/2024    CO2 23.6 08/15/2024     08/15/2024    BUN 25 (H) 08/15/2024    CREATININE 1.09 (H) 08/15/2024    EGFRIFNONA 31 (L) 04/20/2020    BCR 22.9 08/15/2024    ANIONGAP 15.4 (H) 08/15/2024     Lab Results   Component Value Date    WBC 5.04 09/03/2024    HGB 11.7 (L) 09/03/2024    HCT 37.1 09/03/2024    MCV 93.2 09/03/2024     (L) 09/03/2024     Lab Results   Component Value Date    CHLPL 161 09/03/2024    TRIG 917 (H) 09/03/2024    HDL 23 (L) 09/03/2024    LDL UNABLE TO CALCULATE 09/03/2024     Lab Results   Component Value Date    HGBA1C 5.5 01/24/2025     Lab Results   Component Value Date    INR 0.99 08/15/2024    PROTIME 10.8 08/15/2024       Most Recent Echo:  Results for orders placed during the hospital encounter of 08/18/23    Adult Transthoracic Echo Complete W/ Cont if Necessary Per Protocol    Interpretation Summary    Left ventricular systolic function is normal. Calculated left ventricular EF = 63%    Left ventricular wall thickness is consistent with mild concentric hypertrophy.    Left ventricular diastolic function is consistent with (grade I) impaired relaxation.    The left atrial cavity is mildly dilated.    Estimated right ventricular systolic pressure from tricuspid regurgitation is normal (<35 mmHg).       Most Recent Stress Test:  Results for orders placed during the hospital encounter of 08/18/23    Stress Test With Myocardial Perfusion One Day    Interpretation Summary    Myocardial perfusion imaging indicates a normal myocardial perfusion  "study with no evidence of ischemia.    Left ventricular ejection fraction is normal (Calculated EF = 70%).    Impressions are consistent with a low risk study.       Most Recent Cardiac Catheterization:   No results found for this or any previous visit.       NOTE: The following portions of the patient's note were reviewed, confirmed and/or updated this visit as appropriate: History of present illness/Interval history, physical examination, assessment & plan, allergies, current medications, past family history, past medical history, past social history, past surgical history and problem list.    Labs pertinent to today's visit on 04/16/2025 (including but not limited to CBC, CMP, and lipid profiles) were requested from the patient's primary care provider/hospital/clinical laboratory.  If the labs were available for the visit, they were reviewed with the patient.  If they were not available, when received, special interest will be made to the labs pertinent to this visit.  The patient's most recent \"in-house\" labs are noted below and have been reviewed.  Outside labs pertinent to this visit are scanned into the record and have been reviewed.    Discussions held today, 04/16/2025,regarding procedures included risk, benefits, and options including but not limited to: Death, MI, stroke, pain, bleeding, infection, and possible need for vascular/thoracic/cardiothoracic surgery.    Copied information within this note was reviewed and is current as of 04/16/2025.    Assessment and plan noted herein represents the current plan of care as of 04/16/2025.    Significant resources from our office and staff are inherent in engaging this patient in a continuous and active collaborative plan of care related to their chronic cardiovascular conditions outlined herein.  The management of these conditions requires the direction of our service with specialized clinical knowledge, skills, and experience.  This collaborative care includes " but is not limited to patient education, expectations and responsibilities, shared decision making around therapeutic goals, and shared commitments to achieve those goals.

## 2025-04-23 RX ORDER — MECLIZINE HCL 12.5 MG 12.5 MG/1
12.5 TABLET ORAL 3 TIMES DAILY PRN
Qty: 30 TABLET | Refills: 1 | Status: SHIPPED | OUTPATIENT
Start: 2025-04-23

## 2025-04-24 DIAGNOSIS — I10 ESSENTIAL HYPERTENSION: ICD-10-CM

## 2025-04-24 RX ORDER — CHLORTHALIDONE 25 MG/1
25 TABLET ORAL DAILY
Qty: 90 TABLET | Refills: 0 | Status: SHIPPED | OUTPATIENT
Start: 2025-04-24

## 2025-04-24 RX ORDER — CHLORTHALIDONE 25 MG/1
25 TABLET ORAL DAILY
Qty: 90 TABLET | Refills: 0 | Status: SHIPPED | OUTPATIENT
Start: 2025-04-24 | End: 2025-04-24

## 2025-04-28 RX ORDER — PANTOPRAZOLE SODIUM 40 MG/1
40 TABLET, DELAYED RELEASE ORAL DAILY
Qty: 90 TABLET | Refills: 0 | Status: SHIPPED | OUTPATIENT
Start: 2025-04-28

## 2025-05-06 DIAGNOSIS — I10 ELEVATED BLOOD PRESSURE READING WITH DIAGNOSIS OF HYPERTENSION: ICD-10-CM

## 2025-05-06 DIAGNOSIS — M54.2 CERVICALGIA: ICD-10-CM

## 2025-05-06 RX ORDER — CLONIDINE 0.2 MG/24H
1 PATCH, EXTENDED RELEASE TRANSDERMAL WEEKLY
Qty: 12 PATCH | Refills: 0 | Status: SHIPPED | OUTPATIENT
Start: 2025-05-06

## 2025-05-06 RX ORDER — TRAMADOL HYDROCHLORIDE 50 MG/1
50 TABLET ORAL EVERY 8 HOURS PRN
Qty: 30 TABLET | Refills: 0 | Status: SHIPPED | OUTPATIENT
Start: 2025-05-06

## 2025-05-06 NOTE — TELEPHONE ENCOUNTER
Incoming Refill Request      Medication requested (name and dose): CLONIDINE 0.2MG/24 HR TRANSDERMAL PATCH    Pharmacy where request should be sent: [facility]     Additional details provided by patient: N/A    Best call back number:     Does the patient have less than a 3 day supply:  [] Yes  [x] No    Uli Dial Rep  05/06/25, 11:56 EDT

## 2025-05-06 NOTE — TELEPHONE ENCOUNTER
[facility]    Rx Refill Note  Requested Prescriptions     Pending Prescriptions Disp Refills    traMADol (ULTRAM) 50 MG tablet [Pharmacy Med Name: traMADol HCl Oral Tablet 50 MG] 30 tablet      Sig: TAKE 1 TABLET EVERY 6 HOURS FOR SEVERE PAIN OR MODERATE PAIN AS NEEDED      Last office visit with prescribing clinician: 3/27/2025   Last telemedicine visit with prescribing clinician: Visit date not found   Next office visit with prescribing clinician: 6/26/2025                         Would you like a call back once the refill request has been completed: [] Yes [] No    If the office needs to give you a call back, can they leave a voicemail: [] Yes [] No    Linda Adam, RT  05/06/25, 12:38 EDT

## 2025-05-15 ENCOUNTER — OFFICE VISIT (OUTPATIENT)
Dept: FAMILY MEDICINE CLINIC | Facility: CLINIC | Age: 80
End: 2025-05-15
Payer: MEDICARE

## 2025-05-15 VITALS
SYSTOLIC BLOOD PRESSURE: 183 MMHG | BODY MASS INDEX: 24.59 KG/M2 | TEMPERATURE: 97.8 F | WEIGHT: 133.6 LBS | HEART RATE: 79 BPM | HEIGHT: 62 IN | OXYGEN SATURATION: 97 % | DIASTOLIC BLOOD PRESSURE: 73 MMHG

## 2025-05-15 DIAGNOSIS — R73.9 HYPERGLYCEMIA: ICD-10-CM

## 2025-05-15 DIAGNOSIS — L30.9 DERMATITIS: Primary | ICD-10-CM

## 2025-05-15 DIAGNOSIS — E79.0 ELEVATED URIC ACID IN BLOOD: ICD-10-CM

## 2025-05-15 DIAGNOSIS — M10.079 IDIOPATHIC GOUT INVOLVING TOE, UNSPECIFIED CHRONICITY, UNSPECIFIED LATERALITY: ICD-10-CM

## 2025-05-15 LAB
EXPIRATION DATE: NORMAL
HBA1C MFR BLD: 5.4 % (ref 4.5–5.7)
Lab: NORMAL

## 2025-05-15 PROCEDURE — 84550 ASSAY OF BLOOD/URIC ACID: CPT | Performed by: FAMILY MEDICINE

## 2025-05-15 RX ORDER — TRIAMCINOLONE ACETONIDE 1 MG/G
1 CREAM TOPICAL 2 TIMES DAILY PRN
Qty: 45 G | Refills: 0 | Status: SHIPPED | OUTPATIENT
Start: 2025-05-15

## 2025-05-15 NOTE — PROGRESS NOTES
Venipuncture Blood Specimen Collection  Venipuncture performed in RA by Ibis Rcohe MA with good hemostasis. Patient tolerated the procedure well without complications.   05/15/25   Ibis Roche MA

## 2025-05-15 NOTE — PROGRESS NOTES
Subjective   Christal Romo is a 80 y.o. female.     Chief Complaint   Patient presents with    Leg Swelling     Left Lateral distal lower leg         Current Outpatient Medications:     acetaminophen (TYLENOL) 500 MG tablet, Take 1 tablet by mouth. prn, Disp: , Rfl:     allopurinol (ZYLOPRIM) 100 MG tablet, 1 po qday w/ 300mg qday= 400mg qday, Disp: 90 tablet, Rfl: 0    allopurinol (ZYLOPRIM) 300 MG tablet, 1 po qday w/ 100mg qday = 400mg qday, Disp: 90 tablet, Rfl: 0    aspirin 81 MG tablet, Take 1 tablet by mouth Daily., Disp: , Rfl:     baclofen (LIORESAL) 10 MG tablet, TAKE 1 TABLET EVERY NIGHT AS NEEDED FOR MUSCLE SPASM(S), Disp: 30 tablet, Rfl: 11    busPIRone (BUSPAR) 5 MG tablet, TAKE 1 TABLET THREE TIMES DAILY, Disp: 270 tablet, Rfl: 3    citalopram (CeleXA) 10 MG tablet, Take 0.5 tablets by mouth Every Night., Disp: 30 tablet, Rfl: 0    cloNIDine (CATAPRES-TTS) 0.2 MG/24HR patch, Place 1 patch on the skin as directed by provider 1 (One) Time Per Week., Disp: 12 patch, Rfl: 0    diphenhydrAMINE (BENADRYL) 25 MG tablet, Take 1 tablet by mouth Every Night., Disp: , Rfl:     hyoscyamine (LEVSIN) 0.125 MG SL tablet, 1-2 po q4hrs prn abd bloating and cramping, Disp: 40 tablet, Rfl: 1    Lidocaine HCl-Benzyl Alcohol (Salonpas Lidocaine Plus) 4-10 % cream, Apply  topically., Disp: , Rfl:     Lutein 20 MG capsule, Take 1 tablet by mouth Daily., Disp: , Rfl:     meclizine (ANTIVERT) 12.5 MG tablet, TAKE 1 TABLET THREE TIMES DAILY AS NEEDED FOR DIZZINESS, Disp: 30 tablet, Rfl: 1    metoprolol succinate XL (Toprol XL) 100 MG 24 hr tablet, Take 1 tablet by mouth 2 (Two) Times a Day., Disp: 90 tablet, Rfl: 1    Multiple Vitamins-Minerals (Multivitamin Adults 50+) tablet, Take 1 tablet by mouth Daily., Disp: , Rfl:     mycophenolate (MYFORTIC) 360 MG tablet delayed-release EC tablet, Take 1 tablet by mouth Every 12 (Twelve) Hours., Disp: , Rfl:     nystatin (MYCOSTATIN) 100,000 unit/mL suspension, Swish and swallow 5 mL  4 (Four) Times a Day., Disp: 200 mL, Rfl: 1    omega-3 acid ethyl esters (Lovaza) 1 g capsule, Take 1 capsule by mouth Daily. (Patient taking differently: Take 1 capsule by mouth 2 (Two) Times a Day.), Disp: , Rfl:     ondansetron (Zofran) 4 MG tablet, Take 1 tablet by mouth Every 8 (Eight) Hours As Needed for Nausea or Vomiting. prn, Disp: 30 tablet, Rfl: 1    pantoprazole (PROTONIX) 40 MG EC tablet, TAKE 1 TABLET EVERY DAY, Disp: 90 tablet, Rfl: 0    tacrolimus (PROGRAF) 0.5 MG capsule, Take 10 capsules by mouth 2 (Two) Times a Day. 1 po qam and 1 po qpm, Disp: , Rfl:     traMADol (ULTRAM) 50 MG tablet, Take 1 tablet by mouth Every 8 (Eight) Hours As Needed for Moderate Pain., Disp: 30 tablet, Rfl: 0    triamcinolone (KENALOG) 0.1 % cream, Apply 1 Application topically to the appropriate area as directed 2 (Two) Times a Day As Needed for Irritation., Disp: 45 g, Rfl: 0    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 - Dr Ritter [facility]; CBC/Prograf/CMP/LIPIDS/A1C    MAMMO     NEG = 2018/ 2021/ 2022/ 2024    Meniere's disease     OAB     Osteoporosis     PUD        Past Surgical History:   Procedure Laterality Date    BREAST LUMPECTOMY Left     Benign    CATARACT EXTRACTION WITH INTRAOCULAR LENS IMPLANT Left     COLONOSCOPY      Polyps = 2012/ 2015/ 2020, rech 2025   GSI    ERCP W/ PLASTIC STENT PLACEMENT  2014    Liver Stent    [place] TRANS[place]ATION      [place] [facility]    SKIN CANCER EXCISION      Rt [facility]    TOTAL ABDOMINAL HYSTERECTOMY  1979       Family History   Problem Relation Age of Onset    Diabetes Sister         Type II    Heart disease Sister     Hypertension Sister     No Known Problems Brother     Hemochromatosis Other         Social History     Socioeconomic History    Marital status:    Tobacco Use    Smoking status: Former     Current packs/day: 0.00     Average packs/day: 0.3 packs/day for 1 year (0.3 ttl pk-yrs)     Types: Cigarettes     Start date:      Quit date:      Years since quittin.4     Passive exposure: Past    Smokeless tobacco: Never    Tobacco comments:     Quit .    Vaping Use    Vaping status: Never Used   Substance and Sexual Activity    Alcohol use: No     Comment: very rarely     Drug use: No    Sexual activity: Defer       History of Present Illness  81 y/o C female patient presents for evaluation of left leg irritation, gout, hypertension, and medication management.    She reports a sensation akin to something crawling on her skin, specifically in the area of her graft. She has not experienced any insect bites or itching but describes a burning sensation. She has attempted to alleviate the discomfort with heat, ice, and alcohol wipes. She also applies lotion daily. She does not have any steroid cream at home and is not aware of any allergies to steroids.    She has not undergone uric acid testing this year and is experiencing severe pain in her feet and toes. She has been in communication with a nurse at [facility] regarding potential discontinuation of her current medications due to side effects. She has been using Biofreeze for pain relief and has found it beneficial. She has consulted a podiatrist who recommended Epsom salt soaks. She is considering laser therapy as an alternative treatment option. She has not undergone any nerve conduction studies for her feet. She was previously diagnosed with a pinched nerve on the right side of her back. She has not received chemotherapy. She is currently taking allopurinol 400 mg (300 mg + 100 mg) daily.    She discontinued her chlorthalidone medication last week due to adverse reactions, including dizziness, headache, and visual  disturbances. She is currently on metoprolol 50 mg twice daily for blood pressure management. She has been monitoring her blood pressure at home and has noticed elevated readings. She resumed her blood pressure patches last night after a week-long discontinuation.    She is currently on Prograf 0.5 mg and is considering switching to Myfortic twice daily. She has been experiencing diarrhea, nausea, and headaches from her liver medication. She has not had any chest pain.        The following portions of the patient's history were reviewed and updated as appropriate: allergies, current medications, past family history, past medical history, past social history, past surgical history and problem list.    Review of Systems   Constitutional:  Positive for activity change. Negative for appetite change, unexpected weight gain and unexpected weight loss.   Respiratory:  Negative for cough, shortness of breath and wheezing.    Cardiovascular:  Positive for leg swelling (left distal LE). Negative for chest pain.   Gastrointestinal:  Positive for diarrhea and nausea. Negative for vomiting.   Genitourinary:  Positive for menstrual problem.   Skin:  Negative for rash and bruise.   Neurological:  Positive for numbness and headache.       Vitals:    05/15/25 1325   BP: (!) 183/73   Pulse: 79   Temp: 97.8 °F (36.6 °C)   SpO2: 97%       Objective   Physical Exam  Vitals and nursing note reviewed.   Constitutional:       General: She is not in acute distress.     Appearance: She is not ill-appearing or toxic-appearing.   HENT:      Head: Normocephalic and atraumatic.   Pulmonary:      Effort: Pulmonary effort is normal.   Musculoskeletal:      Right lower leg: No edema.      Left lower leg: No edema.   Skin:     Findings: Erythema and rash present.             Comments: +mild swelling at distal lateral Left LE w/ ?dull erythematous rash   Neurological:      Mental Status: She is alert and oriented to person, place, and time.       Cranial Nerves: No cranial nerve deficit.   Psychiatric:         Attention and Perception: Attention and perception normal.         Mood and Affect: Mood and affect normal.         Speech: Speech normal.         Behavior: Behavior normal. Behavior is cooperative.         Thought Content: Thought content normal.         Cognition and Memory: Cognition and memory normal.         Judgment: Judgment normal.       Physical Exam    BMI is within normal parameters. No other follow-up for BMI required.  Assessment & Plan   Diagnoses and all orders for this visit:    1. Dermatitis (Primary)    2. Hyperglycemia  -     POC Glycosylated Hemoglobin (Hb A1C)    3. Elevated uric acid in blood  -     Uric Acid    4. Idiopathic gout involving toe, unspecified chronicity, unspecified laterality    Other orders  -     triamcinolone (KENALOG) 0.1 % cream; Apply 1 Application topically to the appropriate area as directed 2 (Two) Times a Day As Needed for Irritation.  Dispense: 45 g; Refill: 0      Assessment & Plan  1. Left leg irritation.  - The skin appears slightly irritated.  - A medium potency steroid cream will be prescribed for application on the irritated area of the left leg twice daily as needed until the irritation subsides.  - The cream can also be used for potential poison ivy exposure.  - Medication sent to pharmacy.    2. Gout.  - Reports pain in toes and feet, potentially related to gout.  - A uric acid level test will be conducted today.  - Review of previous test results indicates elevated triglycerides and slightly increased creatinine levels.  - If the uric acid levels are elevated, a steroid such as prednisone may be considered for treatment.    3. Hypertension.  - Blood pressure readings have been consistently high, with recent readings around 173/73 and 180s/73.  - She is advised to monitor her blood pressure towards the end of the week to assess the effectiveness of the current treatment regimen.  - Currently  taking metoprolol 50 mg twice a day and using blood pressure patches.  - Advised to continue with the current regimen and avoid taking additional doses of metoprolol unless necessary.    4. Medication Management.  - Currently on Prograf (tacrolimus) and has expressed concerns about side effects.  - Advised to discuss with her liver doctor the possibility of switching to a different medication or adjusting the dosage.  - Mentioned previous use of Envarsus (extended-release tacrolimus) during a shortage of Prograf, which caused adverse reactions.  - Will follow up with liver doctor regarding potential medication changes.     Results  Labs   - Magnesium: 1.6   - Triglycerides: 663   - Cholesterol: Normal   - LDL: Normal   - HDL: 25   - Potassium: Normal   - Sodium: Normal   - Creatinine: 1.31          Patient or patient representative verbalized consent for the use of Ambient Listening during the visit with  Sara Zhao DO for chart documentation. 6/1/2025  16:50 EDT

## 2025-05-16 LAB — URATE SERPL-MCNC: 4.4 MG/DL (ref 2.4–5.7)

## 2025-06-26 ENCOUNTER — OFFICE VISIT (OUTPATIENT)
Dept: FAMILY MEDICINE CLINIC | Facility: CLINIC | Age: 80
End: 2025-06-26
Payer: MEDICARE

## 2025-06-26 ENCOUNTER — PRIOR AUTHORIZATION (OUTPATIENT)
Dept: FAMILY MEDICINE CLINIC | Facility: CLINIC | Age: 80
End: 2025-06-26

## 2025-06-26 VITALS
RESPIRATION RATE: 14 BRPM | WEIGHT: 136 LBS | TEMPERATURE: 97.8 F | HEIGHT: 62 IN | BODY MASS INDEX: 25.03 KG/M2 | SYSTOLIC BLOOD PRESSURE: 198 MMHG | HEART RATE: 71 BPM | OXYGEN SATURATION: 97 % | DIASTOLIC BLOOD PRESSURE: 74 MMHG

## 2025-06-26 DIAGNOSIS — R10.9 ABDOMINAL CRAMPING: Primary | ICD-10-CM

## 2025-06-26 DIAGNOSIS — I10 PRIMARY HYPERTENSION: ICD-10-CM

## 2025-06-26 DIAGNOSIS — M54.2 BILATERAL POSTERIOR NECK PAIN: ICD-10-CM

## 2025-06-26 PROCEDURE — 3077F SYST BP >= 140 MM HG: CPT | Performed by: FAMILY MEDICINE

## 2025-06-26 PROCEDURE — 99213 OFFICE O/P EST LOW 20 MIN: CPT | Performed by: FAMILY MEDICINE

## 2025-06-26 PROCEDURE — 3078F DIAST BP <80 MM HG: CPT | Performed by: FAMILY MEDICINE

## 2025-06-26 PROCEDURE — 1125F AMNT PAIN NOTED PAIN PRSNT: CPT | Performed by: FAMILY MEDICINE

## 2025-06-26 RX ORDER — OXYBUTYNIN CHLORIDE 10 MG/1
10 TABLET, EXTENDED RELEASE ORAL DAILY
COMMUNITY
Start: 2025-06-25

## 2025-06-26 RX ORDER — HYOSCYAMINE SULFATE 0.12 MG/1
TABLET SUBLINGUAL
Qty: 40 TABLET | Refills: 1 | Status: SHIPPED | OUTPATIENT
Start: 2025-06-26 | End: 2025-06-27

## 2025-06-26 RX ORDER — ONDANSETRON 4 MG/1
4 TABLET, FILM COATED ORAL EVERY 8 HOURS PRN
Qty: 30 TABLET | Refills: 1 | Status: SHIPPED | OUTPATIENT
Start: 2025-06-26

## 2025-06-26 RX ORDER — METHYLPREDNISOLONE 4 MG/1
TABLET ORAL
Qty: 21 TABLET | Refills: 0 | Status: SHIPPED | OUTPATIENT
Start: 2025-06-26

## 2025-06-26 NOTE — PROGRESS NOTES
Subjective   Christal Romo is a 80 y.o. female.     Chief Complaint   Patient presents with    Abdominal Pain     Refill on Hyoscyamine 0.125mg SL and ondanestron 4mg tab to [facility] in [facility]         Current Outpatient Medications:     acetaminophen (TYLENOL) 500 MG tablet, Take 1 tablet by mouth. prn, Disp: , Rfl:     allopurinol (ZYLOPRIM) 100 MG tablet, 1 po qday w/ 300mg qday= 400mg qday, Disp: 90 tablet, Rfl: 0    allopurinol (ZYLOPRIM) 300 MG tablet, 1 po qday w/ 100mg qday = 400mg qday, Disp: 90 tablet, Rfl: 0    aspirin 81 MG tablet, Take 1 tablet by mouth Daily., Disp: , Rfl:     baclofen (LIORESAL) 10 MG tablet, TAKE 1 TABLET EVERY NIGHT AS NEEDED FOR MUSCLE SPASM(S), Disp: 30 tablet, Rfl: 11    busPIRone (BUSPAR) 5 MG tablet, TAKE 1 TABLET THREE TIMES DAILY, Disp: 270 tablet, Rfl: 3    citalopram (CeleXA) 10 MG tablet, Take 0.5 tablets by mouth Every Night., Disp: 30 tablet, Rfl: 0    cloNIDine (CATAPRES-TTS) 0.2 MG/24HR patch, Place 1 patch on the skin as directed by provider 1 (One) Time Per Week., Disp: 12 patch, Rfl: 0    diphenhydrAMINE (BENADRYL) 25 MG tablet, Take 1 tablet by mouth Every Night., Disp: , Rfl:     Lidocaine HCl-Benzyl Alcohol (Salonpas Lidocaine Plus) 4-10 % cream, Apply  topically., Disp: , Rfl:     Lutein 20 MG capsule, Take 1 tablet by mouth Daily., Disp: , Rfl:     meclizine (ANTIVERT) 12.5 MG tablet, TAKE 1 TABLET THREE TIMES DAILY AS NEEDED FOR DIZZINESS, Disp: 30 tablet, Rfl: 1    Multiple Vitamins-Minerals (Multivitamin Adults 50+) tablet, Take 1 tablet by mouth Daily., Disp: , Rfl:     mycophenolate (MYFORTIC) 360 MG tablet delayed-release EC tablet, Take 1 tablet by mouth Every 12 (Twelve) Hours., Disp: , Rfl:     ondansetron (Zofran) 4 MG tablet, Take 1 tablet by mouth Every 8 (Eight) Hours As Needed for Nausea or Vomiting. prn, Disp: 30 tablet, Rfl: 1    oxybutynin XL (DITROPAN-XL) 10 MG 24 hr tablet, Take 1 tablet by mouth Daily. For overactive  bladder, Disp: , Rfl:     pantoprazole (PROTONIX) 40 MG EC tablet, TAKE 1 TABLET EVERY DAY, Disp: 90 tablet, Rfl: 0    tacrolimus (PROGRAF) 0.5 MG capsule, Take 10 capsules by mouth 2 (Two) Times a Day. 1 po qam and 1 po qpm, Disp: , Rfl:     traMADol (ULTRAM) 50 MG tablet, Take 1 tablet by mouth Every 8 (Eight) Hours As Needed for Moderate Pain., Disp: 30 tablet, Rfl: 0    triamcinolone (KENALOG) 0.1 % cream, Apply 1 Application topically to the appropriate area as directed 2 (Two) Times a Day As Needed for Irritation., Disp: 45 g, Rfl: 0    hyoscyamine (ANASPAZ,LEVSIN) 0.125 MG tablet, 1-2 po TID prn abd pain/ bloating, Disp: 60 tablet, Rfl: 0    methylPREDNISolone (MEDROL) 4 MG dose pack, Take as directed on package instructions., Disp: 21 tablet, Rfl: 0    metoprolol tartrate (LOPRESSOR) 100 MG tablet, Take 1 tablet by mouth 2 (Two) Times a Day., Disp: 60 tablet, Rfl: 11    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 - Dr Ritter [facility]; CBC/Prograf/CMP/LIPIDS/A1C    MAMMO     NEG = 2018/ 2021/ 2022/ 2024    Meniere's disease     OAB     Osteoporosis     PUD        Past Surgical History:   Procedure Laterality Date    BREAST LUMPECTOMY Left     Benign    CATARACT EXTRACTION WITH INTRAOCULAR LENS IMPLANT Left     COLONOSCOPY      Polyps = 2012/ 2015/ 2020, rech 2025   GSI    ERCP W/ PLASTIC STENT PLACEMENT  2014    Liver Stent    [place]ANS[place]ATION      [place] [facility]    SKIN CANCER EXCISION      Rt UE    TOTAL ABDOMINAL HYSTERECTOMY  1979       Family History   Problem Relation Age of Onset    Diabetes Sister         Type II    Heart disease Sister     Hypertension Sister     No Known Problems Brother     Hemochromatosis Other         Social History     Socioeconomic History    Marital status:    Tobacco Use    Smoking status: Former     Current packs/day: 0.00     Average packs/day: 0.3 packs/day for 1 year (0.3 ttl pk-yrs)     Types: Cigarettes     Start date:      Quit date:      Years since quittin.5     Passive exposure: Past    Smokeless tobacco: Never    Tobacco comments:     Quit .    Vaping Use    Vaping status: Never Used   Substance and Sexual Activity    Alcohol use: No     Comment: very rarely     Drug use: No    Sexual activity: Defer       History of Present Illness  The patient presents for evaluation of LLQ abd pain, blood pressure management, muscle spasms, overactive bladder, and gout.    She has been experiencing persistent pain on her left side, which she describes as a burning sensation. This discomfort has been present for several years. Dr. Starr, a gastroenterologist, suspects the presence of a kidney stone. A few weeks ago, she attempted to alleviate the pain by taking 2 hyoscyamine tablets sublingually, which seemed to help.  She typically takes the medication at night as needed, but notes that it induces drowsiness. She has been unable to obtain a refill of this medication.    She reports elevated blood pressure readings at home, with systolic values ranging from 140 to 194. She has brought her blood pressure machine for calibration today. She has an appointment with her cardiologist tomorrow, who has requested that she bring her blood pressure machine for review.    She continues to experience back spasms. She has previously undergone physical therapy for her neck and back, but found it unhelpful. She has 2 deteriorating discs in her neck and back. She is considering chiropractic treatment. She is only permitted to take Tylenol for arthritis. She took regular Tylenol 500 mg at 12:30 PM today, as well as Tylenol Tension Headache, which contains caffeine. She believes this medication  may be contributing to her elevated blood pressure. She has some tramadol left for severe pain. She has tried muscle relaxers, but finds them incompatible with driving. She has used an ice pack on her back and has taken a Medrol Dosepak in the past, which made her feel unwell.    She was diagnosed with an overactive bladder yesterday by Dr. Garvey, a urologist. She is scheduled to see another specialist in 08/2025. She is currently taking oxybutynin ER 10 mg for this condition.    She is currently on allopurinol for gout management.        The following portions of the patient's history were reviewed and updated as appropriate: allergies, current medications, past family history, past medical history, past social history, past surgical history and problem list.    Review of Systems   Constitutional:  Negative for activity change, appetite change, unexpected weight gain and unexpected weight loss.   Gastrointestinal:  Positive for abdominal pain and diarrhea. Negative for constipation.   Musculoskeletal:  Positive for myalgias, neck pain and neck stiffness.   Neurological:  Positive for headache.   Psychiatric/Behavioral:  Positive for dysphoric mood and stress.        Vitals:    06/26/25 1441   BP: (!) 198/74   Pulse: 71   Resp: 14   Temp: 97.8 °F (36.6 °C)   SpO2: 97%       Objective   Physical Exam  Vitals and nursing note reviewed.   Constitutional:       General: She is not in acute distress.     Appearance: She is not ill-appearing or toxic-appearing.   HENT:      Head: Normocephalic and atraumatic.   Cardiovascular:      Rate and Rhythm: Normal rate and regular rhythm.      Heart sounds: Murmur heard.   Pulmonary:      Effort: Pulmonary effort is normal. No respiratory distress.      Breath sounds: No wheezing, rhonchi or rales.   Musculoskeletal:      Right lower leg: No edema.      Left lower leg: No edema.   Neurological:      Mental Status: She is alert and oriented to person, place, and time.      Cranial  Nerves: No cranial nerve deficit.      Gait: Gait normal.   Psychiatric:         Attention and Perception: Attention and perception normal.         Mood and Affect: Mood is depressed. Affect is tearful.         Speech: Speech normal.         Behavior: Behavior normal. Behavior is cooperative.         Thought Content: Thought content normal.         Cognition and Memory: Cognition and memory normal.         Judgment: Judgment normal.       Physical Exam    BMI is within normal parameters. No other follow-up for BMI required.    Assessment & Plan   Diagnoses and all orders for this visit:    1. Abdominal cramping (Primary)    2. Bilateral posterior neck pain    3. Primary hypertension    Other orders  -     Discontinue: hyoscyamine (LEVSIN) 0.125 MG SL tablet; 1-2 po q4hrs prn abd bloating and cramping  Dispense: 40 tablet; Refill: 1  -     ondansetron (Zofran) 4 MG tablet; Take 1 tablet by mouth Every 8 (Eight) Hours As Needed for Nausea or Vomiting. prn  Dispense: 30 tablet; Refill: 1  -     methylPREDNISolone (MEDROL) 4 MG dose pack; Take as directed on package instructions.  Dispense: 21 tablet; Refill: 0      Assessment & Plan  1. Left-sided pain.  - The left-sided pain could be attributed to colon spasms or scar tissue exerting pressure on the colon when it is filled with stool, potentially triggering spasms.  - Hyoscyamine has been effective in managing the pain.  - Discussion about the effectiveness of hyoscyamine and its potential side effects, such as dry mouth.  - A prescription for hyoscyamine 40 tablets will be sent to pharmacy.    2. Blood pressure management.  - Her blood pressure remains elevated.  - She is advised to continue monitoring her blood pressure at home and bring her blood pressure machines to her cardiologist appointment tomorrow for accuracy check.  - No changes in medication prescribed for blood pressure management at this time.    3. Muscle spasms.  - She experiences muscle spasms in her  neck and back, which are exacerbated by physical therapy exercises.  - Discussion about the potential benefits of wet heat, massage, and physical therapy for muscle spasms.  - A Medrol Dosepak will be prescribed to manage the muscle spasms. She is advised to take the muscle relaxer at night and spread out the Medrol Dosepak pills throughout the day to avoid gastrointestinal upset.    4. Overactive bladder.  - She has been diagnosed with an overactive bladder.  - Oxybutynin ER 10 mg has been prescribed for this condition.  - Review of records confirms the prescription of oxybutynin ER 10 mg.  - This medication will be added to her medication list.    5. Gout.  - She is currently taking allopurinol for gout management.  - Physical exam findings indicate no acute gout symptoms.  - Discussion about the role of allopurinol in managing gout.  - No changes in medication prescribed for gout management at this time.     Results            Patient or patient representative verbalized consent for the use of Ambient Listening during the visit with  Sara Zhao DO for chart documentation. 6/29/2025  14:20 EDT

## 2025-06-26 NOTE — TELEPHONE ENCOUNTER
HUB to read    PA was sent for Hyoscyamine Sulfate 0.125MG sublingual tablets    Waiting on the outcome from insurance.

## 2025-06-27 ENCOUNTER — OFFICE VISIT (OUTPATIENT)
Dept: CARDIOLOGY | Facility: CLINIC | Age: 80
End: 2025-06-27
Payer: MEDICARE

## 2025-06-27 VITALS — HEIGHT: 62 IN | BODY MASS INDEX: 25.03 KG/M2 | WEIGHT: 136 LBS | OXYGEN SATURATION: 97 % | HEART RATE: 72 BPM

## 2025-06-27 DIAGNOSIS — I10 ESSENTIAL HYPERTENSION: Primary | ICD-10-CM

## 2025-06-27 RX ORDER — HYOSCYAMINE SULFATE 0.12 MG/1
TABLET ORAL
Qty: 60 TABLET | Refills: 0 | Status: SHIPPED | OUTPATIENT
Start: 2025-06-27

## 2025-06-27 RX ORDER — METOPROLOL TARTRATE 100 MG/1
100 TABLET ORAL 2 TIMES DAILY
Qty: 60 TABLET | Refills: 11 | Status: SHIPPED | OUTPATIENT
Start: 2025-06-27

## 2025-06-27 NOTE — TELEPHONE ENCOUNTER
ROQUE to jl VERAS was denied for Hyoscyamine Sulfate 0.125MG sublingual tablets    Outcome  [facility]  [facility] follows Medicare rules. The Medicare rule in the Prescription Drug Benefit Manual (Chapter 6, Section 10.1 &amp; 10.9) says for coverage under Medicare Part D, the drug must be approved by the U.S. Food and Drug Administration (FDA). The FDA has not approved the requested drug as safe and effective, and per Medicare rules is not covered.    569,70,A5 CMS EXCLUDED DRUGInsurance prefers [facility] - PREFERRED PRODUCT REQUIRED

## 2025-06-27 NOTE — PROGRESS NOTES
Cardiology Office Visit      Encounter Date:  2025    PATIENT IDENTIFICATION    Name: Christal Romo  Age: 80 y.o. Sex: female : 1945  MRN: [id number]    Reason For Followup:  Hypertension.    Brief Clinical History:  Patient is a 80 y.o.  female  coming to cardiology office for follow up.    Patient has medical history of cirrhosis status post liver transplant , poorly controlled hypertension, valvular heart disease, depression and chronic palpitations.     Patient was seen in the office in 2024.  At that time we increase clonidine patch to 0.2 mg, increase chlorthalidone to 25 mg.    Patient was seen in 2025.  During that visit, patient reports she stopped taking chlorthalidone due to headache. She was on clonidine patch 0.2, and metoprolol 150 mg daily.  Blood pressure significantly elevated in the office, 210/79.  Patient reports at home blood pressure in the 180s range.  She denies chest pain, shortness of breath, palpitations, dizziness, lightheadedness, presyncope or syncope.  We decided to increase metoprolol to 100 mg twice daily.    In 2025 patient comes for follow-up.  She states she has been taking metoprolol 150 mg daily.  Sometimes she feels sleepy when takes 100 twice daily.  She is on clonidine patch but does not feel it is improving much.  Again she reports side effects related to amlodipine, spironolactone, chlorthalidone, hydralazine in the past.  Patient denies chest pain, shortness of breath, palpitations, dizziness, lightheadedness, presyncope or syncope.  Reports chronic back pain.  Home blood pressure readings ranging from 160s to 180s.    TTE 2023    Left ventricular systolic function is normal. Calculated left ventricular EF = 63%    Left ventricular wall thickness is consistent with mild concentric hypertrophy.    Left ventricular diastolic function is consistent with (grade I) impaired relaxation.    The left atrial cavity is mildly dilated.     "Estimated right ventricular systolic pressure from tricuspid regurgitation is normal (<35 mmHg).        Assessment & Plan    Impressions:  Essential hypertension, poorly controlled  Cirrhosis status post liver transplant 2016  Valvular heart disease, including mild MR TR AI and PI  Depression  Chronic palpitations  History of side effects related to amlodipine, spironolactone, chlorthalidone, hydralazine, carvedilol    Recommendations:  Discussed about changing from metoprolol to carvedilol, but patient has side effect to sorbitan which may trigger allergic reactions if she starts carvedilol.  Recommended patient to increase metoprolol to 100 mg twice a day and check blood pressure 2 hours after her morning medication.  Reassess BP in the next visit.  Unfortunately patient has multiple drug intolerance that limits our options  Advised patient to engage in low-sodium diet and she verbalized understanding.        There are no diagnoses linked to this encounter.        Objective:    Vitals:  Vitals:    06/27/25 1350   Pulse: 72   SpO2: 97%   Weight: 61.7 kg (136 lb)   Height: 157.5 cm (62\")     Body mass index is 24.87 kg/m².      Physical Exam:  General: Alert, cooperative, no distress, appears stated age  Lungs:  Clear to auscultation bilaterally, no wheezes, rhonchi or rales are noted  Chest wall: No tenderness  Heart::  Regular rate and rhythm, S1 and S2 normal, no murmur.  No rub or gallop  Abdomen: Soft, nontender, nondistended, bowel sounds active  Extremities: No cyanosis, clubbing, or edema  Neuro/psych: No gross focal deficits        Allergies:  Allergies   Allergen Reactions    Amlodipine Itching    Atacand  [Candesartan Cilexetil] Other (See Comments)    Atenolol Palpitations    Azithromycin GI Intolerance and Nausea Only    Candesartan Headache, Unknown - High Severity and Unknown (See Comments)    Ibuprofen Unknown (See Comments)    Levofloxacin Myalgia, Nausea Only, Other (See Comments) and GI " Intolerance    Penicillins Hives     denies    Spironolactone Nausea And Vomiting    Sucralfate Swelling    Venlafaxine Headache           Contrast Dye (Echo Or Unknown Ct/Mr) Nausea And Vomiting    Lisinopril Nausea Only, Anxiety and Headache    Apple Unknown - High Severity    Atorvastatin Myalgia    Clonidine Derivatives GI Intolerance    Codeine Hallucinations    Diltiazem Hcl Er Coated Beads Other (See Comments) and Unknown (See Comments)     Chest pressure    Escitalopram Headache, Unknown - High Severity and Unknown (See Comments)    Everolimus Unknown - High Severity    Fluconazole Nausea Only and Headache    Hydralazine Headache    Metronidazole Unknown - High Severity and Unknown (See Comments)    Sorbitan Unknown - High Severity and Other (See Comments)    [place] (Diagnostic) Unknown - High Severity    Sulfa Antibiotics Unknown - High Severity    Valganciclovir Unknown - High Severity    Whey Nausea And Vomiting    Zyrtec [Cetirizine] Itching    Citrus Other (See Comments) and Unknown (See Comments)    Lactulose Itching, Unknown - High Severity, Nausea Only, Rash and Unknown (See Comments)     HAS SULFA IN IT    Paroxetine Palpitations    Sertraline Palpitations    Zoloft [Sertraline Hcl] Palpitations       Medication Review:     Current Outpatient Medications:     acetaminophen (TYLENOL) 500 MG tablet, Take 1 tablet by mouth. prn, Disp: , Rfl:     allopurinol (ZYLOPRIM) 100 MG tablet, 1 po qday w/ 300mg qday= 400mg qday, Disp: 90 tablet, Rfl: 0    allopurinol (ZYLOPRIM) 300 MG tablet, 1 po qday w/ 100mg qday = 400mg qday, Disp: 90 tablet, Rfl: 0    aspirin 81 MG tablet, Take 1 tablet by mouth Daily., Disp: , Rfl:     baclofen (LIORESAL) 10 MG tablet, TAKE 1 TABLET EVERY NIGHT AS NEEDED FOR MUSCLE SPASM(S), Disp: 30 tablet, Rfl: 11    busPIRone (BUSPAR) 5 MG tablet, TAKE 1 TABLET THREE TIMES DAILY, Disp: 270 tablet, Rfl: 3    citalopram (CeleXA) 10 MG tablet, Take 0.5 tablets by mouth Every Night.,  Disp: 30 tablet, Rfl: 0    cloNIDine (CATAPRES-TTS) 0.2 MG/24HR patch, Place 1 patch on the skin as directed by provider 1 (One) Time Per Week., Disp: 12 patch, Rfl: 0    diphenhydrAMINE (BENADRYL) 25 MG tablet, Take 1 tablet by mouth Every Night., Disp: , Rfl:     hyoscyamine (LEVSIN) 0.125 MG SL tablet, 1-2 po q4hrs prn abd bloating and cramping, Disp: 40 tablet, Rfl: 1    Lidocaine HCl-Benzyl Alcohol (Salonpas Lidocaine Plus) 4-10 % cream, Apply  topically., Disp: , Rfl:     Lutein 20 MG capsule, Take 1 tablet by mouth Daily., Disp: , Rfl:     meclizine (ANTIVERT) 12.5 MG tablet, TAKE 1 TABLET THREE TIMES DAILY AS NEEDED FOR DIZZINESS, Disp: 30 tablet, Rfl: 1    methylPREDNISolone (MEDROL) 4 MG dose pack, Take as directed on package instructions., Disp: 21 tablet, Rfl: 0    metoprolol succinate XL (Toprol XL) 100 MG 24 hr tablet, Take 1 tablet by mouth 2 (Two) Times a Day., Disp: 90 tablet, Rfl: 1    Multiple Vitamins-Minerals (Multivitamin Adults 50+) tablet, Take 1 tablet by mouth Daily., Disp: , Rfl:     mycophenolate (MYFORTIC) 360 MG tablet delayed-release EC tablet, Take 1 tablet by mouth Every 12 (Twelve) Hours., Disp: , Rfl:     ondansetron (Zofran) 4 MG tablet, Take 1 tablet by mouth Every 8 (Eight) Hours As Needed for Nausea or Vomiting. prn, Disp: 30 tablet, Rfl: 1    oxybutynin XL (DITROPAN-XL) 10 MG 24 hr tablet, Take 1 tablet by mouth Daily. For overactive bladder, Disp: , Rfl:     pantoprazole (PROTONIX) 40 MG EC tablet, TAKE 1 TABLET EVERY DAY, Disp: 90 tablet, Rfl: 0    tacrolimus (PROGRAF) 0.5 MG capsule, Take 10 capsules by mouth 2 (Two) Times a Day. 1 po qam and 1 po qpm, Disp: , Rfl:     traMADol (ULTRAM) 50 MG tablet, Take 1 tablet by mouth Every 8 (Eight) Hours As Needed for Moderate Pain., Disp: 30 tablet, Rfl: 0    triamcinolone (KENALOG) 0.1 % cream, Apply 1 Application topically to the appropriate area as directed 2 (Two) Times a Day As Needed for Irritation., Disp: 45 g, Rfl:  0    Family History:  Family History   Problem Relation Age of Onset    Diabetes Sister         Type II    Heart disease Sister     Hypertension Sister     No Known Problems Brother     Hemochromatosis Other        Past Medical History:  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 - Dr Ritter [facility]; CBC/Prograf/CMP/LIPIDS/A1C    MAMMO     NEG = 2021/ 2024    Meniere's disease     OAB     Osteoporosis     PUD        Past Surgical History:  Past Surgical History:   Procedure Laterality Date    BREAST LUMPECTOMY Left     Benign    CATARACT EXTRACTION WITH INTRAOCULAR LENS IMPLANT Left     COLONOSCOPY      Polyps = 2015/ , rech    GSI    ERCP W/ PLASTIC STENT PLACEMENT      Liver Stent    LIVER TRANSPLANTATION      [place]    SKIN CANCER EXCISION      Rt UE    TOTAL ABDOMINAL HYSTERECTOMY         Social History:  Social History     Socioeconomic History    Marital status:    Tobacco Use    Smoking status: Former     Current packs/day: 0.00     Average packs/day: 0.3 packs/day for 1 year (0.3 ttl pk-yrs)     Types: Cigarettes     Start date:      Quit date:      Years since quittin.5     Passive exposure: Past    Smokeless tobacco: Never    Tobacco comments:     Quit .    Vaping Use    Vaping status: Never Used   Substance and Sexual Activity    Alcohol use: No     Comment: very rarely     Drug use: No    Sexual activity: Defer       Review of Systems:  The following systems were reviewed as they relate to the cardiovascular system: Constitutional, Eyes, ENT, Cardiovascular, Respiratory, Gastrointestinal, Integumentary, Neurological, Psychiatric, Hematologic, Endocrine,  Musculoskeletal, and Genitourinary. The pertinent cardiovascular findings are reported above with all other cardiovascular points within those systems being negative.    Diagnostic Study Review:     Current Electrocardiogram:  Procedures    Laboratory Data:  Lab Results   Component Value Date    GLUCOSE 111 (H) 08/15/2024    BUN 25 (H) 08/15/2024    CREATININE 1.09 (H) 08/15/2024    EGFRIFNONA 31 (L) 04/20/2020    BCR 22.9 08/15/2024    K 4.3 08/15/2024    CO2 23.6 08/15/2024    CALCIUM 9.6 08/15/2024    ALBUMIN 5.0 08/15/2024    AST 22 08/15/2024    ALT 19 08/15/2024     Lab Results   Component Value Date    GLUCOSE 111 (H) 08/15/2024    CALCIUM 9.6 08/15/2024     08/15/2024    K 4.3 08/15/2024    CO2 23.6 08/15/2024     08/15/2024    BUN 25 (H) 08/15/2024    CREATININE 1.09 (H) 08/15/2024    EGFRIFNONA 31 (L) 04/20/2020    BCR 22.9 08/15/2024    ANIONGAP 15.4 (H) 08/15/2024     Lab Results   Component Value Date    WBC 5.04 09/03/2024    HGB 11.7 (L) 09/03/2024    HCT 37.1 09/03/2024    MCV 93.2 09/03/2024     (L) 09/03/2024     Lab Results   Component Value Date    CHLPL 161 09/03/2024    TRIG 917 (H) 09/03/2024    HDL 23 (L) 09/03/2024    LDL UNABLE TO CALCULATE 09/03/2024     Lab Results   Component Value Date    HGBA1C 5.4 05/15/2025     Lab Results   Component Value Date    INR 0.99 08/15/2024    PROTIME 10.8 08/15/2024       Most Recent Echo:  Results for orders placed during the hospital encounter of 08/18/23    Adult Transthoracic Echo Complete W/ Cont if Necessary Per Protocol    Interpretation Summary    Left ventricular systolic function is normal. Calculated left ventricular EF = 63%    Left ventricular wall thickness is consistent with mild concentric hypertrophy.    Left ventricular diastolic function is consistent with (grade I) impaired relaxation.    The left atrial cavity is mildly dilated.    Estimated right ventricular systolic pressure from tricuspid regurgitation is normal  "(<35 mmHg).       Most Recent Stress Test:  Results for orders placed during the hospital encounter of 08/18/23    Stress Test With Myocardial Perfusion One Day    Interpretation Summary    Myocardial perfusion imaging indicates a normal myocardial perfusion study with no evidence of ischemia.    Left ventricular ejection fraction is normal (Calculated EF = 70%).    Impressions are consistent with a low risk study.       Most Recent Cardiac Catheterization:   No results found for this or any previous visit.       NOTE: The following portions of the patient's note were reviewed, confirmed and/or updated this visit as appropriate: History of present illness/Interval history, physical examination, assessment & plan, allergies, current medications, past family history, past medical history, past social history, past surgical history and problem list.    Labs pertinent to today's visit on 06/27/2025 (including but not limited to CBC, CMP, and lipid profiles) were requested from the patient's primary care provider/hospital/clinical laboratory.  If the labs were available for the visit, they were reviewed with the patient.  If they were not available, when received, special interest will be made to the labs pertinent to this visit.  The patient's most recent \"in-house\" labs are noted below and have been reviewed.  Outside labs pertinent to this visit are scanned into the record and have been reviewed.    Discussions held today, 06/27/2025,regarding procedures included risk, benefits, and options including but not limited to: Death, MI, stroke, pain, bleeding, infection, and possible need for vascular/thoracic/cardiothoracic surgery.    Copied information within this note was reviewed and is current as of 06/27/2025.    Assessment and plan noted herein represents the current plan of care as of 06/27/2025.    Significant resources from our office and staff are inherent in engaging this patient in a continuous and active " collaborative plan of care related to their chronic cardiovascular conditions outlined herein.  The management of these conditions requires the direction of our service with specialized clinical knowledge, skills, and experience.  This collaborative care includes but is not limited to patient education, expectations and responsibilities, shared decision making around therapeutic goals, and shared commitments to achieve those goals.

## 2025-07-14 RX ORDER — PANTOPRAZOLE SODIUM 40 MG/1
40 TABLET, DELAYED RELEASE ORAL DAILY
Qty: 90 TABLET | Refills: 0 | Status: SHIPPED | OUTPATIENT
Start: 2025-07-14

## 2025-07-14 NOTE — TELEPHONE ENCOUNTER
Rx Refill Note  Requested Prescriptions     Pending Prescriptions Disp Refills    pantoprazole (PROTONIX) 40 MG EC tablet [Pharmacy Med Name: Pantoprazole Sodium Oral Tablet Delayed Release 40 MG] 90 tablet 3     Sig: TAKE 1 TABLET EVERY DAY      Last office visit with prescribing clinician: 6/26/2025   Last telemedicine visit with prescribing clinician: Visit date not found   Next office visit with prescribing clinician: 8/19/2025     LIPID PANEL (04/14/2025 11:24)   COMPREHENSIVE METABOLIC PANEL (04/14/2025 11:24)   CBC AND DIFFERENTIAL (04/14/2025 11:24)                     Would you like a call back once the refill request has been completed: [] Yes [] No    If the office needs to give you a call back, can they leave a voicemail: [] Yes [] No    Kiki Cid CMA  07/14/25, 16:55 EDT

## 2025-07-15 DIAGNOSIS — M54.2 CERVICALGIA: ICD-10-CM

## 2025-07-15 RX ORDER — TRAMADOL HYDROCHLORIDE 50 MG/1
50 TABLET ORAL EVERY 8 HOURS PRN
Qty: 30 TABLET | Refills: 0 | Status: SHIPPED | OUTPATIENT
Start: 2025-07-15

## 2025-07-15 NOTE — TELEPHONE ENCOUNTER
Caller: Christal Romo    Relationship: Self    Best call back number: [phone]     Requested Prescriptions:   Requested Prescriptions     Pending Prescriptions Disp Refills    traMADol (ULTRAM) 50 MG tablet 30 tablet 0     Sig: Take 1 tablet by mouth Every 8 (Eight) Hours As Needed for Moderate Pain.        Pharmacy where request should be sent: [facility] [id number] [address] [facility] [address] [place] [address] - [phone] [place] [phone] FX     Last office visit with prescribing clinician: 6/26/2025   Last telemedicine visit with prescribing clinician: Visit date not found   Next office visit with prescribing clinician: 8/19/2025     Additional details provided by patient:     Does the patient have less than a 3 day supply:  [x] Yes  [] No    April Uli Rodriguez Rep   07/15/25 14:41 EDT

## 2025-07-28 DIAGNOSIS — I10 ELEVATED BLOOD PRESSURE READING WITH DIAGNOSIS OF HYPERTENSION: ICD-10-CM

## 2025-07-29 DIAGNOSIS — I10 ESSENTIAL HYPERTENSION: ICD-10-CM

## 2025-07-29 RX ORDER — METOPROLOL TARTRATE 100 MG/1
100 TABLET ORAL 2 TIMES DAILY
Qty: 60 TABLET | Refills: 11 | Status: SHIPPED | OUTPATIENT
Start: 2025-07-29 | End: 2025-07-31 | Stop reason: SDUPTHER

## 2025-07-29 RX ORDER — CLONIDINE 0.2 MG/24H
1 PATCH, EXTENDED RELEASE TRANSDERMAL WEEKLY
Qty: 12 PATCH | Refills: 0 | Status: SHIPPED | OUTPATIENT
Start: 2025-07-29

## 2025-07-29 NOTE — TELEPHONE ENCOUNTER
Rx Refill Note  Requested Prescriptions     Pending Prescriptions Disp Refills    cloNIDine (CATAPRES-TTS) 0.2 MG/24HR patch [Pharmacy Med Name: CLONIDINE 0.2 MG/24HR Transdermal Patch Weekly] 12 patch 3     Sig: PLACE 1 PATCH ON THE SKIN AS DIRECTED BY PROVIDER 1 (ONE) TIME PER WEEK.      Last office visit with prescribing clinician: 6/26/2025   Last telemedicine visit with prescribing clinician: Visit date not found   Next office visit with prescribing clinician: 8/19/2025        LIPID PANEL (04/14/2025 11:24)                  Would you like a call back once the refill request has been completed: [] Yes [] No    If the office needs to give you a call back, can they leave a voicemail: [] Yes [] No    Linda Adam, RT  07/29/25, 08:38 EDT

## 2025-07-31 DIAGNOSIS — I10 ESSENTIAL HYPERTENSION: ICD-10-CM

## 2025-07-31 RX ORDER — METOPROLOL TARTRATE 100 MG/1
100 TABLET ORAL 2 TIMES DAILY
Qty: 60 TABLET | Refills: 11 | Status: SHIPPED | OUTPATIENT
Start: 2025-07-31

## 2025-08-06 DIAGNOSIS — I10 ESSENTIAL HYPERTENSION: ICD-10-CM

## 2025-08-06 RX ORDER — METOPROLOL TARTRATE 100 MG/1
100 TABLET ORAL 2 TIMES DAILY
Qty: 60 TABLET | Refills: 11 | Status: SHIPPED | OUTPATIENT
Start: 2025-08-06

## 2025-08-07 ENCOUNTER — OFFICE VISIT (OUTPATIENT)
Dept: FAMILY MEDICINE CLINIC | Facility: CLINIC | Age: 80
End: 2025-08-07
Payer: MEDICARE

## 2025-08-07 VITALS
HEIGHT: 63 IN | DIASTOLIC BLOOD PRESSURE: 70 MMHG | RESPIRATION RATE: 12 BRPM | OXYGEN SATURATION: 99 % | BODY MASS INDEX: 23.74 KG/M2 | SYSTOLIC BLOOD PRESSURE: 206 MMHG | HEART RATE: 72 BPM | WEIGHT: 134 LBS

## 2025-08-07 DIAGNOSIS — H04.129 EYE DRYNESS: Primary | ICD-10-CM

## 2025-08-07 PROCEDURE — 1160F RVW MEDS BY RX/DR IN RCRD: CPT | Performed by: FAMILY MEDICINE

## 2025-08-07 PROCEDURE — 1159F MED LIST DOCD IN RCRD: CPT | Performed by: FAMILY MEDICINE

## 2025-08-07 PROCEDURE — 3077F SYST BP >= 140 MM HG: CPT | Performed by: FAMILY MEDICINE

## 2025-08-07 PROCEDURE — 3078F DIAST BP <80 MM HG: CPT | Performed by: FAMILY MEDICINE

## 2025-08-07 PROCEDURE — 1125F AMNT PAIN NOTED PAIN PRSNT: CPT | Performed by: FAMILY MEDICINE

## 2025-08-07 PROCEDURE — 99213 OFFICE O/P EST LOW 20 MIN: CPT | Performed by: FAMILY MEDICINE

## 2025-08-07 RX ORDER — OLOPATADINE HYDROCHLORIDE 1 MG/ML
1 SOLUTION OPHTHALMIC 2 TIMES DAILY PRN
Qty: 5 ML | Refills: 1 | Status: SHIPPED | OUTPATIENT
Start: 2025-08-07

## 2025-08-07 RX ORDER — BACLOFEN 10 MG/1
TABLET ORAL
Qty: 90 TABLET | Refills: 2 | Status: SHIPPED | OUTPATIENT
Start: 2025-08-07

## 2025-08-07 RX ORDER — MINERAL OIL AND WHITE PETROLATUM 150; 830 MG/G; MG/G
1 OINTMENT OPHTHALMIC NIGHTLY PRN
Start: 2025-08-07 | End: 2025-08-07

## 2025-08-07 RX ORDER — MINERAL OIL AND WHITE PETROLATUM 150; 830 MG/G; MG/G
1 OINTMENT OPHTHALMIC NIGHTLY PRN
Qty: 3.5 G | Refills: 0 | Status: SHIPPED | OUTPATIENT
Start: 2025-08-07

## 2025-08-29 ENCOUNTER — TELEPHONE (OUTPATIENT)
Dept: FAMILY MEDICINE CLINIC | Facility: CLINIC | Age: 80
End: 2025-08-29
Payer: COMMERCIAL

## 2025-08-29 DIAGNOSIS — I10 ESSENTIAL HYPERTENSION: ICD-10-CM

## 2025-08-29 RX ORDER — METOPROLOL TARTRATE 100 MG/1
100 TABLET ORAL 2 TIMES DAILY
Qty: 180 TABLET | Refills: 0 | Status: SHIPPED | OUTPATIENT
Start: 2025-08-29